# Patient Record
Sex: FEMALE | Race: WHITE | Employment: OTHER | URBAN - METROPOLITAN AREA
[De-identification: names, ages, dates, MRNs, and addresses within clinical notes are randomized per-mention and may not be internally consistent; named-entity substitution may affect disease eponyms.]

---

## 2017-01-09 RX ORDER — BUTALBITAL, ASPIRIN, AND CAFFEINE 325; 50; 40 MG/1; MG/1; MG/1
CAPSULE ORAL
Qty: 30 CAPSULE | Refills: 1 | OUTPATIENT
Start: 2017-01-09 | End: 2017-02-21 | Stop reason: SDUPTHER

## 2017-02-03 RX ORDER — BUTALBITAL, ASPIRIN, AND CAFFEINE 325; 50; 40 MG/1; MG/1; MG/1
CAPSULE ORAL
Qty: 30 CAPSULE | Refills: 0 | OUTPATIENT
Start: 2017-02-03

## 2017-02-21 ENCOUNTER — TELEPHONE (OUTPATIENT)
Dept: INTERNAL MEDICINE | Age: 62
End: 2017-02-21

## 2017-02-21 ENCOUNTER — OFFICE VISIT (OUTPATIENT)
Dept: ORTHOPEDIC SURGERY | Age: 62
End: 2017-02-21

## 2017-02-21 VITALS
HEIGHT: 65 IN | WEIGHT: 158 LBS | HEART RATE: 83 BPM | BODY MASS INDEX: 26.33 KG/M2 | DIASTOLIC BLOOD PRESSURE: 72 MMHG | SYSTOLIC BLOOD PRESSURE: 143 MMHG

## 2017-02-21 DIAGNOSIS — G89.29 CHRONIC LEFT SHOULDER PAIN: Primary | ICD-10-CM

## 2017-02-21 DIAGNOSIS — M75.81 TENDINITIS OF RIGHT ROTATOR CUFF: ICD-10-CM

## 2017-02-21 DIAGNOSIS — M19.011 ARTHRITIS OF RIGHT ACROMIOCLAVICULAR JOINT: ICD-10-CM

## 2017-02-21 DIAGNOSIS — M75.82 TENDINITIS OF LEFT ROTATOR CUFF: ICD-10-CM

## 2017-02-21 DIAGNOSIS — G89.29 CHRONIC RIGHT SHOULDER PAIN: ICD-10-CM

## 2017-02-21 DIAGNOSIS — M19.012 ARTHRITIS OF LEFT ACROMIOCLAVICULAR JOINT: ICD-10-CM

## 2017-02-21 DIAGNOSIS — M25.512 CHRONIC LEFT SHOULDER PAIN: Primary | ICD-10-CM

## 2017-02-21 DIAGNOSIS — M75.22 BICEPS TENDINITIS ON LEFT: ICD-10-CM

## 2017-02-21 DIAGNOSIS — M75.21 BICEPS TENDINITIS ON RIGHT: ICD-10-CM

## 2017-02-21 DIAGNOSIS — M47.812 NECK ARTHRITIS: ICD-10-CM

## 2017-02-21 DIAGNOSIS — M25.511 CHRONIC RIGHT SHOULDER PAIN: ICD-10-CM

## 2017-02-21 PROCEDURE — 20610 DRAIN/INJ JOINT/BURSA W/O US: CPT | Performed by: ORTHOPAEDIC SURGERY

## 2017-02-21 RX ORDER — HYDROCHLOROTHIAZIDE 50 MG/1
50 TABLET ORAL DAILY
COMMUNITY
Start: 2017-01-27 | End: 2020-05-27 | Stop reason: ALTCHOICE

## 2017-02-21 RX ORDER — FLUCONAZOLE 150 MG/1
TABLET ORAL
COMMUNITY
Start: 2017-02-16 | End: 2017-03-14

## 2017-02-21 RX ORDER — DULOXETIN HYDROCHLORIDE 30 MG/1
CAPSULE, DELAYED RELEASE ORAL
Qty: 270 CAPSULE | Refills: 1 | Status: SHIPPED | OUTPATIENT
Start: 2017-02-21 | End: 2017-06-13 | Stop reason: SDUPTHER

## 2017-02-21 RX ORDER — BUTALBITAL, ASPIRIN, AND CAFFEINE 325; 50; 40 MG/1; MG/1; MG/1
CAPSULE ORAL
Qty: 30 CAPSULE | Refills: 0 | OUTPATIENT
Start: 2017-02-21 | End: 2017-03-14 | Stop reason: SDUPTHER

## 2017-02-23 ENCOUNTER — TELEPHONE (OUTPATIENT)
Dept: INTERNAL MEDICINE | Age: 62
End: 2017-02-23

## 2017-03-01 ENCOUNTER — TELEPHONE (OUTPATIENT)
Dept: INTERNAL MEDICINE | Age: 62
End: 2017-03-01

## 2017-03-01 RX ORDER — PROMETHAZINE HYDROCHLORIDE AND CODEINE PHOSPHATE 6.25; 1 MG/5ML; MG/5ML
SYRUP ORAL
Qty: 120 ML | Refills: 0 | OUTPATIENT
Start: 2017-03-01 | End: 2017-03-14

## 2017-03-01 RX ORDER — DOXYCYCLINE HYCLATE 100 MG
100 TABLET ORAL 2 TIMES DAILY
Qty: 20 TABLET | Refills: 0 | OUTPATIENT
Start: 2017-03-01 | End: 2017-03-11

## 2017-03-07 RX ORDER — PROCHLORPERAZINE MALEATE 10 MG
TABLET ORAL
Qty: 30 TABLET | Refills: 1 | Status: SHIPPED | OUTPATIENT
Start: 2017-03-07 | End: 2017-03-14

## 2017-03-14 ENCOUNTER — OFFICE VISIT (OUTPATIENT)
Dept: INTERNAL MEDICINE | Age: 62
End: 2017-03-14

## 2017-03-14 VITALS
WEIGHT: 162 LBS | DIASTOLIC BLOOD PRESSURE: 80 MMHG | BODY MASS INDEX: 26.99 KG/M2 | SYSTOLIC BLOOD PRESSURE: 140 MMHG | HEART RATE: 72 BPM | HEIGHT: 65 IN

## 2017-03-14 DIAGNOSIS — I10 ESSENTIAL HYPERTENSION: ICD-10-CM

## 2017-03-14 DIAGNOSIS — J41.1 MUCOPURULENT CHRONIC BRONCHITIS (HCC): ICD-10-CM

## 2017-03-14 DIAGNOSIS — M54.50 ACUTE RIGHT-SIDED LOW BACK PAIN WITHOUT SCIATICA: ICD-10-CM

## 2017-03-14 DIAGNOSIS — F43.29 STRESS AND ADJUSTMENT REACTION: ICD-10-CM

## 2017-03-14 PROCEDURE — 99214 OFFICE O/P EST MOD 30 MIN: CPT | Performed by: INTERNAL MEDICINE

## 2017-03-14 RX ORDER — OMEPRAZOLE 40 MG/1
CAPSULE, DELAYED RELEASE ORAL
Qty: 90 CAPSULE | Refills: 3 | Status: SHIPPED | OUTPATIENT
Start: 2017-03-14 | End: 2017-09-12 | Stop reason: SDUPTHER

## 2017-03-14 RX ORDER — DIAZEPAM 5 MG/1
TABLET ORAL
Qty: 30 TABLET | Refills: 2 | Status: SHIPPED | OUTPATIENT
Start: 2017-03-14 | End: 2017-06-13 | Stop reason: SDUPTHER

## 2017-03-14 RX ORDER — BUTALBITAL, ASPIRIN, AND CAFFEINE 325; 50; 40 MG/1; MG/1; MG/1
CAPSULE ORAL
Qty: 30 CAPSULE | Refills: 2 | Status: SHIPPED | OUTPATIENT
Start: 2017-03-14 | End: 2017-05-08 | Stop reason: SDUPTHER

## 2017-03-14 RX ORDER — CHOLESTYRAMINE 4 G/9G
1 POWDER, FOR SUSPENSION ORAL DAILY
Qty: 30 EACH | Refills: 5 | Status: SHIPPED | OUTPATIENT
Start: 2017-03-14 | End: 2017-06-13 | Stop reason: SDUPTHER

## 2017-03-14 RX ORDER — LEVOTHYROXINE SODIUM 0.1 MG/1
TABLET ORAL
Qty: 90 TABLET | Refills: 3 | Status: SHIPPED | OUTPATIENT
Start: 2017-03-14 | End: 2017-06-13 | Stop reason: SDUPTHER

## 2017-03-14 ASSESSMENT — PATIENT HEALTH QUESTIONNAIRE - PHQ9
SUM OF ALL RESPONSES TO PHQ QUESTIONS 1-9: 0
1. LITTLE INTEREST OR PLEASURE IN DOING THINGS: 0
SUM OF ALL RESPONSES TO PHQ9 QUESTIONS 1 & 2: 0
2. FEELING DOWN, DEPRESSED OR HOPELESS: 0

## 2017-03-14 ASSESSMENT — ENCOUNTER SYMPTOMS
SORE THROAT: 0
BACK PAIN: 1
COUGH: 1

## 2017-04-18 ENCOUNTER — OFFICE VISIT (OUTPATIENT)
Dept: PULMONOLOGY | Age: 62
End: 2017-04-18

## 2017-04-18 VITALS
BODY MASS INDEX: 26.99 KG/M2 | HEIGHT: 65 IN | DIASTOLIC BLOOD PRESSURE: 82 MMHG | WEIGHT: 162 LBS | RESPIRATION RATE: 18 BRPM | SYSTOLIC BLOOD PRESSURE: 118 MMHG | OXYGEN SATURATION: 96 % | HEART RATE: 96 BPM

## 2017-04-18 DIAGNOSIS — J45.901 EXACERBATION OF ASTHMA: ICD-10-CM

## 2017-04-18 PROCEDURE — 99213 OFFICE O/P EST LOW 20 MIN: CPT | Performed by: INTERNAL MEDICINE

## 2017-04-18 RX ORDER — PROMETHAZINE HYDROCHLORIDE AND CODEINE PHOSPHATE 6.25; 1 MG/5ML; MG/5ML
SYRUP ORAL
Qty: 240 ML | Refills: 0 | Status: SHIPPED | OUTPATIENT
Start: 2017-04-18 | End: 2017-06-13

## 2017-04-18 RX ORDER — PREDNISONE 10 MG/1
TABLET ORAL
Qty: 28 TABLET | Refills: 0 | Status: SHIPPED | OUTPATIENT
Start: 2017-04-18 | End: 2017-06-13

## 2017-05-08 RX ORDER — DICYCLOMINE HCL 20 MG
TABLET ORAL
Qty: 120 TABLET | Refills: 4 | OUTPATIENT
Start: 2017-05-08 | End: 2017-09-12 | Stop reason: SDUPTHER

## 2017-05-08 RX ORDER — PROCHLORPERAZINE MALEATE 10 MG
TABLET ORAL
Qty: 30 TABLET | Refills: 0 | OUTPATIENT
Start: 2017-05-08 | End: 2017-06-13 | Stop reason: SDUPTHER

## 2017-05-08 RX ORDER — BUTALBITAL, ASPIRIN, AND CAFFEINE 325; 50; 40 MG/1; MG/1; MG/1
CAPSULE ORAL
Qty: 30 CAPSULE | Refills: 1 | OUTPATIENT
Start: 2017-05-08 | End: 2017-06-13 | Stop reason: SDUPTHER

## 2017-06-09 RX ORDER — ALPRAZOLAM 0.5 MG/1
TABLET ORAL
Qty: 30 TABLET | Refills: 0 | OUTPATIENT
Start: 2017-06-09 | End: 2017-12-05

## 2017-06-13 ENCOUNTER — OFFICE VISIT (OUTPATIENT)
Dept: INTERNAL MEDICINE | Age: 62
End: 2017-06-13

## 2017-06-13 VITALS
DIASTOLIC BLOOD PRESSURE: 80 MMHG | HEIGHT: 65 IN | BODY MASS INDEX: 28.32 KG/M2 | WEIGHT: 170 LBS | HEART RATE: 64 BPM | SYSTOLIC BLOOD PRESSURE: 128 MMHG

## 2017-06-13 DIAGNOSIS — F41.9 ANXIETY: ICD-10-CM

## 2017-06-13 DIAGNOSIS — G89.29 CHRONIC LEFT SHOULDER PAIN: ICD-10-CM

## 2017-06-13 DIAGNOSIS — M25.512 CHRONIC LEFT SHOULDER PAIN: ICD-10-CM

## 2017-06-13 DIAGNOSIS — L98.9 SKIN LESION OF FOOT: ICD-10-CM

## 2017-06-13 DIAGNOSIS — M54.50 ACUTE RIGHT-SIDED LOW BACK PAIN WITHOUT SCIATICA: ICD-10-CM

## 2017-06-13 PROCEDURE — 99214 OFFICE O/P EST MOD 30 MIN: CPT | Performed by: INTERNAL MEDICINE

## 2017-06-13 RX ORDER — LEVOTHYROXINE SODIUM 0.1 MG/1
TABLET ORAL
Qty: 90 TABLET | Refills: 3 | Status: SHIPPED | OUTPATIENT
Start: 2017-06-13 | End: 2017-12-05

## 2017-06-13 RX ORDER — DULOXETIN HYDROCHLORIDE 30 MG/1
CAPSULE, DELAYED RELEASE ORAL
Qty: 270 CAPSULE | Refills: 3 | Status: SHIPPED | OUTPATIENT
Start: 2017-06-13 | End: 2018-07-08 | Stop reason: SDUPTHER

## 2017-06-13 RX ORDER — DIAZEPAM 5 MG/1
TABLET ORAL
Qty: 30 TABLET | Refills: 2 | Status: SHIPPED | OUTPATIENT
Start: 2017-06-13 | End: 2017-09-12 | Stop reason: SDUPTHER

## 2017-06-13 RX ORDER — PROCHLORPERAZINE MALEATE 10 MG
TABLET ORAL
Qty: 30 TABLET | Refills: 1 | Status: SHIPPED | OUTPATIENT
Start: 2017-06-13 | End: 2017-08-15 | Stop reason: SDUPTHER

## 2017-06-13 RX ORDER — CHOLESTYRAMINE 4 G/9G
1 POWDER, FOR SUSPENSION ORAL DAILY
Qty: 90 EACH | Refills: 3 | Status: SHIPPED | OUTPATIENT
Start: 2017-06-13 | End: 2018-05-15

## 2017-06-13 RX ORDER — BUTALBITAL, ASPIRIN, AND CAFFEINE 325; 50; 40 MG/1; MG/1; MG/1
CAPSULE ORAL
Qty: 30 CAPSULE | Refills: 2 | Status: SHIPPED | OUTPATIENT
Start: 2017-06-13 | End: 2017-08-15 | Stop reason: SDUPTHER

## 2017-06-13 ASSESSMENT — PATIENT HEALTH QUESTIONNAIRE - PHQ9
1. LITTLE INTEREST OR PLEASURE IN DOING THINGS: 0
SUM OF ALL RESPONSES TO PHQ9 QUESTIONS 1 & 2: 0
SUM OF ALL RESPONSES TO PHQ QUESTIONS 1-9: 0
2. FEELING DOWN, DEPRESSED OR HOPELESS: 0

## 2017-06-13 ASSESSMENT — ENCOUNTER SYMPTOMS
RESPIRATORY NEGATIVE: 1
BACK PAIN: 1
ROS SKIN COMMENTS: FOOT LESION

## 2017-07-20 ENCOUNTER — HOSPITAL ENCOUNTER (OUTPATIENT)
Dept: WOMENS IMAGING | Age: 62
Discharge: HOME | End: 2017-07-20
Attending: INTERNAL MEDICINE | Admitting: INTERNAL MEDICINE

## 2017-07-20 ENCOUNTER — TELEPHONE (OUTPATIENT)
Dept: INTERNAL MEDICINE | Age: 62
End: 2017-07-20

## 2017-07-20 DIAGNOSIS — M81.0 OSTEOPOROSIS: Primary | ICD-10-CM

## 2017-07-20 DIAGNOSIS — Z12.31 ENCOUNTER FOR SCREENING MAMMOGRAM FOR HIGH-RISK PATIENT: ICD-10-CM

## 2017-07-25 ENCOUNTER — HOSPITAL ENCOUNTER (OUTPATIENT)
Dept: WOMENS IMAGING | Age: 62
Discharge: HOME | End: 2017-07-25
Attending: OBSTETRICS & GYNECOLOGY | Admitting: OBSTETRICS & GYNECOLOGY

## 2017-07-25 DIAGNOSIS — M81.0 AGE RELATED OSTEOPOROSIS: ICD-10-CM

## 2017-07-25 DIAGNOSIS — M81.0 AGE-RELATED OSTEOPOROSIS WITHOUT CURRENT PATHOLOGICAL FRACTURE: ICD-10-CM

## 2017-07-27 ENCOUNTER — OFFICE VISIT (OUTPATIENT)
Dept: ORTHOPEDIC SURGERY | Age: 62
End: 2017-07-27

## 2017-07-27 VITALS
DIASTOLIC BLOOD PRESSURE: 66 MMHG | HEIGHT: 65 IN | HEART RATE: 72 BPM | BODY MASS INDEX: 28.32 KG/M2 | WEIGHT: 169.97 LBS | SYSTOLIC BLOOD PRESSURE: 113 MMHG

## 2017-07-27 DIAGNOSIS — M47.816 SPONDYLOSIS OF LUMBAR REGION WITHOUT MYELOPATHY OR RADICULOPATHY: ICD-10-CM

## 2017-07-27 DIAGNOSIS — M54.50 PAIN OF LUMBAR SPINE: ICD-10-CM

## 2017-07-27 DIAGNOSIS — M51.26 HNP (HERNIATED NUCLEUS PULPOSUS), LUMBAR: ICD-10-CM

## 2017-07-27 DIAGNOSIS — M51.36 DDD (DEGENERATIVE DISC DISEASE), LUMBAR: Primary | ICD-10-CM

## 2017-07-27 PROCEDURE — 72100 X-RAY EXAM L-S SPINE 2/3 VWS: CPT | Performed by: PHYSICAL MEDICINE & REHABILITATION

## 2017-07-27 PROCEDURE — 99243 OFF/OP CNSLTJ NEW/EST LOW 30: CPT | Performed by: PHYSICAL MEDICINE & REHABILITATION

## 2017-07-27 RX ORDER — METHYLPREDNISOLONE 4 MG/1
TABLET ORAL
Qty: 1 KIT | Refills: 0 | Status: SHIPPED | OUTPATIENT
Start: 2017-07-27 | End: 2017-08-02

## 2017-07-27 RX ORDER — ACETAMINOPHEN AND CODEINE PHOSPHATE 300; 30 MG/1; MG/1
1 TABLET ORAL 2 TIMES DAILY PRN
Qty: 14 TABLET | Refills: 0 | Status: CANCELLED | OUTPATIENT
Start: 2017-07-27

## 2017-08-15 RX ORDER — BUTALBITAL, ASPIRIN, AND CAFFEINE 325; 50; 40 MG/1; MG/1; MG/1
CAPSULE ORAL
Qty: 30 CAPSULE | Refills: 2 | OUTPATIENT
Start: 2017-08-15 | End: 2017-09-12 | Stop reason: SDUPTHER

## 2017-08-15 RX ORDER — PROCHLORPERAZINE MALEATE 10 MG
TABLET ORAL
Qty: 30 TABLET | Refills: 0 | Status: SHIPPED | OUTPATIENT
Start: 2017-08-15 | End: 2017-09-12 | Stop reason: SDUPTHER

## 2017-08-22 ENCOUNTER — OFFICE VISIT (OUTPATIENT)
Dept: PULMONOLOGY | Age: 62
End: 2017-08-22

## 2017-08-22 VITALS
SYSTOLIC BLOOD PRESSURE: 122 MMHG | OXYGEN SATURATION: 96 % | HEART RATE: 60 BPM | BODY MASS INDEX: 28.61 KG/M2 | WEIGHT: 178 LBS | HEIGHT: 66 IN | DIASTOLIC BLOOD PRESSURE: 70 MMHG | RESPIRATION RATE: 18 BRPM

## 2017-08-22 DIAGNOSIS — J20.9 ACUTE BRONCHITIS, UNSPECIFIED ORGANISM: ICD-10-CM

## 2017-08-22 DIAGNOSIS — J45.30 MILD PERSISTENT ASTHMA WITHOUT COMPLICATION: Primary | ICD-10-CM

## 2017-08-22 PROCEDURE — 99213 OFFICE O/P EST LOW 20 MIN: CPT | Performed by: INTERNAL MEDICINE

## 2017-08-22 RX ORDER — ALBUTEROL SULFATE 90 UG/1
2 AEROSOL, METERED RESPIRATORY (INHALATION) EVERY 4 HOURS PRN
Qty: 1 INHALER | Refills: 5 | Status: SHIPPED | OUTPATIENT
Start: 2017-08-22 | End: 2019-02-06 | Stop reason: SDUPTHER

## 2017-08-23 ENCOUNTER — HOSPITAL ENCOUNTER (OUTPATIENT)
Dept: OTHER | Age: 62
Discharge: HOME | End: 2017-08-31
Attending: PHYSICAL MEDICINE & REHABILITATION | Admitting: PHYSICAL MEDICINE & REHABILITATION

## 2017-08-29 ENCOUNTER — HOSPITAL ENCOUNTER (OUTPATIENT)
Dept: PHYSICAL THERAPY | Age: 62
Discharge: HOME | End: 2017-08-30
Admitting: PHYSICAL MEDICINE & REHABILITATION

## 2017-09-07 ENCOUNTER — OFFICE VISIT (OUTPATIENT)
Dept: ORTHOPEDIC SURGERY | Age: 62
End: 2017-09-07

## 2017-09-07 ENCOUNTER — HOSPITAL ENCOUNTER (OUTPATIENT)
Dept: PHYSICAL THERAPY | Age: 62
Discharge: HOME | End: 2017-09-08
Admitting: PHYSICAL MEDICINE & REHABILITATION

## 2017-09-07 VITALS
HEIGHT: 64 IN | SYSTOLIC BLOOD PRESSURE: 149 MMHG | WEIGHT: 169 LBS | HEART RATE: 97 BPM | BODY MASS INDEX: 28.85 KG/M2 | DIASTOLIC BLOOD PRESSURE: 67 MMHG

## 2017-09-07 DIAGNOSIS — M47.816 LUMBAR FACET ARTHROPATHY: Primary | ICD-10-CM

## 2017-09-07 PROCEDURE — 99213 OFFICE O/P EST LOW 20 MIN: CPT | Performed by: NURSE PRACTITIONER

## 2017-09-07 RX ORDER — MELOXICAM 15 MG/1
15 TABLET ORAL DAILY
Qty: 30 TABLET | Refills: 1 | Status: SHIPPED | OUTPATIENT
Start: 2017-09-07 | End: 2017-11-30 | Stop reason: SDUPTHER

## 2017-09-12 ENCOUNTER — OFFICE VISIT (OUTPATIENT)
Dept: INTERNAL MEDICINE | Age: 62
End: 2017-09-12

## 2017-09-12 ENCOUNTER — TELEPHONE (OUTPATIENT)
Dept: INTERNAL MEDICINE | Age: 62
End: 2017-09-12

## 2017-09-12 VITALS
DIASTOLIC BLOOD PRESSURE: 80 MMHG | SYSTOLIC BLOOD PRESSURE: 130 MMHG | HEART RATE: 72 BPM | WEIGHT: 179 LBS | HEIGHT: 65 IN | BODY MASS INDEX: 29.82 KG/M2

## 2017-09-12 DIAGNOSIS — M81.0 OSTEOPOROSIS, UNSPECIFIED OSTEOPOROSIS TYPE, UNSPECIFIED PATHOLOGICAL FRACTURE PRESENCE: ICD-10-CM

## 2017-09-12 DIAGNOSIS — E03.9 HYPOTHYROIDISM, UNSPECIFIED TYPE: Chronic | ICD-10-CM

## 2017-09-12 DIAGNOSIS — Z00.00 PREVENTATIVE HEALTH CARE: Primary | ICD-10-CM

## 2017-09-12 DIAGNOSIS — R11.0 NAUSEA: ICD-10-CM

## 2017-09-12 DIAGNOSIS — I10 ESSENTIAL HYPERTENSION: ICD-10-CM

## 2017-09-12 DIAGNOSIS — F43.29 STRESS AND ADJUSTMENT REACTION: ICD-10-CM

## 2017-09-12 DIAGNOSIS — F41.9 ANXIETY: ICD-10-CM

## 2017-09-12 PROBLEM — J45.901 EXACERBATION OF ASTHMA: Status: RESOLVED | Noted: 2017-04-18 | Resolved: 2017-09-12

## 2017-09-12 PROBLEM — J20.9 ACUTE BRONCHITIS: Status: RESOLVED | Noted: 2017-08-22 | Resolved: 2017-09-12

## 2017-09-12 PROBLEM — M54.50 ACUTE RIGHT-SIDED LOW BACK PAIN WITHOUT SCIATICA: Status: RESOLVED | Noted: 2017-03-14 | Resolved: 2017-09-12

## 2017-09-12 PROBLEM — L98.9 SKIN LESION OF FOOT: Status: RESOLVED | Noted: 2017-06-13 | Resolved: 2017-09-12

## 2017-09-12 PROCEDURE — 99213 OFFICE O/P EST LOW 20 MIN: CPT | Performed by: INTERNAL MEDICINE

## 2017-09-12 RX ORDER — OMEPRAZOLE 40 MG/1
CAPSULE, DELAYED RELEASE ORAL
Qty: 90 CAPSULE | Refills: 3 | Status: SHIPPED | OUTPATIENT
Start: 2017-09-12 | End: 2017-09-12 | Stop reason: SDUPTHER

## 2017-09-12 RX ORDER — PROCHLORPERAZINE MALEATE 10 MG
TABLET ORAL
Qty: 30 TABLET | Refills: 1 | Status: SHIPPED | OUTPATIENT
Start: 2017-09-12 | End: 2017-10-23 | Stop reason: SDUPTHER

## 2017-09-12 RX ORDER — DICYCLOMINE HCL 20 MG
TABLET ORAL
Qty: 360 TABLET | Refills: 3 | Status: SHIPPED | OUTPATIENT
Start: 2017-09-12 | End: 2017-09-12 | Stop reason: SDUPTHER

## 2017-09-12 RX ORDER — BUTALBITAL, ASPIRIN, AND CAFFEINE 325; 50; 40 MG/1; MG/1; MG/1
CAPSULE ORAL
Qty: 30 CAPSULE | Refills: 2 | Status: SHIPPED | OUTPATIENT
Start: 2017-09-12 | End: 2017-11-08 | Stop reason: SDUPTHER

## 2017-09-12 RX ORDER — DIAZEPAM 5 MG/1
TABLET ORAL
Qty: 30 TABLET | Refills: 2 | Status: SHIPPED | OUTPATIENT
Start: 2017-09-12 | End: 2017-12-05 | Stop reason: SDUPTHER

## 2017-09-12 RX ORDER — DICYCLOMINE HCL 20 MG
TABLET ORAL
Qty: 360 TABLET | Refills: 3 | Status: SHIPPED | OUTPATIENT
Start: 2017-09-12 | End: 2018-08-07 | Stop reason: SDUPTHER

## 2017-09-12 RX ORDER — OMEPRAZOLE 40 MG/1
CAPSULE, DELAYED RELEASE ORAL
Qty: 90 CAPSULE | Refills: 3 | Status: SHIPPED | OUTPATIENT
Start: 2017-09-12 | End: 2018-10-01 | Stop reason: SDUPTHER

## 2017-09-12 ASSESSMENT — ENCOUNTER SYMPTOMS
ABDOMINAL PAIN: 0
VOMITING: 0
NAUSEA: 1
TROUBLE SWALLOWING: 0

## 2017-10-05 ENCOUNTER — TELEPHONE (OUTPATIENT)
Dept: ORTHOPEDIC SURGERY | Age: 62
End: 2017-10-05

## 2017-10-05 NOTE — TELEPHONE ENCOUNTER
Pt calling to let Erin know that the meloxicam and home therapy are helping at this time so she is going to hold off on getting an MRI or injections   NOREEN

## 2017-10-31 ENCOUNTER — TELEPHONE (OUTPATIENT)
Dept: PULMONOLOGY | Age: 62
End: 2017-10-31

## 2017-11-01 NOTE — TELEPHONE ENCOUNTER
Dr Caryle Lagos,  Please sign off on Breo. Thank you. Called patient and advised of rx and sample(s)  Left message on vm that everything is up at the .

## 2017-11-03 RX ORDER — FLUTICASONE FUROATE AND VILANTEROL 100; 25 UG/1; UG/1
POWDER RESPIRATORY (INHALATION)
Qty: 2 EACH | Refills: 0 | COMMUNITY
Start: 2017-11-03 | End: 2017-12-05

## 2017-11-08 RX ORDER — BUTALBITAL, ASPIRIN, AND CAFFEINE 325; 50; 40 MG/1; MG/1; MG/1
CAPSULE ORAL
Qty: 30 CAPSULE | Refills: 2 | OUTPATIENT
Start: 2017-11-08 | End: 2017-12-05 | Stop reason: SDUPTHER

## 2017-11-28 DIAGNOSIS — Z00.00 PREVENTATIVE HEALTH CARE: ICD-10-CM

## 2017-11-28 DIAGNOSIS — E03.9 HYPOTHYROIDISM, UNSPECIFIED TYPE: Chronic | ICD-10-CM

## 2017-11-28 LAB
A/G RATIO: 1.8 (ref 1.1–2.2)
ALBUMIN SERPL-MCNC: 4.2 G/DL (ref 3.4–5)
ALP BLD-CCNC: 70 U/L (ref 40–129)
ALT SERPL-CCNC: 20 U/L (ref 10–40)
ANION GAP SERPL CALCULATED.3IONS-SCNC: 12 MMOL/L (ref 3–16)
AST SERPL-CCNC: 20 U/L (ref 15–37)
BASOPHILS ABSOLUTE: 0.1 K/UL (ref 0–0.2)
BASOPHILS RELATIVE PERCENT: 1 %
BILIRUB SERPL-MCNC: <0.2 MG/DL (ref 0–1)
BILIRUBIN URINE: NEGATIVE
BLOOD, URINE: NEGATIVE
BUN BLDV-MCNC: 14 MG/DL (ref 7–20)
CALCIUM SERPL-MCNC: 9.9 MG/DL (ref 8.3–10.6)
CHLORIDE BLD-SCNC: 101 MMOL/L (ref 99–110)
CHOLESTEROL, TOTAL: 225 MG/DL (ref 0–199)
CLARITY: CLEAR
CO2: 26 MMOL/L (ref 21–32)
COLOR: YELLOW
CREAT SERPL-MCNC: 0.6 MG/DL (ref 0.6–1.2)
EOSINOPHILS ABSOLUTE: 0.1 K/UL (ref 0–0.6)
EOSINOPHILS RELATIVE PERCENT: 1.2 %
GFR AFRICAN AMERICAN: >60
GFR NON-AFRICAN AMERICAN: >60
GLOBULIN: 2.4 G/DL
GLUCOSE BLD-MCNC: 86 MG/DL (ref 70–99)
GLUCOSE URINE: NEGATIVE MG/DL
HCT VFR BLD CALC: 42 % (ref 36–48)
HDLC SERPL-MCNC: 70 MG/DL (ref 40–60)
HEMOGLOBIN: 14.2 G/DL (ref 12–16)
KETONES, URINE: NEGATIVE MG/DL
LDL CHOLESTEROL CALCULATED: 135 MG/DL
LEUKOCYTE ESTERASE, URINE: NEGATIVE
LYMPHOCYTES ABSOLUTE: 1.3 K/UL (ref 1–5.1)
LYMPHOCYTES RELATIVE PERCENT: 17.7 %
MCH RBC QN AUTO: 31.8 PG (ref 26–34)
MCHC RBC AUTO-ENTMCNC: 33.8 G/DL (ref 31–36)
MCV RBC AUTO: 94.1 FL (ref 80–100)
MICROSCOPIC EXAMINATION: NORMAL
MONOCYTES ABSOLUTE: 0.5 K/UL (ref 0–1.3)
MONOCYTES RELATIVE PERCENT: 6.9 %
NEUTROPHILS ABSOLUTE: 5.4 K/UL (ref 1.7–7.7)
NEUTROPHILS RELATIVE PERCENT: 73.2 %
NITRITE, URINE: NEGATIVE
PDW BLD-RTO: 12.6 % (ref 12.4–15.4)
PH UA: 6
PLATELET # BLD: 280 K/UL (ref 135–450)
PMV BLD AUTO: 8 FL (ref 5–10.5)
POTASSIUM SERPL-SCNC: 4.6 MMOL/L (ref 3.5–5.1)
PROTEIN UA: NEGATIVE MG/DL
RBC # BLD: 4.46 M/UL (ref 4–5.2)
SODIUM BLD-SCNC: 139 MMOL/L (ref 136–145)
SPECIFIC GRAVITY UA: 1.02
T4 FREE: 1.1 NG/DL (ref 0.9–1.8)
TOTAL PROTEIN: 6.6 G/DL (ref 6.4–8.2)
TRIGL SERPL-MCNC: 101 MG/DL (ref 0–150)
TSH SERPL DL<=0.05 MIU/L-ACNC: 3.05 UIU/ML (ref 0.27–4.2)
URINE TYPE: NORMAL
UROBILINOGEN, URINE: 0.2 E.U./DL
VLDLC SERPL CALC-MCNC: 20 MG/DL
WBC # BLD: 7.3 K/UL (ref 4–11)

## 2017-12-05 ENCOUNTER — OFFICE VISIT (OUTPATIENT)
Dept: INTERNAL MEDICINE | Age: 62
End: 2017-12-05

## 2017-12-05 VITALS
SYSTOLIC BLOOD PRESSURE: 138 MMHG | HEIGHT: 64 IN | DIASTOLIC BLOOD PRESSURE: 78 MMHG | WEIGHT: 183 LBS | HEART RATE: 80 BPM | BODY MASS INDEX: 31.24 KG/M2

## 2017-12-05 DIAGNOSIS — Z00.00 PREVENTATIVE HEALTH CARE: ICD-10-CM

## 2017-12-05 DIAGNOSIS — I10 ESSENTIAL HYPERTENSION: Primary | ICD-10-CM

## 2017-12-05 DIAGNOSIS — F41.9 ANXIETY: ICD-10-CM

## 2017-12-05 DIAGNOSIS — M81.0 OSTEOPOROSIS, UNSPECIFIED OSTEOPOROSIS TYPE, UNSPECIFIED PATHOLOGICAL FRACTURE PRESENCE: ICD-10-CM

## 2017-12-05 DIAGNOSIS — E03.9 HYPOTHYROIDISM, UNSPECIFIED TYPE: Chronic | ICD-10-CM

## 2017-12-05 DIAGNOSIS — K21.9 GASTROESOPHAGEAL REFLUX DISEASE, ESOPHAGITIS PRESENCE NOT SPECIFIED: ICD-10-CM

## 2017-12-05 PROBLEM — M25.512 CHRONIC LEFT SHOULDER PAIN: Status: RESOLVED | Noted: 2017-02-21 | Resolved: 2017-12-05

## 2017-12-05 PROBLEM — G89.29 CHRONIC LEFT SHOULDER PAIN: Status: RESOLVED | Noted: 2017-02-21 | Resolved: 2017-12-05

## 2017-12-05 PROCEDURE — 99396 PREV VISIT EST AGE 40-64: CPT | Performed by: INTERNAL MEDICINE

## 2017-12-05 PROCEDURE — 93000 ELECTROCARDIOGRAM COMPLETE: CPT | Performed by: INTERNAL MEDICINE

## 2017-12-05 RX ORDER — LEVOTHYROXINE SODIUM 0.12 MG/1
125 TABLET ORAL DAILY
Qty: 90 TABLET | Refills: 3 | Status: SHIPPED | OUTPATIENT
Start: 2017-12-05 | End: 2018-08-07 | Stop reason: SDUPTHER

## 2017-12-05 RX ORDER — DILTIAZEM HYDROCHLORIDE 60 MG/1
TABLET, FILM COATED ORAL
COMMUNITY
Start: 2017-11-06 | End: 2019-02-04 | Stop reason: SDUPTHER

## 2017-12-05 RX ORDER — BUTALBITAL, ASPIRIN, AND CAFFEINE 325; 50; 40 MG/1; MG/1; MG/1
CAPSULE ORAL
Qty: 30 CAPSULE | Refills: 2 | Status: SHIPPED | OUTPATIENT
Start: 2017-12-05 | End: 2018-02-02 | Stop reason: SDUPTHER

## 2017-12-05 RX ORDER — DIAZEPAM 5 MG/1
TABLET ORAL
Qty: 30 TABLET | Refills: 2 | Status: SHIPPED | OUTPATIENT
Start: 2017-12-05 | End: 2018-05-15 | Stop reason: SDUPTHER

## 2017-12-05 ASSESSMENT — ENCOUNTER SYMPTOMS
RESPIRATORY NEGATIVE: 1
GASTROINTESTINAL NEGATIVE: 1

## 2017-12-05 NOTE — PROGRESS NOTES
SUBJECTIVE:    Patient ID: Terrence Ochoa is an 60 y.o. female. HPI: Patient here today for the f/u of chronic problems -- see Problem List and associated comments. New issues or complaints include (also see Assessment for more details):  Here for routine checkup. Labs reviewed. See problem list.    Review of Systems   Constitutional: Positive for fatigue. HENT: Negative. Eyes: Negative for visual disturbance. Respiratory: Negative. Cardiovascular: Negative. Gastrointestinal: Negative. Genitourinary: Negative. Musculoskeletal: Positive for arthralgias. Skin: Negative for rash and wound. Allergic/Immunologic: Positive for environmental allergies. Neurological: Negative. Hematological: Does not bruise/bleed easily. Psychiatric/Behavioral: Negative for behavioral problems, confusion, decreased concentration, dysphoric mood, sleep disturbance and suicidal ideas. The patient is nervous/anxious. OBJECTIVE:    /78 (Site: Left Arm, Position: Sitting, Cuff Size: Medium Adult)   Pulse 80   Ht 5' 4\" (1.626 m)   Wt 183 lb (83 kg)   BMI 31.41 kg/m²      Physical Exam   Constitutional: She is oriented to person, place, and time. She appears well-developed and well-nourished. HENT:   Head: Normocephalic and atraumatic. Right Ear: External ear normal.   Left Ear: External ear normal.   Mouth/Throat: Oropharynx is clear and moist. No oropharyngeal exudate. Eyes: EOM are normal. Right eye exhibits no discharge. Left eye exhibits no discharge. No scleral icterus. Neck: Normal range of motion. Neck supple. No JVD present. Carotid bruit is not present. No tracheal deviation present. No thyromegaly present. Cardiovascular: Normal rate and regular rhythm. Exam reveals no gallop and no friction rub. Murmur heard. Systolic murmur is present with a grade of 1/6   Pulses:       Carotid pulses are 2+ on the right side, and 2+ on the left side.        Radial pulses are 2+ on the right side, and 2+ on the left side. Posterior tibial pulses are 2+ on the right side, and 2+ on the left side. Pulmonary/Chest: No stridor. No respiratory distress. Abdominal: Soft. Bowel sounds are normal. She exhibits no distension, no abdominal bruit and no mass. There is no hepatosplenomegaly. There is no tenderness. Musculoskeletal: She exhibits no edema. Lymphadenopathy:        Head (right side): No submandibular adenopathy present. Head (left side): No submandibular adenopathy present. She has no cervical adenopathy. Right: No supraclavicular adenopathy present. Left: No supraclavicular adenopathy present. Neurological: She is alert and oriented to person, place, and time. She displays no tremor. No cranial nerve deficit. She exhibits normal muscle tone. Gait normal.   Reflex Scores:       Bicep reflexes are 2+ on the right side and 2+ on the left side. Patellar reflexes are 2+ on the right side and 2+ on the left side. No focal signs   Skin: She is not diaphoretic. No pallor. Psychiatric: She has a normal mood and affect. Her speech is normal and behavior is normal. Judgment and thought content normal. Her mood appears not anxious. Cognition and memory are normal. She does not exhibit a depressed mood. ASSESSMENT:       Encounter Diagnoses   Name Primary?  Essential hypertension Yes    Hypothyroidism, unspecified type     Preventative health care     Gastroesophageal reflux disease, esophagitis presence not specified     Osteoporosis, unspecified osteoporosis type, unspecified pathological fracture presence     Anxiety        Preventative health care  Routine checkup. Labs reviewed. Much of her stress is been decreased at home. She has gained some weight although she is active. She generally complains of fatigue. Nonsmoker. Staying up-to-date on preventive healthcare.     Hypothyroidism  Will increase to 125 to see if this helps with some of her fatigue. Recheck labs in 3 months. Hypertension  BP okay. EKG stable    GERD (gastroesophageal reflux disease)  Controlled    Osteoporosis  DEXA scan shows osteopenia of her hips and some mild osteoporosis in her lumbar spine. She was on Fosamax in the past and she is reluctant for any treatment currently. Agree to repeat DEXA scan in 2 years for comparison. Anxiety  Controlled. Her family has moved out of her house which is helped some the day-to-day stressors. Continue Cymbalta and Valium when necessary. Monitor report done. She is functional and doing well on current medications. PLAN:  See ASSESSMENT for evaluation & PLAN    Orders Placed This Encounter   Procedures    TSH without Reflex     Standing Status:   Future     Standing Expiration Date:   12/5/2018    T4, Free     Standing Status:   Future     Standing Expiration Date:   12/5/2018    EKG 12 Lead     Order Specific Question:   Reason for Exam?     Answer: Other       PSH, PMH, SH and FH reviewed and noted. Recent and past labs, tests and consults also reviewed. Recent or new meds also reviewed.

## 2017-12-05 NOTE — ASSESSMENT & PLAN NOTE
Routine checkup. Labs reviewed. Much of her stress is been decreased at home. She has gained some weight although she is active. She generally complains of fatigue. Nonsmoker. Staying up-to-date on preventive healthcare.

## 2017-12-11 ENCOUNTER — TELEPHONE (OUTPATIENT)
Dept: INTERNAL MEDICINE | Age: 62
End: 2017-12-11

## 2017-12-11 RX ORDER — AZITHROMYCIN 250 MG/1
TABLET, FILM COATED ORAL
Qty: 1 PACKET | Refills: 0 | OUTPATIENT
Start: 2017-12-11 | End: 2017-12-21

## 2017-12-11 RX ORDER — PROMETHAZINE HYDROCHLORIDE AND CODEINE PHOSPHATE 6.25; 1 MG/5ML; MG/5ML
5 SYRUP ORAL EVERY 4 HOURS PRN
Qty: 120 ML | Refills: 0 | OUTPATIENT
Start: 2017-12-11 | End: 2017-12-22 | Stop reason: SDUPTHER

## 2017-12-11 RX ORDER — METHYLPREDNISOLONE 4 MG/1
TABLET ORAL
Qty: 1 KIT | Refills: 0 | OUTPATIENT
Start: 2017-12-11 | End: 2017-12-17

## 2017-12-22 RX ORDER — PROMETHAZINE HYDROCHLORIDE AND CODEINE PHOSPHATE 6.25; 1 MG/5ML; MG/5ML
SYRUP ORAL
Qty: 120 ML | Refills: 0 | Status: SHIPPED | OUTPATIENT
Start: 2017-12-22 | End: 2018-02-02

## 2018-01-16 RX ORDER — PROCHLORPERAZINE MALEATE 10 MG
TABLET ORAL
Qty: 30 TABLET | Refills: 2 | Status: SHIPPED | OUTPATIENT
Start: 2018-01-16 | End: 2018-03-23 | Stop reason: SDUPTHER

## 2018-01-23 ENCOUNTER — TELEPHONE (OUTPATIENT)
Dept: INTERNAL MEDICINE | Age: 63
End: 2018-01-23

## 2018-02-02 ENCOUNTER — OFFICE VISIT (OUTPATIENT)
Dept: INTERNAL MEDICINE | Age: 63
End: 2018-02-02

## 2018-02-02 VITALS
WEIGHT: 183 LBS | SYSTOLIC BLOOD PRESSURE: 118 MMHG | OXYGEN SATURATION: 95 % | BODY MASS INDEX: 30.49 KG/M2 | HEART RATE: 79 BPM | HEIGHT: 65 IN | DIASTOLIC BLOOD PRESSURE: 70 MMHG

## 2018-02-02 DIAGNOSIS — G43.909 MIGRAINE WITHOUT STATUS MIGRAINOSUS, NOT INTRACTABLE, UNSPECIFIED MIGRAINE TYPE: ICD-10-CM

## 2018-02-02 DIAGNOSIS — E03.9 HYPOTHYROIDISM, UNSPECIFIED TYPE: Chronic | ICD-10-CM

## 2018-02-02 DIAGNOSIS — K52.839 MICROSCOPIC COLITIS, UNSPECIFIED MICROSCOPIC COLITIS TYPE: ICD-10-CM

## 2018-02-02 DIAGNOSIS — K21.9 GASTROESOPHAGEAL REFLUX DISEASE, ESOPHAGITIS PRESENCE NOT SPECIFIED: ICD-10-CM

## 2018-02-02 DIAGNOSIS — R19.5 LOOSE STOOLS: ICD-10-CM

## 2018-02-02 PROCEDURE — 99214 OFFICE O/P EST MOD 30 MIN: CPT | Performed by: INTERNAL MEDICINE

## 2018-02-02 RX ORDER — CIPROFLOXACIN 500 MG/1
500 TABLET, FILM COATED ORAL 2 TIMES DAILY
Qty: 20 TABLET | Refills: 0 | Status: SHIPPED | OUTPATIENT
Start: 2018-02-02 | End: 2018-03-12

## 2018-02-02 RX ORDER — BUTALBITAL, ASPIRIN, AND CAFFEINE 325; 50; 40 MG/1; MG/1; MG/1
CAPSULE ORAL
Qty: 30 CAPSULE | Refills: 2 | Status: SHIPPED | OUTPATIENT
Start: 2018-02-02 | End: 2018-04-03 | Stop reason: SDUPTHER

## 2018-02-02 RX ORDER — PREDNISONE 10 MG/1
TABLET ORAL
Qty: 18 TABLET | Refills: 0 | Status: SHIPPED | OUTPATIENT
Start: 2018-02-02 | End: 2018-02-12

## 2018-02-02 ASSESSMENT — ENCOUNTER SYMPTOMS
ABDOMINAL DISTENTION: 0
SORE THROAT: 0
ABDOMINAL PAIN: 0
SHORTNESS OF BREATH: 0
COUGH: 1
WHEEZING: 0
ANAL BLEEDING: 0
DIARRHEA: 1
VOMITING: 0
BLOOD IN STOOL: 0
NAUSEA: 0

## 2018-02-22 ENCOUNTER — TELEPHONE (OUTPATIENT)
Dept: INTERNAL MEDICINE | Age: 63
End: 2018-02-22

## 2018-02-22 DIAGNOSIS — R05.9 COUGH: Primary | ICD-10-CM

## 2018-02-23 ENCOUNTER — HOSPITAL ENCOUNTER (OUTPATIENT)
Dept: OTHER | Age: 63
Discharge: HOME | End: 2018-02-23
Attending: INTERNAL MEDICINE | Admitting: INTERNAL MEDICINE

## 2018-02-23 DIAGNOSIS — R05.9 COUGH: ICD-10-CM

## 2018-02-27 ENCOUNTER — OFFICE VISIT (OUTPATIENT)
Dept: PULMONOLOGY | Age: 63
End: 2018-02-27

## 2018-02-27 VITALS
HEIGHT: 65 IN | WEIGHT: 180 LBS | HEART RATE: 83 BPM | BODY MASS INDEX: 29.99 KG/M2 | RESPIRATION RATE: 16 BRPM | SYSTOLIC BLOOD PRESSURE: 128 MMHG | OXYGEN SATURATION: 95 % | DIASTOLIC BLOOD PRESSURE: 62 MMHG

## 2018-02-27 DIAGNOSIS — J45.30 MILD PERSISTENT ASTHMA WITHOUT COMPLICATION: Primary | ICD-10-CM

## 2018-02-27 DIAGNOSIS — K21.9 GASTROESOPHAGEAL REFLUX DISEASE, ESOPHAGITIS PRESENCE NOT SPECIFIED: ICD-10-CM

## 2018-02-27 DIAGNOSIS — R05.9 COUGH: ICD-10-CM

## 2018-02-27 PROCEDURE — 99214 OFFICE O/P EST MOD 30 MIN: CPT | Performed by: INTERNAL MEDICINE

## 2018-02-27 RX ORDER — INHALER, ASSIST DEVICES
SPACER (EA) MISCELLANEOUS
Qty: 1 EACH | Refills: 3 | Status: ON HOLD | OUTPATIENT
Start: 2018-02-27 | End: 2020-07-03 | Stop reason: HOSPADM

## 2018-02-27 NOTE — PROGRESS NOTES
no discharge. Left eye exhibits no discharge. No scleral icterus. Neck: No tracheal deviation present. No thyromegaly present. Cardiovascular: Normal rate, regular rhythm, S1 normal, S2 normal and intact distal pulses. Murmur heard. Crescendo systolic murmur is present with a grade of 2/6   Pulmonary/Chest: Effort normal and breath sounds normal. No respiratory distress. She has no wheezes. She has no rales. She exhibits no tenderness. Musculoskeletal: She exhibits no edema. Lymphadenopathy:     She has no cervical adenopathy. Neurological: She is alert and oriented to person, place, and time. Skin: Skin is warm and dry. Psychiatric: She has a normal mood and affect. Her behavior is normal. Judgment and thought content normal.   Vitals reviewed. Chest x-ray on 2/23/18 is normal    Assessment:      Asthma appears to be under fairly good control with ICS/LABA. Note that she is not maximizing the benefit of her inhaler, not using a spacer device. She may have one at home. Development of cough is of uncertain origin. It seems unlikely that this is related to asthma, as she has been using ICS/LABA at an appropriate dose. There is erythema and the oropharynx but she does not appear to have postnasal drainage. There may be local irritation from inhaled steroid. GERD is at least somewhat controlled, although she takes additional medicine for symptoms despite 40 mg of omeprazole daily. She is not on an ACEI. Plan:      Continue Symbicort 80/4.5, 2 puffs every 12 hours. She will use this with a spacer. Instructions were given for proper use. She will rinse her mouth after each use. She will be undergoing endoscopy evaluation in the next few weeks, both for colitis and concerns regarding GERD.   During this face-to-face visit of 28 minutes, greater than 50% of the time was spent counseling patient guarding cough, asthma, appropriate treatment and use of medications, extrapulmonary sources

## 2018-03-02 DIAGNOSIS — E03.9 HYPOTHYROIDISM, UNSPECIFIED TYPE: Chronic | ICD-10-CM

## 2018-03-02 LAB
T4 FREE: 1.2 NG/DL (ref 0.9–1.8)
TSH SERPL DL<=0.05 MIU/L-ACNC: 2.28 UIU/ML (ref 0.27–4.2)

## 2018-03-12 ENCOUNTER — OFFICE VISIT (OUTPATIENT)
Dept: INTERNAL MEDICINE | Age: 63
End: 2018-03-12

## 2018-03-12 VITALS
HEIGHT: 64 IN | WEIGHT: 181 LBS | SYSTOLIC BLOOD PRESSURE: 158 MMHG | DIASTOLIC BLOOD PRESSURE: 70 MMHG | TEMPERATURE: 98.6 F | BODY MASS INDEX: 30.9 KG/M2

## 2018-03-12 DIAGNOSIS — R19.5 LOOSE STOOLS: ICD-10-CM

## 2018-03-12 DIAGNOSIS — K52.839 MICROSCOPIC COLITIS, UNSPECIFIED MICROSCOPIC COLITIS TYPE: ICD-10-CM

## 2018-03-12 DIAGNOSIS — K21.9 GASTROESOPHAGEAL REFLUX DISEASE, ESOPHAGITIS PRESENCE NOT SPECIFIED: ICD-10-CM

## 2018-03-12 DIAGNOSIS — J45.30 MILD PERSISTENT ASTHMA WITHOUT COMPLICATION: ICD-10-CM

## 2018-03-12 DIAGNOSIS — H10.33 ACUTE CONJUNCTIVITIS OF BOTH EYES, UNSPECIFIED ACUTE CONJUNCTIVITIS TYPE: ICD-10-CM

## 2018-03-12 DIAGNOSIS — R05.9 COUGH: Primary | ICD-10-CM

## 2018-03-12 DIAGNOSIS — R11.2 NAUSEA AND VOMITING, INTRACTABILITY OF VOMITING NOT SPECIFIED, UNSPECIFIED VOMITING TYPE: ICD-10-CM

## 2018-03-12 PROBLEM — H10.30 ACUTE CONJUNCTIVITIS: Status: ACTIVE | Noted: 2018-03-12

## 2018-03-12 PROBLEM — Z00.00 PREVENTATIVE HEALTH CARE: Status: RESOLVED | Noted: 2017-12-05 | Resolved: 2018-03-12

## 2018-03-12 PROCEDURE — 99214 OFFICE O/P EST MOD 30 MIN: CPT | Performed by: INTERNAL MEDICINE

## 2018-03-12 RX ORDER — PROMETHAZINE HYDROCHLORIDE AND CODEINE PHOSPHATE 6.25; 1 MG/5ML; MG/5ML
5 SYRUP ORAL 3 TIMES DAILY PRN
Qty: 120 ML | Refills: 0 | Status: SHIPPED | OUTPATIENT
Start: 2018-03-12 | End: 2018-03-22

## 2018-03-12 ASSESSMENT — ENCOUNTER SYMPTOMS
ABDOMINAL PAIN: 0
SHORTNESS OF BREATH: 0
TROUBLE SWALLOWING: 0
NAUSEA: 1
DIARRHEA: 1
EYE DISCHARGE: 1
VOMITING: 1
BLOOD IN STOOL: 0

## 2018-03-12 NOTE — PROGRESS NOTES
infectious versus blepharitis. Gave medicated eyedrops to use and suggest starting use baby shampoo with the shower. PLAN:  See ASSESSMENT for evaluation & PLAN     No orders of the defined types were placed in this encounter. PSH, PMH, SH and FH reviewed and noted. Recent and past labs, tests and consults also reviewed. Recent or new meds also reviewed.

## 2018-03-22 ENCOUNTER — PAT TELEPHONE (OUTPATIENT)
Dept: PREADMISSION TESTING | Age: 63
End: 2018-03-22

## 2018-03-22 VITALS — BODY MASS INDEX: 29.99 KG/M2 | HEIGHT: 65 IN | WEIGHT: 180 LBS

## 2018-03-23 RX ORDER — PROCHLORPERAZINE MALEATE 10 MG
TABLET ORAL
Qty: 30 TABLET | Refills: 1 | Status: SHIPPED | OUTPATIENT
Start: 2018-03-23 | End: 2018-05-15 | Stop reason: SDUPTHER

## 2018-03-29 ENCOUNTER — HOSPITAL ENCOUNTER (OUTPATIENT)
Dept: ENDOSCOPY | Age: 63
Discharge: HOME | End: 2018-03-29
Attending: INTERNAL MEDICINE | Admitting: INTERNAL MEDICINE

## 2018-03-29 VITALS
DIASTOLIC BLOOD PRESSURE: 49 MMHG | HEIGHT: 65 IN | HEART RATE: 77 BPM | OXYGEN SATURATION: 99 % | BODY MASS INDEX: 29.32 KG/M2 | WEIGHT: 176 LBS | RESPIRATION RATE: 18 BRPM | TEMPERATURE: 98.9 F | SYSTOLIC BLOOD PRESSURE: 139 MMHG

## 2018-03-29 RX ORDER — MORPHINE SULFATE 4 MG/ML
1 INJECTION, SOLUTION INTRAMUSCULAR; INTRAVENOUS EVERY 5 MIN PRN
Status: DISCONTINUED | OUTPATIENT
Start: 2018-03-29 | End: 2018-03-30 | Stop reason: HOSPADM

## 2018-03-29 RX ORDER — PROMETHAZINE HYDROCHLORIDE 25 MG/ML
6.25 INJECTION, SOLUTION INTRAMUSCULAR; INTRAVENOUS
Status: ACTIVE | OUTPATIENT
Start: 2018-03-29 | End: 2018-03-29

## 2018-03-29 RX ORDER — SODIUM CHLORIDE 9 MG/ML
INJECTION, SOLUTION INTRAVENOUS CONTINUOUS
Status: DISCONTINUED | OUTPATIENT
Start: 2018-03-29 | End: 2018-03-30 | Stop reason: HOSPADM

## 2018-03-29 RX ORDER — SODIUM CHLORIDE 0.9 % (FLUSH) 0.9 %
10 SYRINGE (ML) INJECTION EVERY 12 HOURS SCHEDULED
Status: DISCONTINUED | OUTPATIENT
Start: 2018-03-29 | End: 2018-03-30 | Stop reason: HOSPADM

## 2018-03-29 RX ORDER — LABETALOL HYDROCHLORIDE 5 MG/ML
5 INJECTION, SOLUTION INTRAVENOUS EVERY 10 MIN PRN
Status: DISCONTINUED | OUTPATIENT
Start: 2018-03-29 | End: 2018-03-30 | Stop reason: HOSPADM

## 2018-03-29 RX ORDER — OXYCODONE HYDROCHLORIDE 5 MG/1
5 TABLET ORAL PRN
Status: ACTIVE | OUTPATIENT
Start: 2018-03-29 | End: 2018-03-29

## 2018-03-29 RX ORDER — OXYCODONE HYDROCHLORIDE 5 MG/1
10 TABLET ORAL PRN
Status: ACTIVE | OUTPATIENT
Start: 2018-03-29 | End: 2018-03-29

## 2018-03-29 RX ORDER — LIDOCAINE HYDROCHLORIDE 10 MG/ML
1 INJECTION, SOLUTION EPIDURAL; INFILTRATION; INTRACAUDAL; PERINEURAL
Status: ACTIVE | OUTPATIENT
Start: 2018-03-29 | End: 2018-03-29

## 2018-03-29 RX ORDER — METOCLOPRAMIDE HYDROCHLORIDE 5 MG/ML
10 INJECTION INTRAMUSCULAR; INTRAVENOUS
Status: ACTIVE | OUTPATIENT
Start: 2018-03-29 | End: 2018-03-29

## 2018-03-29 RX ORDER — HYDRALAZINE HYDROCHLORIDE 20 MG/ML
5 INJECTION INTRAMUSCULAR; INTRAVENOUS EVERY 10 MIN PRN
Status: DISCONTINUED | OUTPATIENT
Start: 2018-03-29 | End: 2018-03-30 | Stop reason: HOSPADM

## 2018-03-29 RX ORDER — MEPERIDINE HYDROCHLORIDE 25 MG/ML
12.5 INJECTION INTRAMUSCULAR; INTRAVENOUS; SUBCUTANEOUS EVERY 5 MIN PRN
Status: DISCONTINUED | OUTPATIENT
Start: 2018-03-29 | End: 2018-03-30 | Stop reason: HOSPADM

## 2018-03-29 RX ORDER — SODIUM CHLORIDE 0.9 % (FLUSH) 0.9 %
10 SYRINGE (ML) INJECTION PRN
Status: DISCONTINUED | OUTPATIENT
Start: 2018-03-29 | End: 2018-03-30 | Stop reason: HOSPADM

## 2018-03-29 RX ORDER — DIPHENHYDRAMINE HYDROCHLORIDE 50 MG/ML
12.5 INJECTION INTRAMUSCULAR; INTRAVENOUS
Status: ACTIVE | OUTPATIENT
Start: 2018-03-29 | End: 2018-03-29

## 2018-03-29 RX ADMIN — SODIUM CHLORIDE: 9 INJECTION, SOLUTION INTRAVENOUS at 07:22

## 2018-03-29 ASSESSMENT — PAIN SCALES - GENERAL
PAINLEVEL_OUTOF10: 0

## 2018-03-29 ASSESSMENT — PAIN - FUNCTIONAL ASSESSMENT: PAIN_FUNCTIONAL_ASSESSMENT: 0-10

## 2018-03-29 NOTE — OP NOTE
Endoscopy Note    Patient: Jasmine Hazel  : 1955  Acct#:     Procedure: Esophagogastroduodenoscopy                        Colonoscopy with biopsy    Date:  3/29/2018     Surgeon:   Racheal Hurley MD    Referring Physician:  Joan Doll MD    Indications: This is a 58y.o. year old female who presents today with GERD/Diarrhea/Hx of Polyps     Postoperative Diagnosis:  1. Normal EGD 2. Normal Colonoscopy-Random biopsies obtained for microscopic colitis     Anesthesia: The patient was administered IV propofol per anesthesiology team.  Please see their operative records for full details. Consent:  The patient or their legal guardian has signed a consent, and is aware of the potential risks, benefits, alternatives, and potential complications of this procedure. These include, but are not limited to hemorrhage, bleeding, post procedural pain, perforation, phlebitis, aspiration, hypotension, hypoxia, cardiovascular events such as arryhthmia, and possibly death. Additionally, the possibility of missed colonic polyps and interval colon cancer was discussed in the consent. Description of Procedure: The patient was then taken to the endoscopy suite, placed in the left lateral decubitus position and the above IV sedation was administrered. The Olympus video endoscope was placed through the patient's oropharynx without difficulty to the extent of the 2nd portion of the duodenum. Both forward and retroflexed views of the stomach were obtained. Findings:    Esophagus: The esophagus appeared normal without evidence of Sahu's esophagus or reflux esophagitis. Stomach: The stomach appeared normal on forward and retroflexed views. Duodenum: The first and 2nd portions of the duodenum appeared normal with normal villous pattern    The scope was then withdrawn back into the stomach, it was decompressed, and the scope was completely withdrawn.     A digital rectal examination was performed and revealed negative without mass, lesions or tenderness. The Olympus video colonoscope was placed in the patient's rectum under digital direction and advanced to the cecum. The cecum was identified by characteristic anatomy and ballottment. The prep was excellent. The ileocecal valve was identified. The terminal ileum was normal appearing examined 1-10 cm into the TI. The scope was then withdrawn back through the cecum, ascending, transverse, descending, sigmoid colon, and rectum. Careful circumferential examination of the mucosa in these areas demonstrated:    1. The entire colon was normal appearing with no polyps, inflammation, masses, or other significant abnormalities. Random biopsies obtained for microscopic colitis. The scope was then withdrawn into the rectum and retroflexed. The retroflexed view of the anal verge and rectum demonstrates normal rectum. The scope was straightened, the colon was decompressed and the scope was withdrawn from the patient. The patient tolerated the procedure well and was taken to the PACU in good condition. Impression:    1) See post procedure diagnoses    Recommendations:   1) Await pathology results. 2) If biopsies confirm persistent microscopic colitis will discuss medical treatment options. If biopsies do show active microscopic colitis will also discuss switching PPI to H2 blocker as PPIs can be associated with microscopic colitis. 3) Recommend repeat Colonoscopy in 5 years for screening purposes given hx of polyps. 4) The patient had biopsies taken today. The patient should call for results in 7 days if they have not heard from our office. Our number is [phone].     Ewelina Arora MD  [address] and [address] [facility]

## 2018-03-29 NOTE — ANESTHESIA PRE-OP
complication Y62.22    Loose stools R19.5    Acute conjunctivitis H10.30       Past Medical History:        Diagnosis Date    Adrenal adenoma     left 8 mm - stable - CT 8/13    Anxiety     Asthma     Environmental allergies     GERD (gastroesophageal reflux disease)     HTN (hypertension)     Irritable bowel syndrome     Microscopic colitis     Migraine     Nephrolithiasis 1/21/15    R side -s/p lithotripsy    Osteoporosis     DEXA 8/8/13 - scotty lumbar spine    Restless leg syndrome     Vitreous detachment     Wrist fracture, bilateral        Past Surgical History:        Procedure Laterality Date    COLONOSCOPY  7/13    KNEE SURGERY Right 1975    synovectomy    OVARY REMOVAL Left     partial    OVARY REMOVAL Left     Partial    SYNOVECTOMY Right     knee    WRIST FRACTURE SURGERY Left 2009       Social History:    Social History   Substance Use Topics    Smoking status: Former Smoker     Packs/day: 1.00     Years: 20.00    Smokeless tobacco: Never Used    Alcohol use 0.0 oz/week      Comment: social                                Counseling given: Not Answered      Vital Signs (Current):   Vitals:    03/29/18 0717   BP: (!) 164/67   Pulse: 87   Resp: 18   Temp: 99.1 °F (37.3 °C)   TempSrc: Temporal   SpO2: 97%   Weight: 176 lb (79.8 kg)   Height: 5' 5\" (1.651 m)                                              BP Readings from Last 3 Encounters:   03/29/18 (!) 164/67   03/12/18 (!) 158/70   02/27/18 128/62       NPO Status: Time of last liquid consumption: 0230                                                 Date of last liquid consumption: 03/29/18                        Date of last solid food consumption: 03/27/18    BMI:   Wt Readings from Last 3 Encounters:   03/29/18 176 lb (79.8 kg)   03/22/18 180 lb (81.6 kg)   03/12/18 181 lb (82.1 kg)     Body mass index is 29.29 kg/m².     Anesthesia Evaluation  Patient summary reviewed and Nursing notes reviewed no history of anesthetic complications:   Airway: Mallampati: II  TM distance: >3 FB   Neck ROM: full  Mouth opening: > = 3 FB Dental:          Pulmonary:   (+) asthma:                            Cardiovascular:    (+) hypertension:,                   Neuro/Psych:   (+) headaches:,             GI/Hepatic/Renal:   (+) GERD:,           Endo/Other:    (+) hypothyroidism::., .                 Abdominal:           Vascular:                                        Anesthesia Plan      general       (65-year-old female presents for colonoscopy. Plan general anesthesia with ASA standard monitors. Questions answered. Patient agreeable with anesthetic plan.  )  Induction: intravenous. Anesthetic plan and risks discussed with patient. Plan discussed with CRNA.     Attending anesthesiologist reviewed and agrees with Pre Eval content        Hannah Olmedo MD   3/29/2018

## 2018-04-03 RX ORDER — BUTALBITAL, ASPIRIN, AND CAFFEINE 325; 50; 40 MG/1; MG/1; MG/1
CAPSULE ORAL
Qty: 30 CAPSULE | Refills: 0 | OUTPATIENT
Start: 2018-04-03 | End: 2018-05-15 | Stop reason: SDUPTHER

## 2018-04-10 RX ORDER — PROMETHAZINE HYDROCHLORIDE AND CODEINE PHOSPHATE 6.25; 1 MG/5ML; MG/5ML
SYRUP ORAL
Refills: 0 | OUTPATIENT
Start: 2018-04-10 | End: 2018-05-10

## 2018-05-15 ENCOUNTER — OFFICE VISIT (OUTPATIENT)
Dept: INTERNAL MEDICINE | Age: 63
End: 2018-05-15

## 2018-05-15 VITALS
SYSTOLIC BLOOD PRESSURE: 120 MMHG | DIASTOLIC BLOOD PRESSURE: 68 MMHG | BODY MASS INDEX: 30.16 KG/M2 | WEIGHT: 181 LBS | HEART RATE: 66 BPM | OXYGEN SATURATION: 97 % | HEIGHT: 65 IN

## 2018-05-15 DIAGNOSIS — K52.839 MICROSCOPIC COLITIS, UNSPECIFIED MICROSCOPIC COLITIS TYPE: ICD-10-CM

## 2018-05-15 DIAGNOSIS — R19.5 LOOSE STOOLS: ICD-10-CM

## 2018-05-15 DIAGNOSIS — G43.909 MIGRAINE WITHOUT STATUS MIGRAINOSUS, NOT INTRACTABLE, UNSPECIFIED MIGRAINE TYPE: ICD-10-CM

## 2018-05-15 DIAGNOSIS — I10 ESSENTIAL HYPERTENSION: ICD-10-CM

## 2018-05-15 DIAGNOSIS — R11.0 CHRONIC NAUSEA: ICD-10-CM

## 2018-05-15 DIAGNOSIS — F41.9 ANXIETY: ICD-10-CM

## 2018-05-15 PROBLEM — H10.30 ACUTE CONJUNCTIVITIS: Status: RESOLVED | Noted: 2018-03-12 | Resolved: 2018-05-15

## 2018-05-15 PROCEDURE — 99213 OFFICE O/P EST LOW 20 MIN: CPT | Performed by: INTERNAL MEDICINE

## 2018-05-15 RX ORDER — BUDESONIDE 3 MG/1
3 CAPSULE, COATED PELLETS ORAL EVERY MORNING
COMMUNITY
Start: 2018-04-18 | End: 2021-07-23 | Stop reason: ALTCHOICE

## 2018-05-15 RX ORDER — PROCHLORPERAZINE MALEATE 10 MG
TABLET ORAL
Qty: 30 TABLET | Refills: 2 | Status: SHIPPED | OUTPATIENT
Start: 2018-05-15 | End: 2018-07-22 | Stop reason: SDUPTHER

## 2018-05-15 RX ORDER — BUTALBITAL, ASPIRIN, AND CAFFEINE 325; 50; 40 MG/1; MG/1; MG/1
CAPSULE ORAL
Qty: 30 CAPSULE | Refills: 2 | Status: SHIPPED | OUTPATIENT
Start: 2018-05-15 | End: 2018-08-07 | Stop reason: SDUPTHER

## 2018-05-15 RX ORDER — DIAZEPAM 5 MG/1
TABLET ORAL
Qty: 30 TABLET | Refills: 2 | Status: SHIPPED | OUTPATIENT
Start: 2018-05-15 | End: 2018-08-07 | Stop reason: SDUPTHER

## 2018-05-15 ASSESSMENT — ENCOUNTER SYMPTOMS
CONSTIPATION: 1
ANAL BLEEDING: 1
DIARRHEA: 0
ABDOMINAL PAIN: 0
NAUSEA: 1
RESPIRATORY NEGATIVE: 1
VOMITING: 0

## 2018-07-09 RX ORDER — MELOXICAM 15 MG/1
TABLET ORAL
Qty: 30 TABLET | Refills: 2 | Status: SHIPPED | OUTPATIENT
Start: 2018-07-09 | End: 2019-08-08 | Stop reason: SDUPTHER

## 2018-07-09 RX ORDER — DULOXETIN HYDROCHLORIDE 30 MG/1
CAPSULE, DELAYED RELEASE ORAL
Qty: 270 CAPSULE | Refills: 3 | Status: SHIPPED | OUTPATIENT
Start: 2018-07-09 | End: 2018-10-15 | Stop reason: SDUPTHER

## 2018-07-23 RX ORDER — PROCHLORPERAZINE MALEATE 10 MG
TABLET ORAL
Qty: 30 TABLET | Refills: 1 | Status: SHIPPED | OUTPATIENT
Start: 2018-07-23 | End: 2018-09-13 | Stop reason: SDUPTHER

## 2018-07-24 ENCOUNTER — HOSPITAL ENCOUNTER (OUTPATIENT)
Dept: WOMENS IMAGING | Age: 63
Discharge: HOME | End: 2018-07-24
Attending: INTERNAL MEDICINE | Admitting: INTERNAL MEDICINE

## 2018-07-24 DIAGNOSIS — Z12.31 VISIT FOR SCREENING MAMMOGRAM: ICD-10-CM

## 2018-08-02 ENCOUNTER — TELEPHONE (OUTPATIENT)
Dept: INTERNAL MEDICINE | Age: 63
End: 2018-08-02

## 2018-08-02 DIAGNOSIS — R39.9 UTI SYMPTOMS: Primary | ICD-10-CM

## 2018-08-02 RX ORDER — CIPROFLOXACIN 500 MG/1
500 TABLET, FILM COATED ORAL 2 TIMES DAILY
Qty: 14 TABLET | Refills: 0 | Status: SHIPPED | OUTPATIENT
Start: 2018-08-02 | End: 2018-08-09

## 2018-08-02 NOTE — TELEPHONE ENCOUNTER
Patient called in stating when she is urinating there is blood in the urine. Patient stated it's not a lot, but it is noticeable   Patient stated the blood issue started 8/1/18  Patient stated it is also burning when urinating   Patient stated the burning issue has always been there  Patient stated she's in a little bit of discomfort when not using the bathroom  Patient stated when using the bathroom she's very uncomfortable   I recommended the ER or urgent care. Patient refused  Please call.

## 2018-08-07 ENCOUNTER — OFFICE VISIT (OUTPATIENT)
Dept: INTERNAL MEDICINE | Age: 63
End: 2018-08-07

## 2018-08-07 VITALS
SYSTOLIC BLOOD PRESSURE: 120 MMHG | OXYGEN SATURATION: 95 % | HEIGHT: 65 IN | DIASTOLIC BLOOD PRESSURE: 78 MMHG | WEIGHT: 186 LBS | BODY MASS INDEX: 30.99 KG/M2 | HEART RATE: 72 BPM

## 2018-08-07 DIAGNOSIS — J45.30 MILD PERSISTENT ASTHMA WITHOUT COMPLICATION: ICD-10-CM

## 2018-08-07 DIAGNOSIS — K52.839 MICROSCOPIC COLITIS, UNSPECIFIED MICROSCOPIC COLITIS TYPE: ICD-10-CM

## 2018-08-07 DIAGNOSIS — K21.9 GASTROESOPHAGEAL REFLUX DISEASE, ESOPHAGITIS PRESENCE NOT SPECIFIED: ICD-10-CM

## 2018-08-07 DIAGNOSIS — N30.00 ACUTE CYSTITIS WITHOUT HEMATURIA: ICD-10-CM

## 2018-08-07 DIAGNOSIS — G43.909 MIGRAINE WITHOUT STATUS MIGRAINOSUS, NOT INTRACTABLE, UNSPECIFIED MIGRAINE TYPE: ICD-10-CM

## 2018-08-07 DIAGNOSIS — R19.5 LOOSE STOOLS: ICD-10-CM

## 2018-08-07 DIAGNOSIS — I10 ESSENTIAL HYPERTENSION: ICD-10-CM

## 2018-08-07 DIAGNOSIS — F41.9 ANXIETY: ICD-10-CM

## 2018-08-07 PROCEDURE — 99214 OFFICE O/P EST MOD 30 MIN: CPT | Performed by: INTERNAL MEDICINE

## 2018-08-07 RX ORDER — DICYCLOMINE HCL 20 MG
TABLET ORAL
Qty: 360 TABLET | Refills: 3 | Status: SHIPPED | OUTPATIENT
Start: 2018-08-07 | End: 2019-02-12 | Stop reason: SDUPTHER

## 2018-08-07 RX ORDER — LEVOTHYROXINE SODIUM 0.12 MG/1
125 TABLET ORAL DAILY
Qty: 90 TABLET | Refills: 3 | Status: SHIPPED | OUTPATIENT
Start: 2018-08-07 | End: 2019-02-12 | Stop reason: SDUPTHER

## 2018-08-07 RX ORDER — DIAZEPAM 5 MG/1
TABLET ORAL
Qty: 30 TABLET | Refills: 2 | Status: SHIPPED | OUTPATIENT
Start: 2018-08-07 | End: 2018-10-30 | Stop reason: SDUPTHER

## 2018-08-07 RX ORDER — BUTALBITAL, ASPIRIN, AND CAFFEINE 325; 50; 40 MG/1; MG/1; MG/1
CAPSULE ORAL
Qty: 30 CAPSULE | Refills: 2 | Status: SHIPPED | OUTPATIENT
Start: 2018-08-07 | End: 2018-10-03 | Stop reason: SDUPTHER

## 2018-08-07 ASSESSMENT — PATIENT HEALTH QUESTIONNAIRE - PHQ9
1. LITTLE INTEREST OR PLEASURE IN DOING THINGS: 0
SUM OF ALL RESPONSES TO PHQ9 QUESTIONS 1 & 2: 0
2. FEELING DOWN, DEPRESSED OR HOPELESS: 0
SUM OF ALL RESPONSES TO PHQ QUESTIONS 1-9: 0

## 2018-08-07 ASSESSMENT — ENCOUNTER SYMPTOMS
RESPIRATORY NEGATIVE: 1
WHEEZING: 0
NAUSEA: 1
TROUBLE SWALLOWING: 0
DIARRHEA: 0

## 2018-08-07 NOTE — ASSESSMENT & PLAN NOTE
On omeprazole. Forgot to take for one week and had severe rebound or guard. Per GI doctor there was discussion of stopping the PPI due to possible loose stools and replacing with H2 blocker the patient does much better with PPI.

## 2018-08-14 DIAGNOSIS — R39.9 UTI SYMPTOMS: ICD-10-CM

## 2018-08-14 LAB
BILIRUBIN URINE: NEGATIVE
BLOOD, URINE: NEGATIVE
CLARITY: CLEAR
COLOR: YELLOW
EPITHELIAL CELLS, UA: NORMAL /HPF
GLUCOSE URINE: NEGATIVE MG/DL
KETONES, URINE: NEGATIVE MG/DL
LEUKOCYTE ESTERASE, URINE: ABNORMAL
MICROSCOPIC EXAMINATION: YES
NITRITE, URINE: NEGATIVE
PH UA: 6
PROTEIN UA: NEGATIVE MG/DL
RBC UA: NORMAL /HPF (ref 0–2)
SPECIFIC GRAVITY UA: <=1.005
URINE REFLEX TO CULTURE: YES
URINE TYPE: ABNORMAL
UROBILINOGEN, URINE: 0.2 E.U./DL
WBC UA: NORMAL /HPF (ref 0–5)

## 2018-08-16 LAB — URINE CULTURE, ROUTINE: NORMAL

## 2018-09-11 ENCOUNTER — OFFICE VISIT (OUTPATIENT)
Dept: PULMONOLOGY | Age: 63
End: 2018-09-11

## 2018-09-11 VITALS
OXYGEN SATURATION: 92 % | HEIGHT: 65 IN | RESPIRATION RATE: 17 BRPM | WEIGHT: 187.2 LBS | BODY MASS INDEX: 31.19 KG/M2 | SYSTOLIC BLOOD PRESSURE: 118 MMHG | DIASTOLIC BLOOD PRESSURE: 62 MMHG | HEART RATE: 67 BPM

## 2018-09-11 DIAGNOSIS — J45.30 MILD PERSISTENT ASTHMA WITHOUT COMPLICATION: Primary | ICD-10-CM

## 2018-09-11 DIAGNOSIS — R05.9 COUGH: ICD-10-CM

## 2018-09-11 PROCEDURE — 90682 RIV4 VACC RECOMBINANT DNA IM: CPT | Performed by: INTERNAL MEDICINE

## 2018-09-11 PROCEDURE — 99214 OFFICE O/P EST MOD 30 MIN: CPT | Performed by: INTERNAL MEDICINE

## 2018-09-11 PROCEDURE — 90471 IMMUNIZATION ADMIN: CPT | Performed by: INTERNAL MEDICINE

## 2018-09-11 NOTE — PATIENT INSTRUCTIONS
Patient Education        Influenza (Flu) Vaccine (Inactivated or Recombinant): What You Need to Know  Why get vaccinated? Influenza (\"flu\") is a contagious disease that spreads around the [place] every winter, usually between October and May. Flu is caused by influenza viruses and is spread mainly by coughing, sneezing, and close contact. Anyone can get flu. Flu strikes suddenly and can last several days. Symptoms vary by age, but can include:  · Fever/chills. · Sore throat. · Muscle aches. · Fatigue. · Cough. · Headache. · Runny or stuffy nose. Flu can also lead to pneumonia and blood infections, and cause diarrhea and seizures in children. If you have a medical condition, such as heart or lung disease, flu can make it worse. Flu is more dangerous for some people. Infants and young children, people 72years of age and older, pregnant women, and people with certain health conditions or a weakened immune system are at greatest risk. Each year thousands of people in the [facility] die from flu, and many more are hospitalized. Flu vaccine can:  · Keep you from getting flu. · Make flu less severe if you do get it. · Keep you from spreading flu to your family and other people. Inactivated and recombinant flu vaccines  A dose of flu vaccine is recommended every flu season. Children 6 months through 6years of age may need two doses during the same flu season. Everyone else needs only one dose each flu season. Some inactivated flu vaccines contain a very small amount of a mercury-based preservative called thimerosal. Studies have not shown thimerosal in vaccines to be harmful, but flu vaccines that do not contain thimerosal are available. There is no live flu virus in flu shots. They cannot cause the flu. There are many flu viruses, and they are always changing.  Each year a new flu vaccine is made to protect against three or four viruses that are likely to cause disease in the upcoming flu season. But even when the vaccine doesn't exactly match these viruses, it may still provide some protection. Flu vaccine cannot prevent:  · Flu that is caused by a virus not covered by the vaccine. · Illnesses that look like flu but are not. Some people should not get this vaccine  Tell the person who is giving you the vaccine:  · If you have any severe (life-threatening) allergies. If you ever had a life-threatening allergic reaction after a dose of flu vaccine, or have a severe allergy to any part of this vaccine, you may be advised not to get vaccinated. Most, but not all, types of flu vaccine contain a small amount of egg protein. · If you ever had Guillain-Barré syndrome (also called GBS) Some people with a history of GBS should not get this vaccine. This should be discussed with your doctor. · If you are not feeling well. It is usually okay to get flu vaccine when you have a mild illness, but you might be asked to come back when you feel better. Risks of a vaccine reaction  With any medicine, including vaccines, there is a chance of reactions. These are usually mild and go away on their own, but serious reactions are also possible. Most people who get a flu shot do not have any problems with it. Minor problems following a flu shot include:  · Soreness, redness, or swelling where the shot was given  · Hoarseness  · Sore, red or itchy eyes  · Cough  · Fever  · Aches  · Headache  · Itching  · Fatigue  If these problems occur, they usually begin soon after the shot and last 1 or 2 days. More serious problems following a flu shot can include the following:  · There may be a small increased risk of Guillain-Barré Syndrome (GBS) after inactivated flu vaccine. This risk has been estimated at 1 or 2 additional cases per million people vaccinated. This is much lower than the risk of severe complications from flu, which can be prevented by flu vaccine.   · [facility] children who get the flu shot along with pneumococcal vaccine (PCV13) and/or DTaP vaccine at the same time might be slightly more likely to have a seizure caused by fever. Ask your doctor for more information. Tell your doctor if a child who is getting flu vaccine has ever had a seizure  Problems that could happen after any injected vaccine:  · People sometimes faint after a medical procedure, including vaccination. Sitting or lying down for about 15 minutes can help prevent fainting, and injuries caused by a fall. Tell your doctor if you feel dizzy, or have vision changes or ringing in the ears. · Some people get severe pain in the shoulder and have difficulty moving the arm where a shot was given. This happens very rarely. · Any medication can cause a severe allergic reaction. Such reactions from a vaccine are very rare, estimated at about 1 in a million doses, and would happen within a few minutes to a few hours after the vaccination. As with any medicine, there is a very remote chance of a vaccine causing a serious injury or death. The safety of vaccines is always being monitored. For more information, visit: www.cdc.gov/vaccinesafety/. What if there is a serious reaction? What should I look for? · Look for anything that concerns you, such as signs of a severe allergic reaction, very high fever, or unusual behavior. Signs of a severe allergic reaction can include hives, swelling of the face and throat, difficulty breathing, a fast heartbeat, dizziness, and weakness - usually within a few minutes to a few hours after the vaccination. What should I do? · If you think it is a severe allergic reaction or other emergency that can't wait, call 9-1-1 and get the person to the nearest hospital. Otherwise, call your doctor. · Reactions should be reported to the \"Vaccine Adverse Event Reporting System\" (VAERS). Your doctor should file this report, or you can do it yourself through the VAERS website at www.vaers. [facility].gov, or by calling 6-749-818-090-858-2805. [facility] does not give medical advice. The National Vaccine Injury Compensation Program  The National Vaccine Injury Compensation Program (VICP) is a federal program that was created to compensate people who may have been injured by certain vaccines. Persons who believe they may have been injured by a vaccine can learn about the program and about filing a claim by calling 3-150.936.5940 or visiting the [address] website at www.[facility].gov/vaccinecompensation. There is a time limit to file a claim for compensation. How can I learn more? · Ask your healthcare provider. He or she can give you the vaccine package insert or suggest other sources of information. · Call your local or state health department. · Contact the Centers for Disease Control and Prevention (CDC):  ¨ Call 2-723.482.7394 (1-800-CDC-INFO) or  ¨ Visit CDC's website at www.cdc.gov/flu  Vaccine Information Statement  Inactivated Influenza Vaccine  8/7/2015)  [address] Joe [id number]-99  Department of Health and Human Services  Centers for Disease Control and Prevention  Many Vaccine Information Statements are available in [place] and other languages. See www.immunize.org/vis. Muchas hojas de información sobre vacunas están disponibles en español y en otros idiomas. Visite www.immunize.org/vis. Care instructions adapted under license by [facility] ([facility]). If you have questions about a medical condition or this instruction, always ask your healthcare professional. [place] [address] any warranty or liability for your use of this information. Patient Education        Influenza (Flu) Vaccine (Inactivated or Recombinant): What You Need to Know  Why get vaccinated? Influenza (\"flu\") is a contagious disease that spreads around the [place] every winter, usually between October and May. Flu is caused by influenza viruses and is spread mainly by coughing, sneezing, and close contact. Anyone can get flu.  Flu strikes suddenly and can last several days. Symptoms vary by age, but can include:  · Fever/chills. · Sore throat. · Muscle aches. · Fatigue. · Cough. · Headache. · Runny or stuffy nose. Flu can also lead to pneumonia and blood infections, and cause diarrhea and seizures in children. If you have a medical condition, such as heart or lung disease, flu can make it worse. Flu is more dangerous for some people. Infants and young children, people 72years of age and older, pregnant women, and people with certain health conditions or a weakened immune system are at greatest risk. Each year thousands of people in the [facility] die from flu, and many more are hospitalized. Flu vaccine can:  · Keep you from getting flu. · Make flu less severe if you do get it. · Keep you from spreading flu to your family and other people. Inactivated and recombinant flu vaccines  A dose of flu vaccine is recommended every flu season. Children 6 months through 6years of age may need two doses during the same flu season. Everyone else needs only one dose each flu season. Some inactivated flu vaccines contain a very small amount of a mercury-based preservative called thimerosal. Studies have not shown thimerosal in vaccines to be harmful, but flu vaccines that do not contain thimerosal are available. There is no live flu virus in flu shots. They cannot cause the flu. There are many flu viruses, and they are always changing. Each year a new flu vaccine is made to protect against three or four viruses that are likely to cause disease in the upcoming flu season. But even when the vaccine doesn't exactly match these viruses, it may still provide some protection. Flu vaccine cannot prevent:  · Flu that is caused by a virus not covered by the vaccine. · Illnesses that look like flu but are not. Some people should not get this vaccine  Tell the person who is giving you the vaccine:  · If you have any severe (life-threatening) allergies.  If you ever had

## 2018-09-12 NOTE — PROGRESS NOTES
Subjective:      Patient ID: Alex Alexandra is a 58 y.o. female. HPI  this Andrea Begun returns with complaints of continued cough. This is nonproductive, occurs at random times, not associated with environment or activity. It does not appear to vary with weather. She is a little uncertain about the time span this has been continuing. On the one hand, she speaks of it is if it has been going on for several months, then refers to it as more acute. It is noted that she has had nonproductive cough intermittently over the past few years. She states she has been taking her Symbicort inhaler twice daily. She is not requiring short acting bronchodilator. She denies any shortness of breath. She describes her lifestyle as fairly sedentary. She denies symptoms of heartburn. Review of Systems    Objective:   Physical Exam   Constitutional: She is oriented to person, place, and time. She appears well-developed and well-nourished. HENT:   Head: Normocephalic and atraumatic. Mouth/Throat: Oropharynx is clear and moist. No oropharyngeal exudate. Eyes: Right eye exhibits no discharge. Left eye exhibits no discharge. No scleral icterus. Neck: No tracheal deviation present. No thyromegaly present. Cardiovascular: Normal rate, regular rhythm, normal heart sounds and intact distal pulses. No murmur heard. Pulmonary/Chest: Effort normal and breath sounds normal. No respiratory distress. She has no wheezes. She has no rales. She exhibits no tenderness. Musculoskeletal: She exhibits no edema. Lymphadenopathy:     She has no cervical adenopathy. Neurological: She is alert and oriented to person, place, and time. Skin: Skin is warm and dry. Psychiatric: She has a normal mood and affect. Her behavior is normal. Judgment and thought content normal.   Vitals reviewed. Assessment:      Mild to moderate asthma, maintained on ICS/LA BA. No complaints of dyspnea or tightness.   She has persistent cough, without extrapulmonary source identified. It is probably an issue related to chronic airways disease. Note that she has been treated in the past with montelukast, without significant benefit. Plan:      Add Umeclidinium 1 puff daily.         Remberto Tejada MD

## 2018-09-13 RX ORDER — PROCHLORPERAZINE MALEATE 10 MG
TABLET ORAL
Qty: 30 TABLET | Refills: 0 | Status: SHIPPED | OUTPATIENT
Start: 2018-09-13 | End: 2018-10-30 | Stop reason: SDUPTHER

## 2018-10-01 RX ORDER — OMEPRAZOLE 40 MG/1
CAPSULE, DELAYED RELEASE ORAL
Qty: 90 CAPSULE | Refills: 3 | Status: SHIPPED | OUTPATIENT
Start: 2018-10-01 | End: 2019-02-12 | Stop reason: SDUPTHER

## 2018-10-03 DIAGNOSIS — G43.909 MIGRAINE WITHOUT STATUS MIGRAINOSUS, NOT INTRACTABLE, UNSPECIFIED MIGRAINE TYPE: ICD-10-CM

## 2018-10-03 RX ORDER — BUTALBITAL, ASPIRIN, AND CAFFEINE 325; 50; 40 MG/1; MG/1; MG/1
CAPSULE ORAL
Qty: 30 CAPSULE | Refills: 1 | OUTPATIENT
Start: 2018-10-03 | End: 2018-10-30 | Stop reason: SDUPTHER

## 2018-10-04 DIAGNOSIS — G43.909 MIGRAINE WITHOUT STATUS MIGRAINOSUS, NOT INTRACTABLE, UNSPECIFIED MIGRAINE TYPE: ICD-10-CM

## 2018-10-04 RX ORDER — BUTALBITAL, ASPIRIN, AND CAFFEINE 325; 50; 40 MG/1; MG/1; MG/1
CAPSULE ORAL
Qty: 30 CAPSULE | Refills: 1 | OUTPATIENT
Start: 2018-10-04 | End: 2018-11-03

## 2018-10-15 ENCOUNTER — TELEPHONE (OUTPATIENT)
Dept: INTERNAL MEDICINE CLINIC | Age: 63
End: 2018-10-15

## 2018-10-15 RX ORDER — DULOXETIN HYDROCHLORIDE 30 MG/1
CAPSULE, DELAYED RELEASE ORAL
Qty: 30 CAPSULE | Refills: 0 | Status: SHIPPED | OUTPATIENT
Start: 2018-10-15 | End: 2019-01-18 | Stop reason: SDUPTHER

## 2018-10-30 ENCOUNTER — OFFICE VISIT (OUTPATIENT)
Dept: INTERNAL MEDICINE CLINIC | Age: 63
End: 2018-10-30
Payer: COMMERCIAL

## 2018-10-30 VITALS
SYSTOLIC BLOOD PRESSURE: 132 MMHG | HEIGHT: 65 IN | BODY MASS INDEX: 32.99 KG/M2 | WEIGHT: 198 LBS | DIASTOLIC BLOOD PRESSURE: 70 MMHG

## 2018-10-30 DIAGNOSIS — K52.839 MICROSCOPIC COLITIS, UNSPECIFIED MICROSCOPIC COLITIS TYPE: ICD-10-CM

## 2018-10-30 DIAGNOSIS — G43.909 MIGRAINE WITHOUT STATUS MIGRAINOSUS, NOT INTRACTABLE, UNSPECIFIED MIGRAINE TYPE: ICD-10-CM

## 2018-10-30 DIAGNOSIS — E03.9 HYPOTHYROIDISM, UNSPECIFIED TYPE: Primary | Chronic | ICD-10-CM

## 2018-10-30 DIAGNOSIS — M26.623 BILATERAL TEMPOROMANDIBULAR JOINT PAIN: ICD-10-CM

## 2018-10-30 DIAGNOSIS — Z00.00 PREVENTATIVE HEALTH CARE: ICD-10-CM

## 2018-10-30 DIAGNOSIS — I10 ESSENTIAL HYPERTENSION: ICD-10-CM

## 2018-10-30 DIAGNOSIS — J45.30 MILD PERSISTENT ASTHMA WITHOUT COMPLICATION: ICD-10-CM

## 2018-10-30 DIAGNOSIS — F41.9 ANXIETY: ICD-10-CM

## 2018-10-30 PROBLEM — N30.00 ACUTE CYSTITIS WITHOUT HEMATURIA: Status: RESOLVED | Noted: 2018-08-07 | Resolved: 2018-10-30

## 2018-10-30 PROCEDURE — 99213 OFFICE O/P EST LOW 20 MIN: CPT | Performed by: INTERNAL MEDICINE

## 2018-10-30 RX ORDER — PROCHLORPERAZINE MALEATE 10 MG
TABLET ORAL
Qty: 30 TABLET | Refills: 2 | Status: SHIPPED | OUTPATIENT
Start: 2018-10-30 | End: 2019-03-22

## 2018-10-30 RX ORDER — DIAZEPAM 5 MG/1
TABLET ORAL
Qty: 30 TABLET | Refills: 2 | Status: SHIPPED | OUTPATIENT
Start: 2018-10-30 | End: 2019-01-18 | Stop reason: SDUPTHER

## 2018-10-30 RX ORDER — BUTALBITAL, ASPIRIN, AND CAFFEINE 325; 50; 40 MG/1; MG/1; MG/1
CAPSULE ORAL
Qty: 30 CAPSULE | Refills: 2 | Status: SHIPPED | OUTPATIENT
Start: 2018-10-30 | End: 2019-03-22 | Stop reason: SDUPTHER

## 2018-10-30 ASSESSMENT — ENCOUNTER SYMPTOMS
WHEEZING: 0
RESPIRATORY NEGATIVE: 1
DIARRHEA: 0
GASTROINTESTINAL NEGATIVE: 1
ABDOMINAL DISTENTION: 0

## 2018-11-09 ENCOUNTER — OFFICE VISIT (OUTPATIENT)
Dept: PULMONOLOGY | Age: 63
End: 2018-11-09
Payer: COMMERCIAL

## 2018-11-09 VITALS
DIASTOLIC BLOOD PRESSURE: 68 MMHG | HEART RATE: 70 BPM | HEIGHT: 65 IN | RESPIRATION RATE: 17 BRPM | WEIGHT: 188.8 LBS | OXYGEN SATURATION: 96 % | SYSTOLIC BLOOD PRESSURE: 122 MMHG | BODY MASS INDEX: 31.46 KG/M2

## 2018-11-09 DIAGNOSIS — J45.30 MILD PERSISTENT ASTHMA WITHOUT COMPLICATION: Primary | ICD-10-CM

## 2018-11-09 PROCEDURE — 99213 OFFICE O/P EST LOW 20 MIN: CPT | Performed by: INTERNAL MEDICINE

## 2018-11-09 NOTE — PROGRESS NOTES
Subjective:      Patient ID: Timothy Timmons is a 58 y.o. female. HPI  Mrs. Eliz Henderson returns after using muscarinic antagonist inhaler for treatment of cough, which had not improved with treatment of asthma with ICS/LABA. She reports her cough has greatly improved. She sleeps at night without interruption, she can take a deep breath without coughing. She is feeling much better. Unfortunately, the Umeclidinium inhaler has a very bad taste for her, and she gags even before she takes an inhalation. She even uses this on an empty stomach so that she wouldn't have anything to vomit. She has not had vomiting but is worried about it. No complaints of dyspnea or chest tightness, pressure, wheezing. Review of Systems    Objective:   Physical Exam   Constitutional: She is oriented to person, place, and time. She appears well-developed and well-nourished. HENT:   Head: Normocephalic and atraumatic. Mouth/Throat: Oropharynx is clear and moist. No oropharyngeal exudate. Eyes: Right eye exhibits no discharge. Left eye exhibits no discharge. No scleral icterus. Neck: No tracheal deviation present. No thyromegaly present. Cardiovascular: Normal rate, regular rhythm, normal heart sounds and intact distal pulses. No murmur heard. Pulmonary/Chest: Effort normal and breath sounds normal. No respiratory distress. She has no wheezes. She has no rales. She exhibits no tenderness. Musculoskeletal: She exhibits no edema. Lymphadenopathy:     She has no cervical adenopathy. Neurological: She is alert and oriented to person, place, and time. Skin: Skin is warm and dry. Psychiatric: She has a normal mood and affect. Her behavior is normal. Thought content normal.   Vitals reviewed. Assessment:      Asthma with significant cough, improved control with addition of LAMA along with ICS/LABA. Plan:      Continue current medication, Symbicort 2 puffs twice a day and increase inhaler 1 puff daily.

## 2018-12-11 ENCOUNTER — OFFICE VISIT (OUTPATIENT)
Dept: INTERNAL MEDICINE CLINIC | Age: 63
End: 2018-12-11
Payer: COMMERCIAL

## 2018-12-11 VITALS
DIASTOLIC BLOOD PRESSURE: 80 MMHG | HEIGHT: 65 IN | BODY MASS INDEX: 31.65 KG/M2 | SYSTOLIC BLOOD PRESSURE: 138 MMHG | WEIGHT: 190 LBS

## 2018-12-11 DIAGNOSIS — G89.29 CHRONIC RIGHT-SIDED LOW BACK PAIN WITH RIGHT-SIDED SCIATICA: ICD-10-CM

## 2018-12-11 DIAGNOSIS — E66.09 CLASS 1 OBESITY DUE TO EXCESS CALORIES WITH SERIOUS COMORBIDITY AND BODY MASS INDEX (BMI) OF 31.0 TO 31.9 IN ADULT: ICD-10-CM

## 2018-12-11 DIAGNOSIS — M54.41 CHRONIC RIGHT-SIDED LOW BACK PAIN WITH RIGHT-SIDED SCIATICA: ICD-10-CM

## 2018-12-11 DIAGNOSIS — I10 ESSENTIAL HYPERTENSION: ICD-10-CM

## 2018-12-11 DIAGNOSIS — E03.9 HYPOTHYROIDISM, UNSPECIFIED TYPE: Chronic | ICD-10-CM

## 2018-12-11 PROBLEM — E66.9 OBESITY: Status: ACTIVE | Noted: 2018-12-11

## 2018-12-11 PROCEDURE — 99214 OFFICE O/P EST MOD 30 MIN: CPT | Performed by: INTERNAL MEDICINE

## 2018-12-11 RX ORDER — METHYLPREDNISOLONE 4 MG/1
TABLET ORAL
Qty: 1 KIT | Refills: 0 | Status: SHIPPED | OUTPATIENT
Start: 2018-12-11 | End: 2019-03-22 | Stop reason: ALTCHOICE

## 2018-12-11 RX ORDER — MELOXICAM 15 MG/1
15 TABLET ORAL DAILY
Qty: 30 TABLET | Refills: 3 | Status: SHIPPED | OUTPATIENT
Start: 2018-12-11 | End: 2019-03-22

## 2018-12-11 ASSESSMENT — ENCOUNTER SYMPTOMS
BACK PAIN: 1
ABDOMINAL PAIN: 0
CHEST TIGHTNESS: 0
COLOR CHANGE: 0
WHEEZING: 0

## 2018-12-11 ASSESSMENT — PATIENT HEALTH QUESTIONNAIRE - PHQ9
SUM OF ALL RESPONSES TO PHQ QUESTIONS 1-9: 0
1. LITTLE INTEREST OR PLEASURE IN DOING THINGS: 0
2. FEELING DOWN, DEPRESSED OR HOPELESS: 0
SUM OF ALL RESPONSES TO PHQ9 QUESTIONS 1 & 2: 0
SUM OF ALL RESPONSES TO PHQ QUESTIONS 1-9: 0

## 2018-12-11 NOTE — PATIENT INSTRUCTIONS
for each leg. Hip flexor stretch    1. Kneel on the floor with one knee bent and one leg behind you. Place your forward knee over your foot. Keep your other knee touching the floor. 2. Slowly push your hips forward until you feel a stretch in the upper thigh of your rear leg. 3. Hold the stretch for at least 15 to 30 seconds. Repeat with your other leg. 4. Do 2 to 4 times on each side. Wall sit    1. Stand with your back 10 to 12 inches away from a wall. 2. Lean into the wall until your back is flat against it. 3. Slowly slide down until your knees are slightly bent, pressing your lower back into the wall. 4. Hold for about 6 seconds, then slide back up the wall. 5. Repeat 8 to 12 times. Follow-up care is a key part of your treatment and safety. Be sure to make and go to all appointments, and call your doctor if you are having problems. It's also a good idea to know your test results and keep a list of the medicines you take. Where can you learn more? Go to [email or web address] and sign in to your [employer] account. Enter W728 in the M2TECH box to learn more about \"Low Back Pain: Exercises. \"     If you do not have an account, please click on the \"Sign Up Now\" link. Current as of: November 29, 2017  Content Version: 11.8  © 7245-9606 Healthwise, Incorporated. Care instructions adapted under license by [facility] ([facility]). If you have questions about a medical condition or this instruction, always ask your healthcare professional. [address] any warranty or liability for your use of this information.

## 2019-01-15 ENCOUNTER — OFFICE VISIT (OUTPATIENT)
Dept: PULMONOLOGY | Age: 64
End: 2019-01-15
Payer: MEDICARE

## 2019-01-15 VITALS
OXYGEN SATURATION: 96 % | WEIGHT: 194.4 LBS | RESPIRATION RATE: 18 BRPM | BODY MASS INDEX: 32.39 KG/M2 | DIASTOLIC BLOOD PRESSURE: 74 MMHG | HEART RATE: 63 BPM | HEIGHT: 65 IN | SYSTOLIC BLOOD PRESSURE: 128 MMHG

## 2019-01-15 DIAGNOSIS — J45.30 MILD PERSISTENT ASTHMA WITHOUT COMPLICATION: Primary | ICD-10-CM

## 2019-01-15 PROCEDURE — 99213 OFFICE O/P EST LOW 20 MIN: CPT | Performed by: INTERNAL MEDICINE

## 2019-01-18 ENCOUNTER — TELEPHONE (OUTPATIENT)
Dept: INTERNAL MEDICINE CLINIC | Age: 64
End: 2019-01-18

## 2019-01-18 DIAGNOSIS — G43.909 MIGRAINE WITHOUT STATUS MIGRAINOSUS, NOT INTRACTABLE, UNSPECIFIED MIGRAINE TYPE: ICD-10-CM

## 2019-01-18 DIAGNOSIS — F41.9 ANXIETY: ICD-10-CM

## 2019-01-18 RX ORDER — DIAZEPAM 5 MG/1
TABLET ORAL
Qty: 30 TABLET | Refills: 0 | OUTPATIENT
Start: 2019-01-18 | End: 2019-02-12 | Stop reason: SDUPTHER

## 2019-01-18 RX ORDER — BUTALBITAL, ASPIRIN, AND CAFFEINE 325; 50; 40 MG/1; MG/1; MG/1
1 CAPSULE ORAL EVERY 4 HOURS PRN
Qty: 30 CAPSULE | Refills: 0 | OUTPATIENT
Start: 2019-01-18 | End: 2019-02-12 | Stop reason: SDUPTHER

## 2019-01-23 ENCOUNTER — TELEPHONE (OUTPATIENT)
Dept: INTERNAL MEDICINE CLINIC | Age: 64
End: 2019-01-23

## 2019-01-23 RX ORDER — AMOXICILLIN 500 MG/1
500 CAPSULE ORAL 3 TIMES DAILY
Qty: 30 CAPSULE | Refills: 0 | Status: SHIPPED | OUTPATIENT
Start: 2019-01-23 | End: 2019-02-02

## 2019-02-04 DIAGNOSIS — J45.909 ASTHMA, UNSPECIFIED ASTHMA SEVERITY, UNSPECIFIED WHETHER COMPLICATED, UNSPECIFIED WHETHER PERSISTENT: Primary | ICD-10-CM

## 2019-02-04 RX ORDER — DILTIAZEM HYDROCHLORIDE 60 MG/1
2 TABLET, FILM COATED ORAL 2 TIMES DAILY
Qty: 1 INHALER | Refills: 11 | Status: SHIPPED | OUTPATIENT
Start: 2019-02-04 | End: 2020-02-07

## 2019-02-06 DIAGNOSIS — Z00.00 PREVENTATIVE HEALTH CARE: ICD-10-CM

## 2019-02-06 LAB
A/G RATIO: 1.6 (ref 1.1–2.2)
ALBUMIN SERPL-MCNC: 4.4 G/DL (ref 3.4–5)
ALP BLD-CCNC: 76 U/L (ref 40–129)
ALT SERPL-CCNC: 20 U/L (ref 10–40)
ANION GAP SERPL CALCULATED.3IONS-SCNC: 16 MMOL/L (ref 3–16)
AST SERPL-CCNC: 20 U/L (ref 15–37)
BASOPHILS ABSOLUTE: 0.1 K/UL (ref 0–0.2)
BASOPHILS RELATIVE PERCENT: 1.1 %
BILIRUB SERPL-MCNC: <0.2 MG/DL (ref 0–1)
BILIRUBIN URINE: NEGATIVE
BLOOD, URINE: ABNORMAL
BUN BLDV-MCNC: 15 MG/DL (ref 7–20)
CALCIUM SERPL-MCNC: 10.6 MG/DL (ref 8.3–10.6)
CHLORIDE BLD-SCNC: 101 MMOL/L (ref 99–110)
CHOLESTEROL, TOTAL: 218 MG/DL (ref 0–199)
CLARITY: ABNORMAL
CO2: 23 MMOL/L (ref 21–32)
COLOR: YELLOW
COMMENT UA: ABNORMAL
CREAT SERPL-MCNC: 0.9 MG/DL (ref 0.6–1.2)
EOSINOPHILS ABSOLUTE: 0.1 K/UL (ref 0–0.6)
EOSINOPHILS RELATIVE PERCENT: 0.8 %
EPITHELIAL CELLS, UA: 3 /HPF (ref 0–5)
GFR AFRICAN AMERICAN: >60
GFR NON-AFRICAN AMERICAN: >60
GLOBULIN: 2.8 G/DL
GLUCOSE BLD-MCNC: 135 MG/DL (ref 70–99)
GLUCOSE URINE: NEGATIVE MG/DL
HCT VFR BLD CALC: 46.1 % (ref 36–48)
HDLC SERPL-MCNC: 73 MG/DL (ref 40–60)
HEMOGLOBIN: 15.7 G/DL (ref 12–16)
HEPATITIS C ANTIBODY INTERPRETATION: NORMAL
HYALINE CASTS: 9 /LPF (ref 0–8)
KETONES, URINE: NEGATIVE MG/DL
LDL CHOLESTEROL CALCULATED: 123 MG/DL
LEUKOCYTE ESTERASE, URINE: ABNORMAL
LYMPHOCYTES ABSOLUTE: 2.2 K/UL (ref 1–5.1)
LYMPHOCYTES RELATIVE PERCENT: 22.9 %
MCH RBC QN AUTO: 31.5 PG (ref 26–34)
MCHC RBC AUTO-ENTMCNC: 34.1 G/DL (ref 31–36)
MCV RBC AUTO: 92.3 FL (ref 80–100)
MICROSCOPIC EXAMINATION: YES
MONOCYTES ABSOLUTE: 0.8 K/UL (ref 0–1.3)
MONOCYTES RELATIVE PERCENT: 8.3 %
NEUTROPHILS ABSOLUTE: 6.3 K/UL (ref 1.7–7.7)
NEUTROPHILS RELATIVE PERCENT: 66.9 %
NITRITE, URINE: NEGATIVE
PDW BLD-RTO: 12.6 % (ref 12.4–15.4)
PH UA: 7.5
PLATELET # BLD: 444 K/UL (ref 135–450)
PMV BLD AUTO: 8 FL (ref 5–10.5)
POTASSIUM SERPL-SCNC: 4.5 MMOL/L (ref 3.5–5.1)
PROTEIN UA: 100 MG/DL
RBC # BLD: 5 M/UL (ref 4–5.2)
RBC UA: 11 /HPF (ref 0–4)
RENAL EPITHELIAL, UA: ABNORMAL /HPF
SODIUM BLD-SCNC: 140 MMOL/L (ref 136–145)
SPECIFIC GRAVITY UA: 1.02
T4 FREE: 1.2 NG/DL (ref 0.9–1.8)
TOTAL PROTEIN: 7.2 G/DL (ref 6.4–8.2)
TRIGL SERPL-MCNC: 111 MG/DL (ref 0–150)
TSH SERPL DL<=0.05 MIU/L-ACNC: 1.54 UIU/ML (ref 0.27–4.2)
URINE REFLEX TO CULTURE: YES
URINE TYPE: ABNORMAL
UROBILINOGEN, URINE: 0.2 E.U./DL
VLDLC SERPL CALC-MCNC: 22 MG/DL
WBC # BLD: 9.4 K/UL (ref 4–11)
WBC UA: 232 /HPF (ref 0–5)

## 2019-02-07 LAB
ESTIMATED AVERAGE GLUCOSE: 119.8 MG/DL
HBA1C MFR BLD: 5.8 %

## 2019-02-07 RX ORDER — CIPROFLOXACIN 500 MG/1
500 TABLET, FILM COATED ORAL 2 TIMES DAILY
Qty: 14 TABLET | Refills: 0 | Status: SHIPPED | OUTPATIENT
Start: 2019-02-07 | End: 2019-02-14

## 2019-02-08 ENCOUNTER — TELEPHONE (OUTPATIENT)
Dept: INTERNAL MEDICINE CLINIC | Age: 64
End: 2019-02-08

## 2019-02-11 ENCOUNTER — TELEPHONE (OUTPATIENT)
Dept: FAMILY MEDICINE CLINIC | Age: 64
End: 2019-02-11

## 2019-02-12 ENCOUNTER — OFFICE VISIT (OUTPATIENT)
Dept: INTERNAL MEDICINE CLINIC | Age: 64
End: 2019-02-12
Payer: MEDICARE

## 2019-02-12 VITALS
HEART RATE: 81 BPM | WEIGHT: 190 LBS | BODY MASS INDEX: 31.65 KG/M2 | HEIGHT: 65 IN | DIASTOLIC BLOOD PRESSURE: 80 MMHG | OXYGEN SATURATION: 96 % | SYSTOLIC BLOOD PRESSURE: 138 MMHG

## 2019-02-12 DIAGNOSIS — Z00.00 PREVENTATIVE HEALTH CARE: Primary | ICD-10-CM

## 2019-02-12 DIAGNOSIS — F41.9 ANXIETY: ICD-10-CM

## 2019-02-12 DIAGNOSIS — J45.30 MILD PERSISTENT ASTHMA WITHOUT COMPLICATION: ICD-10-CM

## 2019-02-12 DIAGNOSIS — R01.1 HEART MURMUR: ICD-10-CM

## 2019-02-12 DIAGNOSIS — G43.909 MIGRAINE WITHOUT STATUS MIGRAINOSUS, NOT INTRACTABLE, UNSPECIFIED MIGRAINE TYPE: ICD-10-CM

## 2019-02-12 DIAGNOSIS — R11.0 CHRONIC NAUSEA: ICD-10-CM

## 2019-02-12 DIAGNOSIS — Z23 NEED FOR PNEUMOCOCCAL VACCINATION: ICD-10-CM

## 2019-02-12 DIAGNOSIS — Z00.00 ROUTINE GENERAL MEDICAL EXAMINATION AT A HEALTH CARE FACILITY: ICD-10-CM

## 2019-02-12 DIAGNOSIS — K21.9 GASTROESOPHAGEAL REFLUX DISEASE, ESOPHAGITIS PRESENCE NOT SPECIFIED: ICD-10-CM

## 2019-02-12 DIAGNOSIS — K52.839 MICROSCOPIC COLITIS, UNSPECIFIED MICROSCOPIC COLITIS TYPE: ICD-10-CM

## 2019-02-12 DIAGNOSIS — I10 ESSENTIAL HYPERTENSION: ICD-10-CM

## 2019-02-12 DIAGNOSIS — E03.9 HYPOTHYROIDISM, UNSPECIFIED TYPE: Chronic | ICD-10-CM

## 2019-02-12 PROCEDURE — 1036F TOBACCO NON-USER: CPT | Performed by: INTERNAL MEDICINE

## 2019-02-12 PROCEDURE — 4040F PNEUMOC VAC/ADMIN/RCVD: CPT | Performed by: INTERNAL MEDICINE

## 2019-02-12 PROCEDURE — G8482 FLU IMMUNIZE ORDER/ADMIN: HCPCS | Performed by: INTERNAL MEDICINE

## 2019-02-12 PROCEDURE — 1090F PRES/ABSN URINE INCON ASSESS: CPT | Performed by: INTERNAL MEDICINE

## 2019-02-12 PROCEDURE — G0009 ADMIN PNEUMOCOCCAL VACCINE: HCPCS | Performed by: INTERNAL MEDICINE

## 2019-02-12 PROCEDURE — G8399 PT W/DXA RESULTS DOCUMENT: HCPCS | Performed by: INTERNAL MEDICINE

## 2019-02-12 PROCEDURE — 93000 ELECTROCARDIOGRAM COMPLETE: CPT | Performed by: INTERNAL MEDICINE

## 2019-02-12 PROCEDURE — 1101F PT FALLS ASSESS-DOCD LE1/YR: CPT | Performed by: INTERNAL MEDICINE

## 2019-02-12 PROCEDURE — G8417 CALC BMI ABV UP PARAM F/U: HCPCS | Performed by: INTERNAL MEDICINE

## 2019-02-12 PROCEDURE — 3017F COLORECTAL CA SCREEN DOC REV: CPT | Performed by: INTERNAL MEDICINE

## 2019-02-12 PROCEDURE — G8427 DOCREV CUR MEDS BY ELIG CLIN: HCPCS | Performed by: INTERNAL MEDICINE

## 2019-02-12 PROCEDURE — G0439 PPPS, SUBSEQ VISIT: HCPCS | Performed by: INTERNAL MEDICINE

## 2019-02-12 PROCEDURE — 1123F ACP DISCUSS/DSCN MKR DOCD: CPT | Performed by: INTERNAL MEDICINE

## 2019-02-12 PROCEDURE — 99214 OFFICE O/P EST MOD 30 MIN: CPT | Performed by: INTERNAL MEDICINE

## 2019-02-12 PROCEDURE — 90732 PPSV23 VACC 2 YRS+ SUBQ/IM: CPT | Performed by: INTERNAL MEDICINE

## 2019-02-12 RX ORDER — DULOXETIN HYDROCHLORIDE 30 MG/1
CAPSULE, DELAYED RELEASE ORAL
Qty: 30 CAPSULE | Refills: 5 | Status: SHIPPED | OUTPATIENT
Start: 2019-02-12 | End: 2019-08-08

## 2019-02-12 RX ORDER — BUTALBITAL, ASPIRIN, AND CAFFEINE 325; 50; 40 MG/1; MG/1; MG/1
1 CAPSULE ORAL EVERY 4 HOURS PRN
Qty: 30 CAPSULE | Refills: 2 | Status: SHIPPED | OUTPATIENT
Start: 2019-02-12 | End: 2019-04-19 | Stop reason: SDUPTHER

## 2019-02-12 RX ORDER — OMEPRAZOLE 40 MG/1
CAPSULE, DELAYED RELEASE ORAL
Qty: 30 CAPSULE | Refills: 5 | Status: SHIPPED | OUTPATIENT
Start: 2019-02-12 | End: 2019-08-29 | Stop reason: SDUPTHER

## 2019-02-12 RX ORDER — ALPRAZOLAM 0.5 MG/1
TABLET ORAL
Qty: 30 TABLET | Refills: 2 | Status: SHIPPED | OUTPATIENT
Start: 2019-02-12 | End: 2019-03-12

## 2019-02-12 RX ORDER — CIPROFLOXACIN 500 MG/1
500 TABLET, FILM COATED ORAL 2 TIMES DAILY
Qty: 20 TABLET | Refills: 0 | Status: SHIPPED | OUTPATIENT
Start: 2019-02-12 | End: 2019-03-22 | Stop reason: ALTCHOICE

## 2019-02-12 RX ORDER — LEVOTHYROXINE SODIUM 0.12 MG/1
125 TABLET ORAL DAILY
Qty: 30 TABLET | Refills: 5 | Status: SHIPPED | OUTPATIENT
Start: 2019-02-12 | End: 2019-08-29 | Stop reason: SDUPTHER

## 2019-02-12 RX ORDER — DICYCLOMINE HCL 20 MG
TABLET ORAL
Qty: 120 TABLET | Refills: 5 | Status: SHIPPED | OUTPATIENT
Start: 2019-02-12 | End: 2019-11-05 | Stop reason: SDUPTHER

## 2019-02-12 RX ORDER — PROCHLORPERAZINE MALEATE 10 MG
TABLET ORAL
Qty: 30 TABLET | Refills: 2 | Status: SHIPPED | OUTPATIENT
Start: 2019-02-12 | End: 2020-01-06

## 2019-02-12 RX ORDER — DIAZEPAM 5 MG/1
TABLET ORAL
Qty: 30 TABLET | Refills: 0 | Status: SHIPPED | OUTPATIENT
Start: 2019-02-12 | End: 2019-03-12 | Stop reason: SDUPTHER

## 2019-02-12 ASSESSMENT — LIFESTYLE VARIABLES
HOW OFTEN DURING THE LAST YEAR HAVE YOU NEEDED AN ALCOHOLIC DRINK FIRST THING IN THE MORNING TO GET YOURSELF GOING AFTER A NIGHT OF HEAVY DRINKING: 0
HOW OFTEN DURING THE LAST YEAR HAVE YOU HAD A FEELING OF GUILT OR REMORSE AFTER DRINKING: 0
HAS A RELATIVE, FRIEND, DOCTOR, OR ANOTHER HEALTH PROFESSIONAL EXPRESSED CONCERN ABOUT YOUR DRINKING OR SUGGESTED YOU CUT DOWN: 0
HOW OFTEN DURING THE LAST YEAR HAVE YOU BEEN UNABLE TO REMEMBER WHAT HAPPENED THE NIGHT BEFORE BECAUSE YOU HAD BEEN DRINKING: 0
HOW OFTEN DO YOU HAVE A DRINK CONTAINING ALCOHOL: 2
HOW OFTEN DO YOU HAVE SIX OR MORE DRINKS ON ONE OCCASION: 0
HAVE YOU OR SOMEONE ELSE BEEN INJURED AS A RESULT OF YOUR DRINKING: 0
HOW MANY STANDARD DRINKS CONTAINING ALCOHOL DO YOU HAVE ON A TYPICAL DAY: 0
AUDIT-C TOTAL SCORE: 2
HOW OFTEN DURING THE LAST YEAR HAVE YOU FAILED TO DO WHAT WAS NORMALLY EXPECTED FROM YOU BECAUSE OF DRINKING: 0
AUDIT TOTAL SCORE: 2
HOW OFTEN DURING THE LAST YEAR HAVE YOU FOUND THAT YOU WERE NOT ABLE TO STOP DRINKING ONCE YOU HAD STARTED: 0

## 2019-02-12 ASSESSMENT — PATIENT HEALTH QUESTIONNAIRE - PHQ9
1. LITTLE INTEREST OR PLEASURE IN DOING THINGS: 0
SUM OF ALL RESPONSES TO PHQ QUESTIONS 1-9: 0
SUM OF ALL RESPONSES TO PHQ QUESTIONS 1-9: 0
2. FEELING DOWN, DEPRESSED OR HOPELESS: 0
SUM OF ALL RESPONSES TO PHQ9 QUESTIONS 1 & 2: 0

## 2019-02-12 ASSESSMENT — ENCOUNTER SYMPTOMS
RESPIRATORY NEGATIVE: 1
WHEEZING: 0
NAUSEA: 1

## 2019-03-05 ENCOUNTER — HOSPITAL ENCOUNTER (OUTPATIENT)
Dept: NUCLEAR MEDICINE | Age: 64
Discharge: HOME | End: 2019-03-05
Payer: MEDICARE

## 2019-03-05 ENCOUNTER — HOSPITAL ENCOUNTER (OUTPATIENT)
Dept: ULTRASOUND IMAGING | Age: 64
Discharge: HOME | End: 2019-03-05
Payer: MEDICARE

## 2019-03-05 DIAGNOSIS — R11.0 CHRONIC NAUSEA: ICD-10-CM

## 2019-03-05 PROCEDURE — 78227 HEPATOBIL SYST IMAGE W/DRUG: CPT

## 2019-03-05 PROCEDURE — A9537 TC99M MEBROFENIN: HCPCS | Performed by: INTERNAL MEDICINE

## 2019-03-05 PROCEDURE — 3430000000 HC RX DIAGNOSTIC RADIOPHARMACEUTICAL: Performed by: INTERNAL MEDICINE

## 2019-03-05 PROCEDURE — 76705 ECHO EXAM OF ABDOMEN: CPT

## 2019-03-05 RX ADMIN — Medication 6 MILLICURIE: at 08:51

## 2019-03-12 ENCOUNTER — OFFICE VISIT (OUTPATIENT)
Dept: INTERNAL MEDICINE CLINIC | Age: 64
End: 2019-03-12
Payer: MEDICARE

## 2019-03-12 VITALS
TEMPERATURE: 99.4 F | DIASTOLIC BLOOD PRESSURE: 84 MMHG | SYSTOLIC BLOOD PRESSURE: 128 MMHG | HEART RATE: 76 BPM | HEIGHT: 65 IN | BODY MASS INDEX: 31.99 KG/M2 | OXYGEN SATURATION: 97 % | WEIGHT: 192 LBS

## 2019-03-12 DIAGNOSIS — K76.0 FATTY LIVER: ICD-10-CM

## 2019-03-12 DIAGNOSIS — G43.909 MIGRAINE WITHOUT STATUS MIGRAINOSUS, NOT INTRACTABLE, UNSPECIFIED MIGRAINE TYPE: ICD-10-CM

## 2019-03-12 DIAGNOSIS — R11.0 CHRONIC NAUSEA: ICD-10-CM

## 2019-03-12 DIAGNOSIS — F41.9 ANXIETY: ICD-10-CM

## 2019-03-12 DIAGNOSIS — I10 ESSENTIAL HYPERTENSION: ICD-10-CM

## 2019-03-12 DIAGNOSIS — K82.4 GALLBLADDER POLYP: ICD-10-CM

## 2019-03-12 DIAGNOSIS — J20.8 ACUTE BRONCHITIS DUE TO OTHER SPECIFIED ORGANISMS: ICD-10-CM

## 2019-03-12 PROCEDURE — G8482 FLU IMMUNIZE ORDER/ADMIN: HCPCS | Performed by: INTERNAL MEDICINE

## 2019-03-12 PROCEDURE — 99214 OFFICE O/P EST MOD 30 MIN: CPT | Performed by: INTERNAL MEDICINE

## 2019-03-12 PROCEDURE — G8417 CALC BMI ABV UP PARAM F/U: HCPCS | Performed by: INTERNAL MEDICINE

## 2019-03-12 PROCEDURE — 1090F PRES/ABSN URINE INCON ASSESS: CPT | Performed by: INTERNAL MEDICINE

## 2019-03-12 PROCEDURE — 4040F PNEUMOC VAC/ADMIN/RCVD: CPT | Performed by: INTERNAL MEDICINE

## 2019-03-12 PROCEDURE — 3017F COLORECTAL CA SCREEN DOC REV: CPT | Performed by: INTERNAL MEDICINE

## 2019-03-12 PROCEDURE — 1101F PT FALLS ASSESS-DOCD LE1/YR: CPT | Performed by: INTERNAL MEDICINE

## 2019-03-12 PROCEDURE — 1123F ACP DISCUSS/DSCN MKR DOCD: CPT | Performed by: INTERNAL MEDICINE

## 2019-03-12 PROCEDURE — G8399 PT W/DXA RESULTS DOCUMENT: HCPCS | Performed by: INTERNAL MEDICINE

## 2019-03-12 PROCEDURE — 1036F TOBACCO NON-USER: CPT | Performed by: INTERNAL MEDICINE

## 2019-03-12 PROCEDURE — G8427 DOCREV CUR MEDS BY ELIG CLIN: HCPCS | Performed by: INTERNAL MEDICINE

## 2019-03-12 RX ORDER — AZITHROMYCIN 500 MG/1
TABLET, FILM COATED ORAL
Qty: 3 TABLET | Refills: 0 | Status: SHIPPED | OUTPATIENT
Start: 2019-03-12 | End: 2019-03-22 | Stop reason: ALTCHOICE

## 2019-03-12 RX ORDER — DIAZEPAM 5 MG/1
TABLET ORAL
Qty: 30 TABLET | Refills: 2 | Status: SHIPPED | OUTPATIENT
Start: 2019-03-12 | End: 2019-05-14 | Stop reason: SDUPTHER

## 2019-03-12 ASSESSMENT — ENCOUNTER SYMPTOMS
ABDOMINAL PAIN: 0
TROUBLE SWALLOWING: 0
WHEEZING: 0
NAUSEA: 1
SORE THROAT: 0
COUGH: 1

## 2019-03-12 ASSESSMENT — PATIENT HEALTH QUESTIONNAIRE - PHQ9
SUM OF ALL RESPONSES TO PHQ9 QUESTIONS 1 & 2: 0
1. LITTLE INTEREST OR PLEASURE IN DOING THINGS: 0
2. FEELING DOWN, DEPRESSED OR HOPELESS: 0
SUM OF ALL RESPONSES TO PHQ QUESTIONS 1-9: 0
SUM OF ALL RESPONSES TO PHQ QUESTIONS 1-9: 0

## 2019-03-14 PROBLEM — Z00.00 PREVENTATIVE HEALTH CARE: Status: RESOLVED | Noted: 2017-12-05 | Resolved: 2019-03-14

## 2019-03-21 ENCOUNTER — INITIAL CONSULT (OUTPATIENT)
Dept: SURGERY | Age: 64
End: 2019-03-21
Payer: MEDICARE

## 2019-03-21 ENCOUNTER — TELEPHONE (OUTPATIENT)
Dept: BREAST CENTER | Age: 64
End: 2019-03-21

## 2019-03-21 VITALS
DIASTOLIC BLOOD PRESSURE: 84 MMHG | BODY MASS INDEX: 31.99 KG/M2 | HEIGHT: 65 IN | WEIGHT: 192 LBS | SYSTOLIC BLOOD PRESSURE: 136 MMHG

## 2019-03-21 DIAGNOSIS — K80.50 BILIARY COLIC: Primary | ICD-10-CM

## 2019-03-21 PROCEDURE — 3017F COLORECTAL CA SCREEN DOC REV: CPT | Performed by: SURGERY

## 2019-03-21 PROCEDURE — 1123F ACP DISCUSS/DSCN MKR DOCD: CPT | Performed by: SURGERY

## 2019-03-21 PROCEDURE — 1101F PT FALLS ASSESS-DOCD LE1/YR: CPT | Performed by: SURGERY

## 2019-03-21 PROCEDURE — G8417 CALC BMI ABV UP PARAM F/U: HCPCS | Performed by: SURGERY

## 2019-03-21 PROCEDURE — G8427 DOCREV CUR MEDS BY ELIG CLIN: HCPCS | Performed by: SURGERY

## 2019-03-21 PROCEDURE — 1090F PRES/ABSN URINE INCON ASSESS: CPT | Performed by: SURGERY

## 2019-03-21 PROCEDURE — 4040F PNEUMOC VAC/ADMIN/RCVD: CPT | Performed by: SURGERY

## 2019-03-21 PROCEDURE — G8399 PT W/DXA RESULTS DOCUMENT: HCPCS | Performed by: SURGERY

## 2019-03-21 PROCEDURE — 1036F TOBACCO NON-USER: CPT | Performed by: SURGERY

## 2019-03-21 PROCEDURE — G8482 FLU IMMUNIZE ORDER/ADMIN: HCPCS | Performed by: SURGERY

## 2019-03-21 PROCEDURE — 99203 OFFICE O/P NEW LOW 30 MIN: CPT | Performed by: SURGERY

## 2019-03-22 ASSESSMENT — ENCOUNTER SYMPTOMS
VOMITING: 1
DIARRHEA: 1
NAUSEA: 1

## 2019-03-26 ENCOUNTER — ANESTHESIA EVENT (OUTPATIENT)
Dept: OPERATING ROOM | Age: 64
End: 2019-03-26
Payer: MEDICARE

## 2019-03-27 ENCOUNTER — APPOINTMENT (OUTPATIENT)
Dept: GENERAL RADIOLOGY | Age: 64
End: 2019-03-27
Attending: SURGERY
Payer: MEDICARE

## 2019-03-27 ENCOUNTER — ANESTHESIA (OUTPATIENT)
Dept: OPERATING ROOM | Age: 64
End: 2019-03-27
Payer: MEDICARE

## 2019-03-27 ENCOUNTER — TELEPHONE (OUTPATIENT)
Dept: SURGERY | Age: 64
End: 2019-03-27

## 2019-03-27 ENCOUNTER — HOSPITAL ENCOUNTER (OUTPATIENT)
Age: 64
Setting detail: OUTPATIENT SURGERY
Discharge: HOME | End: 2019-03-27
Attending: SURGERY | Admitting: SURGERY
Payer: MEDICARE

## 2019-03-27 VITALS
RESPIRATION RATE: 17 BRPM | OXYGEN SATURATION: 99 % | SYSTOLIC BLOOD PRESSURE: 110 MMHG | DIASTOLIC BLOOD PRESSURE: 59 MMHG | TEMPERATURE: 97.7 F

## 2019-03-27 VITALS
SYSTOLIC BLOOD PRESSURE: 130 MMHG | DIASTOLIC BLOOD PRESSURE: 73 MMHG | HEIGHT: 65 IN | OXYGEN SATURATION: 94 % | HEART RATE: 106 BPM | RESPIRATION RATE: 18 BRPM | TEMPERATURE: 98.9 F | WEIGHT: 192 LBS | BODY MASS INDEX: 31.99 KG/M2

## 2019-03-27 DIAGNOSIS — K82.4 GALLBLADDER POLYP: ICD-10-CM

## 2019-03-27 DIAGNOSIS — K80.50 BILIARY COLIC: Primary | ICD-10-CM

## 2019-03-27 PROCEDURE — 88304 TISSUE EXAM BY PATHOLOGIST: CPT

## 2019-03-27 PROCEDURE — 2500000003 HC RX 250 WO HCPCS: Performed by: NURSE ANESTHETIST, CERTIFIED REGISTERED

## 2019-03-27 PROCEDURE — 6360000004 HC RX CONTRAST MEDICATION: Performed by: SURGERY

## 2019-03-27 PROCEDURE — 6360000002 HC RX W HCPCS: Performed by: SURGERY

## 2019-03-27 PROCEDURE — 3700000000 HC ANESTHESIA ATTENDED CARE: Performed by: SURGERY

## 2019-03-27 PROCEDURE — 3600000004 HC SURGERY LEVEL 4 BASE: Performed by: SURGERY

## 2019-03-27 PROCEDURE — 3600000014 HC SURGERY LEVEL 4 ADDTL 15MIN: Performed by: SURGERY

## 2019-03-27 PROCEDURE — 2720000010 HC SURG SUPPLY STERILE: Performed by: SURGERY

## 2019-03-27 PROCEDURE — 2500000003 HC RX 250 WO HCPCS: Performed by: SURGERY

## 2019-03-27 PROCEDURE — 2709999900 HC NON-CHARGEABLE SUPPLY: Performed by: SURGERY

## 2019-03-27 PROCEDURE — 7100000011 HC PHASE II RECOVERY - ADDTL 15 MIN: Performed by: SURGERY

## 2019-03-27 PROCEDURE — 7100000000 HC PACU RECOVERY - FIRST 15 MIN: Performed by: SURGERY

## 2019-03-27 PROCEDURE — 3700000001 HC ADD 15 MINUTES (ANESTHESIA): Performed by: SURGERY

## 2019-03-27 PROCEDURE — 7100000010 HC PHASE II RECOVERY - FIRST 15 MIN: Performed by: SURGERY

## 2019-03-27 PROCEDURE — 74300 X-RAY BILE DUCTS/PANCREAS: CPT

## 2019-03-27 PROCEDURE — 6370000000 HC RX 637 (ALT 250 FOR IP): Performed by: ANESTHESIOLOGY

## 2019-03-27 PROCEDURE — 7100000001 HC PACU RECOVERY - ADDTL 15 MIN: Performed by: SURGERY

## 2019-03-27 PROCEDURE — 47563 LAPARO CHOLECYSTECTOMY/GRAPH: CPT | Performed by: SURGERY

## 2019-03-27 PROCEDURE — 2580000003 HC RX 258: Performed by: ANESTHESIOLOGY

## 2019-03-27 PROCEDURE — 6360000002 HC RX W HCPCS: Performed by: NURSE ANESTHETIST, CERTIFIED REGISTERED

## 2019-03-27 RX ORDER — CEFOXITIN SODIUM 2 G/50ML
2 INJECTION, SOLUTION INTRAVENOUS ONCE
Status: COMPLETED | OUTPATIENT
Start: 2019-03-27 | End: 2019-03-27

## 2019-03-27 RX ORDER — PROPOFOL 10 MG/ML
INJECTION, EMULSION INTRAVENOUS PRN
Status: DISCONTINUED | OUTPATIENT
Start: 2019-03-27 | End: 2019-03-27 | Stop reason: SDUPTHER

## 2019-03-27 RX ORDER — SODIUM CHLORIDE 0.9 % (FLUSH) 0.9 %
10 SYRINGE (ML) INJECTION PRN
Status: DISCONTINUED | OUTPATIENT
Start: 2019-03-27 | End: 2019-03-27 | Stop reason: HOSPADM

## 2019-03-27 RX ORDER — HYDROCODONE BITARTRATE AND ACETAMINOPHEN 5; 325 MG/1; MG/1
1 TABLET ORAL EVERY 6 HOURS PRN
Qty: 20 TABLET | Refills: 0 | Status: SHIPPED | OUTPATIENT
Start: 2019-03-27 | End: 2019-04-01

## 2019-03-27 RX ORDER — BUPIVACAINE HYDROCHLORIDE 5 MG/ML
INJECTION, SOLUTION EPIDURAL; INTRACAUDAL
Status: COMPLETED | OUTPATIENT
Start: 2019-03-27 | End: 2019-03-27

## 2019-03-27 RX ORDER — SODIUM CHLORIDE 0.9 % (FLUSH) 0.9 %
10 SYRINGE (ML) INJECTION EVERY 12 HOURS SCHEDULED
Status: DISCONTINUED | OUTPATIENT
Start: 2019-03-27 | End: 2019-03-27 | Stop reason: HOSPADM

## 2019-03-27 RX ORDER — LABETALOL HYDROCHLORIDE 5 MG/ML
INJECTION, SOLUTION INTRAVENOUS PRN
Status: DISCONTINUED | OUTPATIENT
Start: 2019-03-27 | End: 2019-03-27 | Stop reason: SDUPTHER

## 2019-03-27 RX ORDER — PROMETHAZINE HYDROCHLORIDE 25 MG/ML
6.25 INJECTION, SOLUTION INTRAMUSCULAR; INTRAVENOUS
Status: DISCONTINUED | OUTPATIENT
Start: 2019-03-27 | End: 2019-03-27 | Stop reason: HOSPADM

## 2019-03-27 RX ORDER — LIDOCAINE HYDROCHLORIDE 20 MG/ML
INJECTION, SOLUTION EPIDURAL; INFILTRATION; INTRACAUDAL; PERINEURAL PRN
Status: DISCONTINUED | OUTPATIENT
Start: 2019-03-27 | End: 2019-03-27 | Stop reason: SDUPTHER

## 2019-03-27 RX ORDER — MIDAZOLAM HYDROCHLORIDE 1 MG/ML
INJECTION INTRAMUSCULAR; INTRAVENOUS PRN
Status: DISCONTINUED | OUTPATIENT
Start: 2019-03-27 | End: 2019-03-27 | Stop reason: SDUPTHER

## 2019-03-27 RX ORDER — FENTANYL CITRATE 50 UG/ML
INJECTION, SOLUTION INTRAMUSCULAR; INTRAVENOUS PRN
Status: DISCONTINUED | OUTPATIENT
Start: 2019-03-27 | End: 2019-03-27 | Stop reason: SDUPTHER

## 2019-03-27 RX ORDER — HYDROCODONE BITARTRATE AND ACETAMINOPHEN 5; 325 MG/1; MG/1
1 TABLET ORAL
Status: COMPLETED | OUTPATIENT
Start: 2019-03-27 | End: 2019-03-27

## 2019-03-27 RX ORDER — LABETALOL HYDROCHLORIDE 5 MG/ML
5 INJECTION, SOLUTION INTRAVENOUS EVERY 10 MIN PRN
Status: DISCONTINUED | OUTPATIENT
Start: 2019-03-27 | End: 2019-03-27 | Stop reason: HOSPADM

## 2019-03-27 RX ORDER — FENTANYL CITRATE 50 UG/ML
25 INJECTION, SOLUTION INTRAMUSCULAR; INTRAVENOUS EVERY 5 MIN PRN
Status: DISCONTINUED | OUTPATIENT
Start: 2019-03-27 | End: 2019-03-27 | Stop reason: HOSPADM

## 2019-03-27 RX ORDER — HEPARIN SODIUM 5000 [USP'U]/ML
5000 INJECTION, SOLUTION INTRAVENOUS; SUBCUTANEOUS ONCE
Status: COMPLETED | OUTPATIENT
Start: 2019-03-27 | End: 2019-03-27

## 2019-03-27 RX ORDER — ONDANSETRON 2 MG/ML
INJECTION INTRAMUSCULAR; INTRAVENOUS PRN
Status: DISCONTINUED | OUTPATIENT
Start: 2019-03-27 | End: 2019-03-27 | Stop reason: SDUPTHER

## 2019-03-27 RX ORDER — DEXAMETHASONE SODIUM PHOSPHATE 4 MG/ML
INJECTION, SOLUTION INTRA-ARTICULAR; INTRALESIONAL; INTRAMUSCULAR; INTRAVENOUS; SOFT TISSUE PRN
Status: DISCONTINUED | OUTPATIENT
Start: 2019-03-27 | End: 2019-03-27 | Stop reason: SDUPTHER

## 2019-03-27 RX ORDER — PHENYLEPHRINE HCL IN 0.9% NACL 1 MG/10 ML
SYRINGE (ML) INTRAVENOUS PRN
Status: DISCONTINUED | OUTPATIENT
Start: 2019-03-27 | End: 2019-03-27 | Stop reason: SDUPTHER

## 2019-03-27 RX ORDER — ROCURONIUM BROMIDE 10 MG/ML
INJECTION, SOLUTION INTRAVENOUS PRN
Status: DISCONTINUED | OUTPATIENT
Start: 2019-03-27 | End: 2019-03-27 | Stop reason: SDUPTHER

## 2019-03-27 RX ORDER — SODIUM CHLORIDE 9 MG/ML
INJECTION, SOLUTION INTRAVENOUS CONTINUOUS
Status: DISCONTINUED | OUTPATIENT
Start: 2019-03-27 | End: 2019-03-27 | Stop reason: HOSPADM

## 2019-03-27 RX ADMIN — Medication 100 MCG: at 07:47

## 2019-03-27 RX ADMIN — ONDANSETRON 4 MG: 2 INJECTION INTRAMUSCULAR; INTRAVENOUS at 08:03

## 2019-03-27 RX ADMIN — FENTANYL CITRATE 100 MCG: 50 INJECTION INTRAMUSCULAR; INTRAVENOUS at 07:33

## 2019-03-27 RX ADMIN — ROCURONIUM BROMIDE 15 MG: 10 INJECTION INTRAVENOUS at 08:15

## 2019-03-27 RX ADMIN — DEXAMETHASONE SODIUM PHOSPHATE 4 MG: 4 INJECTION, SOLUTION INTRAMUSCULAR; INTRAVENOUS at 07:45

## 2019-03-27 RX ADMIN — HYDROCODONE BITARTRATE AND ACETAMINOPHEN 1 TABLET: 5; 325 TABLET ORAL at 10:29

## 2019-03-27 RX ADMIN — SUGAMMADEX 200 MG: 100 INJECTION, SOLUTION INTRAVENOUS at 08:23

## 2019-03-27 RX ADMIN — LABETALOL HYDROCHLORIDE 5 MG: 5 INJECTION, SOLUTION INTRAVENOUS at 08:05

## 2019-03-27 RX ADMIN — HEPARIN SODIUM 5000 UNITS: 5000 INJECTION INTRAVENOUS; SUBCUTANEOUS at 06:47

## 2019-03-27 RX ADMIN — MIDAZOLAM 2 MG: 1 INJECTION INTRAMUSCULAR; INTRAVENOUS at 07:29

## 2019-03-27 RX ADMIN — SODIUM CHLORIDE: 9 INJECTION, SOLUTION INTRAVENOUS at 06:39

## 2019-03-27 RX ADMIN — PROPOFOL 175 MG: 10 INJECTION, EMULSION INTRAVENOUS at 07:36

## 2019-03-27 RX ADMIN — ROCURONIUM BROMIDE 50 MG: 10 INJECTION INTRAVENOUS at 07:36

## 2019-03-27 RX ADMIN — HYDROMORPHONE HYDROCHLORIDE 0.5 MG: 1 INJECTION, SOLUTION INTRAMUSCULAR; INTRAVENOUS; SUBCUTANEOUS at 07:59

## 2019-03-27 RX ADMIN — CEFOXITIN SODIUM 2 G: 2 INJECTION, SOLUTION INTRAVENOUS at 07:30

## 2019-03-27 RX ADMIN — LIDOCAINE HYDROCHLORIDE 100 MG: 20 INJECTION, SOLUTION EPIDURAL; INFILTRATION; INTRACAUDAL; PERINEURAL at 07:35

## 2019-03-27 RX ADMIN — HYDROMORPHONE HYDROCHLORIDE 0.5 MG: 1 INJECTION, SOLUTION INTRAMUSCULAR; INTRAVENOUS; SUBCUTANEOUS at 07:45

## 2019-03-27 ASSESSMENT — PULMONARY FUNCTION TESTS
PIF_VALUE: 25
PIF_VALUE: 13
PIF_VALUE: 3
PIF_VALUE: 18
PIF_VALUE: 24
PIF_VALUE: 20
PIF_VALUE: 25
PIF_VALUE: 8
PIF_VALUE: 23
PIF_VALUE: 24
PIF_VALUE: 24
PIF_VALUE: 17
PIF_VALUE: 20
PIF_VALUE: 24
PIF_VALUE: 25
PIF_VALUE: 18
PIF_VALUE: 5
PIF_VALUE: 20
PIF_VALUE: 24
PIF_VALUE: 17
PIF_VALUE: 0
PIF_VALUE: 19
PIF_VALUE: 17
PIF_VALUE: 25
PIF_VALUE: 17
PIF_VALUE: 0
PIF_VALUE: 5
PIF_VALUE: 24
PIF_VALUE: 17
PIF_VALUE: 2
PIF_VALUE: 24
PIF_VALUE: 25
PIF_VALUE: 18
PIF_VALUE: 18
PIF_VALUE: 25
PIF_VALUE: 17
PIF_VALUE: 21
PIF_VALUE: 19
PIF_VALUE: 25
PIF_VALUE: 24
PIF_VALUE: 16
PIF_VALUE: 24
PIF_VALUE: 2
PIF_VALUE: 18
PIF_VALUE: 0
PIF_VALUE: 24
PIF_VALUE: 24
PIF_VALUE: 16
PIF_VALUE: 18
PIF_VALUE: 24
PIF_VALUE: 0
PIF_VALUE: 21
PIF_VALUE: 17
PIF_VALUE: 1
PIF_VALUE: 18
PIF_VALUE: 18
PIF_VALUE: 27
PIF_VALUE: 17
PIF_VALUE: 3
PIF_VALUE: 17
PIF_VALUE: 23
PIF_VALUE: 21
PIF_VALUE: 1
PIF_VALUE: 22

## 2019-03-27 ASSESSMENT — PAIN DESCRIPTION - LOCATION
LOCATION: BACK
LOCATION: BACK;ABDOMEN

## 2019-03-27 ASSESSMENT — PAIN DESCRIPTION - FREQUENCY
FREQUENCY: CONTINUOUS

## 2019-03-27 ASSESSMENT — PAIN DESCRIPTION - PAIN TYPE
TYPE: CHRONIC PAIN
TYPE: CHRONIC PAIN
TYPE: CHRONIC PAIN;SURGICAL PAIN
TYPE: CHRONIC PAIN

## 2019-03-27 ASSESSMENT — PAIN - FUNCTIONAL ASSESSMENT
PAIN_FUNCTIONAL_ASSESSMENT: PREVENTS OR INTERFERES SOME ACTIVE ACTIVITIES AND ADLS
PAIN_FUNCTIONAL_ASSESSMENT: PREVENTS OR INTERFERES SOME ACTIVE ACTIVITIES AND ADLS
PAIN_FUNCTIONAL_ASSESSMENT: 0-10
PAIN_FUNCTIONAL_ASSESSMENT: PREVENTS OR INTERFERES SOME ACTIVE ACTIVITIES AND ADLS
PAIN_FUNCTIONAL_ASSESSMENT: PREVENTS OR INTERFERES SOME ACTIVE ACTIVITIES AND ADLS

## 2019-03-27 ASSESSMENT — PAIN DESCRIPTION - DESCRIPTORS
DESCRIPTORS: ACHING

## 2019-03-27 ASSESSMENT — PAIN SCALES - GENERAL
PAINLEVEL_OUTOF10: 0
PAINLEVEL_OUTOF10: 0
PAINLEVEL_OUTOF10: 5
PAINLEVEL_OUTOF10: 4
PAINLEVEL_OUTOF10: 5
PAINLEVEL_OUTOF10: 5
PAINLEVEL_OUTOF10: 4

## 2019-03-27 ASSESSMENT — PAIN DESCRIPTION - PROGRESSION
CLINICAL_PROGRESSION: GRADUALLY IMPROVING
CLINICAL_PROGRESSION: NOT CHANGED
CLINICAL_PROGRESSION: GRADUALLY IMPROVING
CLINICAL_PROGRESSION: NOT CHANGED

## 2019-03-27 ASSESSMENT — PAIN DESCRIPTION - ORIENTATION
ORIENTATION: LOWER

## 2019-04-11 ENCOUNTER — OFFICE VISIT (OUTPATIENT)
Dept: SURGERY | Age: 64
End: 2019-04-11

## 2019-04-11 DIAGNOSIS — K80.50 BILIARY COLIC: Primary | ICD-10-CM

## 2019-04-11 PROCEDURE — 99024 POSTOP FOLLOW-UP VISIT: CPT | Performed by: SURGERY

## 2019-04-11 NOTE — PROGRESS NOTES
Subjective:      Patient ID: Devonte Reyes is a 71 y.o. female. HPI  Patient presents s/p Laparoscopic Cholecystectomy with Cholangiogram. Patient is two weeks post op. Pain level is minor. Incision appearance: well healed x 4. Post op complications: none. Pathology report reviewed with patient and showed cholesterolosis. Follow up prn. Review of Systems    Objective:   Physical Exam    Assessment:       Diagnosis Orders   1.  Biliary colic             Plan:      Follow up with me as needed          Elvi Mendes MD

## 2019-04-17 DIAGNOSIS — G43.909 MIGRAINE WITHOUT STATUS MIGRAINOSUS, NOT INTRACTABLE, UNSPECIFIED MIGRAINE TYPE: ICD-10-CM

## 2019-04-17 DIAGNOSIS — F41.9 ANXIETY: ICD-10-CM

## 2019-04-17 RX ORDER — BUTALBITAL, ASPIRIN, AND CAFFEINE 325; 50; 40 MG/1; MG/1; MG/1
CAPSULE ORAL
Qty: 30 CAPSULE | Refills: 1 | OUTPATIENT
Start: 2019-04-17

## 2019-04-19 DIAGNOSIS — G43.909 MIGRAINE WITHOUT STATUS MIGRAINOSUS, NOT INTRACTABLE, UNSPECIFIED MIGRAINE TYPE: Primary | ICD-10-CM

## 2019-04-19 DIAGNOSIS — F41.9 ANXIETY: ICD-10-CM

## 2019-04-22 DIAGNOSIS — F41.9 ANXIETY: ICD-10-CM

## 2019-04-22 DIAGNOSIS — G43.909 MIGRAINE WITHOUT STATUS MIGRAINOSUS, NOT INTRACTABLE, UNSPECIFIED MIGRAINE TYPE: ICD-10-CM

## 2019-04-22 RX ORDER — DULOXETIN HYDROCHLORIDE 30 MG/1
CAPSULE, DELAYED RELEASE ORAL
Qty: 90 CAPSULE | Refills: 2 | Status: SHIPPED | OUTPATIENT
Start: 2019-04-22 | End: 2019-08-08

## 2019-04-22 RX ORDER — BUTALBITAL, ASPIRIN, AND CAFFEINE 325; 50; 40 MG/1; MG/1; MG/1
CAPSULE ORAL
Qty: 30 CAPSULE | Refills: 1 | OUTPATIENT
Start: 2019-04-22 | End: 2019-05-14 | Stop reason: SDUPTHER

## 2019-04-23 RX ORDER — BUTALBITAL, ASPIRIN, AND CAFFEINE 325; 50; 40 MG/1; MG/1; MG/1
CAPSULE ORAL
Qty: 30 CAPSULE | Refills: 1 | OUTPATIENT
Start: 2019-04-23 | End: 2019-05-23

## 2019-04-24 ENCOUNTER — TELEPHONE (OUTPATIENT)
Dept: INTERNAL MEDICINE CLINIC | Age: 64
End: 2019-04-24

## 2019-05-07 ENCOUNTER — OFFICE VISIT (OUTPATIENT)
Dept: PULMONOLOGY | Age: 64
End: 2019-05-07
Payer: MEDICARE

## 2019-05-07 VITALS
HEIGHT: 65 IN | DIASTOLIC BLOOD PRESSURE: 66 MMHG | OXYGEN SATURATION: 95 % | HEART RATE: 63 BPM | BODY MASS INDEX: 31.12 KG/M2 | SYSTOLIC BLOOD PRESSURE: 124 MMHG | WEIGHT: 186.8 LBS

## 2019-05-07 DIAGNOSIS — J45.30 MILD PERSISTENT ASTHMA WITHOUT COMPLICATION: Primary | ICD-10-CM

## 2019-05-07 DIAGNOSIS — R05.9 COUGH: ICD-10-CM

## 2019-05-07 PROCEDURE — G8417 CALC BMI ABV UP PARAM F/U: HCPCS | Performed by: INTERNAL MEDICINE

## 2019-05-07 PROCEDURE — 99213 OFFICE O/P EST LOW 20 MIN: CPT | Performed by: INTERNAL MEDICINE

## 2019-05-07 PROCEDURE — G8427 DOCREV CUR MEDS BY ELIG CLIN: HCPCS | Performed by: INTERNAL MEDICINE

## 2019-05-07 PROCEDURE — 4040F PNEUMOC VAC/ADMIN/RCVD: CPT | Performed by: INTERNAL MEDICINE

## 2019-05-07 PROCEDURE — 1036F TOBACCO NON-USER: CPT | Performed by: INTERNAL MEDICINE

## 2019-05-07 PROCEDURE — G8399 PT W/DXA RESULTS DOCUMENT: HCPCS | Performed by: INTERNAL MEDICINE

## 2019-05-07 PROCEDURE — 1123F ACP DISCUSS/DSCN MKR DOCD: CPT | Performed by: INTERNAL MEDICINE

## 2019-05-07 PROCEDURE — 1090F PRES/ABSN URINE INCON ASSESS: CPT | Performed by: INTERNAL MEDICINE

## 2019-05-07 PROCEDURE — 3017F COLORECTAL CA SCREEN DOC REV: CPT | Performed by: INTERNAL MEDICINE

## 2019-05-07 NOTE — PROGRESS NOTES
Subjective:      Patient ID: Darnell Tsai is a 71 y.o. female. HPI  Mrs. Catalina Boykin returns for regular follow-up for persistent asthma. Since her last visit, she had laparoscopic cholecystectomy. Abdominal symptoms have improved, although not completely resolved. She continues to have mild cough, with rare sputum. This may awaken her from sleep at times but does not keep her awake. She has not isolated any trigger exposures. No shortness of breath, tightness, wheezing. She has been on Spiriva for 3 months, and is not clear whether this has made any impact on cough. On further review of medication, she admits that she is using Symbicort only once daily, in the morning. She has no requirement for when necessary albuterol    Review of Systems    Objective:   Physical Exam   Constitutional: She is oriented to person, place, and time. She appears well-developed and well-nourished. HENT:   Head: Normocephalic and atraumatic. Mouth/Throat: Oropharynx is clear and moist. No oropharyngeal exudate. Eyes: Right eye exhibits no discharge. Left eye exhibits no discharge. No scleral icterus. Neck: No tracheal deviation present. No thyromegaly present. Cardiovascular: Normal rate, regular rhythm, normal heart sounds and intact distal pulses. No murmur heard. Pulmonary/Chest: Effort normal and breath sounds normal. No stridor. No respiratory distress. She has no wheezes. She has no rales. She exhibits no tenderness. Musculoskeletal: She exhibits no edema. Lymphadenopathy:     She has no cervical adenopathy. Neurological: She is alert and oriented to person, place, and time. Skin: Skin is warm and dry. Psychiatric: She has a normal mood and affect. Her behavior is normal. Thought content normal.   Vitals reviewed. Assessment:      Mild asthma, with persistent cough. She is a former smoker, may have some element of chronic bronchitis.   She has not responded to addition of muscular neck antagonist inhaler. She is not taking the recommended dose of inhaled steroid and long-acting beta agonist.      Plan:      She is recommended to use Symbicort inhaler twice daily, even if it is not quite 12 hours apart.   She may stop using Spiriva, and assess how the inhalers are affecting her cough        Favian Grijalva MD

## 2019-05-14 ENCOUNTER — OFFICE VISIT (OUTPATIENT)
Dept: INTERNAL MEDICINE CLINIC | Age: 64
End: 2019-05-14
Payer: MEDICARE

## 2019-05-14 VITALS
SYSTOLIC BLOOD PRESSURE: 138 MMHG | WEIGHT: 185 LBS | HEART RATE: 68 BPM | OXYGEN SATURATION: 95 % | DIASTOLIC BLOOD PRESSURE: 80 MMHG | BODY MASS INDEX: 30.82 KG/M2 | HEIGHT: 65 IN

## 2019-05-14 DIAGNOSIS — G43.909 MIGRAINE WITHOUT STATUS MIGRAINOSUS, NOT INTRACTABLE, UNSPECIFIED MIGRAINE TYPE: ICD-10-CM

## 2019-05-14 DIAGNOSIS — R11.0 CHRONIC NAUSEA: ICD-10-CM

## 2019-05-14 DIAGNOSIS — I10 ESSENTIAL HYPERTENSION: ICD-10-CM

## 2019-05-14 DIAGNOSIS — R12 HEARTBURN: ICD-10-CM

## 2019-05-14 DIAGNOSIS — F41.9 ANXIETY: ICD-10-CM

## 2019-05-14 DIAGNOSIS — J45.30 MILD PERSISTENT ASTHMA WITHOUT COMPLICATION: ICD-10-CM

## 2019-05-14 PROBLEM — K82.4 GALLBLADDER POLYP: Status: RESOLVED | Noted: 2019-03-12 | Resolved: 2019-05-14

## 2019-05-14 PROBLEM — J20.8 ACUTE BRONCHITIS DUE TO OTHER SPECIFIED ORGANISMS: Status: RESOLVED | Noted: 2017-08-22 | Resolved: 2019-05-14

## 2019-05-14 PROBLEM — R19.5 LOOSE STOOLS: Status: RESOLVED | Noted: 2018-02-02 | Resolved: 2019-05-14

## 2019-05-14 PROBLEM — M54.50 LOW BACK PAIN: Status: RESOLVED | Noted: 2017-03-14 | Resolved: 2019-05-14

## 2019-05-14 PROBLEM — E66.9 OBESITY: Status: RESOLVED | Noted: 2018-12-11 | Resolved: 2019-05-14

## 2019-05-14 PROCEDURE — 4040F PNEUMOC VAC/ADMIN/RCVD: CPT | Performed by: INTERNAL MEDICINE

## 2019-05-14 PROCEDURE — 1123F ACP DISCUSS/DSCN MKR DOCD: CPT | Performed by: INTERNAL MEDICINE

## 2019-05-14 PROCEDURE — G8427 DOCREV CUR MEDS BY ELIG CLIN: HCPCS | Performed by: INTERNAL MEDICINE

## 2019-05-14 PROCEDURE — 3017F COLORECTAL CA SCREEN DOC REV: CPT | Performed by: INTERNAL MEDICINE

## 2019-05-14 PROCEDURE — G8417 CALC BMI ABV UP PARAM F/U: HCPCS | Performed by: INTERNAL MEDICINE

## 2019-05-14 PROCEDURE — 99214 OFFICE O/P EST MOD 30 MIN: CPT | Performed by: INTERNAL MEDICINE

## 2019-05-14 PROCEDURE — G8399 PT W/DXA RESULTS DOCUMENT: HCPCS | Performed by: INTERNAL MEDICINE

## 2019-05-14 PROCEDURE — 1090F PRES/ABSN URINE INCON ASSESS: CPT | Performed by: INTERNAL MEDICINE

## 2019-05-14 PROCEDURE — 1036F TOBACCO NON-USER: CPT | Performed by: INTERNAL MEDICINE

## 2019-05-14 RX ORDER — BUTALBITAL, ASPIRIN, AND CAFFEINE 325; 50; 40 MG/1; MG/1; MG/1
CAPSULE ORAL
Qty: 30 CAPSULE | Refills: 2 | Status: SHIPPED | OUTPATIENT
Start: 2019-05-14 | End: 2019-07-23 | Stop reason: SDUPTHER

## 2019-05-14 RX ORDER — DIAZEPAM 5 MG/1
TABLET ORAL
Qty: 30 TABLET | Refills: 2 | Status: SHIPPED | OUTPATIENT
Start: 2019-05-14 | End: 2019-08-08 | Stop reason: SDUPTHER

## 2019-05-14 RX ORDER — FAMOTIDINE 40 MG/1
40 TABLET, FILM COATED ORAL DAILY
Qty: 30 TABLET | Refills: 5 | Status: SHIPPED | OUTPATIENT
Start: 2019-05-14 | End: 2019-06-03

## 2019-05-14 ASSESSMENT — ENCOUNTER SYMPTOMS
CHEST TIGHTNESS: 0
COUGH: 1
VOMITING: 0
TROUBLE SWALLOWING: 0
NAUSEA: 1
DIARRHEA: 0
CONSTIPATION: 0

## 2019-05-14 ASSESSMENT — PATIENT HEALTH QUESTIONNAIRE - PHQ9
SUM OF ALL RESPONSES TO PHQ QUESTIONS 1-9: 0
2. FEELING DOWN, DEPRESSED OR HOPELESS: 0
1. LITTLE INTEREST OR PLEASURE IN DOING THINGS: 0
SUM OF ALL RESPONSES TO PHQ9 QUESTIONS 1 & 2: 0
SUM OF ALL RESPONSES TO PHQ QUESTIONS 1-9: 0

## 2019-05-14 NOTE — ASSESSMENT & PLAN NOTE
Worsening heartburn over the last couple months since cholecystectomy. Using more and acid. No dysphagia. Continue omeprazole 40 mg but change to evening. Add Pepcid 40 mg in the morning. May have to refer back to GI if this does not help. Consider 5-HIAA evaluation.

## 2019-05-14 NOTE — PROGRESS NOTES
SUBJECTIVE:  Patient ID: Ede Pimentel is an 71 y.o. female. HPI: Patient here today for the f/u of chronic problems-- see Problem List and associated comments. New issues or complaints include (alsosee Assessment for more details):  Status post cholecystectomy and her nausea is significantly resolved along with the vomiting. She does not have any loose stools. She is having quite a bit of heartburn since surgery despite being on omeprazole. She is using an acid supplement. No dysphagia. No abdominal pain. Her wounds are healing well. She is seeing pulmonology for her asthma and cough symptoms. She has no chest pain or chest tightness. Headaches are stable. Anxiety is stable and continues to be well controlled with Valium. She remains functional.    Review of Systems   Constitutional:        Intentional weight loss   HENT: Negative for trouble swallowing. Respiratory: Positive for cough. Negative for chest tightness. Cardiovascular: Negative for chest pain. Gastrointestinal: Positive for nausea. Negative for constipation, diarrhea and vomiting. Heartburn   Genitourinary: Negative. Neurological: Positive for headaches. Psychiatric/Behavioral: Negative for dysphoric mood. The patient is nervous/anxious. OBJECTIVE:    /80 (Site: Right Upper Arm, Position: Sitting, Cuff Size: Medium Adult)   Pulse 68   Ht 5' 5.25\" (1.657 m)   Wt 185 lb (83.9 kg)   SpO2 95%   BMI 30.55 kg/m²      Physical Exam   Constitutional: She is oriented to person, place, and time. She appears well-developed and well-nourished. Non-toxic appearance. She does not have a sickly appearance. No distress. Eyes: EOM are normal. No scleral icterus. Cardiovascular: Normal rate and regular rhythm. Pulmonary/Chest: Effort normal. No respiratory distress. Abdominal: Soft. Bowel sounds are normal.   Surgical wounds healing well   Musculoskeletal: She exhibits no edema.    Neurological: She is alert and oriented to person, place, and time. No cranial nerve deficit. Skin: She is not diaphoretic. No pallor. Psychiatric: She has a normal mood and affect. Her behavior is normal. Judgment and thought content normal.       ASSESSMENT:       Encounter Diagnoses   Name Primary?  Migraine without status migrainosus, not intractable, unspecified migraine type     Anxiety     Essential hypertension     Chronic nausea     Mild persistent asthma without complication     Heartburn        Hypertension  BP ok    Chronic nausea  Improved significantly since cholecystectomy. Rare use of antinausea medication. No more vomiting. Mild persistent asthma without complication  Per pulmonology    Heartburn  Worsening heartburn over the last couple months since cholecystectomy. Using more and acid. No dysphagia. Continue omeprazole 40 mg but change to evening. Add Pepcid 40 mg in the morning. May have to refer back to GI if this does not help. Consider 5-HIAA evaluation. Migraine  Monitor report done. Refill Fiorinal and Valium. Doing well with Rx. Anxiety  Monitor report done. Refill Valium. PLAN:See ASSESSMENT for evaluation & PLAN     No orders of the defined types were placed in this encounter. PSH, PMH, SH and  reviewed and noted. Recent and past labs, tests and consultsalso reviewed. Recent or new meds also reviewed.

## 2019-06-03 ENCOUNTER — TELEPHONE (OUTPATIENT)
Dept: INTERNAL MEDICINE CLINIC | Age: 64
End: 2019-06-03

## 2019-06-03 NOTE — TELEPHONE ENCOUNTER
Try to add ranitidine-she can try the over-the-counter ranitidine 75 mg 1 daily, and see if this helps control her GERD without giving her diarrhea. I can always give her prescription strength ranitidine if necessary as long as it does not cause diarrhea.

## 2019-06-03 NOTE — TELEPHONE ENCOUNTER
The diarrhea could be from many different causes. Stop the Pepcid and see if it clears and if not then follow-up with me. If the Pepcid was helping then we could try a different medication once we know it was the cause of the diarrhea.

## 2019-06-03 NOTE — TELEPHONE ENCOUNTER
Pt called in starting taking the medication    famotidine (PEPCID) 40 MG tablet    Symptoms is diarrhea for 4 days now wants to know what to do???      Please advise

## 2019-06-25 ENCOUNTER — TELEPHONE (OUTPATIENT)
Dept: INTERNAL MEDICINE CLINIC | Age: 64
End: 2019-06-25

## 2019-06-25 NOTE — TELEPHONE ENCOUNTER
Pt stated the following:  FYI  Pt took inhaler then drank soda  Pt vomited up fluid  Then pt vomited up blood. Pt wanted  to be aware.   Please call

## 2019-07-23 DIAGNOSIS — G43.909 MIGRAINE WITHOUT STATUS MIGRAINOSUS, NOT INTRACTABLE, UNSPECIFIED MIGRAINE TYPE: ICD-10-CM

## 2019-07-23 DIAGNOSIS — F41.9 ANXIETY: ICD-10-CM

## 2019-07-23 RX ORDER — BUTALBITAL, ASPIRIN, AND CAFFEINE 325; 50; 40 MG/1; MG/1; MG/1
CAPSULE ORAL
Qty: 30 CAPSULE | Refills: 1 | OUTPATIENT
Start: 2019-07-23 | End: 2019-08-08

## 2019-07-24 DIAGNOSIS — G43.909 MIGRAINE WITHOUT STATUS MIGRAINOSUS, NOT INTRACTABLE, UNSPECIFIED MIGRAINE TYPE: ICD-10-CM

## 2019-07-24 DIAGNOSIS — F41.9 ANXIETY: ICD-10-CM

## 2019-07-24 RX ORDER — BUTALBITAL, ASPIRIN, AND CAFFEINE 325; 50; 40 MG/1; MG/1; MG/1
CAPSULE ORAL
Qty: 30 CAPSULE | Refills: 1 | OUTPATIENT
Start: 2019-07-24

## 2019-07-26 RX ORDER — DULOXETIN HYDROCHLORIDE 30 MG/1
CAPSULE, DELAYED RELEASE ORAL
Qty: 90 CAPSULE | Refills: 2 | Status: SHIPPED | OUTPATIENT
Start: 2019-07-26 | End: 2019-10-19 | Stop reason: SDUPTHER

## 2019-07-26 RX ORDER — PROCHLORPERAZINE MALEATE 10 MG
TABLET ORAL
Qty: 30 TABLET | Refills: 1 | Status: SHIPPED | OUTPATIENT
Start: 2019-07-26 | End: 2019-09-30 | Stop reason: SDUPTHER

## 2019-08-01 ENCOUNTER — HOSPITAL ENCOUNTER (OUTPATIENT)
Dept: WOMENS IMAGING | Age: 64
Discharge: HOME | End: 2019-08-01
Payer: MEDICARE

## 2019-08-01 DIAGNOSIS — Z12.31 VISIT FOR SCREENING MAMMOGRAM: ICD-10-CM

## 2019-08-01 PROCEDURE — 77067 SCR MAMMO BI INCL CAD: CPT

## 2019-08-08 ENCOUNTER — OFFICE VISIT (OUTPATIENT)
Dept: INTERNAL MEDICINE CLINIC | Age: 64
End: 2019-08-08
Payer: MEDICARE

## 2019-08-08 ENCOUNTER — OFFICE VISIT (OUTPATIENT)
Dept: PULMONOLOGY | Age: 64
End: 2019-08-08
Payer: MEDICARE

## 2019-08-08 VITALS
RESPIRATION RATE: 16 BRPM | SYSTOLIC BLOOD PRESSURE: 110 MMHG | BODY MASS INDEX: 30.32 KG/M2 | DIASTOLIC BLOOD PRESSURE: 62 MMHG | OXYGEN SATURATION: 92 % | HEIGHT: 65 IN | HEART RATE: 66 BPM | WEIGHT: 182 LBS

## 2019-08-08 VITALS
HEIGHT: 60 IN | OXYGEN SATURATION: 97 % | WEIGHT: 180 LBS | SYSTOLIC BLOOD PRESSURE: 138 MMHG | HEART RATE: 69 BPM | BODY MASS INDEX: 35.34 KG/M2 | DIASTOLIC BLOOD PRESSURE: 78 MMHG

## 2019-08-08 DIAGNOSIS — J45.30 MILD PERSISTENT ASTHMA WITHOUT COMPLICATION: ICD-10-CM

## 2019-08-08 DIAGNOSIS — I10 ESSENTIAL HYPERTENSION: ICD-10-CM

## 2019-08-08 DIAGNOSIS — G43.909 MIGRAINE WITHOUT STATUS MIGRAINOSUS, NOT INTRACTABLE, UNSPECIFIED MIGRAINE TYPE: ICD-10-CM

## 2019-08-08 DIAGNOSIS — J45.30 MILD PERSISTENT ASTHMA WITHOUT COMPLICATION: Primary | ICD-10-CM

## 2019-08-08 DIAGNOSIS — K21.9 GASTROESOPHAGEAL REFLUX DISEASE, ESOPHAGITIS PRESENCE NOT SPECIFIED: ICD-10-CM

## 2019-08-08 DIAGNOSIS — K52.839 MICROSCOPIC COLITIS, UNSPECIFIED MICROSCOPIC COLITIS TYPE: ICD-10-CM

## 2019-08-08 DIAGNOSIS — F41.9 ANXIETY: ICD-10-CM

## 2019-08-08 DIAGNOSIS — R11.0 CHRONIC NAUSEA: ICD-10-CM

## 2019-08-08 PROBLEM — R12 HEARTBURN: Status: RESOLVED | Noted: 2019-05-14 | Resolved: 2019-08-08

## 2019-08-08 PROBLEM — M26.623 BILATERAL TEMPOROMANDIBULAR JOINT PAIN: Status: RESOLVED | Noted: 2018-10-30 | Resolved: 2019-08-08

## 2019-08-08 PROCEDURE — 4040F PNEUMOC VAC/ADMIN/RCVD: CPT | Performed by: INTERNAL MEDICINE

## 2019-08-08 PROCEDURE — 1123F ACP DISCUSS/DSCN MKR DOCD: CPT | Performed by: INTERNAL MEDICINE

## 2019-08-08 PROCEDURE — G8399 PT W/DXA RESULTS DOCUMENT: HCPCS | Performed by: INTERNAL MEDICINE

## 2019-08-08 PROCEDURE — 1090F PRES/ABSN URINE INCON ASSESS: CPT | Performed by: INTERNAL MEDICINE

## 2019-08-08 PROCEDURE — 3017F COLORECTAL CA SCREEN DOC REV: CPT | Performed by: INTERNAL MEDICINE

## 2019-08-08 PROCEDURE — G8417 CALC BMI ABV UP PARAM F/U: HCPCS | Performed by: INTERNAL MEDICINE

## 2019-08-08 PROCEDURE — G8427 DOCREV CUR MEDS BY ELIG CLIN: HCPCS | Performed by: INTERNAL MEDICINE

## 2019-08-08 PROCEDURE — 99214 OFFICE O/P EST MOD 30 MIN: CPT | Performed by: INTERNAL MEDICINE

## 2019-08-08 PROCEDURE — 99213 OFFICE O/P EST LOW 20 MIN: CPT | Performed by: INTERNAL MEDICINE

## 2019-08-08 PROCEDURE — 1036F TOBACCO NON-USER: CPT | Performed by: INTERNAL MEDICINE

## 2019-08-08 RX ORDER — DIAZEPAM 5 MG/1
TABLET ORAL
Qty: 30 TABLET | Refills: 2 | Status: SHIPPED | OUTPATIENT
Start: 2019-08-08 | End: 2019-11-05 | Stop reason: SDUPTHER

## 2019-08-08 RX ORDER — BUTALBITAL, ASPIRIN, AND CAFFEINE 325; 50; 40 MG/1; MG/1; MG/1
CAPSULE ORAL
Qty: 30 CAPSULE | Refills: 2 | Status: SHIPPED | OUTPATIENT
Start: 2019-08-08 | End: 2019-11-05 | Stop reason: SDUPTHER

## 2019-08-08 RX ORDER — MELOXICAM 15 MG/1
TABLET ORAL
Qty: 30 TABLET | Refills: 2 | Status: SHIPPED | OUTPATIENT
Start: 2019-08-08 | End: 2019-11-05 | Stop reason: SDUPTHER

## 2019-08-08 RX ORDER — MONTELUKAST SODIUM 10 MG/1
10 TABLET ORAL DAILY
Qty: 30 TABLET | Refills: 3 | Status: SHIPPED | OUTPATIENT
Start: 2019-08-08 | End: 2020-01-24

## 2019-08-08 RX ORDER — RANITIDINE HCL 75 MG
75 TABLET ORAL NIGHTLY
COMMUNITY
End: 2020-01-24

## 2019-08-08 ASSESSMENT — PATIENT HEALTH QUESTIONNAIRE - PHQ9
1. LITTLE INTEREST OR PLEASURE IN DOING THINGS: 0
SUM OF ALL RESPONSES TO PHQ9 QUESTIONS 1 & 2: 0
SUM OF ALL RESPONSES TO PHQ QUESTIONS 1-9: 0
2. FEELING DOWN, DEPRESSED OR HOPELESS: 0
SUM OF ALL RESPONSES TO PHQ QUESTIONS 1-9: 0

## 2019-08-08 ASSESSMENT — ENCOUNTER SYMPTOMS
SHORTNESS OF BREATH: 0
TROUBLE SWALLOWING: 0
COUGH: 1
DIARRHEA: 0

## 2019-08-08 NOTE — PROGRESS NOTES
Subjective:      Patient ID: Maria Del Carmen Lea is a 71 y.o. female. HPI  Mrs Deanna Felix returns for regular follow up for asthma. She has had no breathing limitations, chest tightness, wheezing. She has occasional episodes of cough that can be quite startling. She is awakened from sleep once or twice per week with cough. She has been using Symbicort inhaler twice daily, not spiriva. She does not think this has been any more beneficial than her previous routine of symbicort and spiriva, both once daily. Review of Systems    Objective:   Physical Exam   Constitutional: She is oriented to person, place, and time. She appears well-developed and well-nourished. HENT:   Head: Normocephalic and atraumatic. Mouth/Throat: Oropharynx is clear and moist. No oropharyngeal exudate. Mallampati score 3   Eyes: Right eye exhibits no discharge. Left eye exhibits no discharge. No scleral icterus. Neck: No tracheal deviation present. No thyromegaly present. Cardiovascular: Normal rate, regular rhythm and intact distal pulses. Murmur heard. Crescendo systolic murmur is present with a grade of 2/6. Pulmonary/Chest: Effort normal and breath sounds normal. No stridor. No respiratory distress. She has no wheezes. She has no rales. She exhibits no tenderness. Musculoskeletal: She exhibits no edema. Lymphadenopathy:     She has no cervical adenopathy. Neurological: She is alert and oriented to person, place, and time. Skin: Skin is warm and dry. Psychiatric: She has a normal mood and affect. Her behavior is normal. Judgment and thought content normal.   Vitals reviewed. Assessment:      Mild persistent asthma, without significant issues of bronchospasm. Her primary symptom is cough. This is improved but not resolved with standard dose of inhaled glucocorticoid and beta agonist.  It was not clear this improved necessarily with muscarinic antagonist inhaler.   No other evident sources of cough

## 2019-08-08 NOTE — PROGRESS NOTES
SUBJECTIVE:  Patient ID: Bianka Conti is an 71 y.o. female. HPI: Patient here today for the f/u of chronic problems-- see Problem List and associated comments. New issues or complaints include (alsosee Assessment for more details): Patient here for her routine refill. Overall she is doing pretty well. She still gets very rare episodes of nausea. Her diarrhea is significantly improved as well. Heartburn has been well controlled with an H2 blocker in the morning and her PPI in the evening. She is still going to pulmonary medicine to try to get better control of her cough symptoms. She has recently been put on Singulair trial.    She is trying to lose weight and she has been successfully losing at a slow rate. Her headaches are stable in pattern. She needs a refill medication. Also her anxiety is stable and controlled with the Valium. Review of Systems   Constitutional: Negative for unexpected weight change. HENT: Negative for trouble swallowing. Respiratory: Positive for cough. Negative for shortness of breath. Cardiovascular: Negative for chest pain and palpitations. Gastrointestinal: Negative for diarrhea. GERD controlled   Psychiatric/Behavioral: Negative for dysphoric mood. OBJECTIVE:    /78   Pulse 69   Ht 5' 0.25\" (1.53 m)   Wt 180 lb (81.6 kg)   SpO2 97%   BMI 34.86 kg/m²      Physical Exam   Constitutional: She is oriented to person, place, and time. She appears well-developed and well-nourished. No distress. Eyes: No scleral icterus. Cardiovascular: Normal rate and regular rhythm. Murmur heard. Pulmonary/Chest: Effort normal and breath sounds normal. No respiratory distress. Neurological: She is alert and oriented to person, place, and time. She has normal strength. She displays no tremor. No cranial nerve deficit. Coordination and gait normal.   Skin: She is not diaphoretic. No pallor. Psychiatric: She has a normal mood and affect.  Her speech is normal and behavior is normal. Judgment and thought content normal. Cognition and memory are normal.       ASSESSMENT:       Encounter Diagnoses   Name Primary?  Anxiety     Migraine without status migrainosus, not intractable, unspecified migraine type     Essential hypertension     Microscopic colitis, unspecified microscopic colitis type     Gastroesophageal reflux disease, esophagitis presence not specified     Mild persistent asthma without complication     Chronic nausea        Hypertension  BP okay-continue metoprolol and hydrochlorothiazide    Microscopic colitis  No diarrhea. She is still taking the budesonide. She is at 1 a day and she may try cutting down to every other day. Past attempt to stop the medication was not successful. GERD (gastroesophageal reflux disease)  Well-controlled with ranitidine in the morning and omeprazole in the evening. Migraine  Stable and controlled- refill Fiorinal.  Monitor report done. Mild persistent asthma without complication  Per pulmonary medicine. Now on Singulair    Chronic nausea  Very rare episodes now. Doing much better since cholecystectomy. She keeps Compazine on hand. Anxiety  Monitor report done. Refill Valium. Doing well on Rx. PLAN:See ASSESSMENT for evaluation & PLAN     No orders of the defined types were placed in this encounter. PSH, PMH, SH and  reviewed and noted. Recent and past labs, tests and consultsalso reviewed. Recent or new meds also reviewed.

## 2019-08-08 NOTE — ASSESSMENT & PLAN NOTE
No diarrhea. She is still taking the budesonide. She is at 1 a day and she may try cutting down to every other day. Past attempt to stop the medication was not successful.

## 2019-08-21 ENCOUNTER — TELEPHONE (OUTPATIENT)
Dept: INTERNAL MEDICINE CLINIC | Age: 64
End: 2019-08-21

## 2019-08-21 RX ORDER — CIPROFLOXACIN 500 MG/1
500 TABLET, FILM COATED ORAL 2 TIMES DAILY
Qty: 14 TABLET | Refills: 0 | Status: SHIPPED | OUTPATIENT
Start: 2019-08-21 | End: 2019-10-11 | Stop reason: SDUPTHER

## 2019-09-12 ENCOUNTER — TELEPHONE (OUTPATIENT)
Dept: PULMONOLOGY | Age: 64
End: 2019-09-12

## 2019-09-12 NOTE — TELEPHONE ENCOUNTER
Patient called to let Dr Tomi Saul know that the Singulair has not helped her cough. She has been taking Rx for 30 days. She said that she still has a deep dry cough. Is there anything else you would recommend? Please call pt at [phone] if you have any questions.    Thank you

## 2019-09-20 ENCOUNTER — TELEPHONE (OUTPATIENT)
Dept: INTERNAL MEDICINE CLINIC | Age: 64
End: 2019-09-20

## 2019-10-02 RX ORDER — PROCHLORPERAZINE MALEATE 10 MG
TABLET ORAL
Qty: 30 TABLET | Refills: 0 | Status: SHIPPED | OUTPATIENT
Start: 2019-10-02 | End: 2019-11-05 | Stop reason: SDUPTHER

## 2019-10-09 ENCOUNTER — NURSE ONLY (OUTPATIENT)
Dept: INTERNAL MEDICINE CLINIC | Age: 64
End: 2019-10-09
Payer: MEDICARE

## 2019-10-09 DIAGNOSIS — Z23 NEED FOR INFLUENZA VACCINATION: Primary | ICD-10-CM

## 2019-10-09 PROCEDURE — G0008 ADMIN INFLUENZA VIRUS VAC: HCPCS | Performed by: INTERNAL MEDICINE

## 2019-10-09 PROCEDURE — 90653 IIV ADJUVANT VACCINE IM: CPT | Performed by: INTERNAL MEDICINE

## 2019-10-11 ENCOUNTER — TELEPHONE (OUTPATIENT)
Dept: INTERNAL MEDICINE CLINIC | Age: 64
End: 2019-10-11

## 2019-10-11 RX ORDER — CIPROFLOXACIN 500 MG/1
500 TABLET, FILM COATED ORAL 2 TIMES DAILY
Qty: 14 TABLET | Refills: 0 | Status: SHIPPED | OUTPATIENT
Start: 2019-10-11 | End: 2019-10-18

## 2019-10-21 RX ORDER — DULOXETIN HYDROCHLORIDE 30 MG/1
CAPSULE, DELAYED RELEASE ORAL
Qty: 90 CAPSULE | Refills: 1 | Status: SHIPPED | OUTPATIENT
Start: 2019-10-21 | End: 2020-02-13 | Stop reason: SDUPTHER

## 2019-11-05 ENCOUNTER — OFFICE VISIT (OUTPATIENT)
Dept: INTERNAL MEDICINE CLINIC | Age: 64
End: 2019-11-05
Payer: MEDICARE

## 2019-11-05 VITALS
WEIGHT: 178 LBS | SYSTOLIC BLOOD PRESSURE: 130 MMHG | HEART RATE: 71 BPM | BODY MASS INDEX: 29.66 KG/M2 | HEIGHT: 65 IN | DIASTOLIC BLOOD PRESSURE: 78 MMHG | OXYGEN SATURATION: 96 %

## 2019-11-05 DIAGNOSIS — G43.909 MIGRAINE WITHOUT STATUS MIGRAINOSUS, NOT INTRACTABLE, UNSPECIFIED MIGRAINE TYPE: ICD-10-CM

## 2019-11-05 DIAGNOSIS — K21.9 GASTROESOPHAGEAL REFLUX DISEASE, ESOPHAGITIS PRESENCE NOT SPECIFIED: ICD-10-CM

## 2019-11-05 DIAGNOSIS — Z00.00 PREVENTATIVE HEALTH CARE: ICD-10-CM

## 2019-11-05 DIAGNOSIS — K52.839 MICROSCOPIC COLITIS, UNSPECIFIED MICROSCOPIC COLITIS TYPE: ICD-10-CM

## 2019-11-05 DIAGNOSIS — F41.9 ANXIETY: ICD-10-CM

## 2019-11-05 DIAGNOSIS — I10 ESSENTIAL HYPERTENSION: Primary | ICD-10-CM

## 2019-11-05 DIAGNOSIS — R11.0 CHRONIC NAUSEA: ICD-10-CM

## 2019-11-05 DIAGNOSIS — E03.9 HYPOTHYROIDISM, UNSPECIFIED TYPE: ICD-10-CM

## 2019-11-05 PROCEDURE — 4040F PNEUMOC VAC/ADMIN/RCVD: CPT | Performed by: INTERNAL MEDICINE

## 2019-11-05 PROCEDURE — 1123F ACP DISCUSS/DSCN MKR DOCD: CPT | Performed by: INTERNAL MEDICINE

## 2019-11-05 PROCEDURE — G8399 PT W/DXA RESULTS DOCUMENT: HCPCS | Performed by: INTERNAL MEDICINE

## 2019-11-05 PROCEDURE — G8482 FLU IMMUNIZE ORDER/ADMIN: HCPCS | Performed by: INTERNAL MEDICINE

## 2019-11-05 PROCEDURE — 3017F COLORECTAL CA SCREEN DOC REV: CPT | Performed by: INTERNAL MEDICINE

## 2019-11-05 PROCEDURE — 99214 OFFICE O/P EST MOD 30 MIN: CPT | Performed by: INTERNAL MEDICINE

## 2019-11-05 PROCEDURE — 1036F TOBACCO NON-USER: CPT | Performed by: INTERNAL MEDICINE

## 2019-11-05 PROCEDURE — G8417 CALC BMI ABV UP PARAM F/U: HCPCS | Performed by: INTERNAL MEDICINE

## 2019-11-05 PROCEDURE — 1090F PRES/ABSN URINE INCON ASSESS: CPT | Performed by: INTERNAL MEDICINE

## 2019-11-05 PROCEDURE — G8427 DOCREV CUR MEDS BY ELIG CLIN: HCPCS | Performed by: INTERNAL MEDICINE

## 2019-11-05 RX ORDER — MELOXICAM 15 MG/1
TABLET ORAL
Qty: 30 TABLET | Refills: 3 | Status: SHIPPED | OUTPATIENT
Start: 2019-11-05 | End: 2020-07-07

## 2019-11-05 RX ORDER — DIAZEPAM 5 MG/1
TABLET ORAL
Qty: 30 TABLET | Refills: 2 | Status: SHIPPED | OUTPATIENT
Start: 2019-11-05 | End: 2020-02-13 | Stop reason: SDUPTHER

## 2019-11-05 RX ORDER — FAMOTIDINE 40 MG/1
40 TABLET, FILM COATED ORAL DAILY
Qty: 30 TABLET | Refills: 5 | Status: SHIPPED | OUTPATIENT
Start: 2019-11-05 | End: 2020-02-26 | Stop reason: HOSPADM

## 2019-11-05 RX ORDER — DICYCLOMINE HCL 20 MG
TABLET ORAL
Qty: 120 TABLET | Refills: 5 | Status: SHIPPED | OUTPATIENT
Start: 2019-11-05 | End: 2020-06-01

## 2019-11-05 RX ORDER — PROCHLORPERAZINE MALEATE 10 MG
TABLET ORAL
Qty: 30 TABLET | Refills: 2 | Status: SHIPPED | OUTPATIENT
Start: 2019-11-05 | End: 2019-12-31 | Stop reason: SDUPTHER

## 2019-11-05 RX ORDER — BUTALBITAL, ASPIRIN, AND CAFFEINE 325; 50; 40 MG/1; MG/1; MG/1
CAPSULE ORAL
Qty: 30 CAPSULE | Refills: 2 | Status: SHIPPED | OUTPATIENT
Start: 2019-11-05 | End: 2020-02-13 | Stop reason: SDUPTHER

## 2019-11-05 ASSESSMENT — ENCOUNTER SYMPTOMS
NAUSEA: 0
SHORTNESS OF BREATH: 0

## 2019-11-21 ENCOUNTER — OFFICE VISIT (OUTPATIENT)
Dept: PULMONOLOGY | Age: 64
End: 2019-11-21
Payer: MEDICARE

## 2019-11-21 VITALS
DIASTOLIC BLOOD PRESSURE: 72 MMHG | RESPIRATION RATE: 16 BRPM | HEIGHT: 65 IN | WEIGHT: 177 LBS | HEART RATE: 53 BPM | BODY MASS INDEX: 29.49 KG/M2 | SYSTOLIC BLOOD PRESSURE: 118 MMHG | OXYGEN SATURATION: 97 %

## 2019-11-21 DIAGNOSIS — R05.9 COUGH: Primary | ICD-10-CM

## 2019-11-21 DIAGNOSIS — J45.30 MILD PERSISTENT ASTHMA WITHOUT COMPLICATION: ICD-10-CM

## 2019-11-21 PROCEDURE — G8482 FLU IMMUNIZE ORDER/ADMIN: HCPCS | Performed by: INTERNAL MEDICINE

## 2019-11-21 PROCEDURE — G8399 PT W/DXA RESULTS DOCUMENT: HCPCS | Performed by: INTERNAL MEDICINE

## 2019-11-21 PROCEDURE — G8427 DOCREV CUR MEDS BY ELIG CLIN: HCPCS | Performed by: INTERNAL MEDICINE

## 2019-11-21 PROCEDURE — 1036F TOBACCO NON-USER: CPT | Performed by: INTERNAL MEDICINE

## 2019-11-21 PROCEDURE — 1123F ACP DISCUSS/DSCN MKR DOCD: CPT | Performed by: INTERNAL MEDICINE

## 2019-11-21 PROCEDURE — 4040F PNEUMOC VAC/ADMIN/RCVD: CPT | Performed by: INTERNAL MEDICINE

## 2019-11-21 PROCEDURE — G8417 CALC BMI ABV UP PARAM F/U: HCPCS | Performed by: INTERNAL MEDICINE

## 2019-11-21 PROCEDURE — 1090F PRES/ABSN URINE INCON ASSESS: CPT | Performed by: INTERNAL MEDICINE

## 2019-11-21 PROCEDURE — 3017F COLORECTAL CA SCREEN DOC REV: CPT | Performed by: INTERNAL MEDICINE

## 2019-11-21 PROCEDURE — 99214 OFFICE O/P EST MOD 30 MIN: CPT | Performed by: INTERNAL MEDICINE

## 2019-12-16 RX ORDER — DULOXETIN HYDROCHLORIDE 30 MG/1
CAPSULE, DELAYED RELEASE ORAL
Qty: 90 CAPSULE | Refills: 2 | Status: SHIPPED | OUTPATIENT
Start: 2019-12-16 | End: 2019-12-31 | Stop reason: SDUPTHER

## 2019-12-17 ENCOUNTER — TELEPHONE (OUTPATIENT)
Dept: PULMONOLOGY | Age: 64
End: 2019-12-17

## 2019-12-17 ENCOUNTER — HOSPITAL ENCOUNTER (OUTPATIENT)
Dept: CT IMAGING | Age: 64
Discharge: HOME | End: 2019-12-17
Payer: MEDICARE

## 2019-12-17 DIAGNOSIS — R05.9 COUGH: ICD-10-CM

## 2019-12-17 DIAGNOSIS — J90 PLEURAL EFFUSION, BILATERAL: ICD-10-CM

## 2019-12-17 DIAGNOSIS — R01.1 HEART MURMUR: Primary | ICD-10-CM

## 2019-12-17 PROCEDURE — 71250 CT THORAX DX C-: CPT

## 2019-12-18 ENCOUNTER — HOSPITAL ENCOUNTER (OUTPATIENT)
Dept: NON INVASIVE DIAGNOSTICS | Age: 64
Discharge: HOME | End: 2019-12-18
Payer: MEDICARE

## 2019-12-18 ENCOUNTER — TELEPHONE (OUTPATIENT)
Dept: PULMONOLOGY | Age: 64
End: 2019-12-18

## 2019-12-18 DIAGNOSIS — R01.1 HEART MURMUR: ICD-10-CM

## 2019-12-18 DIAGNOSIS — J90 PLEURAL EFFUSION, BILATERAL: ICD-10-CM

## 2019-12-18 LAB
LV EF: 68 %
LVEF MODALITY: NORMAL
PRO-BNP: 2358 PG/ML (ref 0–124)

## 2019-12-18 PROCEDURE — 93306 TTE W/DOPPLER COMPLETE: CPT

## 2019-12-18 NOTE — TELEPHONE ENCOUNTER
Reviewed echocardiogram with Mrs. Noah Carranza, advising her that she has a structural problem and valvular problem that is causing heart failure.   She is referred to Dr. Melvin March for consultation

## 2019-12-19 DIAGNOSIS — E04.1 THYROID NODULE: Primary | ICD-10-CM

## 2019-12-21 ENCOUNTER — HOSPITAL ENCOUNTER (OUTPATIENT)
Dept: ULTRASOUND IMAGING | Age: 64
Discharge: HOME | End: 2019-12-21
Payer: MEDICARE

## 2019-12-21 DIAGNOSIS — E04.1 THYROID NODULE: ICD-10-CM

## 2019-12-21 PROCEDURE — 76536 US EXAM OF HEAD AND NECK: CPT

## 2019-12-23 ENCOUNTER — TELEPHONE (OUTPATIENT)
Dept: INTERNAL MEDICINE CLINIC | Age: 64
End: 2019-12-23

## 2019-12-23 DIAGNOSIS — R93.89 ABNORMAL THYROID ULTRASOUND: Primary | ICD-10-CM

## 2019-12-31 ENCOUNTER — OFFICE VISIT (OUTPATIENT)
Dept: ENT CLINIC | Age: 64
End: 2019-12-31
Payer: MEDICARE

## 2019-12-31 VITALS
TEMPERATURE: 97.7 F | DIASTOLIC BLOOD PRESSURE: 64 MMHG | WEIGHT: 177.4 LBS | HEIGHT: 65 IN | HEART RATE: 73 BPM | SYSTOLIC BLOOD PRESSURE: 116 MMHG | BODY MASS INDEX: 29.56 KG/M2

## 2019-12-31 DIAGNOSIS — E04.2 MULTIPLE THYROID NODULES: Primary | ICD-10-CM

## 2019-12-31 PROCEDURE — 1036F TOBACCO NON-USER: CPT | Performed by: OTOLARYNGOLOGY

## 2019-12-31 PROCEDURE — 3017F COLORECTAL CA SCREEN DOC REV: CPT | Performed by: OTOLARYNGOLOGY

## 2019-12-31 PROCEDURE — 4040F PNEUMOC VAC/ADMIN/RCVD: CPT | Performed by: OTOLARYNGOLOGY

## 2019-12-31 PROCEDURE — G8427 DOCREV CUR MEDS BY ELIG CLIN: HCPCS | Performed by: OTOLARYNGOLOGY

## 2019-12-31 PROCEDURE — G8417 CALC BMI ABV UP PARAM F/U: HCPCS | Performed by: OTOLARYNGOLOGY

## 2019-12-31 PROCEDURE — 99204 OFFICE O/P NEW MOD 45 MIN: CPT | Performed by: OTOLARYNGOLOGY

## 2019-12-31 PROCEDURE — 10005 FNA BX W/US GDN 1ST LES: CPT | Performed by: OTOLARYNGOLOGY

## 2019-12-31 PROCEDURE — 1123F ACP DISCUSS/DSCN MKR DOCD: CPT | Performed by: OTOLARYNGOLOGY

## 2019-12-31 PROCEDURE — 10006 FNA BX W/US GDN EA ADDL: CPT | Performed by: OTOLARYNGOLOGY

## 2019-12-31 PROCEDURE — 1090F PRES/ABSN URINE INCON ASSESS: CPT | Performed by: OTOLARYNGOLOGY

## 2019-12-31 PROCEDURE — G8399 PT W/DXA RESULTS DOCUMENT: HCPCS | Performed by: OTOLARYNGOLOGY

## 2019-12-31 PROCEDURE — G8482 FLU IMMUNIZE ORDER/ADMIN: HCPCS | Performed by: OTOLARYNGOLOGY

## 2020-01-06 ENCOUNTER — TELEPHONE (OUTPATIENT)
Dept: INTERNAL MEDICINE CLINIC | Age: 65
End: 2020-01-06

## 2020-01-06 RX ORDER — AZITHROMYCIN 250 MG/1
250 TABLET, FILM COATED ORAL SEE ADMIN INSTRUCTIONS
Qty: 6 TABLET | Refills: 0 | OUTPATIENT
Start: 2020-01-06 | End: 2020-01-11

## 2020-01-06 RX ORDER — PROMETHAZINE HYDROCHLORIDE AND CODEINE PHOSPHATE 6.25; 1 MG/5ML; MG/5ML
5 SYRUP ORAL EVERY 6 HOURS PRN
Qty: 120 ML | Refills: 0 | OUTPATIENT
Start: 2020-01-06 | End: 2020-01-09

## 2020-01-13 ENCOUNTER — OFFICE VISIT (OUTPATIENT)
Dept: CARDIOLOGY CLINIC | Age: 65
End: 2020-01-13
Payer: MEDICARE

## 2020-01-13 ENCOUNTER — TELEPHONE (OUTPATIENT)
Dept: CARDIOLOGY CLINIC | Age: 65
End: 2020-01-13

## 2020-01-13 VITALS
HEART RATE: 60 BPM | BODY MASS INDEX: 29.42 KG/M2 | SYSTOLIC BLOOD PRESSURE: 118 MMHG | WEIGHT: 176.8 LBS | DIASTOLIC BLOOD PRESSURE: 74 MMHG

## 2020-01-13 PROCEDURE — 99204 OFFICE O/P NEW MOD 45 MIN: CPT | Performed by: INTERNAL MEDICINE

## 2020-01-13 PROCEDURE — G8399 PT W/DXA RESULTS DOCUMENT: HCPCS | Performed by: INTERNAL MEDICINE

## 2020-01-13 PROCEDURE — G8482 FLU IMMUNIZE ORDER/ADMIN: HCPCS | Performed by: INTERNAL MEDICINE

## 2020-01-13 PROCEDURE — 3017F COLORECTAL CA SCREEN DOC REV: CPT | Performed by: INTERNAL MEDICINE

## 2020-01-13 PROCEDURE — 1123F ACP DISCUSS/DSCN MKR DOCD: CPT | Performed by: INTERNAL MEDICINE

## 2020-01-13 PROCEDURE — G8417 CALC BMI ABV UP PARAM F/U: HCPCS | Performed by: INTERNAL MEDICINE

## 2020-01-13 PROCEDURE — 1090F PRES/ABSN URINE INCON ASSESS: CPT | Performed by: INTERNAL MEDICINE

## 2020-01-13 PROCEDURE — G8427 DOCREV CUR MEDS BY ELIG CLIN: HCPCS | Performed by: INTERNAL MEDICINE

## 2020-01-13 PROCEDURE — 4040F PNEUMOC VAC/ADMIN/RCVD: CPT | Performed by: INTERNAL MEDICINE

## 2020-01-13 PROCEDURE — 1036F TOBACCO NON-USER: CPT | Performed by: INTERNAL MEDICINE

## 2020-01-13 NOTE — TELEPHONE ENCOUNTER
Spoke with patient, scheduled cardiac mri at [facility] on 2/7/20 at 10:30. Reviewed instructions. Patient verbalized understanding.

## 2020-01-13 NOTE — PROGRESS NOTES
71 y.o. referred by Dr. Gilford Ly for valvular disease. Has asthma. Here with sister. Pt denies cp or sob. Has some nausea; has it every day for 6 mo. Has it randomly, every day. She gets \"indigestion\" though denies cp. Notices it after eating. Can be random. No exercise. Has steps at home. Gets sob with steps. Not active. No walking. No syncope. Past Medical History:   Diagnosis Date    Adrenal adenoma     left 8 mm - stable - CT     Anxiety     Arthritis     Asthma     Environmental allergies     GERD (gastroesophageal reflux disease)     HTN (hypertension)     Irritable bowel syndrome     Lung disease     Microscopic colitis     Migraine     Nephrolithiasis 1/21/15    R side -s/p lithotripsy    Nosebleed     Osteoporosis     DEXA 13 - scotty lumbar spine    Rash     Restless leg syndrome     TMJ dysfunction     Vitreous detachment     Wrist fracture, bilateral      Past Surgical History:   Procedure Laterality Date    CARDIOVASCULAR STRESS TEST      normal    CHOLECYSTECTOMY, LAPAROSCOPIC N/A 3/27/2019    LAPAROSCOPIC CHOLECYSTECTOMY WITH CHOLANGIOGRAM WITH C-ARM performed by Onur Cam MD at [address]      COLONOSCOPY  2018    Afshan (random bxs)-microscopic colitis    KNEE SURGERY Right 1975    synovectomy    OVARY REMOVAL Left     partial    OVARY REMOVAL Left     Partial    SYNOVECTOMY Right     knee    UPPER GASTROINTESTINAL ENDOSCOPY  2018    Normal    WRIST FRACTURE SURGERY Left      Social History     Tobacco Use    Smoking status: Former Smoker     Packs/day: 1.00     Years: 38.00     Pack years: 38.00     Start date: 1972     Last attempt to quit: 2010     Years since quittin.7    Smokeless tobacco: Never Used   Substance Use Topics    Alcohol use:  Yes     Alcohol/week: 0.0 standard drinks     Comment: 7-8 drinks per week on average    Drug use: No     Allergies   Allergen Reactions    Tolectin CREATININE 0.9 02/06/2019    GLUCOSE 135 (H) 02/06/2019    CALCIUM 10.6 02/06/2019    PROT 7.2 02/06/2019    LABALBU 4.4 02/06/2019    BILITOT <0.2 02/06/2019    ALKPHOS 76 02/06/2019    AST 20 02/06/2019    ALT 20 02/06/2019    LABGLOM >60 02/06/2019    GFRAA >60 02/06/2019    AGRATIO 1.6 02/06/2019    GLOB 2.8 02/06/2019     No results found for: INR, PROTIME    Lab Results   Component Value Date    CHOL 218 (H) 02/06/2019    CHOL 225 (H) 11/28/2017    CHOL 239 (H) 06/11/2016     Lab Results   Component Value Date    TRIG 111 02/06/2019    TRIG 101 11/28/2017    TRIG 90 06/11/2016     Lab Results   Component Value Date    HDL 73 (H) 02/06/2019    HDL 70 (H) 11/28/2017    HDL 84 (H) 06/11/2016     Lab Results   Component Value Date    LDLCALC 123 (H) 02/06/2019    LDLCALC 135 (H) 11/28/2017    LDLCALC 137 (H) 06/11/2016     Lab Results   Component Value Date    LABVLDL 22 02/06/2019    LABVLDL 20 11/28/2017    LABVLDL 18 06/11/2016     No results found for: CHOLHDLRATIO    12/2019 Echo: EF 65-70%. LVOT grd of 103 mm Hg. Grade 2 DD. Mild TR.        Current Outpatient Medications:     prochlorperazine (COMPAZINE) 10 MG tablet, TAKE ONE TABLET BY MOUTH EVERY 6 HOURS AS NEEDED, Disp: 30 tablet, Rfl: 5    dicyclomine (BENTYL) 20 MG tablet, TAKE ONE TABLET BY MOUTH FOUR TIMES A DAY AS NEEDED, Disp: 120 tablet, Rfl: 5    metoprolol tartrate (LOPRESSOR) 25 MG tablet, TAKE 1 TABLET TWICE A DAY, Disp: 180 tablet, Rfl: 3    meloxicam (MOBIC) 15 MG tablet, TAKE ONE TABLET BY MOUTH DAILY, Disp: 30 tablet, Rfl: 3    butalbital-aspirin-caffeine (FIORINAL) -40 MG capsule, TAKE ONE CAPSULE BY MOUTH EVERY 4 HOURS AS NEEDED FOR HEADACHES, Disp: 30 capsule, Rfl: 2    famotidine (PEPCID) 40 MG tablet, Take 1 tablet by mouth daily, Disp: 30 tablet, Rfl: 5    DULoxetine (CYMBALTA) 30 MG extended release capsule, TAKE THREE CAPSULES BY MOUTH DAILY, Disp: 90 capsule, Rfl: 1    omeprazole (PRILOSEC) 40 MG delayed release

## 2020-01-14 ENCOUNTER — HOSPITAL ENCOUNTER (OUTPATIENT)
Dept: ULTRASOUND IMAGING | Age: 65
Discharge: HOME | End: 2020-01-14
Payer: MEDICARE

## 2020-01-14 PROCEDURE — 88173 CYTOPATH EVAL FNA REPORT: CPT

## 2020-01-14 PROCEDURE — 60100 BIOPSY OF THYROID: CPT

## 2020-01-14 PROCEDURE — 76942 ECHO GUIDE FOR BIOPSY: CPT

## 2020-01-14 PROCEDURE — 88305 TISSUE EXAM BY PATHOLOGIST: CPT

## 2020-01-14 NOTE — BRIEF OP NOTE
Brief Postoperative Note    Hung Patel  YOB: 1955  [id number]    Pre-operative Diagnosis: bilateral thyroid nodules    Post-operative Diagnosis: Same    Procedure: FNA of left and right thyroid nodules    Anesthesia: Local    Surgeons: Rosalva Ascencio MD    Estimated Blood Loss: Less than 5 mL    Complications: None    Specimens: Was Obtained: FNA of right and left thyroid nodules.     Findings: Successful FNA of bilateral thyroid nodules    Electronically signed by Rosalva Ascencio MD on 1/14/2020 at 8:23 AM

## 2020-01-15 RX ORDER — ALPRAZOLAM 0.5 MG/1
TABLET ORAL
Qty: 30 TABLET | Refills: 0 | OUTPATIENT
Start: 2020-01-15 | End: 2020-02-14

## 2020-01-15 NOTE — TELEPHONE ENCOUNTER
ALPRAZolam (XANAX) 0.5 MG tablet- pt stated this was denied and she would like for stiven to call her pls advise

## 2020-01-20 ENCOUNTER — TELEPHONE (OUTPATIENT)
Dept: ENT CLINIC | Age: 65
End: 2020-01-20

## 2020-01-20 NOTE — TELEPHONE ENCOUNTER
Patient aware of Dr Miriam Mckay note in detail. States she is finding it to be more of an effort to talk is that normal. Per Dr Miriam Mckay, yes it can be and it will eventually go away. Patient made aware if not better next week call office and make appointment. Verbalized understanding.

## 2020-01-24 ENCOUNTER — OFFICE VISIT (OUTPATIENT)
Dept: PULMONOLOGY | Age: 65
End: 2020-01-24
Payer: MEDICARE

## 2020-01-24 VITALS
HEIGHT: 64 IN | OXYGEN SATURATION: 94 % | DIASTOLIC BLOOD PRESSURE: 66 MMHG | BODY MASS INDEX: 29.98 KG/M2 | SYSTOLIC BLOOD PRESSURE: 108 MMHG | HEART RATE: 69 BPM | WEIGHT: 175.6 LBS

## 2020-01-24 PROBLEM — I42.2 HYPERTROPHIC CARDIOMYOPATHY (HCC): Status: ACTIVE | Noted: 2020-01-24

## 2020-01-24 PROCEDURE — 3017F COLORECTAL CA SCREEN DOC REV: CPT | Performed by: INTERNAL MEDICINE

## 2020-01-24 PROCEDURE — 99214 OFFICE O/P EST MOD 30 MIN: CPT | Performed by: INTERNAL MEDICINE

## 2020-01-24 PROCEDURE — G8399 PT W/DXA RESULTS DOCUMENT: HCPCS | Performed by: INTERNAL MEDICINE

## 2020-01-24 PROCEDURE — 4040F PNEUMOC VAC/ADMIN/RCVD: CPT | Performed by: INTERNAL MEDICINE

## 2020-01-24 PROCEDURE — G8427 DOCREV CUR MEDS BY ELIG CLIN: HCPCS | Performed by: INTERNAL MEDICINE

## 2020-01-24 PROCEDURE — 1090F PRES/ABSN URINE INCON ASSESS: CPT | Performed by: INTERNAL MEDICINE

## 2020-01-24 PROCEDURE — 1036F TOBACCO NON-USER: CPT | Performed by: INTERNAL MEDICINE

## 2020-01-24 PROCEDURE — G8482 FLU IMMUNIZE ORDER/ADMIN: HCPCS | Performed by: INTERNAL MEDICINE

## 2020-01-24 PROCEDURE — 1123F ACP DISCUSS/DSCN MKR DOCD: CPT | Performed by: INTERNAL MEDICINE

## 2020-01-24 PROCEDURE — G8417 CALC BMI ABV UP PARAM F/U: HCPCS | Performed by: INTERNAL MEDICINE

## 2020-01-24 NOTE — PROGRESS NOTES
Subjective:      Patient ID: Griselda Trinidad is a 71 y.o. female. HPI Mrs. Ann Marie Mcarthur returns for follow-up of asthma and cough. Since her last visit, she had further evaluation of thyromegaly, as seen on CT scan. She had ultrasound and nodules were seen. Needle aspiration of these indicated benign thyroid tissue. She was seen by Dr. Sally Kaur for hypertrophic cardiomyopathy. She is having further evaluation with cardiac MRI. There were ST-T changes on EKG that may require nuclear stress test.   She had a upper respiratory infection 2 to 3 weeks ago, with increased cough. This has largely resolved. She incidentally received azithromycin and cough suppressant. She is back to having her usual daily nonproductive cough. No complaints of shortness of breath with normal daily activities. Review of Systems    Objective:   Physical Exam  Vitals signs reviewed. Constitutional:       Appearance: She is well-developed. HENT:      Head: Normocephalic and atraumatic. Mouth/Throat:      Mouth: Mucous membranes are moist.      Pharynx: Oropharynx is clear. No oropharyngeal exudate or posterior oropharyngeal erythema. Eyes:      General: No scleral icterus. Right eye: No discharge. Left eye: No discharge. Neck:      Thyroid: No thyromegaly. Trachea: No tracheal deviation. Cardiovascular:      Rate and Rhythm: Normal rate and regular rhythm. Pulses: Normal pulses. Heart sounds: Murmur present. Crescendo  systolic murmur present with a grade of 2/6. Pulmonary:      Effort: Pulmonary effort is normal.      Breath sounds: Examination of the right-lower field reveals decreased breath sounds. Examination of the left-lower field reveals decreased breath sounds. Decreased breath sounds present. No wheezing, rhonchi or rales. Musculoskeletal:      Right lower leg: No edema. Left lower leg: No edema. Lymphadenopathy:      Cervical: No cervical adenopathy.    Skin: General: Skin is warm and dry. Neurological:      Mental Status: She is alert and oriented to person, place, and time. Psychiatric:         Mood and Affect: Mood normal.         Behavior: Behavior normal.         Thought Content: Thought content normal.         Judgment: Judgment normal.     CT chest from 12/17/2019 is reviewed: Very mild emphysematous changes. No abnormalities of airways or lung parenchyma otherwise. Small bilateral pleural effusions and small pericardial effusion present. Cardiomegaly present. Assessment:      Mild persistent asthma, overall well controlled with ICS/LABA. Cough, unclear etiology. Treatment of airways disease has had no effect on this, including adding muscarinic antagonist.  She had some modest improvement with treatment of postnasal drainage. No GERD. Concern for heart failure as a reason for cough. Hypertrophic cardiomyopathy, evaluation is underway. Finding of thyroid nodules on ultrasound, benign      Plan:      Continue ICS/LABA inhaler  No specific treatment for cough is evident, treatment of cardiac disease may be useful.   Cardiac MRI, possible pharmacologic GXT        Mukesh Briones MD

## 2020-02-06 DIAGNOSIS — E03.9 HYPOTHYROIDISM, UNSPECIFIED TYPE: ICD-10-CM

## 2020-02-06 DIAGNOSIS — Z00.00 PREVENTATIVE HEALTH CARE: ICD-10-CM

## 2020-02-06 DIAGNOSIS — I10 ESSENTIAL HYPERTENSION: ICD-10-CM

## 2020-02-06 LAB
A/G RATIO: 1.6 (ref 1.1–2.2)
ALBUMIN SERPL-MCNC: 4.4 G/DL (ref 3.4–5)
ALP BLD-CCNC: 72 U/L (ref 40–129)
ALT SERPL-CCNC: 15 U/L (ref 10–40)
ANION GAP SERPL CALCULATED.3IONS-SCNC: 14 MMOL/L (ref 3–16)
AST SERPL-CCNC: 18 U/L (ref 15–37)
BASOPHILS ABSOLUTE: 0.1 K/UL (ref 0–0.2)
BASOPHILS RELATIVE PERCENT: 1.1 %
BILIRUB SERPL-MCNC: 0.4 MG/DL (ref 0–1)
BILIRUBIN URINE: NEGATIVE
BLOOD, URINE: NEGATIVE
BUN BLDV-MCNC: 10 MG/DL (ref 7–20)
CALCIUM SERPL-MCNC: 10.5 MG/DL (ref 8.3–10.6)
CASTS 2: ABNORMAL /LPF
CASTS: ABNORMAL /LPF
CHLORIDE BLD-SCNC: 93 MMOL/L (ref 99–110)
CHOLESTEROL, TOTAL: 227 MG/DL (ref 0–199)
CLARITY: ABNORMAL
CO2: 27 MMOL/L (ref 21–32)
COLOR: YELLOW
CREAT SERPL-MCNC: 0.7 MG/DL (ref 0.6–1.2)
EOSINOPHILS ABSOLUTE: 0 K/UL (ref 0–0.6)
EOSINOPHILS RELATIVE PERCENT: 0.6 %
EPITHELIAL CELLS, UA: 10 /HPF (ref 0–5)
GFR AFRICAN AMERICAN: >60
GFR NON-AFRICAN AMERICAN: >60
GLOBULIN: 2.8 G/DL
GLUCOSE BLD-MCNC: 104 MG/DL (ref 70–99)
GLUCOSE URINE: NEGATIVE MG/DL
HCT VFR BLD CALC: 43.7 % (ref 36–48)
HDLC SERPL-MCNC: 77 MG/DL (ref 40–60)
HEMOGLOBIN: 15.4 G/DL (ref 12–16)
KETONES, URINE: NEGATIVE MG/DL
LDL CHOLESTEROL CALCULATED: 133 MG/DL
LEUKOCYTE ESTERASE, URINE: ABNORMAL
LYMPHOCYTES ABSOLUTE: 1.3 K/UL (ref 1–5.1)
LYMPHOCYTES RELATIVE PERCENT: 18.9 %
MCH RBC QN AUTO: 31.9 PG (ref 26–34)
MCHC RBC AUTO-ENTMCNC: 35.2 G/DL (ref 31–36)
MCV RBC AUTO: 90.8 FL (ref 80–100)
MICROSCOPIC EXAMINATION: YES
MONOCYTES ABSOLUTE: 0.5 K/UL (ref 0–1.3)
MONOCYTES RELATIVE PERCENT: 7.5 %
NEUTROPHILS ABSOLUTE: 4.8 K/UL (ref 1.7–7.7)
NEUTROPHILS RELATIVE PERCENT: 71.9 %
NITRITE, URINE: NEGATIVE
PDW BLD-RTO: 12.4 % (ref 12.4–15.4)
PH UA: 7 (ref 5–8)
PLATELET # BLD: 305 K/UL (ref 135–450)
PMV BLD AUTO: 7.6 FL (ref 5–10.5)
POTASSIUM SERPL-SCNC: 4 MMOL/L (ref 3.5–5.1)
PROTEIN UA: NEGATIVE MG/DL
RBC # BLD: 4.81 M/UL (ref 4–5.2)
RBC UA: 6 /HPF (ref 0–4)
SODIUM BLD-SCNC: 134 MMOL/L (ref 136–145)
SPECIFIC GRAVITY UA: 1.01 (ref 1–1.03)
T4 FREE: 1.1 NG/DL (ref 0.9–1.8)
TOTAL PROTEIN: 7.2 G/DL (ref 6.4–8.2)
TRIGL SERPL-MCNC: 86 MG/DL (ref 0–150)
TSH SERPL DL<=0.05 MIU/L-ACNC: 1.75 UIU/ML (ref 0.27–4.2)
URINE REFLEX TO CULTURE: YES
URINE TYPE: ABNORMAL
UROBILINOGEN, URINE: 0.2 E.U./DL
VLDLC SERPL CALC-MCNC: 17 MG/DL
WBC # BLD: 6.7 K/UL (ref 4–11)
WBC UA: 16 /HPF (ref 0–5)

## 2020-02-07 LAB
ESTIMATED AVERAGE GLUCOSE: 105.4 MG/DL
HBA1C MFR BLD: 5.3 %

## 2020-02-07 RX ORDER — DILTIAZEM HYDROCHLORIDE 60 MG/1
TABLET, FILM COATED ORAL
Qty: 10.2 G | Refills: 10 | Status: SHIPPED | OUTPATIENT
Start: 2020-02-07 | End: 2021-04-06

## 2020-02-07 RX ORDER — OMEPRAZOLE 40 MG/1
CAPSULE, DELAYED RELEASE ORAL
Qty: 30 CAPSULE | Refills: 3 | Status: SHIPPED | OUTPATIENT
Start: 2020-02-07 | End: 2020-05-07 | Stop reason: SDUPTHER

## 2020-02-08 LAB
ORGANISM: ABNORMAL
URINE CULTURE, ROUTINE: ABNORMAL

## 2020-02-11 ENCOUNTER — TELEPHONE (OUTPATIENT)
Dept: CARDIOLOGY CLINIC | Age: 65
End: 2020-02-11

## 2020-02-11 NOTE — TELEPHONE ENCOUNTER
Echo showed hypertropic cardiomyopathy and LVOT obstruction and severe mitral regurgitations. She needs to see Dr. Khanh Saha to discuss plan of care. I'd probably have her come in soon to discuss with Dr. Khanh Saha.

## 2020-02-12 ENCOUNTER — OFFICE VISIT (OUTPATIENT)
Dept: CARDIOLOGY CLINIC | Age: 65
End: 2020-02-12
Payer: MEDICARE

## 2020-02-12 VITALS
WEIGHT: 173.6 LBS | HEIGHT: 65 IN | HEART RATE: 84 BPM | BODY MASS INDEX: 28.92 KG/M2 | SYSTOLIC BLOOD PRESSURE: 136 MMHG | DIASTOLIC BLOOD PRESSURE: 70 MMHG

## 2020-02-12 PROCEDURE — 4040F PNEUMOC VAC/ADMIN/RCVD: CPT | Performed by: INTERNAL MEDICINE

## 2020-02-12 PROCEDURE — G8417 CALC BMI ABV UP PARAM F/U: HCPCS | Performed by: INTERNAL MEDICINE

## 2020-02-12 PROCEDURE — G8482 FLU IMMUNIZE ORDER/ADMIN: HCPCS | Performed by: INTERNAL MEDICINE

## 2020-02-12 PROCEDURE — 1123F ACP DISCUSS/DSCN MKR DOCD: CPT | Performed by: INTERNAL MEDICINE

## 2020-02-12 PROCEDURE — G8427 DOCREV CUR MEDS BY ELIG CLIN: HCPCS | Performed by: INTERNAL MEDICINE

## 2020-02-12 PROCEDURE — 1090F PRES/ABSN URINE INCON ASSESS: CPT | Performed by: INTERNAL MEDICINE

## 2020-02-12 PROCEDURE — 99214 OFFICE O/P EST MOD 30 MIN: CPT | Performed by: INTERNAL MEDICINE

## 2020-02-12 PROCEDURE — 3017F COLORECTAL CA SCREEN DOC REV: CPT | Performed by: INTERNAL MEDICINE

## 2020-02-12 PROCEDURE — G8399 PT W/DXA RESULTS DOCUMENT: HCPCS | Performed by: INTERNAL MEDICINE

## 2020-02-12 PROCEDURE — 1036F TOBACCO NON-USER: CPT | Performed by: INTERNAL MEDICINE

## 2020-02-13 ENCOUNTER — NURSE ONLY (OUTPATIENT)
Dept: CARDIOLOGY CLINIC | Age: 65
End: 2020-02-13

## 2020-02-13 ENCOUNTER — PREP FOR PROCEDURE (OUTPATIENT)
Dept: CARDIOLOGY CLINIC | Age: 65
End: 2020-02-13

## 2020-02-13 ENCOUNTER — OFFICE VISIT (OUTPATIENT)
Dept: INTERNAL MEDICINE CLINIC | Age: 65
End: 2020-02-13
Payer: MEDICARE

## 2020-02-13 VITALS
OXYGEN SATURATION: 95 % | SYSTOLIC BLOOD PRESSURE: 118 MMHG | HEIGHT: 64 IN | WEIGHT: 172 LBS | DIASTOLIC BLOOD PRESSURE: 78 MMHG | BODY MASS INDEX: 29.37 KG/M2 | HEART RATE: 70 BPM

## 2020-02-13 PROBLEM — R82.71 BACTERIURIA: Status: ACTIVE | Noted: 2020-02-13

## 2020-02-13 PROBLEM — E04.2 MULTIPLE THYROID NODULES: Status: ACTIVE | Noted: 2020-02-13

## 2020-02-13 PROBLEM — I34.0 MITRAL VALVE REGURGITATION: Status: ACTIVE | Noted: 2019-02-12

## 2020-02-13 PROCEDURE — G0439 PPPS, SUBSEQ VISIT: HCPCS | Performed by: INTERNAL MEDICINE

## 2020-02-13 PROCEDURE — G8417 CALC BMI ABV UP PARAM F/U: HCPCS | Performed by: INTERNAL MEDICINE

## 2020-02-13 PROCEDURE — 1123F ACP DISCUSS/DSCN MKR DOCD: CPT | Performed by: INTERNAL MEDICINE

## 2020-02-13 PROCEDURE — G8399 PT W/DXA RESULTS DOCUMENT: HCPCS | Performed by: INTERNAL MEDICINE

## 2020-02-13 PROCEDURE — 3017F COLORECTAL CA SCREEN DOC REV: CPT | Performed by: INTERNAL MEDICINE

## 2020-02-13 PROCEDURE — G8482 FLU IMMUNIZE ORDER/ADMIN: HCPCS | Performed by: INTERNAL MEDICINE

## 2020-02-13 PROCEDURE — G8427 DOCREV CUR MEDS BY ELIG CLIN: HCPCS | Performed by: INTERNAL MEDICINE

## 2020-02-13 PROCEDURE — 99214 OFFICE O/P EST MOD 30 MIN: CPT | Performed by: INTERNAL MEDICINE

## 2020-02-13 PROCEDURE — 4040F PNEUMOC VAC/ADMIN/RCVD: CPT | Performed by: INTERNAL MEDICINE

## 2020-02-13 PROCEDURE — 1090F PRES/ABSN URINE INCON ASSESS: CPT | Performed by: INTERNAL MEDICINE

## 2020-02-13 PROCEDURE — 1036F TOBACCO NON-USER: CPT | Performed by: INTERNAL MEDICINE

## 2020-02-13 RX ORDER — SODIUM CHLORIDE 0.9 % (FLUSH) 0.9 %
10 SYRINGE (ML) INJECTION EVERY 12 HOURS SCHEDULED
Status: CANCELLED | OUTPATIENT
Start: 2020-02-13

## 2020-02-13 RX ORDER — DIAZEPAM 5 MG/1
5 TABLET ORAL 2 TIMES DAILY PRN
Qty: 60 TABLET | Refills: 3 | Status: SHIPPED | OUTPATIENT
Start: 2020-02-13 | End: 2020-05-21 | Stop reason: SDUPTHER

## 2020-02-13 RX ORDER — ASPIRIN 325 MG
325 TABLET ORAL ONCE
Status: CANCELLED | OUTPATIENT
Start: 2020-02-26

## 2020-02-13 RX ORDER — DULOXETIN HYDROCHLORIDE 30 MG/1
CAPSULE, DELAYED RELEASE ORAL
Qty: 90 CAPSULE | Refills: 2 | Status: SHIPPED | OUTPATIENT
Start: 2020-02-13 | End: 2020-05-21 | Stop reason: SDUPTHER

## 2020-02-13 RX ORDER — DIAZEPAM 5 MG/1
5 TABLET ORAL 2 TIMES DAILY PRN
Qty: 60 TABLET | Refills: 3 | Status: SHIPPED | OUTPATIENT
Start: 2020-02-13 | End: 2020-02-13 | Stop reason: DRUGHIGH

## 2020-02-13 RX ORDER — BUTALBITAL, ASPIRIN, AND CAFFEINE 325; 50; 40 MG/1; MG/1; MG/1
CAPSULE ORAL
Qty: 30 CAPSULE | Refills: 2 | Status: SHIPPED | OUTPATIENT
Start: 2020-02-13 | End: 2020-05-21 | Stop reason: SDUPTHER

## 2020-02-13 RX ORDER — SODIUM CHLORIDE 0.9 % (FLUSH) 0.9 %
10 SYRINGE (ML) INJECTION PRN
Status: CANCELLED | OUTPATIENT
Start: 2020-02-13

## 2020-02-13 RX ORDER — SODIUM CHLORIDE 9 MG/ML
INJECTION, SOLUTION INTRAVENOUS CONTINUOUS
Status: CANCELLED | OUTPATIENT
Start: 2020-02-13

## 2020-02-13 ASSESSMENT — LIFESTYLE VARIABLES
HAVE YOU OR SOMEONE ELSE BEEN INJURED AS A RESULT OF YOUR DRINKING: 0
HOW OFTEN DURING THE LAST YEAR HAVE YOU FOUND THAT YOU WERE NOT ABLE TO STOP DRINKING ONCE YOU HAD STARTED: 0
HOW OFTEN DURING THE LAST YEAR HAVE YOU FAILED TO DO WHAT WAS NORMALLY EXPECTED FROM YOU BECAUSE OF DRINKING: 0
HOW MANY STANDARD DRINKS CONTAINING ALCOHOL DO YOU HAVE ON A TYPICAL DAY: 0
AUDIT-C TOTAL SCORE: 2
AUDIT TOTAL SCORE: 2
HOW OFTEN DURING THE LAST YEAR HAVE YOU HAD A FEELING OF GUILT OR REMORSE AFTER DRINKING: 0
HOW OFTEN DURING THE LAST YEAR HAVE YOU NEEDED AN ALCOHOLIC DRINK FIRST THING IN THE MORNING TO GET YOURSELF GOING AFTER A NIGHT OF HEAVY DRINKING: 0
HAS A RELATIVE, FRIEND, DOCTOR, OR ANOTHER HEALTH PROFESSIONAL EXPRESSED CONCERN ABOUT YOUR DRINKING OR SUGGESTED YOU CUT DOWN: 0
HOW OFTEN DO YOU HAVE A DRINK CONTAINING ALCOHOL: 2
HOW OFTEN DO YOU HAVE SIX OR MORE DRINKS ON ONE OCCASION: 0
HOW OFTEN DURING THE LAST YEAR HAVE YOU BEEN UNABLE TO REMEMBER WHAT HAPPENED THE NIGHT BEFORE BECAUSE YOU HAD BEEN DRINKING: 0

## 2020-02-13 ASSESSMENT — ENCOUNTER SYMPTOMS
SHORTNESS OF BREATH: 1
GASTROINTESTINAL NEGATIVE: 1
EYE DISCHARGE: 0
TROUBLE SWALLOWING: 0

## 2020-02-13 ASSESSMENT — PATIENT HEALTH QUESTIONNAIRE - PHQ9
SUM OF ALL RESPONSES TO PHQ9 QUESTIONS 1 & 2: 0
1. LITTLE INTEREST OR PLEASURE IN DOING THINGS: 0
SUM OF ALL RESPONSES TO PHQ QUESTIONS 1-9: 0
2. FEELING DOWN, DEPRESSED OR HOPELESS: 0
SUM OF ALL RESPONSES TO PHQ QUESTIONS 1-9: 0

## 2020-02-13 NOTE — PROGRESS NOTES
Left Heart Catheterization    A left heart catheterization is a procedure that provides your cardiologist with detailed information regarding how your heart functions. A small catheter (long, fine tube) is inserted into an artery (a vessel that carries blood and oxygen) that leads to your heart. While watching with x-ray equipment, small amounts of dye are injected which enables visualization of the heart arteries and chambers. The pictures that your cardiologist receives from the cardiac catheterization enable him or her to decide on the best treatment for you. Date of the procedure:  2/26/20     Time of arrival: 0830    Cardiologist performing the procedure: ___Dr. Oconnell______________    Instructions for your left heart catheterization:    1. Bring a list of your medications to the hospital.    2.  Please notify us before the procedure if you are allergic to anything; especially x-ray contrast dye, iodine, nickel, or any type of jewelry. This is very important! 3. Do not eat or drink anything at all after midnight (or 8 hours) prior to the procedure. 4.  Take all morning medications EXCEPT any diuretics (water pills) the day of the procedure with a small sip of water. 5.  If you are on Coumadin, Warfarin, or Ledon Shock, please notify us so that we can make adjustments to your medication. 6.  If you are taking Xarelto, Eliquis, or Pradaxa, please stop staking these medications two days prior to the procedure (including the day of the procedure). 7.  If you are diabetic, check your blood sugar in the morning. If your blood sugar is 120 or less, do not take insulin. If your blood sugar is more than 120, take half the dose of your normal insulin. Do not take Metformin the night before your procedure or morning of the procedure. 8.  You MUST have someone to drive you home--no driving for 24 hours after your procedure.   If an intervention is performed, you might stay overnight in the hospital.    9.  Discharge instructions will be given to you at the time of your procedure. 10.  For any questions or if you cannot keep this appointment for any reason, please call [phone]. Spoke with pt regarding procedure. Pre-op instructions, time and location of arrival discussed. Pt verbalized understanding and all questions answered at this time.

## 2020-02-25 ENCOUNTER — PRE-PROCEDURE TELEPHONE (OUTPATIENT)
Dept: CARDIAC CATH/INVASIVE PROCEDURES | Age: 65
End: 2020-02-25

## 2020-02-26 ENCOUNTER — HOSPITAL ENCOUNTER (OUTPATIENT)
Dept: CARDIAC CATH/INVASIVE PROCEDURES | Age: 65
Discharge: HOME | End: 2020-02-28
Payer: MEDICARE

## 2020-02-26 VITALS — HEIGHT: 65 IN | BODY MASS INDEX: 28.16 KG/M2 | WEIGHT: 169 LBS | TEMPERATURE: 98 F

## 2020-02-26 LAB
A/G RATIO: 1.4 (ref 1.1–2.2)
ALBUMIN SERPL-MCNC: 4.3 G/DL (ref 3.4–5)
ALP BLD-CCNC: 66 U/L (ref 40–129)
ALT SERPL-CCNC: 16 U/L (ref 10–40)
ANION GAP SERPL CALCULATED.3IONS-SCNC: 13 MMOL/L (ref 3–16)
AST SERPL-CCNC: 19 U/L (ref 15–37)
BILIRUB SERPL-MCNC: <0.2 MG/DL (ref 0–1)
BUN BLDV-MCNC: 10 MG/DL (ref 7–20)
CALCIUM SERPL-MCNC: 10.4 MG/DL (ref 8.3–10.6)
CHLORIDE BLD-SCNC: 101 MMOL/L (ref 99–110)
CO2: 23 MMOL/L (ref 21–32)
CREAT SERPL-MCNC: 0.7 MG/DL (ref 0.6–1.2)
EKG ATRIAL RATE: 68 BPM
EKG DIAGNOSIS: NORMAL
EKG P AXIS: 66 DEGREES
EKG P-R INTERVAL: 166 MS
EKG Q-T INTERVAL: 424 MS
EKG QRS DURATION: 90 MS
EKG QTC CALCULATION (BAZETT): 450 MS
EKG R AXIS: 31 DEGREES
EKG T AXIS: 84 DEGREES
EKG VENTRICULAR RATE: 68 BPM
GFR AFRICAN AMERICAN: >60
GFR NON-AFRICAN AMERICAN: >60
GLOBULIN: 3 G/DL
GLUCOSE BLD-MCNC: 112 MG/DL (ref 70–99)
HCT VFR BLD CALC: 43.3 % (ref 36–48)
HEMOGLOBIN: 14.9 G/DL (ref 12–16)
INR BLD: 0.98 (ref 0.86–1.14)
MCH RBC QN AUTO: 32.1 PG (ref 26–34)
MCHC RBC AUTO-ENTMCNC: 34.5 G/DL (ref 31–36)
MCV RBC AUTO: 93.1 FL (ref 80–100)
PDW BLD-RTO: 13.2 % (ref 12.4–15.4)
PLATELET # BLD: 313 K/UL (ref 135–450)
PMV BLD AUTO: 7.1 FL (ref 5–10.5)
POTASSIUM SERPL-SCNC: 4.3 MMOL/L (ref 3.5–5.1)
PROTHROMBIN TIME: 11.4 SEC (ref 10–13.2)
RBC # BLD: 4.65 M/UL (ref 4–5.2)
SODIUM BLD-SCNC: 137 MMOL/L (ref 136–145)
TOTAL PROTEIN: 7.3 G/DL (ref 6.4–8.2)
WBC # BLD: 9.6 K/UL (ref 4–11)

## 2020-02-26 PROCEDURE — 85610 PROTHROMBIN TIME: CPT

## 2020-02-26 PROCEDURE — 2500000003 HC RX 250 WO HCPCS

## 2020-02-26 PROCEDURE — 99217 PR OBSERVATION CARE DISCHARGE MANAGEMENT: CPT | Performed by: NURSE PRACTITIONER

## 2020-02-26 PROCEDURE — 2709999900 HC NON-CHARGEABLE SUPPLY

## 2020-02-26 PROCEDURE — 93458 L HRT ARTERY/VENTRICLE ANGIO: CPT

## 2020-02-26 PROCEDURE — 93458 L HRT ARTERY/VENTRICLE ANGIO: CPT | Performed by: INTERNAL MEDICINE

## 2020-02-26 PROCEDURE — 99152 MOD SED SAME PHYS/QHP 5/>YRS: CPT | Performed by: INTERNAL MEDICINE

## 2020-02-26 PROCEDURE — 6360000002 HC RX W HCPCS

## 2020-02-26 PROCEDURE — 6360000004 HC RX CONTRAST MEDICATION: Performed by: INTERNAL MEDICINE

## 2020-02-26 PROCEDURE — 99152 MOD SED SAME PHYS/QHP 5/>YRS: CPT

## 2020-02-26 PROCEDURE — 99153 MOD SED SAME PHYS/QHP EA: CPT

## 2020-02-26 PROCEDURE — 93005 ELECTROCARDIOGRAM TRACING: CPT | Performed by: INTERNAL MEDICINE

## 2020-02-26 PROCEDURE — C1894 INTRO/SHEATH, NON-LASER: HCPCS

## 2020-02-26 PROCEDURE — 85027 COMPLETE CBC AUTOMATED: CPT

## 2020-02-26 PROCEDURE — C1769 GUIDE WIRE: HCPCS

## 2020-02-26 PROCEDURE — 80053 COMPREHEN METABOLIC PANEL: CPT

## 2020-02-26 RX ORDER — SODIUM CHLORIDE 9 MG/ML
INJECTION, SOLUTION INTRAVENOUS CONTINUOUS
Status: DISCONTINUED | OUTPATIENT
Start: 2020-02-26 | End: 2020-02-29 | Stop reason: HOSPADM

## 2020-02-26 RX ORDER — ACETAMINOPHEN 325 MG/1
650 TABLET ORAL EVERY 4 HOURS PRN
Status: DISCONTINUED | OUTPATIENT
Start: 2020-02-26 | End: 2020-02-29 | Stop reason: HOSPADM

## 2020-02-26 RX ORDER — ASPIRIN 325 MG
325 TABLET ORAL ONCE
Status: DISCONTINUED | OUTPATIENT
Start: 2020-02-26 | End: 2020-02-29 | Stop reason: HOSPADM

## 2020-02-26 RX ORDER — SODIUM CHLORIDE 0.9 % (FLUSH) 0.9 %
10 SYRINGE (ML) INJECTION EVERY 12 HOURS SCHEDULED
Status: DISCONTINUED | OUTPATIENT
Start: 2020-02-26 | End: 2020-02-26 | Stop reason: SDUPTHER

## 2020-02-26 RX ORDER — SODIUM CHLORIDE 0.9 % (FLUSH) 0.9 %
10 SYRINGE (ML) INJECTION PRN
Status: DISCONTINUED | OUTPATIENT
Start: 2020-02-26 | End: 2020-02-26 | Stop reason: SDUPTHER

## 2020-02-26 RX ORDER — SODIUM CHLORIDE 0.9 % (FLUSH) 0.9 %
10 SYRINGE (ML) INJECTION PRN
Status: DISCONTINUED | OUTPATIENT
Start: 2020-02-26 | End: 2020-02-29 | Stop reason: HOSPADM

## 2020-02-26 RX ORDER — SODIUM CHLORIDE 0.9 % (FLUSH) 0.9 %
10 SYRINGE (ML) INJECTION EVERY 12 HOURS SCHEDULED
Status: DISCONTINUED | OUTPATIENT
Start: 2020-02-26 | End: 2020-02-29 | Stop reason: HOSPADM

## 2020-02-26 RX ADMIN — IOHEXOL 100 ML: 350 INJECTION, SOLUTION INTRAVENOUS at 11:56

## 2020-02-26 NOTE — DISCHARGE SUMMARY
[facility] Discharge Note      Patient ID: Trupti Woodard 76 y.o. 1955    Admission Date: 2/26/2020     Discharge Date: 2/26/2020     Reason for Admission:  Principal Diagnosis: Hypertropic cardiomegaly   Secondary Diagnosis: Severe mitral regurgitation     Procedures:  Coronary angiogram     Brief Summary of Patient's Course:  76 y.o. with ANGULO, HCM and severe MR who presented for coronary angiogram. There was no obstructive coronary artery disease. There was a gradient across the LVOT of 102 mmHg. Recent echo demonstrated severe mitral regurgitation. She tolerated the procedure well. A TR band was used to obtain hemostasis of the right radial arteriotomy. She was monitored in the holding bay and discharged home. Dr. Lili Blakely was consulted for correction of LVOT and severe mitral regurgitation. Discharge Condition:  Good     Discharge Instructions: Activity Limitations:  No heavy lifting. Diet:  low fat and low sodium    Follow Up Instructions: To office in: Follow up with Clari Becker NP in 1 week and Follow up with Dr. Lili Blakely for surgery   Instructed Re: Discharge medications, activity and dietary restrictions and follow up appointments were discussed. Discharge Medications:   Ju Burciaga"   Home Medication Instructions GMK:[id number]    Printed on:02/26/20 1247   Medication Information                      budesonide (ENTOCORT EC) 3 MG extended release capsule  Take 3 mg by mouth every morning              butalbital-aspirin-caffeine (FIORINAL) -40 MG capsule  TAKE ONE CAPSULE BY MOUTH EVERY 4 HOURS AS NEEDED FOR HEADACHES             diazepam (VALIUM) 5 MG tablet  Take 1 tablet by mouth 2 times daily as needed for Anxiety.              dicyclomine (BENTYL) 20 MG tablet  TAKE ONE TABLET BY MOUTH FOUR TIMES A DAY AS NEEDED             DULoxetine (CYMBALTA) 30 MG extended release capsule  TAKE THREE CAPSULES BY MOUTH DAILY             FIBER PO  Take 1

## 2020-02-26 NOTE — H&P
week on average    Drug use: No     Allergies   Allergen Reactions    Tolectin [Tolmetin] Swelling     Face & Lips     Family History   Problem Relation Age of Onset   [place] Cancer Mother         leukemia    Other Father         CRF     Review of Systems   General: No fevers, chills, fatigue, or night sweats. No abnormal changes in weight. HEENT: No blurry or decreased vision. No changes in hearing, nasal discharge or sore throat. Cardiovascular: See HPI. No cramping in legs or buttocks when walking. Respiratory: No cough, hemoptysis, or wheezing. No history of asthma. Gastrointestinal: No abdominal pain, hematochezia, melana, or history of GI ulcers. Genito-Urinary: No dysuria or hematuria. No urgency or polyuria. Musculoskeletal: No complaints of joint pain, joint swelling or muscular weakness/soreness. Neurological: No dizziness or headaches. No numbness/tingling, speech problems or weakness. No history of a stroke or TIA. Psychological: No anxiety or depression  Hematological and Lymphatic: No abnormal bleeding or bruising, blood clots, jaundice. Endocrine: No malaise/lethargy, palpitations, polydipsia/polyuria, temperature intolerance or unexpected weight changes. Skin: No rashes or non-healing ulcers. PE:  Temperature 98 °F (36.7 °C), temperature source Oral, height 5' 5\" (1.651 m), weight 169 lb (76.7 kg), not currently breastfeeding. General (appearance): Well devel. No distress. Eyes: Anicteric. EOMI. Neck: Supple. No JVD  Ears/Nose/Mouth/Thorat: No cyanosis  CV: RRR. 2/6 PLACIDO. + S2   Respiratory:  Clear B, normal respiratory effort  GI: abd s/nt/nd. No peritoneal signs  Skin: Warm, dry. No rashes  Neuro/Psych: Alert and oriented x 3. Appropriate behavior  Ext:  No c/c.  No pitting edema  Pulses:  2+ radial.    Lab Results   Component Value Date    WBC 9.6 02/26/2020    HGB 14.9 02/26/2020    HCT 43.3 02/26/2020    MCV 93.1 02/26/2020     02/26/2020     Lab Results DULoxetine (CYMBALTA) 30 MG extended release capsule, TAKE THREE CAPSULES BY MOUTH DAILY, Disp: 90 capsule, Rfl: 2    butalbital-aspirin-caffeine (FIORINAL) -40 MG capsule, TAKE ONE CAPSULE BY MOUTH EVERY 4 HOURS AS NEEDED FOR HEADACHES, Disp: 30 capsule, Rfl: 2    diazepam (VALIUM) 5 MG tablet, Take 1 tablet by mouth 2 times daily as needed for Anxiety. , Disp: 60 tablet, Rfl: 3    omeprazole (PRILOSEC) 40 MG delayed release capsule, TAKE ONE CAPSULE BY MOUTH DAILY, Disp: 30 capsule, Rfl: 3    SYMBICORT 80-4.5 MCG/ACT AERO, INHALE TWO PUFFS BY MOUTH TWICE A DAY, Disp: 10.2 g, Rfl: 10    prochlorperazine (COMPAZINE) 10 MG tablet, TAKE ONE TABLET BY MOUTH EVERY 6 HOURS AS NEEDED, Disp: 30 tablet, Rfl: 5    dicyclomine (BENTYL) 20 MG tablet, TAKE ONE TABLET BY MOUTH FOUR TIMES A DAY AS NEEDED, Disp: 120 tablet, Rfl: 5    metoprolol tartrate (LOPRESSOR) 25 MG tablet, TAKE 1 TABLET TWICE A DAY, Disp: 180 tablet, Rfl: 3    meloxicam (MOBIC) 15 MG tablet, TAKE ONE TABLET BY MOUTH DAILY, Disp: 30 tablet, Rfl: 3    famotidine (PEPCID) 40 MG tablet, Take 1 tablet by mouth daily, Disp: 30 tablet, Rfl: 5    levothyroxine (SYNTHROID) 125 MCG tablet, TAKE ONE TABLET BY MOUTH DAILY, Disp: 60 tablet, Rfl: 4    budesonide (ENTOCORT EC) 3 MG extended release capsule, Take 3 mg by mouth every morning , Disp: , Rfl:     hydrochlorothiazide (HYDRODIURIL) 50 MG tablet, , Disp: , Rfl:     FIBER PO, Take 1 capsule by mouth daily , Disp: , Rfl:     loratadine (CLARITIN) 10 MG tablet, Take 10 mg by mouth daily. , Disp: , Rfl:     PROAIR  (90 Base) MCG/ACT inhaler, INHALE TWO PUFFS BY MOUTH EVERY 4 HOURS AS NEEDED FOR WHEEZING, Disp: 1 Inhaler, Rfl: 11    Spacer/Aero-Holding Chambers (AEROCHAMBER PLUS-FLOW SIGNAL) MISC, Use with inhaler as instructed, Disp: 1 each, Rfl: 3    A/P:  76 y.o. here for HCM. HCM    Recs:  -Cath  -Refer to CT surgery given HCM, severe MR, and ANGULO. Gume Kaur MD, [facility]

## 2020-02-26 NOTE — PROCEDURES
Rosia Duane  76 y.o.  [id number]    Referring MD:  Dr. Walker Height    Indication:  SOB, HCM    After informed consent was obtained and history and exam reviewed, the patient was taken to the cardiac cath lab. The patient had both groins and the right wrist prepped and draped in sterile fashion. 1% Xylocaine was used to anesthetize the right wrist.  A needle was inserted into the radial artery and a 5/6 Fr sheath was inserted. Continuous pulse-oximetry and ECG monitoring was performed, and frequent blood pressure assessment was performed. An independent trained observer pushed medications at my direction. We monitored the patient's level of consciousness and vital signs/physiologic status throughout the procedure duration (see start and stop times below). A solution of 2.5 mg verapamil, 200 mcg nitroglycerin, and 3,000U of heparin was administered via the sheath. A JR 4 catheter was advanced through the sheath over a guide wire and advanced under fluoroscopic guidance into the ascending aorta. The wire and catheter prolapsed into the left ventricle. Wire removed. Apical LV pressure was obtained. Pullback across the LVOT was performed, and then pullback across the aortic valve performed. The right coronary artery was engaged. Digital angiograms were recorded. The catheter was then removed and a JL 3.5 catheter was then advanced over the wire into the ascending aorta. The wire was removed, and the left main coronary artery was engaged. Digital angiograms were recorded. The catheter was then removed, and a pigtail catheter was advanced over the wire to the ascending aorta. The pigtail catheter was then placed into the left ventricle. Pressures were recorded. Ventriculography was performed. Post-ventriculography pressures and a pullback were performed. The pigtail catheter was then removed. All catheter exchanges done over a wire.     Hemostasis was obtained by TR Band    Complications:

## 2020-03-02 ENCOUNTER — TELEPHONE (OUTPATIENT)
Dept: INTERNAL MEDICINE CLINIC | Age: 65
End: 2020-03-02

## 2020-03-04 ENCOUNTER — OFFICE VISIT (OUTPATIENT)
Dept: CARDIOTHORACIC SURGERY | Age: 65
End: 2020-03-04
Payer: MEDICARE

## 2020-03-04 VITALS
BODY MASS INDEX: 28.82 KG/M2 | HEART RATE: 67 BPM | DIASTOLIC BLOOD PRESSURE: 68 MMHG | TEMPERATURE: 98.8 F | HEIGHT: 65 IN | SYSTOLIC BLOOD PRESSURE: 136 MMHG | OXYGEN SATURATION: 97 % | WEIGHT: 173 LBS

## 2020-03-04 PROCEDURE — G8417 CALC BMI ABV UP PARAM F/U: HCPCS | Performed by: THORACIC SURGERY (CARDIOTHORACIC VASCULAR SURGERY)

## 2020-03-04 PROCEDURE — 1123F ACP DISCUSS/DSCN MKR DOCD: CPT | Performed by: THORACIC SURGERY (CARDIOTHORACIC VASCULAR SURGERY)

## 2020-03-04 PROCEDURE — G8482 FLU IMMUNIZE ORDER/ADMIN: HCPCS | Performed by: THORACIC SURGERY (CARDIOTHORACIC VASCULAR SURGERY)

## 2020-03-04 PROCEDURE — G8399 PT W/DXA RESULTS DOCUMENT: HCPCS | Performed by: THORACIC SURGERY (CARDIOTHORACIC VASCULAR SURGERY)

## 2020-03-04 PROCEDURE — 1036F TOBACCO NON-USER: CPT | Performed by: THORACIC SURGERY (CARDIOTHORACIC VASCULAR SURGERY)

## 2020-03-04 PROCEDURE — G8427 DOCREV CUR MEDS BY ELIG CLIN: HCPCS | Performed by: THORACIC SURGERY (CARDIOTHORACIC VASCULAR SURGERY)

## 2020-03-04 PROCEDURE — 4040F PNEUMOC VAC/ADMIN/RCVD: CPT | Performed by: THORACIC SURGERY (CARDIOTHORACIC VASCULAR SURGERY)

## 2020-03-04 PROCEDURE — 99204 OFFICE O/P NEW MOD 45 MIN: CPT | Performed by: THORACIC SURGERY (CARDIOTHORACIC VASCULAR SURGERY)

## 2020-03-04 PROCEDURE — 3017F COLORECTAL CA SCREEN DOC REV: CPT | Performed by: THORACIC SURGERY (CARDIOTHORACIC VASCULAR SURGERY)

## 2020-03-04 PROCEDURE — 1090F PRES/ABSN URINE INCON ASSESS: CPT | Performed by: THORACIC SURGERY (CARDIOTHORACIC VASCULAR SURGERY)

## 2020-03-04 RX ORDER — ASCORBIC ACID 500 MG
1000 TABLET ORAL DAILY
Status: ON HOLD | COMMUNITY
End: 2020-07-03 | Stop reason: HOSPADM

## 2020-03-04 RX ORDER — AMOXICILLIN 500 MG
1 CAPSULE ORAL 2 TIMES DAILY
COMMUNITY

## 2020-03-04 RX ORDER — FAMOTIDINE 40 MG/1
40 TABLET, FILM COATED ORAL DAILY
COMMUNITY
End: 2020-05-21 | Stop reason: SDUPTHER

## 2020-03-04 RX ORDER — OMEGA-3S/DHA/EPA/FISH OIL/D3 300MG-1000
400 CAPSULE ORAL DAILY
COMMUNITY
End: 2022-08-09

## 2020-03-04 SDOH — HEALTH STABILITY: MENTAL HEALTH: HOW OFTEN DO YOU HAVE A DRINK CONTAINING ALCOHOL?: 4 OR MORE TIMES A WEEK

## 2020-03-04 SDOH — HEALTH STABILITY: MENTAL HEALTH: HOW MANY STANDARD DRINKS CONTAINING ALCOHOL DO YOU HAVE ON A TYPICAL DAY?: 1 OR 2

## 2020-03-04 ASSESSMENT — ENCOUNTER SYMPTOMS
SHORTNESS OF BREATH: 1
BLOOD IN STOOL: 0
TROUBLE SWALLOWING: 0
EYE PAIN: 0
DIARRHEA: 1
ABDOMINAL PAIN: 1
CONSTIPATION: 0
EYE REDNESS: 0
APNEA: 1
BACK PAIN: 1
CHEST TIGHTNESS: 0

## 2020-03-04 NOTE — PROGRESS NOTES
palpitations (OCC. ). Negative for chest pain and leg swelling. Gastrointestinal: Positive for abdominal pain (W/IBS flare up) and diarrhea (W/IBS). Negative for blood in stool and constipation.        + Acid reflux   Endocrine: Positive for cold intolerance. Negative for heat intolerance. Genitourinary: Negative for difficulty urinating, dysuria and hematuria. Musculoskeletal: Positive for back pain. Negative for gait problem and neck pain. Skin: Negative for pallor, rash and wound.        + Bruising to right wrist-resolving( Cath site)- No redness, swelling, purulence. Allergic/Immunologic: Negative for environmental allergies and food allergies. Neurological: Negative for dizziness, syncope and light-headedness. Hematological: Bruises/bleeds easily. Psychiatric/Behavioral: Negative for sleep disturbance. The patient is nervous/anxious.       Past Medical History:   Diagnosis Date    Adrenal adenoma     left 8 mm - stable - CT 8/13    Anxiety     Arthritis     Asthma     Environmental allergies     GERD (gastroesophageal reflux disease)     HOCM (hypertrophic obstructive cardiomyopathy) (HCC)     HTN (hypertension)     Irritable bowel syndrome     Lung disease     Microscopic colitis     Migraine     Mitral regurgitation     Nephrolithiasis 1/21/15    R side -s/p lithotripsy    Nosebleed     Osteoporosis     DEXA 8/8/13 - scotty lumbar spine    Rash     Restless leg syndrome     TMJ dysfunction     Vitreous detachment     Wrist fracture, bilateral      Past Surgical History:   Procedure Laterality Date    CARDIAC CATHETERIZATION  02/28/2020    CARDIOVASCULAR STRESS TEST  2016    normal    CHOLECYSTECTOMY, LAPAROSCOPIC N/A 3/27/2019    LAPAROSCOPIC CHOLECYSTECTOMY WITH CHOLANGIOGRAM WITH C-ARM performed by Frances Jason MD at [facility]  7/13    COLONOSCOPY  03/29/2018    Afshan (random bxs)-microscopic colitis    KNEE SURGERY Right 1975    synovectomy connections:     Talks on phone: Not on file     Gets together: Not on file     Attends Jain service: Not on file     Active member of club or organization: Not on file     Attends meetings of clubs or organizations: Not on file     Relationship status: Not on file    Intimate partner violence:     Fear of current or ex partner: Not on file     Emotionally abused: Not on file     Physically abused: Not on file     Forced sexual activity: Not on file   Other Topics Concern    Not on file   Social History Narrative    Not on file     Family History   Problem Relation Age of Onset    Cancer Mother         leukemia    Hypertension Mother     Other Father         CRF    Heart Surgery Father         CABG    Blindness Father     Hypertension Father     Hearing Loss Father        Objective:   Physical Exam  Vitals:    03/04/20 1334 03/04/20 1339   BP: 134/70 136/68   Site: Left Upper Arm Right Upper Arm   Position: Sitting Sitting   Pulse: 67    Temp: 98.8 °F (37.1 °C)    TempSrc: Oral    SpO2: 97%    Weight: 173 lb (78.5 kg)    Height: 5' 5\" (1.651 m)      General: appears well  HEENT: no JVD, no bruit, no stridor  CV: normal rate, regular, systolic murmur  Resp: normal effort, clear, good air movement bilaterally  Abd: soft, non-tender, non-distended, no bruit  Ext: warm, no edema, normal pedal pulses  Neuro: alert, oriented, no focal deficits observed    Diagnostics Reviewed:  Left Heart cath 2/26/2020  Angiographic Findings:  Right dominant system  Left Main:  Normal     Left Anterior Descending:  Normal     Circumflex:  Normal     Right Coronary:  Normal     Left Ventriculogram:  Not performed.     Hemodynamics (mm Hg):  Apical Left Ventricular Pressure: 222/1, 11  LVOT Left ventricular pressure: 120/7, 22  Central Aortic Pressure:  115/65 (83)     Conclusions:  -No coronary artery disease.   -Gradient across the LVOT of 102 mm Hg.  -\"Sen and Dome\" tracing on     Cardiac MRI 2/7/20  IMPRESSIONS:     msec; if < 20 msec, suggestive of    iron-overloading of the myocardium).       3. The right ventricular size is normal. Global right ventricular function is normal. There are    no regional wall motion abnormalities of the right ventricular wall. There is no late    gadolinium enhancement of the right ventricle.       4. Left atrial size is moderately enlarged. There is extensive left atrial scarring.  Right    atrial size is normal. The Qp/Qs is 1.0 by phase contrast imaging and is consistent with no    evidence of shunt.       5. Velocity-encoded phase contrast Imaging in (2D/4D) was performed to assess valvular    function. The aortic valve leaflets are thickened.  Peak velocity at the aortic valve is 1.34    corresponding to a peak gradient of 7.18 mmHg. There is mild aortic regurgitation. The    calculated aortic regurgitant fraction is 5.6 %. The AMVL is thickened. Zulema Distel is severe mitral    regurgitation. The calculated mitral regurgitant fraction is 57.7 %. The calculated tricuspid    regurgitant fraction is 3.15 %. Peak velocity at pulmonary valve is 0.87 corresponding to a    peak gradient of 3.0276 mmHg. The calculated pulmonic regurgitant fraction is 0.0 %.       6. There is a trivial pericardial effusion.       7. The coronary arteries have normal origins and proximal courses.  The descending aorta is    dilated measuring 24.6 mm. Transthoracic Echocardiogram 12/18/2019  Conclusions      Summary   Left ventricular cavity size is normal.   There is severe asymmetric hypertrophy of the basal septum. Ejection fraction is estimated to be 65-70%. Overall left ventricular systolic function appears borderline hyperdynamic. There is a late peaking gradient recorded across the LV outflow tract   consistent with dynamic obstruction with a peak gradient of 103 mmHg. Diastolic filling parameters suggest grade II diastolic dysfunction. Mild tricuspid regurgitation.       CT Chest 12/17/2019  FINDINGS: Mediastinum: Indeterminate left thyroid lobe nodule measures 15 mm.  The   heart size is mildly enlarged with small anterior pericardial effusion. There are no pathologically enlarged intrathoracic or axillary lymph nodes. Small anterior mediastinal and subcarinal lymph nodes are too small to   further characterize.       Lungs/pleura: The central airways are patent.  The lungs demonstrate small   effusions bilaterally.  No consolidation noted.  Lungs are mildly   emphysematous.  No nodule or mass.       Upper Abdomen: 3 mm non-obstructing left renal upper pole calculus.       Soft Tissues/Bones: No suspicious lytic or blastic osseous lesion.           Impression   1. Small effusions bilaterally. 2. Emphysema. 3. Cardiomegaly with small pericardial effusion. 4. Nonobstructing left renal calculus 3 mm. Assessment and Plan:      Ms. Mayco Rebolledo is a 76year old woman with asymmetric septal hypertrophy and dynamic LVOT obstruction with SITA and severe mitral regurgitation. We recommend surgery with septal myectomy and mitral valve repair or possible replacement. We discussed the surgery, the risks and benefits, the tushar-operative course and recovery expectations. She would like to proceed with surgery at the earliest convenience. She will need a Transesophageal echocardiogram as part of her pre-operative testing in order to better assess the mitral valve to aide planning for repair. She will need to be seen in the pre-admission testing clinic. I saw and evaluated the patient. I agree with the findings/plan of care in the fellow's note.       Brijesh Roberto MD  3/4/2020  3:14 PM

## 2020-03-05 ENCOUNTER — PREP FOR PROCEDURE (OUTPATIENT)
Dept: CARDIOLOGY CLINIC | Age: 65
End: 2020-03-05

## 2020-03-05 ENCOUNTER — NURSE ONLY (OUTPATIENT)
Dept: CARDIOLOGY CLINIC | Age: 65
End: 2020-03-05

## 2020-03-05 ENCOUNTER — TELEPHONE (OUTPATIENT)
Dept: CARDIOLOGY CLINIC | Age: 65
End: 2020-03-05

## 2020-03-05 RX ORDER — SODIUM CHLORIDE 0.9 % (FLUSH) 0.9 %
10 SYRINGE (ML) INJECTION EVERY 12 HOURS SCHEDULED
Status: CANCELLED | OUTPATIENT
Start: 2020-03-05

## 2020-03-05 RX ORDER — SODIUM CHLORIDE 0.9 % (FLUSH) 0.9 %
10 SYRINGE (ML) INJECTION PRN
Status: CANCELLED | OUTPATIENT
Start: 2020-03-05

## 2020-03-05 RX ORDER — SODIUM CHLORIDE 9 MG/ML
INJECTION, SOLUTION INTRAVENOUS CONTINUOUS
Status: CANCELLED | OUTPATIENT
Start: 2020-03-05

## 2020-03-05 NOTE — TELEPHONE ENCOUNTER
Please call Liliana Ge to schedule CORRIE with Dr. Jazmin Cole as part of her preoperative testing order to better assess the mitral valve to aide planning for repair per Jovita Greene from Dr. Maximo Zaldivar office.

## 2020-03-05 NOTE — PROGRESS NOTES
Transesophageal Echocardiogram (CORRIE)     Date of Procedure: 3/11/20    Time of Arrival: 0800    Cardiologist performing procedure:  Dr. Chevy Aponte    · [address] at [facility] [employer]. through the main entrance. Check in at the Outpatient Diagnostic desk on the 1st floor. · Do not eat or drink anything after midnight the night before the procedure. · You may brush your teeth and rinse the morning of the procedure. · Take ALL of your routine medications the morning of the procedure. However, if you are taking diabetic medications, please HOLD on the day of the procedure (including insulin). If you take Lantus insulin, take HALF of your usual dose the night before. · Do NOT stop taking aspirin, Plavix, coumadin, Eliquis, Xarelto, or Pradaxa (any blood thinners)    · Do not apply any lotion, deodorant, or powder the morning of the procedure. · Please bring a list of your medications to the hospital with you. · You must have someone available to drive you home. It is recommended that you do not drive for 24 hours after the procedure. · If you are unable to make this appointment, please call [address] [place] [address] Cardiology at [phone]. Spoke with pt regarding procedure. Pre-op instructions, time and location of arrival discussed. Pt verbalized understanding and all questions answered at this time.

## 2020-03-09 ENCOUNTER — TELEPHONE (OUTPATIENT)
Dept: CARDIOLOGY CLINIC | Age: 65
End: 2020-03-09

## 2020-03-10 ENCOUNTER — PRE-PROCEDURE TELEPHONE (OUTPATIENT)
Dept: CARDIAC CATH/INVASIVE PROCEDURES | Age: 65
End: 2020-03-10

## 2020-03-11 ENCOUNTER — HOSPITAL ENCOUNTER (OUTPATIENT)
Dept: NON INVASIVE DIAGNOSTICS | Age: 65
Discharge: HOME | End: 2020-03-11
Payer: MEDICARE

## 2020-03-11 VITALS — HEIGHT: 65 IN | WEIGHT: 168 LBS | BODY MASS INDEX: 27.99 KG/M2 | TEMPERATURE: 97.5 F

## 2020-03-11 LAB
EKG ATRIAL RATE: 67 BPM
EKG DIAGNOSIS: NORMAL
EKG P AXIS: 75 DEGREES
EKG P-R INTERVAL: 172 MS
EKG Q-T INTERVAL: 422 MS
EKG QRS DURATION: 94 MS
EKG QTC CALCULATION (BAZETT): 445 MS
EKG R AXIS: -10 DEGREES
EKG T AXIS: 96 DEGREES
EKG VENTRICULAR RATE: 67 BPM
LV EF: 58 %
LVEF MODALITY: NORMAL

## 2020-03-11 PROCEDURE — 93005 ELECTROCARDIOGRAM TRACING: CPT | Performed by: INTERNAL MEDICINE

## 2020-03-11 PROCEDURE — 99152 MOD SED SAME PHYS/QHP 5/>YRS: CPT | Performed by: INTERNAL MEDICINE

## 2020-03-11 PROCEDURE — 93312 ECHO TRANSESOPHAGEAL: CPT | Performed by: INTERNAL MEDICINE

## 2020-03-11 PROCEDURE — 93010 ELECTROCARDIOGRAM REPORT: CPT | Performed by: INTERNAL MEDICINE

## 2020-03-11 PROCEDURE — 93320 DOPPLER ECHO COMPLETE: CPT | Performed by: INTERNAL MEDICINE

## 2020-03-11 PROCEDURE — 93325 DOPPLER ECHO COLOR FLOW MAPG: CPT | Performed by: INTERNAL MEDICINE

## 2020-03-11 RX ORDER — SODIUM CHLORIDE 0.9 % (FLUSH) 0.9 %
10 SYRINGE (ML) INJECTION EVERY 12 HOURS SCHEDULED
Status: DISCONTINUED | OUTPATIENT
Start: 2020-03-11 | End: 2020-03-12 | Stop reason: HOSPADM

## 2020-03-11 RX ORDER — SODIUM CHLORIDE 0.9 % (FLUSH) 0.9 %
10 SYRINGE (ML) INJECTION PRN
Status: DISCONTINUED | OUTPATIENT
Start: 2020-03-11 | End: 2020-03-12 | Stop reason: HOSPADM

## 2020-03-11 RX ORDER — SODIUM CHLORIDE 9 MG/ML
INJECTION, SOLUTION INTRAVENOUS CONTINUOUS
Status: DISCONTINUED | OUTPATIENT
Start: 2020-03-11 | End: 2020-03-12 | Stop reason: HOSPADM

## 2020-03-11 NOTE — PROCEDURES
medications at my direction. We monitored the patient's level of consciousness and vital signs/physiologic status throughout the procedure duration (see start and stop times below).   Sedation (meds): versed 8 mg, fentanyl 75 mcg  Sedation start time: 10:55  Sedation stop time: 11:25    Estimated blood loss: none  Specimens removed: none  Complications: none    Plan:  Proceed with procedure    Full report on CORRIE dictated separately    Ember Whalen MD, [facility]

## 2020-03-14 PROBLEM — Z00.00 PREVENTATIVE HEALTH CARE: Status: RESOLVED | Noted: 2017-12-05 | Resolved: 2020-03-14

## 2020-03-25 ENCOUNTER — TELEPHONE (OUTPATIENT)
Dept: CARDIOTHORACIC SURGERY | Age: 65
End: 2020-03-25

## 2020-03-25 NOTE — TELEPHONE ENCOUNTER
CORRIE reviewed by Dr. Chucky Lomeli. Per Dr. Chucky Lomeli, since patient is stable surgery will be scheduled at a later date due to the risk of Coronavirus. Patient is aware and agreeable with plan. Patient instructed to call with any new or worsening symptoms.

## 2020-04-15 ENCOUNTER — TELEPHONE (OUTPATIENT)
Dept: PULMONOLOGY | Age: 65
End: 2020-04-15

## 2020-04-15 NOTE — TELEPHONE ENCOUNTER
Patient chooses to postpone her office visit until after her heart surgery. She states that her asthma is stable. She will call when she knows when cardiac surgery is planned so that she can r/s then.

## 2020-05-07 RX ORDER — OMEPRAZOLE 40 MG/1
40 CAPSULE, DELAYED RELEASE ORAL EVERY EVENING
Qty: 90 CAPSULE | Refills: 1 | Status: ON HOLD
Start: 2020-05-07 | End: 2020-07-03 | Stop reason: HOSPADM

## 2020-05-07 RX ORDER — LEVOTHYROXINE SODIUM 0.12 MG/1
TABLET ORAL
Qty: 60 TABLET | Refills: 4 | Status: SHIPPED | OUTPATIENT
Start: 2020-05-07 | End: 2020-05-21 | Stop reason: SDUPTHER

## 2020-05-11 RX ORDER — ALPRAZOLAM 0.5 MG/1
TABLET ORAL
Qty: 30 TABLET | Refills: 0 | OUTPATIENT
Start: 2020-05-11 | End: 2020-06-24 | Stop reason: SDUPTHER

## 2020-05-20 NOTE — PROGRESS NOTES
[facility] [employer] / [facility] ([facility]) [address], [address]    Acknowledgment of Informed Consent for Surgical or Medical Procedure and Sedation  I agree to allow doctor(s) Tyrell Castro and his/her associates or assistants, including residents and/or other qualified medical practitioner to perform the following medical treatment or procedure and to administer or direct the administration of sedation as necessary:  Procedure(s): MITRAL VALVE REPAIR / [address] MYECTOMY  My doctor has explained the following regarding the proposed procedure:   the explanation of the procedure   the benefits of the procedure   the potential problems that might occur during recuperation   the risks and side effects of the procedure which could include but are not limited to severe blood loss, infection, stroke or death   the benefits, risks and side effect of alternative procedures including the consequences of declining this procedure or any alternative procedures   the likelihood of achieving satisfactory results. I acknowledge no guarantee or assurance has been made to me regarding the results. I understand that during the course of this treatment/procedure, unforeseen conditions can occur which require an additional or different procedure. I agree to allow my physician or assistants to perform such extension of the original procedure as they may find necessary. I understand that sedation will often result in temporary impairment of memory and fine motor skills and that sedation can occasionally progress to a state of deep sedation or general anesthesia. I understand the risks of anesthesia for surgery include, but are not limited to, sore throat, hoarseness, injury to face, mouth, or teeth; nausea; headache; injury to blood vessels or nerves; death, brain damage, or paralysis.     I understand that if I have a Limitation of Treatment order in effect during my hospitalization, the order may or may not be in effect during this procedure. I give my doctor permission to give me blood or blood products. I understand that there are risks with receiving blood such as hepatitis, AIDS, fever, or allergic reaction. I acknowledge that the risks, benefits, and alternatives of this treatment have been explained to me and that no express or implied warranty has been given by the hospital, any blood bank, or any person or entity as to the blood or blood components transfused. At the discretion of my doctor, I agree to allow observers, equipment/product representatives and allow photographing, and/or televising of the procedure, provided my name or identity is maintained confidentially. I agree the hospital may dispose of or use for scientific or educational purposes any tissue, fluid, or body parts which may be removed.     ________________________________Date________Time______ am/pm  ([facility])  Patient or Signature of Closest Relative or Legal Guardian    ________________________________Date________Time______am/pm      Page 1 of  1  Witness

## 2020-05-21 ENCOUNTER — TELEPHONE (OUTPATIENT)
Dept: CARDIOTHORACIC SURGERY | Age: 65
End: 2020-05-21

## 2020-05-21 ENCOUNTER — OFFICE VISIT (OUTPATIENT)
Dept: INTERNAL MEDICINE CLINIC | Age: 65
End: 2020-05-21
Payer: MEDICARE

## 2020-05-21 VITALS
SYSTOLIC BLOOD PRESSURE: 128 MMHG | BODY MASS INDEX: 28.12 KG/M2 | HEART RATE: 64 BPM | DIASTOLIC BLOOD PRESSURE: 80 MMHG | WEIGHT: 169 LBS | OXYGEN SATURATION: 95 % | TEMPERATURE: 97.8 F

## 2020-05-21 PROCEDURE — G8427 DOCREV CUR MEDS BY ELIG CLIN: HCPCS | Performed by: INTERNAL MEDICINE

## 2020-05-21 PROCEDURE — 1090F PRES/ABSN URINE INCON ASSESS: CPT | Performed by: INTERNAL MEDICINE

## 2020-05-21 PROCEDURE — G8399 PT W/DXA RESULTS DOCUMENT: HCPCS | Performed by: INTERNAL MEDICINE

## 2020-05-21 PROCEDURE — 1123F ACP DISCUSS/DSCN MKR DOCD: CPT | Performed by: INTERNAL MEDICINE

## 2020-05-21 PROCEDURE — 4040F PNEUMOC VAC/ADMIN/RCVD: CPT | Performed by: INTERNAL MEDICINE

## 2020-05-21 PROCEDURE — 3017F COLORECTAL CA SCREEN DOC REV: CPT | Performed by: INTERNAL MEDICINE

## 2020-05-21 PROCEDURE — G8417 CALC BMI ABV UP PARAM F/U: HCPCS | Performed by: INTERNAL MEDICINE

## 2020-05-21 PROCEDURE — 99214 OFFICE O/P EST MOD 30 MIN: CPT | Performed by: INTERNAL MEDICINE

## 2020-05-21 PROCEDURE — 1036F TOBACCO NON-USER: CPT | Performed by: INTERNAL MEDICINE

## 2020-05-21 RX ORDER — DIAZEPAM 5 MG/1
5 TABLET ORAL 2 TIMES DAILY PRN
Qty: 60 TABLET | Refills: 3 | Status: ON HOLD | OUTPATIENT
Start: 2020-05-21 | End: 2020-07-03 | Stop reason: HOSPADM

## 2020-05-21 RX ORDER — BUTALBITAL, ASPIRIN, AND CAFFEINE 325; 50; 40 MG/1; MG/1; MG/1
CAPSULE ORAL
Qty: 60 CAPSULE | Refills: 2 | Status: SHIPPED | OUTPATIENT
Start: 2020-05-21 | End: 2020-08-05 | Stop reason: SDUPTHER

## 2020-05-21 RX ORDER — PROCHLORPERAZINE MALEATE 10 MG
TABLET ORAL
Qty: 30 TABLET | Refills: 5 | Status: SHIPPED | OUTPATIENT
Start: 2020-05-21 | End: 2020-11-25 | Stop reason: SDUPTHER

## 2020-05-21 RX ORDER — DULOXETIN HYDROCHLORIDE 30 MG/1
CAPSULE, DELAYED RELEASE ORAL
Qty: 90 CAPSULE | Refills: 3 | Status: SHIPPED | OUTPATIENT
Start: 2020-05-21 | End: 2020-11-25 | Stop reason: SDUPTHER

## 2020-05-21 RX ORDER — FAMOTIDINE 40 MG/1
40 TABLET, FILM COATED ORAL DAILY
Qty: 90 TABLET | Refills: 3 | Status: SHIPPED | OUTPATIENT
Start: 2020-05-21 | End: 2022-05-03 | Stop reason: SDUPTHER

## 2020-05-21 RX ORDER — LEVOTHYROXINE SODIUM 0.12 MG/1
TABLET ORAL
Qty: 60 TABLET | Refills: 4 | Status: SHIPPED | OUTPATIENT
Start: 2020-05-21 | End: 2021-07-06

## 2020-05-21 SDOH — ECONOMIC STABILITY: INCOME INSECURITY: HOW HARD IS IT FOR YOU TO PAY FOR THE VERY BASICS LIKE FOOD, HOUSING, MEDICAL CARE, AND HEATING?: NOT HARD AT ALL

## 2020-05-21 SDOH — ECONOMIC STABILITY: TRANSPORTATION INSECURITY
IN THE PAST 12 MONTHS, HAS LACK OF TRANSPORTATION KEPT YOU FROM MEETINGS, WORK, OR FROM GETTING THINGS NEEDED FOR DAILY LIVING?: NO

## 2020-05-21 SDOH — ECONOMIC STABILITY: FOOD INSECURITY: WITHIN THE PAST 12 MONTHS, YOU WORRIED THAT YOUR FOOD WOULD RUN OUT BEFORE YOU GOT MONEY TO BUY MORE.: NEVER TRUE

## 2020-05-21 SDOH — ECONOMIC STABILITY: FOOD INSECURITY: WITHIN THE PAST 12 MONTHS, THE FOOD YOU BOUGHT JUST DIDN'T LAST AND YOU DIDN'T HAVE MONEY TO GET MORE.: NEVER TRUE

## 2020-05-21 SDOH — ECONOMIC STABILITY: TRANSPORTATION INSECURITY
IN THE PAST 12 MONTHS, HAS THE LACK OF TRANSPORTATION KEPT YOU FROM MEDICAL APPOINTMENTS OR FROM GETTING MEDICATIONS?: NO

## 2020-05-21 ASSESSMENT — ENCOUNTER SYMPTOMS
EYE DISCHARGE: 0
DIARRHEA: 0
SHORTNESS OF BREATH: 0
TROUBLE SWALLOWING: 0

## 2020-05-21 NOTE — ASSESSMENT & PLAN NOTE
Chronic. Monitor report done. Medications refilled. No changes at this time, especially with upcoming surgery.

## 2020-05-21 NOTE — PROGRESS NOTES
SUBJECTIVE:  Patient ID: Aisha Birch is an 76 y.o. female. HPI: Patient here today for the f/u of chronic problems-- see Problem List and associated comments. New issues or complaints include (also see Assessment for more details): Patient here for refill on her medications. Her headaches, asthma, and other medical conditions are all relatively stable. She is having no fever or chills or GI symptoms. She does have a chronic cough but this is not new. She has been trying to shelter in place as much as possible, especially since she is having upcoming surgery for mitral valve repair and surgery for her hypertrophic cardiomyopathy. She is understandably extremely nervous about the upcoming surgery and she is taking every precaution as well to try to avoid infection in the meantime. Her surgery is on June 1. Review of Systems   Constitutional: Negative for fever. HENT: Negative for trouble swallowing. Eyes: Negative for discharge. Respiratory: Negative for shortness of breath. Cardiovascular: Negative for chest pain and palpitations. Gastrointestinal: Negative for diarrhea. Genitourinary: Negative for dysuria. Skin: Negative for rash. Neurological: Positive for headaches. Psychiatric/Behavioral: The patient is nervous/anxious. OBJECTIVE:    /80 (Site: Left Upper Arm, Position: Sitting, Cuff Size: Medium Adult)   Pulse 64   Temp 97.8 °F (36.6 °C) (Oral)   Wt 169 lb (76.7 kg)   SpO2 95%   BMI 28.12 kg/m²      Physical Exam  Constitutional:       General: She is not in acute distress. Appearance: Normal appearance. She is not ill-appearing or diaphoretic. HENT:      Mouth/Throat:      Pharynx: No oropharyngeal exudate or posterior oropharyngeal erythema. Eyes:      General: No scleral icterus. Neck:      Vascular: No carotid bruit. Cardiovascular:      Rate and Rhythm: Normal rate and regular rhythm. Heart sounds: Murmur present.    Pulmonary:

## 2020-05-26 ENCOUNTER — OFFICE VISIT (OUTPATIENT)
Dept: PRIMARY CARE CLINIC | Age: 65
End: 2020-05-26

## 2020-05-26 ENCOUNTER — ANESTHESIA EVENT (OUTPATIENT)
Dept: OPERATING ROOM | Age: 65
DRG: 220 | End: 2020-05-26
Payer: MEDICARE

## 2020-05-26 ENCOUNTER — HOSPITAL ENCOUNTER (OUTPATIENT)
Dept: GENERAL RADIOLOGY | Age: 65
Discharge: HOME | End: 2020-05-26
Payer: MEDICARE

## 2020-05-26 ENCOUNTER — HOSPITAL ENCOUNTER (OUTPATIENT)
Dept: PREADMISSION TESTING | Age: 65
Discharge: HOME | End: 2020-05-30
Payer: MEDICARE

## 2020-05-26 VITALS — HEIGHT: 65 IN | BODY MASS INDEX: 28.82 KG/M2 | WEIGHT: 173 LBS

## 2020-05-26 LAB
A/G RATIO: 1.6 (ref 1.1–2.2)
ABO/RH: NORMAL
ALBUMIN SERPL-MCNC: 4.1 G/DL (ref 3.4–5)
ALP BLD-CCNC: 60 U/L (ref 40–129)
ALT SERPL-CCNC: 15 U/L (ref 10–40)
ANION GAP SERPL CALCULATED.3IONS-SCNC: 13 MMOL/L (ref 3–16)
ANTIBODY SCREEN: NORMAL
APTT: 33.1 SEC (ref 24.2–36.2)
AST SERPL-CCNC: 18 U/L (ref 15–37)
BACTERIA: ABNORMAL /HPF
BILIRUB SERPL-MCNC: 0.3 MG/DL (ref 0–1)
BILIRUBIN URINE: NEGATIVE
BLOOD, URINE: NEGATIVE
BUN BLDV-MCNC: 11 MG/DL (ref 7–20)
CALCIUM SERPL-MCNC: 9.8 MG/DL (ref 8.3–10.6)
CHLORIDE BLD-SCNC: 85 MMOL/L (ref 99–110)
CLARITY: CLEAR
CO2: 25 MMOL/L (ref 21–32)
COLOR: YELLOW
CREAT SERPL-MCNC: 0.6 MG/DL (ref 0.6–1.2)
EPITHELIAL CELLS, UA: ABNORMAL /HPF (ref 0–5)
FIBRINOGEN: 236 MG/DL (ref 200–397)
GFR AFRICAN AMERICAN: >60
GFR NON-AFRICAN AMERICAN: >60
GLOBULIN: 2.5 G/DL
GLUCOSE BLD-MCNC: 90 MG/DL (ref 70–99)
GLUCOSE URINE: NEGATIVE MG/DL
HCT VFR BLD CALC: 37.2 % (ref 36–48)
HEMOGLOBIN: 13 G/DL (ref 12–16)
INR BLD: 0.97 (ref 0.86–1.14)
KETONES, URINE: NEGATIVE MG/DL
LEUKOCYTE ESTERASE, URINE: ABNORMAL
MAGNESIUM: 1.8 MG/DL (ref 1.8–2.4)
MCH RBC QN AUTO: 32.3 PG (ref 26–34)
MCHC RBC AUTO-ENTMCNC: 35 G/DL (ref 31–36)
MCV RBC AUTO: 92.3 FL (ref 80–100)
MICROSCOPIC EXAMINATION: YES
NITRITE, URINE: NEGATIVE
PDW BLD-RTO: 12.7 % (ref 12.4–15.4)
PH UA: 7 (ref 5–8)
PLATELET # BLD: 269 K/UL (ref 135–450)
PMV BLD AUTO: 7.1 FL (ref 5–10.5)
POTASSIUM SERPL-SCNC: 3.3 MMOL/L (ref 3.5–5.1)
PROTEIN UA: NEGATIVE MG/DL
PROTHROMBIN TIME: 11.2 SEC (ref 10–13.2)
RBC # BLD: 4.04 M/UL (ref 4–5.2)
RBC UA: ABNORMAL /HPF (ref 0–4)
SODIUM BLD-SCNC: 123 MMOL/L (ref 136–145)
SPECIFIC GRAVITY UA: <=1.005 (ref 1–1.03)
TOTAL PROTEIN: 6.6 G/DL (ref 6.4–8.2)
URINE TYPE: ABNORMAL
UROBILINOGEN, URINE: 0.2 E.U./DL
WBC # BLD: 6.4 K/UL (ref 4–11)
WBC UA: ABNORMAL /HPF (ref 0–5)

## 2020-05-26 PROCEDURE — 83735 ASSAY OF MAGNESIUM: CPT

## 2020-05-26 PROCEDURE — 93005 ELECTROCARDIOGRAM TRACING: CPT | Performed by: THORACIC SURGERY (CARDIOTHORACIC VASCULAR SURGERY)

## 2020-05-26 PROCEDURE — 87077 CULTURE AEROBIC IDENTIFY: CPT

## 2020-05-26 PROCEDURE — 87641 MR-STAPH DNA AMP PROBE: CPT

## 2020-05-26 PROCEDURE — 71046 X-RAY EXAM CHEST 2 VIEWS: CPT

## 2020-05-26 PROCEDURE — 85610 PROTHROMBIN TIME: CPT

## 2020-05-26 PROCEDURE — 81001 URINALYSIS AUTO W/SCOPE: CPT

## 2020-05-26 PROCEDURE — 86900 BLOOD TYPING SEROLOGIC ABO: CPT

## 2020-05-26 PROCEDURE — 87086 URINE CULTURE/COLONY COUNT: CPT

## 2020-05-26 PROCEDURE — 86850 RBC ANTIBODY SCREEN: CPT

## 2020-05-26 PROCEDURE — 85384 FIBRINOGEN ACTIVITY: CPT

## 2020-05-26 PROCEDURE — 83036 HEMOGLOBIN GLYCOSYLATED A1C: CPT

## 2020-05-26 PROCEDURE — 85730 THROMBOPLASTIN TIME PARTIAL: CPT

## 2020-05-26 PROCEDURE — 87186 SC STD MICRODIL/AGAR DIL: CPT

## 2020-05-26 PROCEDURE — 86901 BLOOD TYPING SEROLOGIC RH(D): CPT

## 2020-05-26 PROCEDURE — 80053 COMPREHEN METABOLIC PANEL: CPT

## 2020-05-26 PROCEDURE — 85027 COMPLETE CBC AUTOMATED: CPT

## 2020-05-26 ASSESSMENT — LIFESTYLE VARIABLES: SMOKING_STATUS: 0

## 2020-05-26 NOTE — PROGRESS NOTES
only)     5. MEDICATIONS    Take the following medications with a SMALL sip of water: metoprolol.  Use your usual dose of inhalers the morning of surgery. BRING your rescue inhaler with you to hospital.    Anesthesia does NOT want you to take insulin the morning of surgery. They will control your blood sugar while you are at the hospital. Please contact your ordering physician for instructions regarding your insulin the night before your procedure. If you have an insulin pump, please keep it set on basal rate. 6.  Do not swallow water when brushing teeth. No gum, candy, mints or ice chips. Refrain from smoking or at least decrease the amount. 7.  Dress in loose, comfortable clothing appropriate for redressing after your procedure. Do not wear jewelry (including body piercings), make-up (especially NO eye make-up), fingernail polish (NO toenail polish if foot/leg surgery), lotion, powders or metal hairclips. 8.  Dentures, glasses, or contacts will need to be removed before your procedure. Bring cases for your glasses, contacts, dentures, or hearing aids to protect them while you are in surgery. 9.  If you use a CPAP, please bring it with you on the day of your procedure. 10. We recommend that valuable personal  belongings, such as cash, cell phones, e-tablets or jewelry, be left at home during your stay. The hospital will not be responsible for valuables that are not secured in the hospital safe. However, if your insurance requires a co-pay, you may want to bring a method of payment, i.e. Check or credit card, if you wish to pay your co-pay the day of surgery. 11. If you are to stay overnight, you may bring a bag with personal items. Please have any large items you may need brought in by your family after your arrival to your hospital room. 12. If you have a Living Will or Durable Power of , please bring a copy on the day of your procedure.      13. With your permission, one family will be given written instructions about your diet, activity, dressing care, medications, and return visits. 4. Once at home, should issues with nausea, pain, or bleeding occur, or should you notice any signs of infection, you should call your surgeon. 5. Always clean your hands before and after caring for your wound. Do not let your family touch your surgery site without cleaning their hands. 6. Narcotic pain medications can cause significant constipation. You may want to add a stool softener to your postoperative medication schedule or speak to your surgeon on how best to manage this SIDE EFFECT. SPECIAL INSTRUCTIONS   Thank you for allowing us to care for you. We strive to exceed your expectations in the overall delivery of care and service provided to you and your family. If you need to contact us for any reason, please call us at [phone]. Instructions reviewed and copy given to patient during preadmission testing visit. Andree Bella. 5/26/2020 .3:32 PM      ADDITIONAL EDUCATIONAL INFORMATION REVIEWED / PROVIDED TO YOU AND YOUR FAMILY:  Yes Taking Control of Your Pain   Yes FAQs about Surgical Site Infections    Yes Cardiac Surgery Instructions for AM admission to the hospital  Yes Bactroban® Nasal Ointment Instructions for Cardiac Surgery  Yes Learning About Preventing Pressure Sores  Yes Cardiac Surgery Preoperative Hibiclens® Bathing Instructions  Yes Your Care after Heart Surgery Binder    No Rocky® Wipes Bathing Instructions (Obtained from:  https://www.Footbalistic/. pdf )  Yes Hibiclens® Bathing Instructions  No Antibacterial Soap    Yes Incentive Spirometer given to patient- PLEASE BRING THIS SPIROMETER BACK WITH YOU ON THE DAY OF YOUR SURGERY    No CMS Comprehensive Care for Joint Replacement Model Notification Letter  No Your Guide to Hip Replacement Surgery.  PLEASE BRING THIS BOOKLET BACK ON THE DAY OF YOUR SURGERY. No Your Guide to Knee Replacement Surgery. PLEASE BRING THIS BOOKLET BACK ON THE DAY OF YOUR SURGERY. No Your Guide to Shoulder Replacement Surgery. PLEASE BRING THIS BOOKLET BACK ON THE DAY OF YOUR SURGERY.   No  Reviewed/Given handout for TJ Video/Class    No Other

## 2020-05-26 NOTE — ANESTHESIA PRE PROCEDURE
Department of Anesthesiology  Preprocedure Note       Name:  Cheo Mary   Age:  76 y.o.  :  1955                                          MRN:  [id number]         Date:  2020      Surgeon: Flor Dias):  Christina Degroot MD    Procedure: Procedure(s):  MITRAL VALVE REPAIR / REPLACEMENT WITH SEPTAL MYECTOMY    Medications prior to admission:   Prior to Admission medications    Medication Sig Start Date End Date Taking? Authorizing Provider   levothyroxine (SYNTHROID) 125 MCG tablet TAKE ONE TABLET BY MOUTH DAILY 20   Cheikh Osman MD   prochlorperazine (COMPAZINE) 10 MG tablet Take one tablet by mouth every 6 hours as needed 20   Cheikh Osman MD   butalbital-aspirin-caffeine [facility]) -94 MG capsule TAKE ONE CAPSULE BY MOUTH EVERY 4 HOURS AS NEEDED FOR HEADACHES 20  Cheikh Osman MD   diazePAM (VALIUM) 5 MG tablet Take 1 tablet by mouth 2 times daily as needed for Anxiety.  20  Cheikh Osman MD   famotidine (PEPCID) 40 MG tablet Take 1 tablet by mouth daily 20   Cheikh Osman MD   DULoxetine (CYMBALTA) 30 MG extended release capsule TAKE THREE CAPSULES BY MOUTH DAILY 20   Cheikh Osman MD   ALPRAZolam Soila Marcano) 0.5 MG tablet TAKE ONE TABLET BY MOUTH DAILY AS NEEDED FOR ANXIETY 20  Cheikh Osman MD   omeprazole (PRILOSEC) 40 MG delayed release capsule Take 1 capsule by mouth every evening TAKE ONE CAPSULE BY MOUTH DAILY 20   Cheikh Osman MD   vitamin C (ASCORBIC ACID) 500 MG tablet Take 1,000 mg by mouth daily    Historical Provider, MD   Multiple Vitamins-Minerals (CENTRUM SILVER PO) Take 1 tablet by mouth daily    Historical Provider, MD   Calcium Carbonate-Vitamin D (CALTRATE 600+D PO) Take 1 tablet by mouth 2 times daily    Historical Provider, MD   vitamin D3 (CHOLECALCIFEROL) 10 MCG (400 UNIT) TABS tablet Take 400 Units by mouth daily    Historical Provider, MD   Omega-3 Fatty Acids (FISH OIL) 1200 MG CAPS Take 1

## 2020-05-27 ENCOUNTER — TELEPHONE (OUTPATIENT)
Dept: CARDIOTHORACIC SURGERY | Age: 65
End: 2020-05-27

## 2020-05-27 ENCOUNTER — TELEPHONE (OUTPATIENT)
Dept: INTERNAL MEDICINE CLINIC | Age: 65
End: 2020-05-27

## 2020-05-27 LAB
EKG ATRIAL RATE: 73 BPM
EKG DIAGNOSIS: NORMAL
EKG P AXIS: 71 DEGREES
EKG P-R INTERVAL: 184 MS
EKG Q-T INTERVAL: 414 MS
EKG QRS DURATION: 104 MS
EKG QTC CALCULATION (BAZETT): 456 MS
EKG R AXIS: 52 DEGREES
EKG T AXIS: 250 DEGREES
EKG VENTRICULAR RATE: 73 BPM

## 2020-05-27 PROCEDURE — 93010 ELECTROCARDIOGRAM REPORT: CPT | Performed by: INTERNAL MEDICINE

## 2020-05-27 RX ORDER — POTASSIUM CHLORIDE 20 MEQ/1
20 TABLET, EXTENDED RELEASE ORAL 2 TIMES DAILY
Qty: 180 TABLET | Refills: 1 | Status: SHIPPED
Start: 2020-05-27 | End: 2020-05-27 | Stop reason: CLARIF

## 2020-05-27 RX ORDER — POTASSIUM CHLORIDE 20 MEQ/1
20 TABLET, EXTENDED RELEASE ORAL 2 TIMES DAILY
Qty: 20 TABLET | Refills: 0 | Status: ON HOLD | OUTPATIENT
Start: 2020-05-27 | End: 2020-07-03 | Stop reason: HOSPADM

## 2020-05-27 NOTE — TELEPHONE ENCOUNTER
[facility] with Dr. Ariel Perez is calling regarding abnormal labs for the patient who is scheduled to have heart surgery and would like a call back to discuss.

## 2020-05-27 NOTE — PROGRESS NOTES
Reviewed EKG and BMP with Dr. Jessica Gallegos, she texted Dr. Graeme Sandifer, she feels pt's labs need to be addressed and sodium optimized before scheduling this case. Per Dr. Jessica Gallegos, Dr. Graeme Sandifer is in agreement.

## 2020-05-27 NOTE — TELEPHONE ENCOUNTER
Stop hydrochlorothiazide. Get potassium chloride 20 mEq    #20     1 twice daily  Repeat a BMP next Wednesday morning --diagnosis hyponatremia and hypokalemia    Add a little bit of salt to her diet.

## 2020-05-28 LAB
ESTIMATED AVERAGE GLUCOSE: 99.7 MG/DL
HBA1C MFR BLD: 5.1 %
MRSA SCREEN RT-PCR: NORMAL
ORGANISM: ABNORMAL
URINE CULTURE, ROUTINE: ABNORMAL

## 2020-05-29 ENCOUNTER — TELEPHONE (OUTPATIENT)
Dept: INTERNAL MEDICINE CLINIC | Age: 65
End: 2020-05-29

## 2020-05-29 LAB
SARS-COV-2: NOT DETECTED
SOURCE: NORMAL

## 2020-06-01 ENCOUNTER — ANESTHESIA (OUTPATIENT)
Dept: OPERATING ROOM | Age: 65
DRG: 220 | End: 2020-06-01
Payer: MEDICARE

## 2020-06-01 RX ORDER — DICYCLOMINE HCL 20 MG
TABLET ORAL
Qty: 120 TABLET | Refills: 4 | Status: SHIPPED | OUTPATIENT
Start: 2020-06-01 | End: 2021-02-25 | Stop reason: SDUPTHER

## 2020-06-03 DIAGNOSIS — E87.6 HYPOKALEMIA: ICD-10-CM

## 2020-06-03 DIAGNOSIS — E87.1 HYPONATREMIA: ICD-10-CM

## 2020-06-03 LAB
ANION GAP SERPL CALCULATED.3IONS-SCNC: 12 MMOL/L (ref 3–16)
BUN BLDV-MCNC: 17 MG/DL (ref 7–20)
CALCIUM SERPL-MCNC: 10.4 MG/DL (ref 8.3–10.6)
CHLORIDE BLD-SCNC: 97 MMOL/L (ref 99–110)
CO2: 24 MMOL/L (ref 21–32)
CREAT SERPL-MCNC: 0.7 MG/DL (ref 0.6–1.2)
GFR AFRICAN AMERICAN: >60
GFR NON-AFRICAN AMERICAN: >60
GLUCOSE BLD-MCNC: 92 MG/DL (ref 70–99)
POTASSIUM SERPL-SCNC: 4.9 MMOL/L (ref 3.5–5.1)
SODIUM BLD-SCNC: 133 MMOL/L (ref 136–145)

## 2020-06-04 ENCOUNTER — TELEPHONE (OUTPATIENT)
Dept: INTERNAL MEDICINE CLINIC | Age: 65
End: 2020-06-04

## 2020-06-04 ENCOUNTER — TELEPHONE (OUTPATIENT)
Dept: CARDIOTHORACIC SURGERY | Age: 65
End: 2020-06-04

## 2020-06-04 NOTE — TELEPHONE ENCOUNTER
Reviewed BMP from 6/3/20 with Dr. Jennifer Szymanski. He states that patient can now be scheduled for surgery. She will need a repeat BMP prior to surgery. Faheem Vines, our  and patient notified.

## 2020-06-05 ENCOUNTER — TELEPHONE (OUTPATIENT)
Dept: CARDIOTHORACIC SURGERY | Age: 65
End: 2020-06-05

## 2020-06-05 NOTE — TELEPHONE ENCOUNTER
Spoke w/pt at length; reviewed instructions to use hibiclens shower prep pm nite prior to surgery as directed. Verb understanding.

## 2020-06-09 ENCOUNTER — OFFICE VISIT (OUTPATIENT)
Dept: PRIMARY CARE CLINIC | Age: 65
End: 2020-06-09

## 2020-06-09 DIAGNOSIS — Z01.818 PRE-OP TESTING: ICD-10-CM

## 2020-06-09 DIAGNOSIS — E87.1 HYPONATREMIA: ICD-10-CM

## 2020-06-09 LAB
ANION GAP SERPL CALCULATED.3IONS-SCNC: 9 MMOL/L (ref 3–16)
BUN BLDV-MCNC: 10 MG/DL (ref 7–20)
CALCIUM SERPL-MCNC: 9.9 MG/DL (ref 8.3–10.6)
CHLORIDE BLD-SCNC: 96 MMOL/L (ref 99–110)
CO2: 24 MMOL/L (ref 21–32)
CREAT SERPL-MCNC: 0.7 MG/DL (ref 0.6–1.2)
GFR AFRICAN AMERICAN: >60
GFR NON-AFRICAN AMERICAN: >60
GLUCOSE BLD-MCNC: 106 MG/DL (ref 70–99)
POTASSIUM SERPL-SCNC: 4.7 MMOL/L (ref 3.5–5.1)
SODIUM BLD-SCNC: 129 MMOL/L (ref 136–145)

## 2020-06-09 NOTE — PROGRESS NOTES
Patient presented to [facility] drive up clinic for preop testing. Patient was swabbed and given information advising them to remain isolated until procedure date.

## 2020-06-10 ENCOUNTER — TELEPHONE (OUTPATIENT)
Dept: CARDIOTHORACIC SURGERY | Age: 65
End: 2020-06-10

## 2020-06-10 RX ORDER — CEFAZOLIN SODIUM 2 G/50ML
2 SOLUTION INTRAVENOUS ONCE
Status: CANCELLED | OUTPATIENT
Start: 2020-06-10 | End: 2020-06-10

## 2020-06-10 RX ORDER — CHLORHEXIDINE GLUCONATE 0.12 MG/ML
15 RINSE ORAL ONCE
Status: CANCELLED | OUTPATIENT
Start: 2020-06-10

## 2020-06-10 NOTE — PROGRESS NOTES
shahla at dr Lory Sweet office aware of sodium level 129, states will talk with dr Gagandeep Cuello and get back with me

## 2020-06-11 LAB
SARS-COV-2: NOT DETECTED
SOURCE: NORMAL

## 2020-06-11 NOTE — RESULT ENCOUNTER NOTE
Please contact patient with their testing results: Your test for COVID-19, also known as novel coronavirus, came back negative. No virus was detected from the sample collected. Until your symptoms are fully resolved, you may still be contagious. We recommend that you remain isolated for 7 days minimum or 72 hours after your symptoms have completely resolved, whichever is longer. Continually monitor symptoms. Contact a medical provider if symptoms are worsening. If you have any additional questions, contact your PCP.     For additional information, please visit the Centers for Disease Control and Prevention   [employer]

## 2020-06-16 ENCOUNTER — TELEPHONE (OUTPATIENT)
Dept: CARDIOTHORACIC SURGERY | Age: 65
End: 2020-06-16

## 2020-06-16 RX ORDER — AMOXICILLIN 500 MG/1
500 CAPSULE ORAL 3 TIMES DAILY
Qty: 15 CAPSULE | Refills: 0 | Status: SHIPPED | OUTPATIENT
Start: 2020-06-16 | End: 2020-06-21

## 2020-06-23 ENCOUNTER — OFFICE VISIT (OUTPATIENT)
Dept: PRIMARY CARE CLINIC | Age: 65
End: 2020-06-23

## 2020-06-23 DIAGNOSIS — I34.0 MITRAL VALVE INSUFFICIENCY, UNSPECIFIED ETIOLOGY: ICD-10-CM

## 2020-06-23 DIAGNOSIS — Z01.818 PRE-OP TESTING: ICD-10-CM

## 2020-06-23 LAB
A/G RATIO: 1.6 (ref 1.1–2.2)
ABO/RH: NORMAL
ALBUMIN SERPL-MCNC: 4.1 G/DL (ref 3.4–5)
ALP BLD-CCNC: 62 U/L (ref 40–129)
ALT SERPL-CCNC: 45 U/L (ref 10–40)
ANION GAP SERPL CALCULATED.3IONS-SCNC: 11 MMOL/L (ref 3–16)
ANTIBODY SCREEN: NORMAL
APTT: 35.8 SEC (ref 24.2–36.2)
AST SERPL-CCNC: 48 U/L (ref 15–37)
BASOPHILS ABSOLUTE: 0.1 K/UL (ref 0–0.2)
BASOPHILS RELATIVE PERCENT: 0.9 %
BILIRUB SERPL-MCNC: 0.3 MG/DL (ref 0–1)
BILIRUBIN URINE: NEGATIVE
BLOOD, URINE: NEGATIVE
BUN BLDV-MCNC: 10 MG/DL (ref 7–20)
CALCIUM SERPL-MCNC: 9.6 MG/DL (ref 8.3–10.6)
CHLORIDE BLD-SCNC: 97 MMOL/L (ref 99–110)
CHOLESTEROL, TOTAL: 191 MG/DL (ref 0–199)
CLARITY: CLEAR
CO2: 25 MMOL/L (ref 21–32)
COLOR: YELLOW
COMMENT UA: ABNORMAL
CREAT SERPL-MCNC: 0.7 MG/DL (ref 0.6–1.2)
EOSINOPHILS ABSOLUTE: 0.1 K/UL (ref 0–0.6)
EOSINOPHILS RELATIVE PERCENT: 0.7 %
ESTIMATED AVERAGE GLUCOSE: 93.9 MG/DL
FIBRINOGEN: 244 MG/DL (ref 200–397)
GFR AFRICAN AMERICAN: >60
GFR NON-AFRICAN AMERICAN: >60
GLOBULIN: 2.6 G/DL
GLUCOSE BLD-MCNC: 97 MG/DL (ref 70–99)
GLUCOSE URINE: NEGATIVE MG/DL
HBA1C MFR BLD: 4.9 %
HCT VFR BLD CALC: 39.8 % (ref 36–48)
HDLC SERPL-MCNC: 57 MG/DL (ref 40–60)
HEMOGLOBIN: 13.5 G/DL (ref 12–16)
INR BLD: 1.01 (ref 0.86–1.14)
KETONES, URINE: NEGATIVE MG/DL
LDL CHOLESTEROL CALCULATED: 119 MG/DL
LEUKOCYTE ESTERASE, URINE: ABNORMAL
LYMPHOCYTES ABSOLUTE: 1.2 K/UL (ref 1–5.1)
LYMPHOCYTES RELATIVE PERCENT: 16.1 %
MAGNESIUM: 2.1 MG/DL (ref 1.8–2.4)
MCH RBC QN AUTO: 31.7 PG (ref 26–34)
MCHC RBC AUTO-ENTMCNC: 33.8 G/DL (ref 31–36)
MCV RBC AUTO: 93.7 FL (ref 80–100)
MICROSCOPIC EXAMINATION: YES
MONOCYTES ABSOLUTE: 0.5 K/UL (ref 0–1.3)
MONOCYTES RELATIVE PERCENT: 6.9 %
NEUTROPHILS ABSOLUTE: 5.5 K/UL (ref 1.7–7.7)
NEUTROPHILS RELATIVE PERCENT: 75.4 %
NITRITE, URINE: NEGATIVE
PDW BLD-RTO: 12.4 % (ref 12.4–15.4)
PH UA: 6.5 (ref 5–8)
PLATELET # BLD: 257 K/UL (ref 135–450)
PMV BLD AUTO: 7.7 FL (ref 5–10.5)
POTASSIUM SERPL-SCNC: 4 MMOL/L (ref 3.5–5.1)
PROTEIN UA: NEGATIVE MG/DL
PROTHROMBIN TIME: 11.7 SEC (ref 10–13.2)
RBC # BLD: 4.25 M/UL (ref 4–5.2)
RBC UA: ABNORMAL /HPF (ref 0–4)
SODIUM BLD-SCNC: 133 MMOL/L (ref 136–145)
SPECIFIC GRAVITY UA: 1.01 (ref 1–1.03)
TOTAL PROTEIN: 6.7 G/DL (ref 6.4–8.2)
TRIGL SERPL-MCNC: 75 MG/DL (ref 0–150)
URINE TYPE: ABNORMAL
UROBILINOGEN, URINE: 0.2 E.U./DL
VLDLC SERPL CALC-MCNC: 15 MG/DL
WBC # BLD: 7.3 K/UL (ref 4–11)
WBC UA: ABNORMAL /HPF (ref 0–5)

## 2020-06-23 NOTE — PROGRESS NOTES
Spoke with Елена Ashley at Dr Donnie Jacobson office. H&P to be done DOS.  OK to use CXR and EKG from 5/26/20

## 2020-06-23 NOTE — PROGRESS NOTES
[facility] PRE-SURGICAL TESTING INSTRUCTIONS                              PRIOR TO PROCEDURE DATE:  1. Please follow any guidelines/instructions prior to your procedure as advised by your surgeon. 2. Arrange for someone to drive you home and be with you for the first 24 hours after discharge for your safety after your procedure for which you received sedation. Ensure it is someone we can share information with regarding your discharge. 3. You must contact your surgeon for instructions IF:   You are taking any blood thinners, aspirin, anti-inflammatory or vitamin E.   There is a change in your physical condition such as a cold, fever, rash, cuts, sores or any other infection, especially near your surgical site. 4. Do not drink alcohol the day before or day of your procedure. 5. A Pre-op History and Physical for surgery MUST be completed by your Physician or Urgent Care within 30 days of your procedure date. Please bring a copy with you on the day of your procedure and along with any other testing performed. THE DAY OF YOUR PROCEDURE:  1. Follow instructions for ARRIVAL TIME as DIRECTED BY YOUR SURGEON. I    2. Enter the MAIN entrance from [place] and follow the signs to the free [employer] [address] [employer] parking (offered free of charge 6am-5pm). 3. Enter the Main Entrance of the hospital (do not enter from the lower level of the parking garage). Upon entrance, check in with the  at the main desk on your left. If no one is available at the desk, proceed into the [facility] Waiting Room and go through the door directly into the [facility]. There is a Check-in desk ACROSS from Room 5 (marked with a sign hanging from the ceiling). The phone number for the surgery center is [phone]. 4. Please call [phone] option #2 option #2 if you have not been preregistered yet. On the day of your procedure bring your insurance card and photo ID.  You will be registered at your bedside once brought back to your room. 5. DO NOT EAT ANYTHING eight hours prior to surgery. May have 8 ounces of water 4 hours prior to surgery. 6. MEDICATIONS    Take the following medications with a SMALL sip of water: Metoprolol Use your usual dose of inhalers the morning of surgery. BRING your rescue inhaler with you to hospital.    Anesthesia does NOT want you to take insulin the morning of surgery. They will control your blood sugar while you are at the hospital. Please contact your ordering physician for instructions regarding your insulin the night before your procedure. If you have an insulin pump, please keep it set on basal rate. 7. Do not swallow water when brushing teeth. No gum, candy, mints or ice chips. Refrain from smoking or at least decrease the amount. 8. Dress in loose, comfortable clothing appropriate for redressing after your procedure. Do not wear jewelry (including body piercings), make-up (especially NO eye make-up), fingernail polish (NO toenail polish if foot/leg surgery), lotion, powders or metal hairclips. 9. Dentures, glasses, or contacts will need to be removed before your procedure. Bring cases for your glasses, contacts, dentures, or hearing aids to protect them while you are in surgery. 10. If you use a CPAP, please bring it with you on the day of your procedure. 11. We recommend that valuable personal  belongings such as cash, cell phones, e-tablets or jewelry, be left at home during your stay. The hospital will not be responsible for valuables that are not secured in the hospital safe. However, if your insurance requires a co-pay, you may want to bring a method of payment, i.e. Check or credit card, if you wish to pay your co-pay the day of surgery. 12. If you are to stay overnight, you may bring a bag with personal items.  Please have any large items you may need brought in by your family after your arrival to your hospital room.    13. If you have a Living Will or Durable Power of , please bring a copy on the day of your procedure. 15. With your permission, one family member may accompany you while you are being prepared for surgery. Once you are ready, additional family members may join you. HOW WE KEEP YOU SAFE and WORK TO PREVENT SURGICAL SITE INFECTIONS:  1. Health care workers should always check your ID bracelet to verify your name and birth date. You will be asked many times to state your name, date of birth, and allergies. 2. Health care workers should always clean their hands with soap or alcohol gel before providing care to you. It is okay to ask anyone if they cleaned their hands before they touch you. 3. You will be actively involved in verifying the type of procedure you are having and ensuring the correct surgical site. This will be confirmed multiple times prior to your procedure. Do NOT esvin your surgery site UNLESS instructed to by your surgeon. 4. Do not shave or wax for 72 hours prior to procedure near your operative site. Shaving with a razor can irritate your skin and make it easier to develop an infection. On the day of your procedure, any hair that needs to be removed near the surgical site will be clipped by a healthcare worker using a special clippers designed to avoid skin irritation. 5. When you are in the operating room, your surgical site will be cleansed with a special soap, and in most cases, you will be given an antibiotic before the surgery begins. What to expect AFTER YOUR PROCEDURE:  1. Immediately following your procedure, your will be taken to the PACU for the first phase of your recovery. Your nurse will help you recover from any potential side effects of anesthesia, such as extreme drowsiness, changes in your vital signs or breathing patterns. Nausea, headache, muscle aches, or sore throat may also occur after anesthesia.   Your nurse will help you manage these potential side effects. 2. For comfort and safety, arrange to have someone at home with you for the first 24 hours after discharge. 3. You and your family will be given written instructions about your diet, activity, dressing care, medications, and return visits. 4. Once at home, should issues with nausea, pain, or bleeding occur, or should you notice any signs of infection, you should call your surgeon. 5. Always clean your hands before and after caring for your wound. Do not let your family touch your surgery site without cleaning their hands. 6. Narcotic pain medications can cause significant constipation. You may want to add a stool softener to your postoperative medication schedule or speak to your surgeon on how best to manage this SIDE EFFECT. SPECIAL INSTRUCTIONS     Thank you for allowing us to care for you. We strive to exceed your expectations in the delivery of care and service provided to you and your family. If you need to contact us for any reason, please call us at [phone]    Instructions reviewed with patient during preadmission testing phone interview. Selina Pulido. 6/23/2020 .2:50 PM      ADDITIONAL EDUCATIONAL INFORMATION REVIEWED PER PHONE WITH YOU AND/OR YOUR FAMILY:    Yes Pain Goal-Taking Control of Your Pain  Yes FAQs about Surgical Site Infections  Yes Hibiclens® Bathing Instructions   Yes Incentive Spirometer Education

## 2020-06-24 ENCOUNTER — OFFICE VISIT (OUTPATIENT)
Dept: INTERNAL MEDICINE CLINIC | Age: 65
End: 2020-06-24
Payer: MEDICARE

## 2020-06-24 ENCOUNTER — TELEPHONE (OUTPATIENT)
Dept: INTERNAL MEDICINE CLINIC | Age: 65
End: 2020-06-24

## 2020-06-24 ENCOUNTER — TELEPHONE (OUTPATIENT)
Dept: CARDIOTHORACIC SURGERY | Age: 65
End: 2020-06-24

## 2020-06-24 VITALS
BODY MASS INDEX: 27.92 KG/M2 | WEIGHT: 167.6 LBS | SYSTOLIC BLOOD PRESSURE: 126 MMHG | OXYGEN SATURATION: 97 % | TEMPERATURE: 98.7 F | DIASTOLIC BLOOD PRESSURE: 78 MMHG | HEART RATE: 75 BPM | HEIGHT: 65 IN

## 2020-06-24 PROBLEM — J90 PLEURAL EFFUSION, BILATERAL: Status: RESOLVED | Noted: 2019-12-17 | Resolved: 2020-06-24

## 2020-06-24 PROBLEM — Z01.818 PREOP EXAMINATION: Status: ACTIVE | Noted: 2020-06-24

## 2020-06-24 PROCEDURE — 4040F PNEUMOC VAC/ADMIN/RCVD: CPT | Performed by: INTERNAL MEDICINE

## 2020-06-24 PROCEDURE — 3017F COLORECTAL CA SCREEN DOC REV: CPT | Performed by: INTERNAL MEDICINE

## 2020-06-24 PROCEDURE — 1036F TOBACCO NON-USER: CPT | Performed by: INTERNAL MEDICINE

## 2020-06-24 PROCEDURE — G8427 DOCREV CUR MEDS BY ELIG CLIN: HCPCS | Performed by: INTERNAL MEDICINE

## 2020-06-24 PROCEDURE — G8417 CALC BMI ABV UP PARAM F/U: HCPCS | Performed by: INTERNAL MEDICINE

## 2020-06-24 PROCEDURE — G8399 PT W/DXA RESULTS DOCUMENT: HCPCS | Performed by: INTERNAL MEDICINE

## 2020-06-24 PROCEDURE — 1090F PRES/ABSN URINE INCON ASSESS: CPT | Performed by: INTERNAL MEDICINE

## 2020-06-24 PROCEDURE — 1123F ACP DISCUSS/DSCN MKR DOCD: CPT | Performed by: INTERNAL MEDICINE

## 2020-06-24 PROCEDURE — 99215 OFFICE O/P EST HI 40 MIN: CPT | Performed by: INTERNAL MEDICINE

## 2020-06-24 RX ORDER — ALPRAZOLAM 0.5 MG/1
0.5 TABLET ORAL DAILY PRN
Qty: 30 TABLET | Refills: 2 | Status: SHIPPED | OUTPATIENT
Start: 2020-06-24 | End: 2020-11-25 | Stop reason: SDUPTHER

## 2020-06-24 RX ORDER — NITROFURANTOIN MACROCRYSTALS 100 MG/1
100 CAPSULE ORAL 3 TIMES DAILY
Qty: 12 CAPSULE | Refills: 0 | Status: ON HOLD
Start: 2020-06-24 | End: 2020-07-03 | Stop reason: HOSPADM

## 2020-06-24 ASSESSMENT — ENCOUNTER SYMPTOMS
GASTROINTESTINAL NEGATIVE: 1
SHORTNESS OF BREATH: 0
EYE DISCHARGE: 0
TROUBLE SWALLOWING: 0

## 2020-06-24 NOTE — TELEPHONE ENCOUNTER
Patient is scheduled for surgery with Dr. Nikki De Santiago 6/29/20. Per PAT, patient is in need of updated H&P. Also noted moderate leukocytes and wbc's in UA. Awaiting culture results. Reviewed with TON Jackson MSN. Plan is for patient to follow up with PCP Dr. Adonis Yen for updated H&P and review of urine. Patient is aware and agreeable with plan.

## 2020-06-24 NOTE — ASSESSMENT & PLAN NOTE
Patient scheduled for open heart surgery for mitral valve repair/replacement and myectomy. OK FOR SURGERY. No known anesthesia problems in patient or family. Take medications as directed.

## 2020-06-24 NOTE — PROGRESS NOTES
Subjective:      Patient ID: Soha Bermudez is a 76 y.o. female. HPI Patient here today for preoperative evaluation. Patient scheduled for open heart surgery for mitral valve repair/replacement and myectomy for septal hypertrophy. She has had COVID testing which is pending at this time. Recent labs reviewed-she was recently diagnosed as hyponatremic but this is been corrected. She was also found to have bacteriuria which will be treated. See Assessment for comments on acute and chronic medical problems and clearance evaluation. Review of Systems   Constitutional: Negative for fever. HENT: Negative for trouble swallowing. Eyes: Negative for discharge. Respiratory: Negative for shortness of breath. Cardiovascular: Negative for chest pain, palpitations and leg swelling. Gastrointestinal: Negative. Genitourinary: Negative for difficulty urinating and dysuria. Musculoskeletal: Positive for arthralgias. Skin: Negative for rash and wound. Allergic/Immunologic: Negative for immunocompromised state. Neurological: Negative for syncope and headaches. Hematological: Negative for adenopathy. Does not bruise/bleed easily. Psychiatric/Behavioral: The patient is nervous/anxious. Objective:   Physical Exam  Constitutional:       General: She is not in acute distress. Appearance: She is well-developed. She is not diaphoretic. HENT:      Right Ear: External ear normal.      Left Ear: External ear normal.      Mouth/Throat:      Pharynx: No oropharyngeal exudate or posterior oropharyngeal erythema. Eyes:      General: No scleral icterus. Comments: Mild left eye ptosis   Neck:      Musculoskeletal: Normal range of motion. Thyroid: Thyromegaly (Small bilateral nodules) present. Vascular: No carotid bruit or JVD. Trachea: No tracheal deviation. Cardiovascular:      Rate and Rhythm: Normal rate and regular rhythm.       Pulses:           Carotid pulses are 2+

## 2020-06-24 NOTE — TELEPHONE ENCOUNTER
Patient states Dr. Braulio Ruiz office advised her urine was abnormal and she is in need of an antibiotic sent to [facility] [address] [place] [address], [facility] [address] [place] [id number] [phone] F [phone]     Please advise

## 2020-06-25 LAB
SARS-COV-2: NOT DETECTED
SOURCE: NORMAL

## 2020-06-26 ENCOUNTER — TELEPHONE (OUTPATIENT)
Dept: CARDIOTHORACIC SURGERY | Age: 65
End: 2020-06-26

## 2020-06-26 LAB
ORGANISM: ABNORMAL
URINE CULTURE, ROUTINE: ABNORMAL

## 2020-06-29 ENCOUNTER — HOSPITAL ENCOUNTER (INPATIENT)
Age: 65
LOS: 4 days | Discharge: HOME HEALTH | DRG: 220 | End: 2020-07-03
Attending: THORACIC SURGERY (CARDIOTHORACIC VASCULAR SURGERY) | Admitting: INTERNAL MEDICINE
Payer: MEDICARE

## 2020-06-29 ENCOUNTER — APPOINTMENT (OUTPATIENT)
Dept: GENERAL RADIOLOGY | Age: 65
DRG: 220 | End: 2020-06-29
Attending: THORACIC SURGERY (CARDIOTHORACIC VASCULAR SURGERY)
Payer: MEDICARE

## 2020-06-29 VITALS
RESPIRATION RATE: 12 BRPM | DIASTOLIC BLOOD PRESSURE: 53 MMHG | SYSTOLIC BLOOD PRESSURE: 86 MMHG | TEMPERATURE: 99.5 F | OXYGEN SATURATION: 89 %

## 2020-06-29 PROBLEM — I50.30 HYPERTROPHIC OBSTRUCTIVE CARDIOMYOPATHY WITH DIASTOLIC HEART FAILURE (HCC): Status: ACTIVE | Noted: 2020-06-29

## 2020-06-29 PROBLEM — I42.1 HYPERTROPHIC OBSTRUCTIVE CARDIOMYOPATHY WITH DIASTOLIC HEART FAILURE (HCC): Status: ACTIVE | Noted: 2020-06-29

## 2020-06-29 LAB
ABO/RH: NORMAL
ACTIVATED CLOTTING TIME: 124 SEC (ref 99–130)
ACTIVATED CLOTTING TIME: 125 SEC (ref 99–130)
ACTIVATED CLOTTING TIME: 446 SEC (ref 99–130)
ACTIVATED CLOTTING TIME: 470 SEC (ref 99–130)
ACTIVATED CLOTTING TIME: 532 SEC (ref 99–130)
ACTIVATED CLOTTING TIME: 558 SEC (ref 99–130)
ACTIVATED CLOTTING TIME: 806 SEC (ref 99–130)
ANION GAP SERPL CALCULATED.3IONS-SCNC: 10 MMOL/L (ref 3–16)
ANION GAP SERPL CALCULATED.3IONS-SCNC: 10 MMOL/L (ref 3–16)
ANION GAP SERPL CALCULATED.3IONS-SCNC: 5 MMOL/L (ref 3–16)
ANTIBODY SCREEN: NORMAL
APTT: 41.9 SEC (ref 24.2–36.2)
BACTERIA: ABNORMAL /HPF
BACTERIA: ABNORMAL /HPF
BASE EXCESS ARTERIAL: -2 (ref -3–3)
BASE EXCESS ARTERIAL: -2 (ref -3–3)
BASE EXCESS ARTERIAL: -3 (ref -3–3)
BASE EXCESS ARTERIAL: -3 (ref -3–3)
BASE EXCESS ARTERIAL: -4 (ref -3–3)
BASE EXCESS ARTERIAL: -4 (ref -3–3)
BASE EXCESS ARTERIAL: -5 (ref -3–3)
BASE EXCESS ARTERIAL: 0 (ref -3–3)
BASE EXCESS ARTERIAL: 1 (ref -3–3)
BASE EXCESS ARTERIAL: 1 (ref -3–3)
BASE EXCESS ARTERIAL: 2 (ref -3–3)
BASE EXCESS ARTERIAL: 3 (ref -3–3)
BASE EXCESS VENOUS: -4 (ref -3–3)
BASOPHILS ABSOLUTE: 0 K/UL (ref 0–0.2)
BASOPHILS RELATIVE PERCENT: 0.2 %
BILIRUBIN URINE: NEGATIVE
BILIRUBIN URINE: NEGATIVE
BLOOD, URINE: NEGATIVE
BLOOD, URINE: NEGATIVE
BUN BLDV-MCNC: 10 MG/DL (ref 7–20)
BUN BLDV-MCNC: 8 MG/DL (ref 7–20)
BUN BLDV-MCNC: 9 MG/DL (ref 7–20)
CALCIUM IONIZED: 1.22 MMOL/L (ref 1.12–1.32)
CALCIUM IONIZED: 1.22 MMOL/L (ref 1.12–1.32)
CALCIUM IONIZED: 1.25 MMOL/L (ref 1.12–1.32)
CALCIUM IONIZED: 1.25 MMOL/L (ref 1.12–1.32)
CALCIUM IONIZED: 1.29 MMOL/L (ref 1.12–1.32)
CALCIUM IONIZED: 1.29 MMOL/L (ref 1.12–1.32)
CALCIUM IONIZED: 1.3 MMOL/L (ref 1.12–1.32)
CALCIUM IONIZED: 1.33 MMOL/L (ref 1.12–1.32)
CALCIUM IONIZED: 1.41 MMOL/L (ref 1.12–1.32)
CALCIUM SERPL-MCNC: 10.7 MG/DL (ref 8.3–10.6)
CALCIUM SERPL-MCNC: 7.8 MG/DL (ref 8.3–10.6)
CALCIUM SERPL-MCNC: 8.6 MG/DL (ref 8.3–10.6)
CHLORIDE BLD-SCNC: 106 MMOL/L (ref 99–110)
CHLORIDE BLD-SCNC: 111 MMOL/L (ref 99–110)
CHLORIDE BLD-SCNC: 112 MMOL/L (ref 99–110)
CHOLESTEROL, TOTAL: 195 MG/DL (ref 0–199)
CLARITY: ABNORMAL
CLARITY: ABNORMAL
CO2: 21 MMOL/L (ref 21–32)
CO2: 23 MMOL/L (ref 21–32)
CO2: 25 MMOL/L (ref 21–32)
COLOR: YELLOW
COLOR: YELLOW
CREAT SERPL-MCNC: 0.5 MG/DL (ref 0.6–1.2)
CREAT SERPL-MCNC: 0.6 MG/DL (ref 0.6–1.2)
CREAT SERPL-MCNC: 0.6 MG/DL (ref 0.6–1.2)
EOSINOPHILS ABSOLUTE: 0 K/UL (ref 0–0.6)
EOSINOPHILS RELATIVE PERCENT: 0.1 %
EPITHELIAL CELLS, UA: ABNORMAL /HPF (ref 0–5)
EPITHELIAL CELLS, UA: ABNORMAL /HPF (ref 0–5)
GFR AFRICAN AMERICAN: >60
GFR NON-AFRICAN AMERICAN: >60
GLUCOSE BLD-MCNC: 106 MG/DL (ref 70–99)
GLUCOSE BLD-MCNC: 107 MG/DL (ref 70–99)
GLUCOSE BLD-MCNC: 109 MG/DL (ref 70–99)
GLUCOSE BLD-MCNC: 110 MG/DL (ref 70–99)
GLUCOSE BLD-MCNC: 111 MG/DL (ref 70–99)
GLUCOSE BLD-MCNC: 111 MG/DL (ref 70–99)
GLUCOSE BLD-MCNC: 113 MG/DL (ref 70–99)
GLUCOSE BLD-MCNC: 114 MG/DL (ref 70–99)
GLUCOSE BLD-MCNC: 117 MG/DL (ref 70–99)
GLUCOSE BLD-MCNC: 119 MG/DL (ref 70–99)
GLUCOSE BLD-MCNC: 122 MG/DL (ref 70–99)
GLUCOSE BLD-MCNC: 136 MG/DL (ref 70–99)
GLUCOSE BLD-MCNC: 144 MG/DL (ref 70–99)
GLUCOSE BLD-MCNC: 146 MG/DL (ref 70–99)
GLUCOSE BLD-MCNC: 148 MG/DL (ref 70–99)
GLUCOSE BLD-MCNC: 148 MG/DL (ref 70–99)
GLUCOSE BLD-MCNC: 153 MG/DL (ref 70–99)
GLUCOSE BLD-MCNC: 155 MG/DL (ref 70–99)
GLUCOSE BLD-MCNC: 160 MG/DL (ref 70–99)
GLUCOSE BLD-MCNC: 180 MG/DL (ref 70–99)
GLUCOSE BLD-MCNC: 77 MG/DL (ref 70–99)
GLUCOSE BLD-MCNC: 98 MG/DL (ref 70–99)
GLUCOSE BLD-MCNC: 99 MG/DL (ref 70–99)
GLUCOSE URINE: NEGATIVE MG/DL
GLUCOSE URINE: NEGATIVE MG/DL
HCO3 ARTERIAL: 20 MMOL/L (ref 21–29)
HCO3 ARTERIAL: 21.5 MMOL/L (ref 21–29)
HCO3 ARTERIAL: 21.8 MMOL/L (ref 21–29)
HCO3 ARTERIAL: 22.4 MMOL/L (ref 21–29)
HCO3 ARTERIAL: 22.6 MMOL/L (ref 21–29)
HCO3 ARTERIAL: 23.5 MMOL/L (ref 21–29)
HCO3 ARTERIAL: 23.8 MMOL/L (ref 21–29)
HCO3 ARTERIAL: 24.6 MMOL/L (ref 21–29)
HCO3 ARTERIAL: 24.9 MMOL/L (ref 21–29)
HCO3 ARTERIAL: 25.4 MMOL/L (ref 21–29)
HCO3 ARTERIAL: 25.5 MMOL/L (ref 21–29)
HCO3 ARTERIAL: 26 MMOL/L (ref 21–29)
HCO3 ARTERIAL: 27.2 MMOL/L (ref 21–29)
HCO3 ARTERIAL: 28.9 MMOL/L (ref 21–29)
HCO3 VENOUS: 21.9 MMOL/L (ref 23–29)
HCT VFR BLD CALC: 31.2 % (ref 36–48)
HCT VFR BLD CALC: 32.5 % (ref 36–48)
HDLC SERPL-MCNC: 57 MG/DL (ref 40–60)
HEMOGLOBIN: 10.2 G/DL (ref 12–16)
HEMOGLOBIN: 10.5 G/DL (ref 12–16)
HEMOGLOBIN: 10.9 G/DL (ref 12–16)
HEMOGLOBIN: 11.4 G/DL (ref 12–16)
HEMOGLOBIN: 8.1 G/DL (ref 12–16)
HEMOGLOBIN: 8.2 G/DL (ref 12–16)
HEMOGLOBIN: 8.3 G/DL (ref 12–16)
HEMOGLOBIN: 8.3 G/DL (ref 12–16)
HEMOGLOBIN: 8.7 G/DL (ref 12–16)
HYALINE CASTS: ABNORMAL /LPF (ref 0–2)
INR BLD: 1.07 (ref 0.86–1.14)
INR BLD: 1.16 (ref 0.86–1.14)
KETONES, URINE: NEGATIVE MG/DL
KETONES, URINE: NEGATIVE MG/DL
LACTATE: 0.95 MMOL/L (ref 0.4–2)
LACTATE: 0.97 MMOL/L (ref 0.4–2)
LACTATE: 1.09 MMOL/L (ref 0.4–2)
LACTATE: 1.11 MMOL/L (ref 0.4–2)
LACTATE: 1.11 MMOL/L (ref 0.4–2)
LACTATE: 1.55 MMOL/L (ref 0.4–2)
LACTATE: 1.62 MMOL/L (ref 0.4–2)
LACTATE: 1.99 MMOL/L (ref 0.4–2)
LDL CHOLESTEROL CALCULATED: 120 MG/DL
LEUKOCYTE ESTERASE, URINE: ABNORMAL
LEUKOCYTE ESTERASE, URINE: ABNORMAL
LYMPHOCYTES ABSOLUTE: 0.7 K/UL (ref 1–5.1)
LYMPHOCYTES RELATIVE PERCENT: 4.4 %
MAGNESIUM: 2.4 MG/DL (ref 1.8–2.4)
MAGNESIUM: 3 MG/DL (ref 1.8–2.4)
MCH RBC QN AUTO: 32.1 PG (ref 26–34)
MCH RBC QN AUTO: 32.3 PG (ref 26–34)
MCHC RBC AUTO-ENTMCNC: 33.5 G/DL (ref 31–36)
MCHC RBC AUTO-ENTMCNC: 33.8 G/DL (ref 31–36)
MCV RBC AUTO: 95.6 FL (ref 80–100)
MCV RBC AUTO: 95.8 FL (ref 80–100)
MICROSCOPIC EXAMINATION: YES
MICROSCOPIC EXAMINATION: YES
MONOCYTES ABSOLUTE: 1.2 K/UL (ref 0–1.3)
MONOCYTES RELATIVE PERCENT: 7.6 %
NEUTROPHILS ABSOLUTE: 13.9 K/UL (ref 1.7–7.7)
NEUTROPHILS RELATIVE PERCENT: 87.7 %
NITRITE, URINE: NEGATIVE
NITRITE, URINE: NEGATIVE
O2 SAT, ARTERIAL: 100 % (ref 93–100)
O2 SAT, ARTERIAL: 90 % (ref 93–100)
O2 SAT, ARTERIAL: 92 % (ref 93–100)
O2 SAT, ARTERIAL: 94 % (ref 93–100)
O2 SAT, ARTERIAL: 96 % (ref 93–100)
O2 SAT, ARTERIAL: 97 % (ref 93–100)
O2 SAT, ARTERIAL: 98 % (ref 93–100)
O2 SAT, VEN: 67 %
PCO2 ARTERIAL: 34.9 MM HG (ref 35–45)
PCO2 ARTERIAL: 36.8 MM HG (ref 35–45)
PCO2 ARTERIAL: 37.3 MM HG (ref 35–45)
PCO2 ARTERIAL: 38.6 MM HG (ref 35–45)
PCO2 ARTERIAL: 39 MM HG (ref 35–45)
PCO2 ARTERIAL: 39.5 MM HG (ref 35–45)
PCO2 ARTERIAL: 40.7 MM HG (ref 35–45)
PCO2 ARTERIAL: 40.8 MM HG (ref 35–45)
PCO2 ARTERIAL: 41.3 MM HG (ref 35–45)
PCO2 ARTERIAL: 44.4 MM HG (ref 35–45)
PCO2 ARTERIAL: 46.7 MM HG (ref 35–45)
PCO2 ARTERIAL: 47.8 MM HG (ref 35–45)
PCO2 ARTERIAL: 51.2 MM HG (ref 35–45)
PCO2 ARTERIAL: 58 MM HG (ref 35–45)
PCO2, VEN: 41.4 MM HG (ref 40–50)
PDW BLD-RTO: 12.5 % (ref 12.4–15.4)
PDW BLD-RTO: 12.8 % (ref 12.4–15.4)
PERFORMED ON: ABNORMAL
PERFORMED ON: NORMAL
PERFORMED ON: NORMAL
PH ARTERIAL: 7.3 (ref 7.35–7.45)
PH ARTERIAL: 7.31 (ref 7.35–7.45)
PH ARTERIAL: 7.33 (ref 7.35–7.45)
PH ARTERIAL: 7.34 (ref 7.35–7.45)
PH ARTERIAL: 7.35 (ref 7.35–7.45)
PH ARTERIAL: 7.36 (ref 7.35–7.45)
PH ARTERIAL: 7.37 (ref 7.35–7.45)
PH ARTERIAL: 7.39 (ref 7.35–7.45)
PH ARTERIAL: 7.39 (ref 7.35–7.45)
PH ARTERIAL: 7.41 (ref 7.35–7.45)
PH ARTERIAL: 7.42 (ref 7.35–7.45)
PH ARTERIAL: 7.45 (ref 7.35–7.45)
PH UA: 6.5 (ref 5–8)
PH UA: 6.5 (ref 5–8)
PH VENOUS: 7.33 (ref 7.35–7.45)
PLATELET # BLD: 180 K/UL (ref 135–450)
PLATELET # BLD: 194 K/UL (ref 135–450)
PMV BLD AUTO: 7.5 FL (ref 5–10.5)
PMV BLD AUTO: 7.9 FL (ref 5–10.5)
PO2 ARTERIAL: 102.5 MM HG (ref 75–108)
PO2 ARTERIAL: 219.3 MM HG (ref 75–108)
PO2 ARTERIAL: 235.1 MM HG (ref 75–108)
PO2 ARTERIAL: 365.4 MM HG (ref 75–108)
PO2 ARTERIAL: 379.9 MM HG (ref 75–108)
PO2 ARTERIAL: 380.7 MM HG (ref 75–108)
PO2 ARTERIAL: 387.2 MM HG (ref 75–108)
PO2 ARTERIAL: 503.1 MM HG (ref 75–108)
PO2 ARTERIAL: 581.3 MM HG (ref 75–108)
PO2 ARTERIAL: 63.6 MM HG (ref 75–108)
PO2 ARTERIAL: 64.6 MM HG (ref 75–108)
PO2 ARTERIAL: 71.5 MM HG (ref 75–108)
PO2 ARTERIAL: 89.6 MM HG (ref 75–108)
PO2 ARTERIAL: 94.3 MM HG (ref 75–108)
PO2, VEN: 37 MM HG
POC CREATININE: 0.9 MG/DL (ref 0.6–1.2)
POC POTASSIUM: 3.6 MMOL/L (ref 3.5–5.1)
POC POTASSIUM: 3.9 MMOL/L (ref 3.5–5.1)
POC POTASSIUM: 4 MMOL/L (ref 3.5–5.1)
POC POTASSIUM: 4.2 MMOL/L (ref 3.5–5.1)
POC POTASSIUM: 4.3 MMOL/L (ref 3.5–5.1)
POC POTASSIUM: 4.3 MMOL/L (ref 3.5–5.1)
POC POTASSIUM: 4.7 MMOL/L (ref 3.5–5.1)
POC POTASSIUM: 4.7 MMOL/L (ref 3.5–5.1)
POC POTASSIUM: 5.4 MMOL/L (ref 3.5–5.1)
POC POTASSIUM: 5.4 MMOL/L (ref 3.5–5.1)
POC SAMPLE TYPE: ABNORMAL
POC SODIUM: 141 MMOL/L (ref 136–145)
POC SODIUM: 142 MMOL/L (ref 136–145)
POC SODIUM: 143 MMOL/L (ref 136–145)
POC SODIUM: 144 MMOL/L (ref 136–145)
POC SODIUM: 144 MMOL/L (ref 136–145)
POC SODIUM: 145 MMOL/L (ref 136–145)
POTASSIUM SERPL-SCNC: 4 MMOL/L (ref 3.5–5.1)
POTASSIUM SERPL-SCNC: 4.2 MMOL/L (ref 3.5–5.1)
POTASSIUM SERPL-SCNC: 4.4 MMOL/L (ref 3.5–5.1)
PROTEIN UA: ABNORMAL MG/DL
PROTEIN UA: NEGATIVE MG/DL
PROTHROMBIN TIME: 12.4 SEC (ref 10–13.2)
PROTHROMBIN TIME: 13.5 SEC (ref 10–13.2)
RBC # BLD: 3.26 M/UL (ref 4–5.2)
RBC # BLD: 3.39 M/UL (ref 4–5.2)
RBC UA: ABNORMAL /HPF (ref 0–4)
RBC UA: ABNORMAL /HPF (ref 0–4)
SODIUM BLD-SCNC: 140 MMOL/L (ref 136–145)
SODIUM BLD-SCNC: 141 MMOL/L (ref 136–145)
SODIUM BLD-SCNC: 142 MMOL/L (ref 136–145)
SPECIFIC GRAVITY UA: 1.02 (ref 1–1.03)
SPECIFIC GRAVITY UA: 1.02 (ref 1–1.03)
TCO2 ARTERIAL: 21 MMOL/L
TCO2 ARTERIAL: 23 MMOL/L
TCO2 ARTERIAL: 23 MMOL/L
TCO2 ARTERIAL: 24 MMOL/L
TCO2 ARTERIAL: 24 MMOL/L
TCO2 ARTERIAL: 25 MMOL/L
TCO2 ARTERIAL: 25 MMOL/L
TCO2 ARTERIAL: 26 MMOL/L
TCO2 ARTERIAL: 26 MMOL/L
TCO2 ARTERIAL: 27 MMOL/L
TCO2 ARTERIAL: 29 MMOL/L
TCO2 ARTERIAL: 31 MMOL/L
TCO2 CALC VENOUS: 23 MMOL/L
TRIGL SERPL-MCNC: 91 MG/DL (ref 0–150)
URINE REFLEX TO CULTURE: YES
URINE TYPE: ABNORMAL
URINE TYPE: ABNORMAL
UROBILINOGEN, URINE: 0.2 E.U./DL
UROBILINOGEN, URINE: 0.2 E.U./DL
VLDLC SERPL CALC-MCNC: 18 MG/DL
WBC # BLD: 15.8 K/UL (ref 4–11)
WBC # BLD: 16.6 K/UL (ref 4–11)
WBC UA: ABNORMAL /HPF (ref 0–5)
WBC UA: ABNORMAL /HPF (ref 0–5)

## 2020-06-29 PROCEDURE — 80048 BASIC METABOLIC PNL TOTAL CA: CPT

## 2020-06-29 PROCEDURE — 3700000000 HC ANESTHESIA ATTENDED CARE: Performed by: THORACIC SURGERY (CARDIOTHORACIC VASCULAR SURGERY)

## 2020-06-29 PROCEDURE — 2780000006 HC MISC HEART VALVE: Performed by: THORACIC SURGERY (CARDIOTHORACIC VASCULAR SURGERY)

## 2020-06-29 PROCEDURE — 6360000002 HC RX W HCPCS: Performed by: THORACIC SURGERY (CARDIOTHORACIC VASCULAR SURGERY)

## 2020-06-29 PROCEDURE — P9045 ALBUMIN (HUMAN), 5%, 250 ML: HCPCS | Performed by: THORACIC SURGERY (CARDIOTHORACIC VASCULAR SURGERY)

## 2020-06-29 PROCEDURE — 02TM0ZZ RESECTION OF VENTRICULAR SEPTUM, OPEN APPROACH: ICD-10-PCS | Performed by: THORACIC SURGERY (CARDIOTHORACIC VASCULAR SURGERY)

## 2020-06-29 PROCEDURE — 02HV33Z INSERTION OF INFUSION DEVICE INTO SUPERIOR VENA CAVA, PERCUTANEOUS APPROACH: ICD-10-PCS | Performed by: ANESTHESIOLOGY

## 2020-06-29 PROCEDURE — 2700000000 HC OXYGEN THERAPY PER DAY

## 2020-06-29 PROCEDURE — 85730 THROMBOPLASTIN TIME PARTIAL: CPT

## 2020-06-29 PROCEDURE — 83735 ASSAY OF MAGNESIUM: CPT

## 2020-06-29 PROCEDURE — 94640 AIRWAY INHALATION TREATMENT: CPT

## 2020-06-29 PROCEDURE — 84295 ASSAY OF SERUM SODIUM: CPT

## 2020-06-29 PROCEDURE — 83605 ASSAY OF LACTIC ACID: CPT

## 2020-06-29 PROCEDURE — 87186 SC STD MICRODIL/AGAR DIL: CPT

## 2020-06-29 PROCEDURE — 2580000003 HC RX 258: Performed by: ANESTHESIOLOGY

## 2020-06-29 PROCEDURE — 6370000000 HC RX 637 (ALT 250 FOR IP): Performed by: CLINICAL NURSE SPECIALIST

## 2020-06-29 PROCEDURE — 3600000018 HC SURGERY OHS ADDTL 15MIN: Performed by: THORACIC SURGERY (CARDIOTHORACIC VASCULAR SURGERY)

## 2020-06-29 PROCEDURE — 33416 REVISE VENTRICLE MUSCLE: CPT | Performed by: THORACIC SURGERY (CARDIOTHORACIC VASCULAR SURGERY)

## 2020-06-29 PROCEDURE — 6370000000 HC RX 637 (ALT 250 FOR IP): Performed by: THORACIC SURGERY (CARDIOTHORACIC VASCULAR SURGERY)

## 2020-06-29 PROCEDURE — 82803 BLOOD GASES ANY COMBINATION: CPT

## 2020-06-29 PROCEDURE — 94150 VITAL CAPACITY TEST: CPT

## 2020-06-29 PROCEDURE — 94761 N-INVAS EAR/PLS OXIMETRY MLT: CPT

## 2020-06-29 PROCEDURE — C1729 CATH, DRAINAGE: HCPCS | Performed by: THORACIC SURGERY (CARDIOTHORACIC VASCULAR SURGERY)

## 2020-06-29 PROCEDURE — 2500000003 HC RX 250 WO HCPCS: Performed by: THORACIC SURGERY (CARDIOTHORACIC VASCULAR SURGERY)

## 2020-06-29 PROCEDURE — 85610 PROTHROMBIN TIME: CPT

## 2020-06-29 PROCEDURE — 80061 LIPID PANEL: CPT

## 2020-06-29 PROCEDURE — 37799 UNLISTED PX VASCULAR SURGERY: CPT

## 2020-06-29 PROCEDURE — 85347 COAGULATION TIME ACTIVATED: CPT

## 2020-06-29 PROCEDURE — 85027 COMPLETE CBC AUTOMATED: CPT

## 2020-06-29 PROCEDURE — 87086 URINE CULTURE/COLONY COUNT: CPT

## 2020-06-29 PROCEDURE — 71045 X-RAY EXAM CHEST 1 VIEW: CPT

## 2020-06-29 PROCEDURE — 7100000011 HC PHASE II RECOVERY - ADDTL 15 MIN

## 2020-06-29 PROCEDURE — 82565 ASSAY OF CREATININE: CPT

## 2020-06-29 PROCEDURE — 85025 COMPLETE CBC W/AUTO DIFF WBC: CPT

## 2020-06-29 PROCEDURE — 7100000010 HC PHASE II RECOVERY - FIRST 15 MIN

## 2020-06-29 PROCEDURE — P9047 ALBUMIN (HUMAN), 25%, 50ML: HCPCS | Performed by: ANESTHESIOLOGY

## 2020-06-29 PROCEDURE — 94002 VENT MGMT INPAT INIT DAY: CPT

## 2020-06-29 PROCEDURE — 86900 BLOOD TYPING SEROLOGIC ABO: CPT

## 2020-06-29 PROCEDURE — 3600000008 HC SURGERY OHS BASE: Performed by: THORACIC SURGERY (CARDIOTHORACIC VASCULAR SURGERY)

## 2020-06-29 PROCEDURE — 2580000003 HC RX 258: Performed by: THORACIC SURGERY (CARDIOTHORACIC VASCULAR SURGERY)

## 2020-06-29 PROCEDURE — B24BZZ4 ULTRASONOGRAPHY OF HEART WITH AORTA, TRANSESOPHAGEAL: ICD-10-PCS | Performed by: THORACIC SURGERY (CARDIOTHORACIC VASCULAR SURGERY)

## 2020-06-29 PROCEDURE — 2500000003 HC RX 250 WO HCPCS: Performed by: ANESTHESIOLOGY

## 2020-06-29 PROCEDURE — 33430 REPLACEMENT OF MITRAL VALVE: CPT | Performed by: THORACIC SURGERY (CARDIOTHORACIC VASCULAR SURGERY)

## 2020-06-29 PROCEDURE — 02RG08Z REPLACEMENT OF MITRAL VALVE WITH ZOOPLASTIC TISSUE, OPEN APPROACH: ICD-10-PCS | Performed by: THORACIC SURGERY (CARDIOTHORACIC VASCULAR SURGERY)

## 2020-06-29 PROCEDURE — 82330 ASSAY OF CALCIUM: CPT

## 2020-06-29 PROCEDURE — C2626 INFUSION PUMP, NON-PROG,TEMP: HCPCS | Performed by: THORACIC SURGERY (CARDIOTHORACIC VASCULAR SURGERY)

## 2020-06-29 PROCEDURE — 84132 ASSAY OF SERUM POTASSIUM: CPT

## 2020-06-29 PROCEDURE — 5A1221Z PERFORMANCE OF CARDIAC OUTPUT, CONTINUOUS: ICD-10-PCS | Performed by: THORACIC SURGERY (CARDIOTHORACIC VASCULAR SURGERY)

## 2020-06-29 PROCEDURE — 82947 ASSAY GLUCOSE BLOOD QUANT: CPT

## 2020-06-29 PROCEDURE — 6360000002 HC RX W HCPCS: Performed by: ANESTHESIOLOGY

## 2020-06-29 PROCEDURE — 2720000010 HC SURG SUPPLY STERILE: Performed by: THORACIC SURGERY (CARDIOTHORACIC VASCULAR SURGERY)

## 2020-06-29 PROCEDURE — C1751 CATH, INF, PER/CENT/MIDLINE: HCPCS | Performed by: THORACIC SURGERY (CARDIOTHORACIC VASCULAR SURGERY)

## 2020-06-29 PROCEDURE — 88305 TISSUE EXAM BY PATHOLOGIST: CPT

## 2020-06-29 PROCEDURE — 1200000000 HC SEMI PRIVATE

## 2020-06-29 PROCEDURE — 81001 URINALYSIS AUTO W/SCOPE: CPT

## 2020-06-29 PROCEDURE — 2709999900 HC NON-CHARGEABLE SUPPLY: Performed by: THORACIC SURGERY (CARDIOTHORACIC VASCULAR SURGERY)

## 2020-06-29 PROCEDURE — 85018 HEMOGLOBIN: CPT

## 2020-06-29 PROCEDURE — 86901 BLOOD TYPING SEROLOGIC RH(D): CPT

## 2020-06-29 PROCEDURE — 86850 RBC ANTIBODY SCREEN: CPT

## 2020-06-29 PROCEDURE — 99222 1ST HOSP IP/OBS MODERATE 55: CPT | Performed by: INTERNAL MEDICINE

## 2020-06-29 PROCEDURE — 3700000001 HC ADD 15 MINUTES (ANESTHESIA): Performed by: THORACIC SURGERY (CARDIOTHORACIC VASCULAR SURGERY)

## 2020-06-29 DEVICE — VALVE MI H18MM DIA25MM ORIFICE DIA22.5MM SUT RNG DIA33MM: Type: IMPLANTABLE DEVICE | Site: HEART | Status: FUNCTIONAL

## 2020-06-29 RX ORDER — POTASSIUM CHLORIDE 29.8 MG/ML
20 INJECTION INTRAVENOUS PRN
Status: DISCONTINUED | OUTPATIENT
Start: 2020-06-29 | End: 2020-07-01

## 2020-06-29 RX ORDER — NICOTINE POLACRILEX 4 MG
15 LOZENGE BUCCAL PRN
Status: DISCONTINUED | OUTPATIENT
Start: 2020-06-29 | End: 2020-07-02

## 2020-06-29 RX ORDER — ALBUTEROL SULFATE 2.5 MG/3ML
2.5 SOLUTION RESPIRATORY (INHALATION) EVERY 6 HOURS PRN
Status: DISCONTINUED | OUTPATIENT
Start: 2020-06-29 | End: 2020-07-03 | Stop reason: HOSPADM

## 2020-06-29 RX ORDER — MAGNESIUM HYDROXIDE 1200 MG/15ML
LIQUID ORAL CONTINUOUS PRN
Status: COMPLETED | OUTPATIENT
Start: 2020-06-29 | End: 2020-06-29

## 2020-06-29 RX ORDER — SODIUM CHLORIDE, SODIUM LACTATE, POTASSIUM CHLORIDE, CALCIUM CHLORIDE 600; 310; 30; 20 MG/100ML; MG/100ML; MG/100ML; MG/100ML
INJECTION, SOLUTION INTRAVENOUS CONTINUOUS
Status: DISCONTINUED | OUTPATIENT
Start: 2020-06-29 | End: 2020-06-29

## 2020-06-29 RX ORDER — FENTANYL CITRATE 50 UG/ML
25 INJECTION, SOLUTION INTRAMUSCULAR; INTRAVENOUS EVERY 5 MIN PRN
Status: DISCONTINUED | OUTPATIENT
Start: 2020-06-29 | End: 2020-06-29 | Stop reason: HOSPADM

## 2020-06-29 RX ORDER — BUDESONIDE AND FORMOTEROL FUMARATE DIHYDRATE 80; 4.5 UG/1; UG/1
2 AEROSOL RESPIRATORY (INHALATION) DAILY
Status: DISCONTINUED | OUTPATIENT
Start: 2020-06-29 | End: 2020-06-29 | Stop reason: CLARIF

## 2020-06-29 RX ORDER — MAGNESIUM SULFATE IN WATER 40 MG/ML
2 INJECTION, SOLUTION INTRAVENOUS PRN
Status: DISCONTINUED | OUTPATIENT
Start: 2020-06-29 | End: 2020-07-03 | Stop reason: HOSPADM

## 2020-06-29 RX ORDER — LISINOPRIL 2.5 MG/1
2.5 TABLET ORAL
Status: DISCONTINUED | OUTPATIENT
Start: 2020-06-30 | End: 2020-07-03 | Stop reason: HOSPADM

## 2020-06-29 RX ORDER — MIDAZOLAM HYDROCHLORIDE 1 MG/ML
INJECTION INTRAMUSCULAR; INTRAVENOUS PRN
Status: DISCONTINUED | OUTPATIENT
Start: 2020-06-29 | End: 2020-06-29 | Stop reason: SDUPTHER

## 2020-06-29 RX ORDER — FUROSEMIDE 10 MG/ML
40 INJECTION INTRAMUSCULAR; INTRAVENOUS
Status: ACTIVE | OUTPATIENT
Start: 2020-06-29 | End: 2020-06-29

## 2020-06-29 RX ORDER — MIDAZOLAM HYDROCHLORIDE 1 MG/ML
1 INJECTION INTRAMUSCULAR; INTRAVENOUS
Status: ACTIVE | OUTPATIENT
Start: 2020-06-29 | End: 2020-06-30

## 2020-06-29 RX ORDER — ATORVASTATIN CALCIUM 40 MG/1
40 TABLET, FILM COATED ORAL NIGHTLY
Status: DISCONTINUED | OUTPATIENT
Start: 2020-06-30 | End: 2020-07-03 | Stop reason: HOSPADM

## 2020-06-29 RX ORDER — FUROSEMIDE 10 MG/ML
40 INJECTION INTRAMUSCULAR; INTRAVENOUS 2 TIMES DAILY
Status: DISCONTINUED | OUTPATIENT
Start: 2020-06-30 | End: 2020-07-02

## 2020-06-29 RX ORDER — 0.9 % SODIUM CHLORIDE 0.9 %
1000 INTRAVENOUS SOLUTION INTRAVENOUS CONTINUOUS PRN
Status: DISCONTINUED | OUTPATIENT
Start: 2020-06-29 | End: 2020-07-01

## 2020-06-29 RX ORDER — PROTAMINE SULFATE 10 MG/ML
INJECTION, SOLUTION INTRAVENOUS PRN
Status: DISCONTINUED | OUTPATIENT
Start: 2020-06-29 | End: 2020-06-29

## 2020-06-29 RX ORDER — CHLORHEXIDINE GLUCONATE 0.12 MG/ML
15 RINSE ORAL ONCE
Status: COMPLETED | OUTPATIENT
Start: 2020-06-29 | End: 2020-06-29

## 2020-06-29 RX ORDER — ROCURONIUM BROMIDE 10 MG/ML
INJECTION, SOLUTION INTRAVENOUS PRN
Status: DISCONTINUED | OUTPATIENT
Start: 2020-06-29 | End: 2020-06-29 | Stop reason: SDUPTHER

## 2020-06-29 RX ORDER — HYDRALAZINE HYDROCHLORIDE 20 MG/ML
5 INJECTION INTRAMUSCULAR; INTRAVENOUS EVERY 5 MIN PRN
Status: DISCONTINUED | OUTPATIENT
Start: 2020-06-29 | End: 2020-07-03 | Stop reason: HOSPADM

## 2020-06-29 RX ORDER — SODIUM CHLORIDE, SODIUM LACTATE, POTASSIUM CHLORIDE, CALCIUM CHLORIDE 600; 310; 30; 20 MG/100ML; MG/100ML; MG/100ML; MG/100ML
INJECTION, SOLUTION INTRAVENOUS CONTINUOUS PRN
Status: DISCONTINUED | OUTPATIENT
Start: 2020-06-29 | End: 2020-06-29 | Stop reason: SDUPTHER

## 2020-06-29 RX ORDER — LANOLIN ALCOHOL/MO/W.PET/CERES
400 CREAM (GRAM) TOPICAL 2 TIMES DAILY
Status: DISCONTINUED | OUTPATIENT
Start: 2020-06-30 | End: 2020-07-03 | Stop reason: HOSPADM

## 2020-06-29 RX ORDER — CEFAZOLIN SODIUM 2 G/50ML
2 SOLUTION INTRAVENOUS
Status: COMPLETED | OUTPATIENT
Start: 2020-06-29 | End: 2020-06-29

## 2020-06-29 RX ORDER — CHLORHEXIDINE GLUCONATE 0.12 MG/ML
15 RINSE ORAL 2 TIMES DAILY
Status: DISCONTINUED | OUTPATIENT
Start: 2020-06-29 | End: 2020-07-03 | Stop reason: HOSPADM

## 2020-06-29 RX ORDER — NITROGLYCERIN 20 MG/100ML
INJECTION INTRAVENOUS CONTINUOUS PRN
Status: DISCONTINUED | OUTPATIENT
Start: 2020-06-29 | End: 2020-06-29

## 2020-06-29 RX ORDER — DIPHENHYDRAMINE HCL 25 MG
25 TABLET ORAL NIGHTLY PRN
Status: DISCONTINUED | OUTPATIENT
Start: 2020-06-30 | End: 2020-07-03 | Stop reason: HOSPADM

## 2020-06-29 RX ORDER — LIDOCAINE HYDROCHLORIDE 20 MG/ML
INJECTION, SOLUTION INTRAVENOUS PRN
Status: DISCONTINUED | OUTPATIENT
Start: 2020-06-29 | End: 2020-06-29 | Stop reason: SDUPTHER

## 2020-06-29 RX ORDER — INSULIN LISPRO 100 [IU]/ML
0-12 INJECTION, SOLUTION INTRAVENOUS; SUBCUTANEOUS EVERY 4 HOURS
Status: DISCONTINUED | OUTPATIENT
Start: 2020-06-30 | End: 2020-07-01

## 2020-06-29 RX ORDER — OXYCODONE HYDROCHLORIDE AND ACETAMINOPHEN 5; 325 MG/1; MG/1
2 TABLET ORAL EVERY 4 HOURS PRN
Status: DISCONTINUED | OUTPATIENT
Start: 2020-06-29 | End: 2020-07-03 | Stop reason: HOSPADM

## 2020-06-29 RX ORDER — FENTANYL CITRATE 0.05 MG/ML
INJECTION, SOLUTION INTRAMUSCULAR; INTRAVENOUS PRN
Status: DISCONTINUED | OUTPATIENT
Start: 2020-06-29 | End: 2020-06-29

## 2020-06-29 RX ORDER — PROTAMINE SULFATE 10 MG/ML
50 INJECTION, SOLUTION INTRAVENOUS
Status: ACTIVE | OUTPATIENT
Start: 2020-06-29 | End: 2020-06-29

## 2020-06-29 RX ORDER — METOPROLOL TARTRATE 5 MG/5ML
2.5 INJECTION INTRAVENOUS EVERY 10 MIN PRN
Status: DISCONTINUED | OUTPATIENT
Start: 2020-06-29 | End: 2020-07-03 | Stop reason: HOSPADM

## 2020-06-29 RX ORDER — PHENYLEPHRINE HYDROCHLORIDE 10 MG/ML
INJECTION INTRAVENOUS PRN
Status: DISCONTINUED | OUTPATIENT
Start: 2020-06-29 | End: 2020-06-29

## 2020-06-29 RX ORDER — BUDESONIDE 0.25 MG/2ML
0.25 INHALANT ORAL 2 TIMES DAILY
Status: DISCONTINUED | OUTPATIENT
Start: 2020-06-29 | End: 2020-07-03 | Stop reason: HOSPADM

## 2020-06-29 RX ORDER — ACETAMINOPHEN 325 MG/1
650 TABLET ORAL EVERY 4 HOURS PRN
Status: DISCONTINUED | OUTPATIENT
Start: 2020-06-29 | End: 2020-07-03 | Stop reason: HOSPADM

## 2020-06-29 RX ORDER — DEXTROSE MONOHYDRATE 25 G/50ML
12.5 INJECTION, SOLUTION INTRAVENOUS PRN
Status: DISCONTINUED | OUTPATIENT
Start: 2020-06-29 | End: 2020-07-02

## 2020-06-29 RX ORDER — FENTANYL CITRATE 50 UG/ML
25 INJECTION, SOLUTION INTRAMUSCULAR; INTRAVENOUS
Status: DISPENSED | OUTPATIENT
Start: 2020-06-29 | End: 2020-07-01

## 2020-06-29 RX ORDER — ALBUMIN (HUMAN) 12.5 G/50ML
SOLUTION INTRAVENOUS PRN
Status: DISCONTINUED | OUTPATIENT
Start: 2020-06-29 | End: 2020-06-29

## 2020-06-29 RX ORDER — ALBUMIN, HUMAN INJ 5% 5 %
25 SOLUTION INTRAVENOUS PRN
Status: DISCONTINUED | OUTPATIENT
Start: 2020-06-29 | End: 2020-07-01

## 2020-06-29 RX ORDER — MEPERIDINE HYDROCHLORIDE 50 MG/ML
25 INJECTION INTRAMUSCULAR; INTRAVENOUS; SUBCUTANEOUS
Status: ACTIVE | OUTPATIENT
Start: 2020-06-29 | End: 2020-06-29

## 2020-06-29 RX ORDER — SODIUM CHLORIDE 0.9 % (FLUSH) 0.9 %
10 SYRINGE (ML) INJECTION EVERY 12 HOURS SCHEDULED
Status: DISCONTINUED | OUTPATIENT
Start: 2020-06-29 | End: 2020-07-03 | Stop reason: HOSPADM

## 2020-06-29 RX ORDER — ONDANSETRON 2 MG/ML
4 INJECTION INTRAMUSCULAR; INTRAVENOUS EVERY 8 HOURS PRN
Status: DISCONTINUED | OUTPATIENT
Start: 2020-06-29 | End: 2020-07-03 | Stop reason: HOSPADM

## 2020-06-29 RX ORDER — CHLORHEXIDINE GLUCONATE 4 G/100ML
SOLUTION TOPICAL ONCE
Status: COMPLETED | OUTPATIENT
Start: 2020-06-29 | End: 2020-06-29

## 2020-06-29 RX ORDER — ARFORMOTEROL TARTRATE 15 UG/2ML
15 SOLUTION RESPIRATORY (INHALATION) 2 TIMES DAILY
Status: DISCONTINUED | OUTPATIENT
Start: 2020-06-29 | End: 2020-07-03 | Stop reason: HOSPADM

## 2020-06-29 RX ORDER — DOPAMINE HYDROCHLORIDE 160 MG/100ML
2 INJECTION, SOLUTION INTRAVENOUS CONTINUOUS PRN
Status: DISCONTINUED | OUTPATIENT
Start: 2020-06-29 | End: 2020-07-02

## 2020-06-29 RX ORDER — SODIUM CHLORIDE, SODIUM LACTATE, POTASSIUM CHLORIDE, CALCIUM CHLORIDE 600; 310; 30; 20 MG/100ML; MG/100ML; MG/100ML; MG/100ML
INJECTION, SOLUTION INTRAVENOUS CONTINUOUS
Status: DISCONTINUED | OUTPATIENT
Start: 2020-06-29 | End: 2020-07-01

## 2020-06-29 RX ORDER — NITROGLYCERIN 20 MG/100ML
10 INJECTION INTRAVENOUS CONTINUOUS PRN
Status: DISCONTINUED | OUTPATIENT
Start: 2020-06-29 | End: 2020-07-01

## 2020-06-29 RX ORDER — OXYCODONE HYDROCHLORIDE AND ACETAMINOPHEN 5; 325 MG/1; MG/1
1 TABLET ORAL EVERY 4 HOURS PRN
Status: DISCONTINUED | OUTPATIENT
Start: 2020-06-29 | End: 2020-07-03 | Stop reason: HOSPADM

## 2020-06-29 RX ORDER — CLOPIDOGREL BISULFATE 75 MG/1
75 TABLET ORAL DAILY
Status: DISCONTINUED | OUTPATIENT
Start: 2020-06-30 | End: 2020-07-03 | Stop reason: HOSPADM

## 2020-06-29 RX ORDER — POLYETHYLENE GLYCOL 3350 17 G/17G
17 POWDER, FOR SOLUTION ORAL DAILY
Status: DISCONTINUED | OUTPATIENT
Start: 2020-06-29 | End: 2020-07-03 | Stop reason: HOSPADM

## 2020-06-29 RX ORDER — DEXTROSE MONOHYDRATE 50 MG/ML
100 INJECTION, SOLUTION INTRAVENOUS PRN
Status: DISCONTINUED | OUTPATIENT
Start: 2020-06-29 | End: 2020-07-02

## 2020-06-29 RX ORDER — SODIUM CHLORIDE 0.9 % (FLUSH) 0.9 %
10 SYRINGE (ML) INJECTION PRN
Status: DISCONTINUED | OUTPATIENT
Start: 2020-06-29 | End: 2020-07-03 | Stop reason: HOSPADM

## 2020-06-29 RX ORDER — DIAZEPAM 5 MG/1
5 TABLET ORAL 2 TIMES DAILY PRN
Status: DISCONTINUED | OUTPATIENT
Start: 2020-06-29 | End: 2020-07-03 | Stop reason: HOSPADM

## 2020-06-29 RX ORDER — METOCLOPRAMIDE HYDROCHLORIDE 5 MG/ML
10 INJECTION INTRAMUSCULAR; INTRAVENOUS EVERY 6 HOURS PRN
Status: DISCONTINUED | OUTPATIENT
Start: 2020-06-29 | End: 2020-07-03 | Stop reason: HOSPADM

## 2020-06-29 RX ORDER — ALPRAZOLAM 0.5 MG/1
0.5 TABLET ORAL DAILY PRN
Status: DISCONTINUED | OUTPATIENT
Start: 2020-06-29 | End: 2020-07-03 | Stop reason: HOSPADM

## 2020-06-29 RX ORDER — BUPIVACAINE HYDROCHLORIDE 5 MG/ML
INJECTION, SOLUTION EPIDURAL; INTRACAUDAL PRN
Status: DISCONTINUED | OUTPATIENT
Start: 2020-06-29 | End: 2020-06-29 | Stop reason: HOSPADM

## 2020-06-29 RX ORDER — ALBUTEROL SULFATE 2.5 MG/3ML
2.5 SOLUTION RESPIRATORY (INHALATION) 4 TIMES DAILY
Status: DISCONTINUED | OUTPATIENT
Start: 2020-06-29 | End: 2020-07-03 | Stop reason: HOSPADM

## 2020-06-29 RX ORDER — LEVOTHYROXINE SODIUM 0.12 MG/1
125 TABLET ORAL DAILY
Status: DISCONTINUED | OUTPATIENT
Start: 2020-06-29 | End: 2020-07-03 | Stop reason: HOSPADM

## 2020-06-29 RX ORDER — ONDANSETRON 2 MG/ML
4 INJECTION INTRAMUSCULAR; INTRAVENOUS
Status: DISCONTINUED | OUTPATIENT
Start: 2020-06-29 | End: 2020-06-29 | Stop reason: HOSPADM

## 2020-06-29 RX ORDER — AMINOCAPROIC ACID 250 MG/ML
INJECTION, SOLUTION INTRAVENOUS PRN
Status: DISCONTINUED | OUTPATIENT
Start: 2020-06-29 | End: 2020-06-29 | Stop reason: SDUPTHER

## 2020-06-29 RX ORDER — SODIUM CHLORIDE 9 MG/ML
INJECTION, SOLUTION INTRAVENOUS CONTINUOUS PRN
Status: DISCONTINUED | OUTPATIENT
Start: 2020-06-29 | End: 2020-06-29 | Stop reason: SDUPTHER

## 2020-06-29 RX ORDER — PROPOFOL 10 MG/ML
INJECTION, EMULSION INTRAVENOUS PRN
Status: DISCONTINUED | OUTPATIENT
Start: 2020-06-29 | End: 2020-06-29 | Stop reason: SDUPTHER

## 2020-06-29 RX ORDER — POTASSIUM CHLORIDE 750 MG/1
10 TABLET, EXTENDED RELEASE ORAL
Status: DISCONTINUED | OUTPATIENT
Start: 2020-06-30 | End: 2020-07-02

## 2020-06-29 RX ORDER — MILRINONE LACTATE 0.2 MG/ML
0.38 INJECTION, SOLUTION INTRAVENOUS CONTINUOUS PRN
Status: DISCONTINUED | OUTPATIENT
Start: 2020-06-29 | End: 2020-07-01

## 2020-06-29 RX ORDER — 0.9 % SODIUM CHLORIDE 0.9 %
500 INTRAVENOUS SOLUTION INTRAVENOUS
Status: DISCONTINUED | OUTPATIENT
Start: 2020-06-29 | End: 2020-06-29 | Stop reason: HOSPADM

## 2020-06-29 RX ADMIN — SODIUM CHLORIDE, POTASSIUM CHLORIDE, SODIUM LACTATE AND CALCIUM CHLORIDE: 600; 310; 30; 20 INJECTION, SOLUTION INTRAVENOUS at 06:28

## 2020-06-29 RX ADMIN — ROCURONIUM BROMIDE 30 MG: 10 INJECTION, SOLUTION INTRAVENOUS at 09:28

## 2020-06-29 RX ADMIN — ROCURONIUM BROMIDE 70 MG: 10 INJECTION, SOLUTION INTRAVENOUS at 07:05

## 2020-06-29 RX ADMIN — SODIUM CHLORIDE, SODIUM LACTATE, POTASSIUM CHLORIDE, AND CALCIUM CHLORIDE: 600; 310; 30; 20 INJECTION, SOLUTION INTRAVENOUS at 06:53

## 2020-06-29 RX ADMIN — PHENYLEPHRINE HYDROCHLORIDE 100 MCG: 10 INJECTION INTRAVENOUS at 07:10

## 2020-06-29 RX ADMIN — SODIUM BICARBONATE 50 MEQ: 84 INJECTION, SOLUTION INTRAVENOUS at 16:02

## 2020-06-29 RX ADMIN — VANCOMYCIN HYDROCHLORIDE 1 G: 10 INJECTION, POWDER, LYOPHILIZED, FOR SOLUTION INTRAVENOUS at 07:29

## 2020-06-29 RX ADMIN — VANCOMYCIN HYDROCHLORIDE 1000 MG: 10 INJECTION, POWDER, LYOPHILIZED, FOR SOLUTION INTRAVENOUS at 20:15

## 2020-06-29 RX ADMIN — LIDOCAINE HYDROCHLORIDE 100 MG: 20 INJECTION, SOLUTION INTRAVENOUS at 07:05

## 2020-06-29 RX ADMIN — ROCURONIUM BROMIDE 30 MG: 10 INJECTION, SOLUTION INTRAVENOUS at 08:41

## 2020-06-29 RX ADMIN — ROCURONIUM BROMIDE 30 MG: 10 INJECTION, SOLUTION INTRAVENOUS at 08:16

## 2020-06-29 RX ADMIN — PHENYLEPHRINE HYDROCHLORIDE 100 MCG: 10 INJECTION INTRAVENOUS at 07:13

## 2020-06-29 RX ADMIN — POTASSIUM CHLORIDE 20 MEQ: 29.8 INJECTION, SOLUTION INTRAVENOUS at 16:05

## 2020-06-29 RX ADMIN — FENTANYL CITRATE 250 MCG: 50 INJECTION, SOLUTION INTRAMUSCULAR; INTRAVENOUS at 07:00

## 2020-06-29 RX ADMIN — Medication 10 ML: at 20:22

## 2020-06-29 RX ADMIN — SODIUM CHLORIDE: 9 INJECTION, SOLUTION INTRAVENOUS at 06:53

## 2020-06-29 RX ADMIN — ALBUMIN (HUMAN) 25 G: 12.5 INJECTION, SOLUTION INTRAVENOUS at 12:41

## 2020-06-29 RX ADMIN — CEFAZOLIN 2 G: 10 INJECTION, POWDER, FOR SOLUTION INTRAVENOUS at 15:12

## 2020-06-29 RX ADMIN — PHENYLEPHRINE HYDROCHLORIDE 100 MCG: 10 INJECTION INTRAVENOUS at 07:42

## 2020-06-29 RX ADMIN — POTASSIUM CHLORIDE 20 MEQ: 29.8 INJECTION, SOLUTION INTRAVENOUS at 17:31

## 2020-06-29 RX ADMIN — AMINOCAPROIC ACID 1000 MG: 250 INJECTION, SOLUTION INTRAVENOUS at 07:06

## 2020-06-29 RX ADMIN — MUPIROCIN: 20 OINTMENT TOPICAL at 20:21

## 2020-06-29 RX ADMIN — MIDAZOLAM HYDROCHLORIDE 2 MG: 2 INJECTION, SOLUTION INTRAMUSCULAR; INTRAVENOUS at 10:15

## 2020-06-29 RX ADMIN — PROPOFOL 150 MG: 10 INJECTION, EMULSION INTRAVENOUS at 07:05

## 2020-06-29 RX ADMIN — ALBUTEROL SULFATE 2.5 MG: 2.5 SOLUTION RESPIRATORY (INHALATION) at 20:49

## 2020-06-29 RX ADMIN — ROCURONIUM BROMIDE 10 MG: 10 INJECTION, SOLUTION INTRAVENOUS at 11:36

## 2020-06-29 RX ADMIN — NITROGLYCERIN 10 MCG/MIN: 200 INJECTION, SOLUTION INTRAVENOUS at 11:48

## 2020-06-29 RX ADMIN — CEFAZOLIN SODIUM 2 G: 2 SOLUTION INTRAVENOUS at 07:48

## 2020-06-29 RX ADMIN — PHENYLEPHRINE HYDROCHLORIDE 100 MCG: 10 INJECTION INTRAVENOUS at 07:31

## 2020-06-29 RX ADMIN — CHLORHEXIDINE GLUCONATE 15 ML: 1.2 RINSE ORAL at 13:36

## 2020-06-29 RX ADMIN — ALBUMIN (HUMAN) 250 ML: 0.25 INJECTION, SOLUTION INTRAVENOUS at 11:00

## 2020-06-29 RX ADMIN — MUPIROCIN: 20 OINTMENT TOPICAL at 13:34

## 2020-06-29 RX ADMIN — ARFORMOTEROL TARTRATE 15 MCG: 15 SOLUTION RESPIRATORY (INHALATION) at 20:49

## 2020-06-29 RX ADMIN — PHENYLEPHRINE HYDROCHLORIDE 200 MCG: 10 INJECTION INTRAVENOUS at 07:37

## 2020-06-29 RX ADMIN — MUPIROCIN: 20 OINTMENT TOPICAL at 05:49

## 2020-06-29 RX ADMIN — AMINOCAPROIC ACID 4000 MG: 250 INJECTION, SOLUTION INTRAVENOUS at 07:15

## 2020-06-29 RX ADMIN — BUDESONIDE 250 MCG: 0.25 SUSPENSION RESPIRATORY (INHALATION) at 20:49

## 2020-06-29 RX ADMIN — ROCURONIUM BROMIDE 20 MG: 10 INJECTION, SOLUTION INTRAVENOUS at 10:16

## 2020-06-29 RX ADMIN — SODIUM CHLORIDE 1000 ML: 9 INJECTION, SOLUTION INTRAVENOUS at 14:05

## 2020-06-29 RX ADMIN — FENTANYL CITRATE 250 MCG: 50 INJECTION, SOLUTION INTRAMUSCULAR; INTRAVENOUS at 07:46

## 2020-06-29 RX ADMIN — FENTANYL CITRATE 25 MCG: 50 INJECTION, SOLUTION INTRAMUSCULAR; INTRAVENOUS at 13:48

## 2020-06-29 RX ADMIN — CHLORHEXIDINE GLUCONATE 15 ML: 1.2 RINSE ORAL at 05:49

## 2020-06-29 RX ADMIN — FENTANYL CITRATE 100 MCG: 50 INJECTION, SOLUTION INTRAMUSCULAR; INTRAVENOUS at 09:09

## 2020-06-29 RX ADMIN — PHENYLEPHRINE HYDROCHLORIDE 100 MCG: 10 INJECTION INTRAVENOUS at 08:11

## 2020-06-29 RX ADMIN — FENTANYL CITRATE 250 MCG: 50 INJECTION, SOLUTION INTRAMUSCULAR; INTRAVENOUS at 08:15

## 2020-06-29 RX ADMIN — CHLORHEXIDINE GLUCONATE: 213 SOLUTION TOPICAL at 05:49

## 2020-06-29 RX ADMIN — CHLORHEXIDINE GLUCONATE 15 ML: 1.2 RINSE ORAL at 20:22

## 2020-06-29 RX ADMIN — OXYCODONE HYDROCHLORIDE AND ACETAMINOPHEN 2 TABLET: 5; 325 TABLET ORAL at 18:13

## 2020-06-29 RX ADMIN — SODIUM CHLORIDE, POTASSIUM CHLORIDE, SODIUM LACTATE AND CALCIUM CHLORIDE: 600; 310; 30; 20 INJECTION, SOLUTION INTRAVENOUS at 12:43

## 2020-06-29 RX ADMIN — POTASSIUM CHLORIDE 20 MEQ: 29.8 INJECTION, SOLUTION INTRAVENOUS at 20:15

## 2020-06-29 RX ADMIN — FENTANYL CITRATE 250 MCG: 50 INJECTION, SOLUTION INTRAMUSCULAR; INTRAVENOUS at 07:15

## 2020-06-29 RX ADMIN — PHENYLEPHRINE HYDROCHLORIDE 100 MCG: 10 INJECTION INTRAVENOUS at 07:47

## 2020-06-29 RX ADMIN — DEXTROSE MONOHYDRATE 0.1 MCG/MIN: 5 INJECTION, SOLUTION INTRAVENOUS at 14:07

## 2020-06-29 RX ADMIN — MIDAZOLAM HYDROCHLORIDE 4 MG: 2 INJECTION, SOLUTION INTRAMUSCULAR; INTRAVENOUS at 07:00

## 2020-06-29 RX ADMIN — PROTAMINE SULFATE 350 MG: 10 INJECTION, SOLUTION INTRAVENOUS at 10:59

## 2020-06-29 RX ADMIN — ALPRAZOLAM 0.5 MG: 0.5 TABLET ORAL at 20:21

## 2020-06-29 RX ADMIN — SODIUM CHLORIDE 1.77 UNITS/HR: 900 INJECTION, SOLUTION INTRAVENOUS at 13:24

## 2020-06-29 ASSESSMENT — PULMONARY FUNCTION TESTS
PIF_VALUE: 17
PIF_VALUE: 0
PIF_VALUE: 9
PIF_VALUE: 9
PIF_VALUE: 19
PIF_VALUE: 15
PIF_VALUE: 0
PIF_VALUE: 12
PIF_VALUE: 0
PIF_VALUE: 17
PIF_VALUE: 28
PIF_VALUE: 17
PIF_VALUE: 0
PIF_VALUE: 13
PIF_VALUE: 11
PIF_VALUE: 0
PIF_VALUE: 14
PIF_VALUE: 0
PIF_VALUE: 0
PIF_VALUE: 18
PIF_VALUE: 17
PIF_VALUE: 14
PIF_VALUE: 14
PIF_VALUE: 17
PIF_VALUE: 0
PIF_VALUE: 0
PIF_VALUE: 15
PIF_VALUE: 14
PIF_VALUE: 14
PIF_VALUE: 29
PIF_VALUE: 17
PIF_VALUE: 17
PIF_VALUE: 0
PIF_VALUE: 18
PIF_VALUE: 0
PIF_VALUE: 0
PIF_VALUE: 17
PIF_VALUE: 16
PIF_VALUE: 17
PIF_VALUE: 1
PIF_VALUE: 16
PIF_VALUE: 17
PIF_VALUE: 0
PIF_VALUE: 17
PIF_VALUE: 0
PIF_VALUE: 20
PIF_VALUE: 0
PIF_VALUE: 28
PIF_VALUE: 0
PIF_VALUE: 0
PIF_VALUE: 18
PIF_VALUE: 14
PIF_VALUE: 0
PIF_VALUE: 17
PIF_VALUE: 15
PIF_VALUE: 13
PIF_VALUE: 0
PIF_VALUE: 0
PIF_VALUE: 14
PIF_VALUE: 0
PIF_VALUE: 16
PIF_VALUE: 11
PIF_VALUE: 16
PIF_VALUE: 0
PIF_VALUE: 17
PIF_VALUE: 11
PIF_VALUE: 17
PIF_VALUE: 14
PIF_VALUE: 0
PIF_VALUE: 22
PIF_VALUE: 0
PIF_VALUE: 17
PIF_VALUE: 16
PIF_VALUE: 17
PIF_VALUE: 15
PIF_VALUE: 15
PIF_VALUE: 1
PIF_VALUE: 0
PIF_VALUE: 18
PIF_VALUE: 0
PIF_VALUE: 12
PIF_VALUE: 12
PIF_VALUE: 14
PIF_VALUE: 18
PIF_VALUE: 0
PIF_VALUE: 14
PIF_VALUE: 0
PIF_VALUE: 14
PIF_VALUE: 14
PIF_VALUE: 17
PIF_VALUE: 0
PIF_VALUE: 20
PIF_VALUE: 16
PIF_VALUE: 15
PIF_VALUE: 0
PIF_VALUE: 0
PIF_VALUE: 14
PIF_VALUE: 0
PIF_VALUE: 12
PIF_VALUE: 14
PIF_VALUE: 0
PIF_VALUE: 20
PIF_VALUE: 17
PIF_VALUE: 9
PIF_VALUE: 17
PIF_VALUE: 0
PIF_VALUE: 0
PIF_VALUE: 25
PIF_VALUE: 15
PIF_VALUE: 0
PIF_VALUE: 12
PIF_VALUE: 13
PIF_VALUE: 0
PIF_VALUE: 0
PIF_VALUE: 15
PIF_VALUE: 13
PIF_VALUE: 0
PIF_VALUE: 13
PIF_VALUE: 0
PIF_VALUE: 17
PIF_VALUE: 0
PIF_VALUE: 0
PIF_VALUE: 17
PIF_VALUE: 0
PIF_VALUE: 16
PIF_VALUE: 13
PIF_VALUE: 13
PIF_VALUE: 0
PIF_VALUE: 0
PIF_VALUE: 32
PIF_VALUE: 14
PIF_VALUE: 15
PIF_VALUE: 0
PIF_VALUE: 0
PIF_VALUE: 11
PIF_VALUE: 0
PIF_VALUE: 1
PIF_VALUE: 0
PIF_VALUE: 14
PIF_VALUE: 0
PIF_VALUE: 17
PIF_VALUE: 0
PIF_VALUE: 17
PIF_VALUE: 17
PIF_VALUE: 0
PIF_VALUE: 0
PIF_VALUE: 15
PIF_VALUE: 17
PIF_VALUE: 16
PIF_VALUE: 0
PIF_VALUE: 12
PIF_VALUE: 14
PIF_VALUE: 17
PIF_VALUE: 14
PIF_VALUE: 14
PIF_VALUE: 16
PIF_VALUE: 13
PIF_VALUE: 0
PIF_VALUE: 11
PIF_VALUE: 27
PIF_VALUE: 18
PIF_VALUE: 0
PIF_VALUE: 21
PIF_VALUE: 0
PIF_VALUE: 17
PIF_VALUE: 17
PIF_VALUE: 0
PIF_VALUE: 14
PIF_VALUE: 17
PIF_VALUE: 16
PIF_VALUE: 14
PIF_VALUE: 0
PIF_VALUE: 17
PIF_VALUE: 0
PIF_VALUE: 0
PIF_VALUE: 12
PIF_VALUE: 0
PIF_VALUE: 17
PIF_VALUE: 0
PIF_VALUE: 17
PIF_VALUE: 14
PIF_VALUE: 0
PIF_VALUE: 11
PIF_VALUE: 11
PIF_VALUE: 18
PIF_VALUE: 0
PIF_VALUE: 13
PIF_VALUE: 16
PIF_VALUE: 14
PIF_VALUE: 0
PIF_VALUE: 17
PIF_VALUE: 0
PIF_VALUE: 17
PIF_VALUE: 30
PIF_VALUE: 0
PIF_VALUE: 17
PIF_VALUE: 16
PIF_VALUE: 0
PIF_VALUE: 0
PIF_VALUE: 8
PIF_VALUE: 11
PIF_VALUE: 0
PIF_VALUE: 16
PIF_VALUE: 18
PIF_VALUE: 0
PIF_VALUE: 15
PIF_VALUE: 16
PIF_VALUE: 0
PIF_VALUE: 12
PIF_VALUE: 17
PIF_VALUE: 16
PIF_VALUE: 13
PIF_VALUE: 0
PIF_VALUE: 16
PIF_VALUE: 0
PIF_VALUE: 8
PIF_VALUE: 17
PIF_VALUE: 0
PIF_VALUE: 17
PIF_VALUE: 0
PIF_VALUE: 17
PIF_VALUE: 0
PIF_VALUE: 0
PIF_VALUE: 20
PIF_VALUE: 17
PIF_VALUE: 3
PIF_VALUE: 0
PIF_VALUE: 0
PIF_VALUE: 6
PIF_VALUE: 18
PIF_VALUE: 17
PIF_VALUE: 0
PIF_VALUE: 0
PIF_VALUE: 17
PIF_VALUE: 0
PIF_VALUE: 18
PIF_VALUE: 0
PIF_VALUE: 18
PIF_VALUE: 0
PIF_VALUE: 14
PIF_VALUE: 16
PIF_VALUE: 0
PIF_VALUE: 11
PIF_VALUE: 10
PIF_VALUE: 1
PIF_VALUE: 11
PIF_VALUE: 0
PIF_VALUE: 0
PIF_VALUE: 16
PIF_VALUE: 13
PIF_VALUE: 14
PIF_VALUE: 0
PIF_VALUE: 18
PIF_VALUE: 0
PIF_VALUE: 13
PIF_VALUE: 0
PIF_VALUE: 13
PIF_VALUE: 16
PIF_VALUE: 28
PIF_VALUE: 0
PIF_VALUE: 0
PIF_VALUE: 15
PIF_VALUE: 0
PIF_VALUE: 18
PIF_VALUE: 17
PIF_VALUE: 0
PIF_VALUE: 0
PIF_VALUE: 32
PIF_VALUE: 17
PIF_VALUE: 13
PIF_VALUE: 17
PIF_VALUE: 1
PIF_VALUE: 0
PIF_VALUE: 11
PIF_VALUE: 13
PIF_VALUE: 15
PIF_VALUE: 15
PIF_VALUE: 0
PIF_VALUE: 17
PIF_VALUE: 0
PIF_VALUE: 18
PIF_VALUE: 0
PIF_VALUE: 17
PIF_VALUE: 0
PIF_VALUE: 15
PIF_VALUE: 15
PIF_VALUE: 0
PIF_VALUE: 0
PIF_VALUE: 14
PIF_VALUE: 0
PIF_VALUE: 12
PIF_VALUE: 0
PIF_VALUE: 0
PIF_VALUE: 13
PIF_VALUE: 16
PIF_VALUE: 11
PIF_VALUE: 0
PIF_VALUE: 0
PIF_VALUE: 11
PIF_VALUE: 0
PIF_VALUE: 1

## 2020-06-29 ASSESSMENT — PAIN SCALES - GENERAL
PAINLEVEL_OUTOF10: 0
PAINLEVEL_OUTOF10: 4
PAINLEVEL_OUTOF10: 4
PAINLEVEL_OUTOF10: 3

## 2020-06-29 ASSESSMENT — PAIN DESCRIPTION - DESCRIPTORS: DESCRIPTORS: ACHING

## 2020-06-29 ASSESSMENT — PAIN - FUNCTIONAL ASSESSMENT: PAIN_FUNCTIONAL_ASSESSMENT: 0-10

## 2020-06-29 NOTE — DISCHARGE INSTR - COC
Continuity of Care Form    Patient Name: Batool Ruano   :  1955  MRN:  [id number]    Admit date:  2020  Discharge date:  ***    Code Status Order: Full Code   Advance Directives:   Advance Care Flowsheet Documentation     Date/Time Healthcare Directive Type of Healthcare Directive Copy in [address] Agent's Name Healthcare Agent's Phone Number    20 9544  Yes, patient has an advance directive for healthcare treatment  --  No, copy requested from family --  -- --    20 7816  Yes, patient has an advance directive for healthcare treatment  Durable power of  for health care; Health care treatment directive; Living will  -- --   Ovid Caller --          Admitting Physician:  Moni Ballard MD  PCP: Rhiannon Romo MD    Discharging Nurse: [facility] Unit/Room#: OR/NONE  Discharging Unit Phone Number: ***    Emergency Contact:   Extended Emergency Contact Information  Primary Emergency Contact: Shiv Bryan  Address: [address] [place] [address],[address] [place] [address] Phone: [phone]  Relation: Spouse   needed? No  Secondary Emergency Contact: Leo Ruiz Phone: [phone]  Mobile Phone: [phone]  Relation: Brother/Sister   needed?  No    Past Surgical History:  Past Surgical History:   Procedure Laterality Date    CARDIAC CATHETERIZATION  2020    CARDIOVASCULAR STRESS TEST  2016    normal    CHOLECYSTECTOMY, LAPAROSCOPIC N/A 3/27/2019    w/cholangiogram w/C-arm, performed by Vern Adorno MD at [address] [place] [address]      COLONOSCOPY  2018    Afshan (random bxs)-microscopic colitis    KNEE SURGERY Right     synovectomy    OVARY REMOVAL Left     Partial    TRANSESOPHAGEAL ECHOCARDIOGRAM  2020    Dr. Dean Cantu ENDOSCOPY  2018    Normal    WRIST FRACTURE SURGERY Left        Immunization History:   Immunization History   Administered Date(s) Administered    Influenza 09/30/2014    Influenza Vaccine, unspecified formulation 09/13/2013, 09/30/2014, 09/21/2016, 10/05/2017    Influenza Virus Vaccine 09/30/2015, 10/05/2017    Influenza, Mayra Drier, Recombinant, IM PF (Flublok 18 yrs and older) 09/11/2018    Influenza, Triv, inactivated, subunit, adjuvanted, IM (Fluad 65 yrs and older) 10/09/2019    Pneumococcal Conjugate 13-valent ([id number]) 11/16/2016    Pneumococcal Polysaccharide ([id number]) 02/12/2019    Tdap (Boostrix, Adacel) 12/17/2015    Zoster Live (Zostavax) 07/23/2014    Zoster Recombinant (Shingrix) 02/12/2019, 07/12/2019       Active Problems:  Patient Active Problem List   Diagnosis Code    Hypertension I10    Microscopic colitis K52.839    GERD (gastroesophageal reflux disease) K21.9    Migraine G43.909    Osteoporosis M81.0    Anxiety F41.9    Neck arthritis C6/C7 M47.812    Nephrolithiasis N20.0    Hypothyroidism E03.9    Endometrial hyperplasia N85.00    Chronic nausea R11.0    Mild persistent asthma without complication O68.45    Mitral valve regurgitation I34.0    Fatty liver K76.0    Hypertrophic cardiomyopathy (HCC) I42.2    Multiple thyroid nodules E04.2    Bacteriuria R82.71    S/P coronary angiogram Z98.890    Left ventricular outflow tract obstruction Q24.8    Severe mitral regurgitation I34.0    Preop examination Z01.818       Isolation/Infection:   Isolation          No Isolation        Patient Infection Status     None to display          Nurse Assessment:  Last Vital Signs: BP (!) 112/46 Comment: 133/63 left arm  Pulse 74   Temp 98.6 °F (37 °C) (Oral)   Resp 16   Ht 5' 5\" (1.651 m)   Wt 162 lb 8 oz (73.7 kg)   SpO2 94%   BMI 27.04 kg/m²     Last documented pain score (0-10 scale): Pain Level: 4  Last Weight:   Wt Readings from Last 1 Encounters:   06/29/20 162 lb 8 oz (73.7 kg)     Mental Status:  {IP PT MENTAL STATUS:20030}    IV 6/29/20    Readmission Risk Assessment Score:  Readmission Risk              Risk of Unplanned Readmission:        0           Discharging to Facility/ Agency   · Name: [facility]  · Phone: [phone]  · Fax: [phone]      / signature: Electronically signed by DELL Bernard LSW on 7/2/20 at 10:49 AM EDT    PHYSICIAN SECTION    Prognosis: Good    Condition at Discharge: Stable    Rehab Potential (if transferring to Rehab): Good    Recommended Labs or Other Treatments After Discharge:   MUST  [address] [facility]    Pre-op weight:   162 lbs    Incision/Wound Care:    Midsternal      Chest Tube Site     Legs         Clean with antibacterial soap and water daily. Monitor for signs of infection    Upper Extremity Restrictions (sternal precautions):  No lifting, pushing, pulling over 5 pounds for 8 weeks. Remove chest tubes sutures 10 days post-op, after checking with surgeon's office. Please remove IJ suture if it is still in.   1.  Wash EACH incision daily with separate wash cloth with antibacterial soap and water; pat dry. Do NOT apply anything to incisions - NO LOTIONS, HYDROGEN PEROXIDE, POWDER. 2.  If discharged with dressing on wound, remove dressing and wash daily with antibacterial soap and water. Leave open to air unless draining. 3. ILYA hose on during the day and off at night. Keep legs elevated while sitting, especially if edematous. 4.  Incentive spiropmeter X 10, cough and deep breath every hour while awake. 5.  Pulse Ox checks with each visit for home care and with vital signs for ECF. CALL if pulse ox less than 90%. 6.  Keep activity log (ambulate as directed and bring log to follow up appointment.)  7. Daily weight and record  8. No tub bath. May shower  9. Up in chair for all meals  10. PT/OT - evaluate and treat  11. Follow up to see Dr Annie Mitchell on Wednesday, July 15, 2020 at 9:45 AM  12. Rolling walker    Call the surgeon at [phone] for any for any of the following reasons:  · Changes in incision (increased redness, tenderness, warmth or drainage)  · Call for pain not relieved with pain medication  · Call for temp >101, HR <50 or >110 beats/min, SBP < 90 or >160. · No bowel movement for 3 days  · Daily weights: call if 2 pound weight gain in 24 hours or 5 pound weight gain in 1 week. · Call for persistent diarrhea, nausea, or vomiting. · Pain, tenderness, warmth, or redness in calf  · Call for symptomatic fluttering in chest, irregular pulse, dyspnea  · DEPRESSION: Call Primary Care Physician if feelings of depression and depression persists  · DIABETICS: Call Primary Care Physician for blood sugars >130 consistently       Physician Certification: I certify the above information and transfer of Martin Miller  is necessary for the continuing treatment of the diagnosis listed and that she requires Home Care for less 30 days. Update Admission H&P: No change in H&P    PHYSICIAN SIGNATURE:  Electronically signed by TABITHA Davis/VANESA Catherine MD on 7/1/20 at 11:44 AM EDT

## 2020-06-29 NOTE — H&P
Lili Araujo    [id number]    [facility] Same Day Surgery Update H & P  Department of General Surgery   Surgical Service   Pre-operative History and Physical  Last H & P within the last 30 days. DIAGNOSIS:   Nonrheumatic mitral valve regurgitation [I34.0]  Obstructive hypertrophic cardiomyopathy (HCC) [I42.1]    PROCEDURE:  [place] TCAT MITRAL VALVE REPAIR INITIAL PROSTHESIS [39932] (MITRAL VALVE REPAIR / REPLACEMENT WITH SEPTAL MYECTOMY)      HISTORY OF PRESENT ILLNESS:   Patient with c/o cough was found to have asymmetric septal hypertrophy and severe mitral regurgitation on workup. Covid 19:  Patient denies fever, chills, cough or known exposure to Covid-19. Patient reports they have been quarantined at home since Covid-19 test. However review of her chart shows that she went to her pre-operative physical after Covid test was performed.       Past Medical History:        Diagnosis Date    Adrenal adenoma     left 8 mm - stable - CT 8/13    Anxiety     Arthritis     Asthma     Environmental allergies     GERD (gastroesophageal reflux disease)     HOCM (hypertrophic obstructive cardiomyopathy) (HCC)     HTN (hypertension)     Irritable bowel syndrome     Lung disease     Microscopic colitis     Migraine     Mitral regurgitation     Nephrolithiasis 1/21/15    R side -s/p lithotripsy    Nosebleed     Osteoporosis     DEXA 8/8/13 - scotty lumbar spine    Rash     Restless leg syndrome     TMJ dysfunction     Vitreous detachment     Wrist fracture, bilateral      Past Surgical History:        Procedure Laterality Date    CARDIAC CATHETERIZATION  02/28/2020    CARDIOVASCULAR STRESS TEST  2016    normal    CHOLECYSTECTOMY, LAPAROSCOPIC N/A 3/27/2019    w/cholangiogram w/C-arm, performed by Coty Eastman MD at [address]  7/13    COLONOSCOPY  03/29/2018    Afshan (random bxs)-microscopic colitis    KNEE SURGERY Right 1975    synovectomy    OVARY REMOVAL Left Partial    TRANSESOPHAGEAL ECHOCARDIOGRAM  03/11/2020    Dr. Cherylene Epp  03/2018    Normal    WRIST FRACTURE SURGERY Left 2009     Past Social History:  Social History     Socioeconomic History    Marital status:      Spouse name: None    Number of children: None    Years of education: None    Highest education level: None   Occupational History    None   Social Needs    Financial resource strain: Not hard at all   [place] insecurity     Worry: Never true     Inability: Never true    Transportation needs     Medical: No     Non-medical: No   Tobacco Use    Smoking status: Former Smoker     Packs/day: 1.00     Years: 38.00     Pack years: 38.00     Types: Cigarettes     Start date: 5/1/1972     Last attempt to quit: 5/1/2010     Years since quitting: 10.1    Smokeless tobacco: Never Used   Substance and Sexual Activity    Alcohol use: Not Currently     Alcohol/week: 0.0 standard drinks     Frequency: 4 or more times a week     Drinks per session: 1 or 2     Binge frequency: Never    Drug use: No    Sexual activity: None   Lifestyle    Physical activity     Days per week: None     Minutes per session: None    Stress: None   Relationships    Social connections     Talks on phone: None     Gets together: None     Attends Moravian service: None     Active member of club or organization: None     Attends meetings of clubs or organizations: None     Relationship status: None    Intimate partner violence     Fear of current or ex partner: None     Emotionally abused: None     Physically abused: None     Forced sexual activity: None   Other Topics Concern    None   Social History Narrative    None         Medications Prior to Admission:      Prior to Admission medications    Medication Sig Start Date End Date Taking?  Authorizing Provider   nitrofurantoin (MACRODANTIN) 100 MG capsule Take 1 capsule by mouth 3 times daily 6/24/20  Yes Kennedy Sandoval MD ALPRAZolam (XANAX) 0.5 MG tablet Take 1 tablet by mouth daily as needed for Anxiety. 6/24/20 6/24/21 Yes Evelyne Lima MD   dicyclomine (BENTYL) 20 MG tablet TAKE ONE TABLET BY MOUTH FOUR TIMES A DAY AS NEEDED 6/1/20  Yes Evelyne Lima MD   potassium chloride (KLOR-CON M) 20 MEQ extended release tablet Take 1 tablet by mouth 2 times daily for 10 days 5/27/20 6/29/20 Yes Evelyne Lima MD   levothyroxine (SYNTHROID) 125 MCG tablet TAKE ONE TABLET BY MOUTH DAILY 5/21/20  Yes Evelyne Lima MD   prochlorperazine (COMPAZINE) 10 MG tablet Take one tablet by mouth every 6 hours as needed 5/21/20  Yes Evelyne Lima MD   butalbital-aspirin-caffeine [facility]) -79 MG capsule TAKE ONE CAPSULE BY MOUTH EVERY 4 HOURS AS NEEDED FOR HEADACHES 5/21/20 8/21/20 Yes Evelyne Lima MD   diazePAM (VALIUM) 5 MG tablet Take 1 tablet by mouth 2 times daily as needed for Anxiety. 5/21/20 5/22/21 Yes Evelyne Lima MD   famotidine (PEPCID) 40 MG tablet Take 1 tablet by mouth daily 5/21/20  Yes Evelyne Lima MD   DULoxetine (CYMBALTA) 30 MG extended release capsule TAKE THREE CAPSULES BY MOUTH DAILY 5/21/20  Yes Evelyne Lima MD   omeprazole (PRILOSEC) 40 MG delayed release capsule Take 1 capsule by mouth every evening TAKE ONE CAPSULE BY MOUTH DAILY 5/7/20  Yes Evelyne Lima MD   SYMBICORT 80-4.5 MCG/ACT AERO INHALE TWO PUFFS BY MOUTH TWICE A DAY  Patient taking differently: Inhale 2 puffs into the lungs daily Rinse mouth out with water (without swallowing) after every dose. 2/7/20  Yes Abel Daigle MD   metoprolol tartrate (LOPRESSOR) 25 MG tablet TAKE 1 TABLET TWICE A DAY 11/5/19  Yes Evelyne iLma MD   budesonide (ENTOCORT EC) 3 MG extended release capsule Take 3 mg by mouth every morning  4/18/18  Yes Derrick Huddleston MD   loratadine (CLARITIN) 10 MG tablet Take 10 mg by mouth daily.    Yes Historical Provider, MD   PROAIR  (90 Base) MCG/ACT inhaler INHALE TWO PUFFS BY MOUTH EVERY 4 HOURS AS NEEDED FOR WHEEZING 6/6/20   Kimberly Yun MD   vitamin C (ASCORBIC ACID) 500 MG tablet Take 1,000 mg by mouth daily    Historical Provider, MD   Multiple Vitamins-Minerals (CENTRUM SILVER PO) Take 1 tablet by mouth daily    Historical Provider, MD   Calcium Carbonate-Vitamin D (CALTRATE 600+D PO) Take 1 tablet by mouth 2 times daily    Historical Provider, MD   vitamin D3 (CHOLECALCIFEROL) 10 MCG (400 UNIT) TABS tablet Take 400 Units by mouth daily    Historical Provider, MD   Omega-3 Fatty Acids (FISH OIL) 1200 MG CAPS Take 1 capsule by mouth 2 times daily    Historical Provider, MD   Flaxseed, Linseed, (FLAXSEED OIL PO) Take 1,300 mg by mouth daily    Historical Provider, MD   meloxicam (MOBIC) 15 MG tablet TAKE ONE TABLET BY MOUTH DAILY 11/5/19   Vlad Rhodes MD   Spacer/Aero-Holding Chambers (AEROCHAMBER PLUS-FLOW SIGNAL) MISC Use with inhaler as instructed 2/27/18   Kimberly Yun MD   FIBER PO Take 1 capsule by mouth daily     Historical Provider, MD         Allergies: Tolectin [tolmetin]    PHYSICAL EXAM:      BP (!) 112/46 Comment: 133/63 left arm  Pulse 74   Temp 98.6 °F (37 °C) (Oral)   Resp 16   Ht 5' 5\" (1.651 m)   Wt 162 lb 8 oz (73.7 kg)   SpO2 94%   BMI 27.04 kg/m²      Airway:  Airway patent with no audible stridor    Heart:  regular rate and rhythm, Murmur noted    Lungs:  No increased work of breathing, good air exchange, clear to auscultation bilaterally, no crackles or wheezing    Abdomen:  soft, non-distended, non-tender, no rebound tenderness or guarding, normal active bowel sounds and no masses palpated    ASSESSMENT AND PLAN     Patient is a 76 y.o. female with above specified procedure planned. 1.  Patient seen and focused exam done today- no new changes since last physical exam on 6/24/20    2. Access to ancillary services are available per request of the provider.     Cecile Underwood, TABITHA - CNP     6/29/2020

## 2020-06-29 NOTE — PLAN OF CARE
Problem: Discharge Planning:  Goal: Discharged to appropriate level of care  Description: Discharged to appropriate level of care  Outcome: Ongoing     Problem: Cardiac Output - Decreased:  Goal: Cardiac output within specified parameters  Description: Cardiac output within specified parameters  Outcome: Ongoing     Problem: Infection - Surgical Site:  Goal: Will show no infection signs and symptoms  Description: Will show no infection signs and symptoms  Outcome: Ongoing     Problem: Pain - Acute:  Goal: Pain level will decrease  Description: Pain level will decrease  Outcome: Ongoing     Problem: Venous Thromboembolism:  Goal: Absence of signs or symptoms of impaired coagulation  Description: Absence of signs or symptoms of impaired coagulation  Outcome: Ongoing     Problem: Falls - Risk of:  Goal: Will remain free from falls  Description: Will remain free from falls  Outcome: Ongoing  Goal: Absence of physical injury  Description: Absence of physical injury  Outcome: Ongoing     Problem: Skin Integrity:  Goal: Will show no infection signs and symptoms  Description: Will show no infection signs and symptoms  Outcome: Ongoing  Goal: Absence of new skin breakdown  Description: Absence of new skin breakdown  Outcome: Ongoing

## 2020-06-29 NOTE — CARE COORDINATION
Case Management Assessment           Initial Evaluation         Date / Time of Evaluation: 6/29/2020 2:09 PM  Assessment Completed by: Mihir Heck    Patient is a 75 y/o female admitted with DX of Nonrheumatic mitral valve regurgitation;  Obstructive hypertrophic cardiomyopathy. Patient is resting post surgery. HELDER provided AQ list for [facility]. Patient will need [address] [facility] at d/c.  SW will make appropriate referrals for [facility] and any other d/c needs. Patient Name: Mar Corral     YOB: 1955  Diagnosis: Nonrheumatic mitral valve regurgitation [I34.0]  Obstructive hypertrophic cardiomyopathy ([address] [I42.1]  Hypertrophic obstructive cardiomyopathy with diastolic heart failure ([facility] [I50.30, I42.1]     Date / Time: 6/29/2020  5:04 AM    Patient Admission Status: Inpatient    If patient is discharged prior to next notation, then this note serves as note for discharge by case management. Current PCP: Hansel Mcleod MD  Clinic Patient: No    Chart Reviewed: Yes  Patient/ Family Interviewed: Yes    Initial assessment completed at bedside with: Unable, patient asleep post OR    Hospitalization in the last 30 days: No    Emergency Contacts:  Extended Emergency Contact Information  Primary Emergency Contact: Shiv Bryan  Address: [address] [place] [address],[address] [place] [address] Phone: [phone]  Relation: Spouse   needed? No  Secondary Emergency Contact: Leo Ruiz Phone: [phone]  Mobile Phone: [phone]  Relation: Brother/Sister   needed?  No    Advance Directives:   Code Status: Full Code     Advance Directives (For Healthcare)  Healthcare Directive: Yes, patient has an advance directive for healthcare treatment  Type of Healthcare Directive: Durable power of  for health care, Health care treatment directive, Living will  Copy in Chart: No, copy requested from family  Healthcare Agent's Name:  Zita Brian / Beliefs  Do you have any ethnic, cultural, sacramental, or spiritual Rastafari needs you would like us to be aware of while you are in the hospital?: No      Financial  Payor: Gabriela Arvizu / Plan: MEDICARE PART A AND B / Product Type: *No Product type* /     Pre-cert required for SNF: No    Pharmacy    [facility] [address] [facility], [facility]  [address] [facility]  Phone: [phone] Fax: [phone]    [facility] [address], Nader Aleman [address] [place] [address] [phone] - [phone]  [address]  Phone: [phone] Fax: [address]  [phone] Gen[address] Avitia [phone]  [address] [facility] [address] [address]  Phone: [phone] Fax: [phone]      Potential assistance Purchasing Medications:    Does Patient want to participate in local refill/ meds to beds program?:      Meds To Beds General Rules:  1. Can ONLY be done Monday- Friday between 8:30am-5pm  2. Prescription(s) must be in pharmacy by 3pm to be filled same day  3. Copy of patient's insurance/ prescription drug card and patient face sheet must be sent along with the prescription(s)  4. Cost of Rx cannot be added to hospital bill. If financial assistance is needed, please contact unit  or ;  or  CANNOT provide pharmacy voucher for patients co-pays  5.  Patients can then  the prescription on their way out of the hospital at discharge, or pharmacy can deliver to the bedside if staff is available. (payment due at time of pick-up or delivery - cash, check, or card accepted)     Able to afford home medications/ co-pay costs: Yes    ADLS  Support Systems:  Spouse/Family    HOUSING  Home Environment: Lives with spouse  Steps: N/A    Plans to RETURN to current housing: Yes  Barriers to RETURNING to current housing: [address]  Currently ACTIVE with Home Health Care: Yes  Home Care Agency: TBD        DISCHARGE PLAN:  Disposition: Home with Home Health Care: TBD     Transportation PLAN for discharge: family     Factors facilitating achievement of predicted outcomes: Family support    Barriers to discharge: Decreased endurance and Wound Care    Additional Case Management Notes: Patient plans to return home with     The Plan for Transition of Care is related to the following treatment goals of Nonrheumatic mitral valve regurgitation [I34.0]  Obstructive hypertrophic cardiomyopathy ([address] [I42.1]  Hypertrophic obstructive cardiomyopathy with diastolic heart failure ([address] [I50.30, I42.1]    The Patient and/or patient representative Thelma Muro and her family were provided with a choice of provider and agrees with the discharge plan Yes    Freedom of choice list was provided with basic dialogue that supports the patient's individualized plan of care/goals and shares the quality data associated with the providers.  Yes    Care Transition patient: No    DELL Bernard, [facility]  Case Management Department  Ph: [phone]  Fax: [phone]

## 2020-06-29 NOTE — PROGRESS NOTES
Results for Giovanna Reddy" (MRN [id number]) as of 6/29/2020 13:18   Ref. Range 6/29/2020 13:08   pH, Arterial Latest Ref Range: 7.350 - 7.450  7.359   pCO2, Arterial Latest Ref Range: 35.0 - 45.0 mm Hg 38.6   pO2, Arterial Latest Ref Range: 75.0 - 108.0 mm Hg 235.1 (H)   HCO3, Arterial Latest Ref Range: 21.0 - 29.0 mmol/L 21.8   TCO2 (calc), Art Latest Ref Range: Not Established mmol/L 23   Base Excess, Arterial Latest Ref Range: -3 - 3  -4 (L)   O2 Sat, Arterial Latest Ref Range: 93 - 100 % 100     FiO2 decreased to 80%, PEEP decreased to 5 per Dr. Faby Huizar. PA cath re-calibrated. Now in NSR with HR 97 bpm.   at bedside to visit.

## 2020-06-29 NOTE — H&P
Topics    Alcohol use: Not Currently     Alcohol/week: 0.0 standard drinks     Frequency: 4 or more times a week     Drinks per session: 1 or 2     Binge frequency: Never    Drug use: No     Allergies   Allergen Reactions    Tolectin [Tolmetin] Swelling     Face & Lips     Family History   Problem Relation Age of Onset    Cancer Mother         leukemia    Hypertension Mother     Other Father         CRF    Heart Surgery Father         CABG    Blindness Father     Hypertension Father     Hearing Loss Father      ROS: Not obtainable    PE:  100/60 on exam  General (appearance): Well devel. No distress  Eyes: Anicteric. EOMI. Neck: supple  Ears/Nose/Mouth/Thorat: No cyanosis  CV: RRR. + PLACIDO   Respiratory:  Intubated. GI: abd soft  Skin: Warm, dry. No rashes  Neuro/Psych: Alert and oriented x 3.  Appropriate behavior  Ext:  No c/c. + edema  Pulses:  2+ carotid    Lab Results   Component Value Date    WBC 16.6 (H) 06/29/2020    HGB 10.9 (L) 06/29/2020    HCT 32.5 (L) 06/29/2020    MCV 95.8 06/29/2020     06/29/2020     Lab Results   Component Value Date     06/29/2020    K 4.2 06/29/2020     (H) 06/29/2020    CO2 23 06/29/2020    BUN 8 06/29/2020    CREATININE 0.6 06/29/2020    GLUCOSE 111 (H) 06/29/2020    CALCIUM 7.8 (L) 06/29/2020    PROT 6.7 06/23/2020    LABALBU 4.1 06/23/2020    BILITOT 0.3 06/23/2020    ALKPHOS 62 06/23/2020    AST 48 (H) 06/23/2020    ALT 45 (H) 06/23/2020    LABGLOM >60 06/29/2020    GFRAA >60 06/29/2020    AGRATIO 1.6 06/23/2020    GLOB 2.6 06/23/2020     Lab Results   Component Value Date    INR 1.16 (H) 06/29/2020    INR 1.01 06/23/2020    INR 0.97 05/26/2020    PROTIME 13.5 (H) 06/29/2020    PROTIME 11.7 06/23/2020    PROTIME 11.2 05/26/2020     Lab Results   Component Value Date    CHOL 195 06/29/2020    CHOL 191 06/23/2020    CHOL 227 (H) 02/06/2020     Lab Results   Component Value Date    TRIG 91 06/29/2020    TRIG 75 06/23/2020    TRIG 86 02/06/2020 Lab Results   Component Value Date    HDL 57 06/29/2020    HDL 57 06/23/2020    HDL 77 (H) 02/06/2020     Lab Results   Component Value Date    LDLCALC 120 (H) 06/29/2020    LDLCALC 119 (H) 06/23/2020    LDLCALC 133 (H) 02/06/2020     Lab Results   Component Value Date    LABVLDL 18 06/29/2020    LABVLDL 15 06/23/2020    LABVLDL 17 02/06/2020     No results found for: CHOLHDLRATIO    2/2020 LHC:  Angiographic Findings:  Right dominant system  Left Main:  Normal  Left Anterior Descending:  Normal  Circumflex:  Normal  Right Coronary:  Normal  Left Ventriculogram:  Not performed. Hemodynamics (mm Hg):  Apical Left Ventricular Pressure: 222/1, 11  LVOT Left ventricular pressure: 120/7, 22  Central Aortic Pressure:  115/65 (83)    3/11/2020 CORRIE: EF 55-60%. Severe asymmetric hypertrpohy of the basal septum. Moderate MR.    2/2020 CMRI (): HCM. Mod HK of basal anterior and AS segments. Pap muscles displaced to the apex and not well organized. Rebeca flor enhancement present. + SITA. Severe MR.    12/2019 Echo: EF 65-70%. LVOT grd of 103 mm Hg. Grade 2 DD. Severe MR. Mild TR.    76 y.o. now s/p myecomty and MV replacement (25 mm Mosaic). Issues:  -HCM s/p myectomy  -Severe MR s/p replacement  -Hypercholesterolemia    Recs:  -Cont post-op care  -Pulm toilet  -BB when tolerates low dose for AF prevention  -ASA and statin when taking po  -Will follow    Sameer Degroot MD, [facility] [place], [facility]

## 2020-06-29 NOTE — ANESTHESIA POSTPROCEDURE EVALUATION
Department of Anesthesiology  Postprocedure Note    Patient: Didier Gallegos  MRN: [id number]  YOB: 1955  Date of evaluation: 6/29/2020  Time:  12:29 PM     Procedure Summary     Date:  06/29/20 Room / Location:  [address] [place] [address] [address] [place] [address] / [facility]    Anesthesia Start:  0312 Anesthesia Stop:      Procedure:  CORRIE; TCPB; resection of hypertrophic muscle from septal LVOT; miital valve replacement w/ 25mm Medtronic Mosaic bovine pericardial bioprosthetic valve; TCPB; ICNB x 5 levels bilatereally (N/A Chest) Diagnosis:       Nonrheumatic mitral valve regurgitation      Obstructive hypertrophic cardiomyopathy (HCC)      (Severe mitral valve regurgitation [I34.0] Obstructive hypertrophic cardiomyopathy ([address] [I42.1])    Surgeon:  Anton Jiménez MD Responsible Provider:  Angelika Leonard DO    Anesthesia Type:  general ASA Status:  4          Anesthesia Type: general    Eleon Phase I: Eleno Score: 10    Eleno Phase II:      Last vitals: Reviewed and per EMR flowsheets.        Anesthesia Post Evaluation    Patient location during evaluation: ICU  Patient participation: complete - patient cannot participate  Level of consciousness: sedated and ventilated  Pain score: 0  Airway patency: patent  Nausea & Vomiting: no nausea and no vomiting  Complications: no  Cardiovascular status: blood pressure returned to baseline and hemodynamically stable  Respiratory status: acceptable, intubated and ventilator  Hydration status: stable  Comments: Report given to rn / pacer dep

## 2020-06-29 NOTE — ANESTHESIA PROCEDURE NOTES
Procedure Performed: CORRIE       Start Time:  6/29/2020 7:30 AM       End Time:   6/29/2020 8:05 AM    Preanesthesia Checklist:  Patient identified, IV assessed, risks and benefits discussed, monitors and equipment assessed, procedure being performed at surgeon's request and anesthesia consent obtained. General Procedure Information  Diagnostic Indications for Echo:  hemodynamic monitoring and assessment of valve function  Physician Requesting Echo: Juan Carlos Rose MD  Location performed:  OR  Intubated  Heart visualized  Probe Type:  3D and biplane  Modalities:  Color flow mapping    Echocardiographic and Doppler Measurements    Ventricles    Right Ventricle:  Cavity size normal.  Thrombus not present.   Global function normal.    Left Ventricle:  Cavity size normal.    Other Ventricular Findings:       Severe MR    Ventricular Regional Function:  1- Basal Anteroseptal:  normal  2- Basal Anterior:  normal  3- Basal Anterolateral:  normal  4- Basal Inferolateral:  normal  5- Basal Inferior:  normal  6- Basal Inferoseptal:  normal  7- Mid Anteroseptal:  normal  8- Mid Anterior:  normal  9- Mid Anterolateral:  normal  10- Mid Inferolateral:  normal  11- Mid Inferior:  normal  12- Mid Inferoseptal:  normal  13- Apical Anterior:  normal  14- Apical Lateral:  normal  15- Apical Inferior:  normal  16- Apical Septal:  normal  17- Porter:  normal

## 2020-06-29 NOTE — PROGRESS NOTES
Patient has been successfully weaned from Mechanical Ventilation. RSBI before extubation was 49 with EtCO2 of 29 and SpO2 of 94 on 40% FiO2. Patient extubated and placed on 6 liters/min via high flow cannula. Post extubation SpO2 is 94% with HR  88 bpm and RR 24 breaths/min. Patient had moderate cough that was non-productive. Extubation Well tolerated by patient. no stridor present.

## 2020-06-29 NOTE — BRIEF OP NOTE
Pre-op Dx:hypertrophic obstrutive cardiomyopathy; mitral valve regurgitation; diastolic heart failure      Post-op Dx:same; rheumatic mitral valve disease; acute blood loss anemia      Procedure:CORRIE; TCPB; resection of hypertrophic muscle from septal LVOT; miital valve replacement w/ 25mm Medtronic Mosaic bovine pericardial bioprosthetic valve; TCPB; ICNB x 5 levels bilatereally      Anesthesia:General endotracheal anesthesia      Surgeon/Assistants:Shavonne      Specimens:LVOT septal muscle      Complications: none      Findings:septal hypertrophy as expected; 4+ MR pre-op found to be secondary to rheumativ mitral valve disease; preserved EF post op; zero perivalvular leak; unable to see LVOT post repair because of shadowing from MV prosthesis; thrill in ascending aorta pre-op completely gone post op

## 2020-06-29 NOTE — PROGRESS NOTES
RESPIRATORY THERAPY ASSESSMENT    Name:  Lor Benson  Record Number:  [id number]  Age: 76 y.o. Gender: female  : 1955  Today's Date:  2020  Room:  8016/6807-52    Assessment     Is the patient being admitted for a COPD or Asthma exacerbation? No   (If yes the patient will be seen every 4 hours for the first 24 hours and then reassessed)    Patient Admission Diagnosis      Allergies  Allergies   Allergen Reactions    Tolectin [Tolmetin] Swelling     Face & Lips       Minimum Predicted Vital Capacity:     918           Actual Vital Capacity:      400              Pulmonary History:No history  Home Oxygen Therapy:  room air  Home Respiratory Therapy:Budesonide/Formoterol    Current Respiratory Therapy:  pulmicort BID, brovona BID, 02, albuterol Q6prn          Respiratory Severity Index(RSI)   Patients with orders for inhalation medications, oxygen, or any therapeutic treatment modality will be placed on Respiratory Protocol. They will be assessed with the first treatment and at least every 72 hours thereafter. The following severity scale will be used to determine frequency of treatment intervention.     Smoking History: Pulmonary Disease or Smoking History, Greater than 15 pack year = 2    Social History  Social History     Tobacco Use    Smoking status: Former Smoker     Packs/day: 1.00     Years: 38.00     Pack years: 38.00     Types: Cigarettes     Start date: 1972     Last attempt to quit: 2010     Years since quitting: 10.1    Smokeless tobacco: Never Used   Substance Use Topics    Alcohol use: Not Currently     Alcohol/week: 0.0 standard drinks     Frequency: 4 or more times a week     Drinks per session: 1 or 2     Binge frequency: Never    Drug use: No       Recent Surgical History: Thoracic Mitral valve replacement = 4  Past Surgical History  Past Surgical History:   Procedure Laterality Date    CARDIAC CATHETERIZATION  2020    CARDIOVASCULAR STRESS TEST 2016    normal    CHOLECYSTECTOMY, LAPAROSCOPIC N/A 3/27/2019    w/cholangiogram w/C-arm, performed by Ruthann Councilman, MD at [address]  7/13    COLONOSCOPY  03/29/2018    Afshan (random bxs)-microscopic colitis    KNEE SURGERY Right 1975    synovectomy    OVARY REMOVAL Left     Partial    TRANSESOPHAGEAL ECHOCARDIOGRAM  03/11/2020    Dr. Clarissa Lu  03/2018    Normal    WRIST FRACTURE SURGERY Left 2009       Level of Consciousness: Alert, Oriented, and Cooperative = 0    Level of Activity: Bedridden, unresponsive or quadriplegic = 4    Respiratory Pattern: Regular Pattern; RR 8-20 = 0    Breath Sounds: Diminshed bilaterally and/or crackles = 2    Sputum   ,  ,    Cough: Strong, spontaneous, non-productive = 0    Vital Signs   BP (!) 69/32   Pulse 97   Temp 100 °F (37.8 °C) (Core)   Resp 17   Ht 5' 5\" (1.651 m)   Wt 162 lb 8 oz (73.7 kg)   SpO2 100%   BMI 27.04 kg/m²   SPO2 (COPD values may differ): 86-87% on room air or greater than 92% on FiO2 35- 50% = 3    Peak Flow (asthma only): not applicable = 0    RSI: 98-03 = Q4WA (every four hours while awake) and Q4hrs PRN        Plan       Goals: medication delivery, mobilize retained secretions, volume expansion and improve oxygenation    Patient/caregiver was educated on the proper method of use for Respiratory Care Devices:  Yes      Level of patient/caregiver understanding able to:   ? Verbalize understanding   ? Demonstrate understanding       ? Teach back        ? Needs reinforcement       ? No available caregiver               ? Other:     Response to education:  Good     Is patient being placed on Home Treatment Regimen? No     Does the patient have everything they need prior to discharge? NA     Comments: pt assessed, chart reviewed, no distress noted.  Pt extubated to 6lpm high flow cannula    Plan of Care: albuterol HHN Q4w/a, pulmicort BID, Brovona BID    Electronically signed by Albaro Jain DANIELE Crews on 6/29/2020 at 1:23 PM    Respiratory Protocol Guidelines     1. Assessment and treatment by Respiratory Therapy will be initiated for medication and therapeutic interventions upon initiation of aerosolized medication. 2. Physician will be contacted for respiratory rate (RR) greater than 35 breaths per minute. Therapy will be held for heart rate (HR) greater than 140 beats per minute, pending direction from physician. 3. Bronchodilators will be administered via Metered Dose Inhaler (MDI) with spacer when the following criteria are met:  a. Alert and cooperative     b. HR < 140 bpm  c. RR < 30 bpm                d. Can demonstrate a 2-3 second inspiratory hold  4. Bronchodilators will be administered via Hand Held Nebulizer [facility]) to patients when ANY of the following criteria are met  a. Incognizant or uncooperative          b. Patients treated with HHN at Home        c. Unable to demonstrate proper use of MDI with spacer     d. RR > 30 bpm   5. Bronchodilators will be delivered via Metered Dose Inhaler (MDI), HHN, Aerogen to intubated patients on mechanical ventilation. 6. Inhalation medication orders will be delivered and/or substituted as outlined below. Aerosolized Medications Ordering and Administration Guidelines:    1. All Medications will be ordered by a physician, and their frequency and/or modality will be adjusted as defined by the patients Respiratory Severity Index (RSI) score. 2. If the patient does not have documented COPD, consider discontinuing anticholinergics when RSI is less than 9.  3. If the bronchospasm worsens (increased RSI), then the bronchodilator frequency can be increased to a maximum of every 4 hours. If greater than every 4 hours is required, the physician will be contacted. 4. If the bronchospasm improves, the frequency of the bronchodilator can be decreased, based on the patient's RSI, but not less than home treatment regimen frequency.   5. Bronchodilator(s)

## 2020-06-30 LAB
ANION GAP SERPL CALCULATED.3IONS-SCNC: 6 MMOL/L (ref 3–16)
BUN BLDV-MCNC: 7 MG/DL (ref 7–20)
CALCIUM IONIZED: 1.31 MMOL/L (ref 1.12–1.32)
CALCIUM IONIZED: 1.32 MMOL/L (ref 1.12–1.32)
CALCIUM SERPL-MCNC: 9.1 MG/DL (ref 8.3–10.6)
CHLORIDE BLD-SCNC: 106 MMOL/L (ref 99–110)
CO2: 23 MMOL/L (ref 21–32)
CREAT SERPL-MCNC: <0.5 MG/DL (ref 0.6–1.2)
GFR AFRICAN AMERICAN: >60
GFR NON-AFRICAN AMERICAN: >60
GLUCOSE BLD-MCNC: 101 MG/DL (ref 70–99)
GLUCOSE BLD-MCNC: 102 MG/DL (ref 70–99)
GLUCOSE BLD-MCNC: 103 MG/DL (ref 70–99)
GLUCOSE BLD-MCNC: 103 MG/DL (ref 70–99)
GLUCOSE BLD-MCNC: 104 MG/DL (ref 70–99)
GLUCOSE BLD-MCNC: 104 MG/DL (ref 70–99)
GLUCOSE BLD-MCNC: 109 MG/DL (ref 70–99)
GLUCOSE BLD-MCNC: 112 MG/DL (ref 70–99)
GLUCOSE BLD-MCNC: 113 MG/DL (ref 70–99)
GLUCOSE BLD-MCNC: 118 MG/DL (ref 70–99)
GLUCOSE BLD-MCNC: 121 MG/DL (ref 70–99)
GLUCOSE BLD-MCNC: 146 MG/DL (ref 70–99)
GLUCOSE BLD-MCNC: 167 MG/DL (ref 70–99)
GLUCOSE BLD-MCNC: 97 MG/DL (ref 70–99)
HCT VFR BLD CALC: 30 % (ref 36–48)
HEMOGLOBIN: 10.1 G/DL (ref 12–16)
INR BLD: 1.19 (ref 0.86–1.14)
MAGNESIUM: 2.3 MG/DL (ref 1.8–2.4)
MCH RBC QN AUTO: 32.2 PG (ref 26–34)
MCHC RBC AUTO-ENTMCNC: 33.6 G/DL (ref 31–36)
MCV RBC AUTO: 96 FL (ref 80–100)
PDW BLD-RTO: 12.7 % (ref 12.4–15.4)
PERFORMED ON: ABNORMAL
PERFORMED ON: NORMAL
PLATELET # BLD: 128 K/UL (ref 135–450)
PMV BLD AUTO: 8.2 FL (ref 5–10.5)
POC POTASSIUM: 4.1 MMOL/L (ref 3.5–5.1)
POC POTASSIUM: 4.3 MMOL/L (ref 3.5–5.1)
POC SAMPLE TYPE: ABNORMAL
POC SAMPLE TYPE: ABNORMAL
POTASSIUM SERPL-SCNC: 4.2 MMOL/L (ref 3.5–5.1)
PROTHROMBIN TIME: 13.8 SEC (ref 10–13.2)
RBC # BLD: 3.13 M/UL (ref 4–5.2)
SODIUM BLD-SCNC: 135 MMOL/L (ref 136–145)
WBC # BLD: 12.5 K/UL (ref 4–11)

## 2020-06-30 PROCEDURE — 6370000000 HC RX 637 (ALT 250 FOR IP): Performed by: THORACIC SURGERY (CARDIOTHORACIC VASCULAR SURGERY)

## 2020-06-30 PROCEDURE — 82330 ASSAY OF CALCIUM: CPT

## 2020-06-30 PROCEDURE — 97530 THERAPEUTIC ACTIVITIES: CPT

## 2020-06-30 PROCEDURE — 82947 ASSAY GLUCOSE BLOOD QUANT: CPT

## 2020-06-30 PROCEDURE — 85610 PROTHROMBIN TIME: CPT

## 2020-06-30 PROCEDURE — 2700000000 HC OXYGEN THERAPY PER DAY

## 2020-06-30 PROCEDURE — 94150 VITAL CAPACITY TEST: CPT

## 2020-06-30 PROCEDURE — 99232 SBSQ HOSP IP/OBS MODERATE 35: CPT | Performed by: NURSE PRACTITIONER

## 2020-06-30 PROCEDURE — 6370000000 HC RX 637 (ALT 250 FOR IP): Performed by: CLINICAL NURSE SPECIALIST

## 2020-06-30 PROCEDURE — 97116 GAIT TRAINING THERAPY: CPT

## 2020-06-30 PROCEDURE — 80048 BASIC METABOLIC PNL TOTAL CA: CPT

## 2020-06-30 PROCEDURE — 97535 SELF CARE MNGMENT TRAINING: CPT

## 2020-06-30 PROCEDURE — 94640 AIRWAY INHALATION TREATMENT: CPT

## 2020-06-30 PROCEDURE — 84132 ASSAY OF SERUM POTASSIUM: CPT

## 2020-06-30 PROCEDURE — 83735 ASSAY OF MAGNESIUM: CPT

## 2020-06-30 PROCEDURE — 37799 UNLISTED PX VASCULAR SURGERY: CPT

## 2020-06-30 PROCEDURE — 97166 OT EVAL MOD COMPLEX 45 MIN: CPT

## 2020-06-30 PROCEDURE — 97161 PT EVAL LOW COMPLEX 20 MIN: CPT

## 2020-06-30 PROCEDURE — 2000000000 HC ICU R&B

## 2020-06-30 PROCEDURE — 6360000002 HC RX W HCPCS: Performed by: THORACIC SURGERY (CARDIOTHORACIC VASCULAR SURGERY)

## 2020-06-30 PROCEDURE — 94761 N-INVAS EAR/PLS OXIMETRY MLT: CPT

## 2020-06-30 PROCEDURE — 85027 COMPLETE CBC AUTOMATED: CPT

## 2020-06-30 PROCEDURE — 2580000003 HC RX 258: Performed by: THORACIC SURGERY (CARDIOTHORACIC VASCULAR SURGERY)

## 2020-06-30 RX ADMIN — BUDESONIDE 250 MCG: 0.25 SUSPENSION RESPIRATORY (INHALATION) at 21:40

## 2020-06-30 RX ADMIN — OXYCODONE HYDROCHLORIDE AND ACETAMINOPHEN 2 TABLET: 5; 325 TABLET ORAL at 19:02

## 2020-06-30 RX ADMIN — ALBUTEROL SULFATE 2.5 MG: 2.5 SOLUTION RESPIRATORY (INHALATION) at 21:40

## 2020-06-30 RX ADMIN — POTASSIUM CHLORIDE 10 MEQ: 750 TABLET, EXTENDED RELEASE ORAL at 11:17

## 2020-06-30 RX ADMIN — ALPRAZOLAM 0.5 MG: 0.5 TABLET ORAL at 19:02

## 2020-06-30 RX ADMIN — CHLORHEXIDINE GLUCONATE 15 ML: 1.2 RINSE ORAL at 21:17

## 2020-06-30 RX ADMIN — LISINOPRIL 2.5 MG: 2.5 TABLET ORAL at 11:17

## 2020-06-30 RX ADMIN — ARFORMOTEROL TARTRATE 15 MCG: 15 SOLUTION RESPIRATORY (INHALATION) at 21:40

## 2020-06-30 RX ADMIN — ASPIRIN 325 MG: 325 TABLET, COATED ORAL at 09:23

## 2020-06-30 RX ADMIN — FENTANYL CITRATE 25 MCG: 50 INJECTION, SOLUTION INTRAMUSCULAR; INTRAVENOUS at 04:28

## 2020-06-30 RX ADMIN — FENTANYL CITRATE 25 MCG: 50 INJECTION, SOLUTION INTRAMUSCULAR; INTRAVENOUS at 11:17

## 2020-06-30 RX ADMIN — MUPIROCIN: 20 OINTMENT TOPICAL at 09:25

## 2020-06-30 RX ADMIN — INSULIN LISPRO 2 UNITS: 100 INJECTION, SOLUTION INTRAVENOUS; SUBCUTANEOUS at 21:57

## 2020-06-30 RX ADMIN — Medication 400 MG: at 09:23

## 2020-06-30 RX ADMIN — POTASSIUM CHLORIDE 10 MEQ: 750 TABLET, EXTENDED RELEASE ORAL at 17:47

## 2020-06-30 RX ADMIN — POTASSIUM CHLORIDE 20 MEQ: 29.8 INJECTION, SOLUTION INTRAVENOUS at 01:12

## 2020-06-30 RX ADMIN — CEFAZOLIN 2 G: 10 INJECTION, POWDER, FOR SOLUTION INTRAVENOUS at 09:20

## 2020-06-30 RX ADMIN — VANCOMYCIN HYDROCHLORIDE 1000 MG: 10 INJECTION, POWDER, LYOPHILIZED, FOR SOLUTION INTRAVENOUS at 19:23

## 2020-06-30 RX ADMIN — METOPROLOL TARTRATE 12.5 MG: 25 TABLET, FILM COATED ORAL at 09:21

## 2020-06-30 RX ADMIN — FENTANYL CITRATE 25 MCG: 50 INJECTION, SOLUTION INTRAMUSCULAR; INTRAVENOUS at 00:21

## 2020-06-30 RX ADMIN — Medication 10 ML: at 21:17

## 2020-06-30 RX ADMIN — ATORVASTATIN CALCIUM 40 MG: 40 TABLET, FILM COATED ORAL at 21:16

## 2020-06-30 RX ADMIN — CEFAZOLIN 2 G: 10 INJECTION, POWDER, FOR SOLUTION INTRAVENOUS at 23:31

## 2020-06-30 RX ADMIN — METOPROLOL TARTRATE 12.5 MG: 25 TABLET, FILM COATED ORAL at 21:16

## 2020-06-30 RX ADMIN — FENTANYL CITRATE 25 MCG: 50 INJECTION, SOLUTION INTRAMUSCULAR; INTRAVENOUS at 07:00

## 2020-06-30 RX ADMIN — ALBUTEROL SULFATE 2.5 MG: 2.5 SOLUTION RESPIRATORY (INHALATION) at 13:06

## 2020-06-30 RX ADMIN — CLOPIDOGREL BISULFATE 75 MG: 75 TABLET ORAL at 09:21

## 2020-06-30 RX ADMIN — Medication 10 ML: at 09:24

## 2020-06-30 RX ADMIN — OXYCODONE HYDROCHLORIDE AND ACETAMINOPHEN 2 TABLET: 5; 325 TABLET ORAL at 14:43

## 2020-06-30 RX ADMIN — ALBUTEROL SULFATE 2.5 MG: 2.5 SOLUTION RESPIRATORY (INHALATION) at 17:21

## 2020-06-30 RX ADMIN — ALBUTEROL SULFATE 2.5 MG: 2.5 SOLUTION RESPIRATORY (INHALATION) at 09:23

## 2020-06-30 RX ADMIN — CEFAZOLIN 2 G: 10 INJECTION, POWDER, FOR SOLUTION INTRAVENOUS at 00:19

## 2020-06-30 RX ADMIN — FUROSEMIDE 40 MG: 10 INJECTION, SOLUTION INTRAMUSCULAR; INTRAVENOUS at 17:47

## 2020-06-30 RX ADMIN — VANCOMYCIN HYDROCHLORIDE 1000 MG: 10 INJECTION, POWDER, LYOPHILIZED, FOR SOLUTION INTRAVENOUS at 09:20

## 2020-06-30 RX ADMIN — FUROSEMIDE 40 MG: 10 INJECTION, SOLUTION INTRAMUSCULAR; INTRAVENOUS at 09:20

## 2020-06-30 RX ADMIN — LEVOTHYROXINE SODIUM 125 MCG: 125 TABLET ORAL at 09:23

## 2020-06-30 RX ADMIN — POLYETHYLENE GLYCOL 3350 17 G: 17 POWDER, FOR SOLUTION ORAL at 09:21

## 2020-06-30 RX ADMIN — CEFAZOLIN 2 G: 10 INJECTION, POWDER, FOR SOLUTION INTRAVENOUS at 15:45

## 2020-06-30 RX ADMIN — ARFORMOTEROL TARTRATE 15 MCG: 15 SOLUTION RESPIRATORY (INHALATION) at 09:23

## 2020-06-30 RX ADMIN — OXYCODONE HYDROCHLORIDE AND ACETAMINOPHEN 2 TABLET: 5; 325 TABLET ORAL at 08:28

## 2020-06-30 RX ADMIN — MUPIROCIN: 20 OINTMENT TOPICAL at 21:17

## 2020-06-30 RX ADMIN — Medication 400 MG: at 21:16

## 2020-06-30 RX ADMIN — BUDESONIDE 250 MCG: 0.25 SUSPENSION RESPIRATORY (INHALATION) at 09:22

## 2020-06-30 RX ADMIN — INSULIN GLARGINE 9 UNITS: 100 INJECTION, SOLUTION SUBCUTANEOUS at 21:57

## 2020-06-30 RX ADMIN — POTASSIUM CHLORIDE 10 MEQ: 750 TABLET, EXTENDED RELEASE ORAL at 09:22

## 2020-06-30 RX ADMIN — FENTANYL CITRATE 25 MCG: 50 INJECTION, SOLUTION INTRAMUSCULAR; INTRAVENOUS at 06:00

## 2020-06-30 ASSESSMENT — PAIN - FUNCTIONAL ASSESSMENT
PAIN_FUNCTIONAL_ASSESSMENT: ACTIVITIES ARE NOT PREVENTED

## 2020-06-30 ASSESSMENT — PAIN SCALES - GENERAL
PAINLEVEL_OUTOF10: 5
PAINLEVEL_OUTOF10: 3
PAINLEVEL_OUTOF10: 7
PAINLEVEL_OUTOF10: 5
PAINLEVEL_OUTOF10: 5
PAINLEVEL_OUTOF10: 8
PAINLEVEL_OUTOF10: 5
PAINLEVEL_OUTOF10: 6
PAINLEVEL_OUTOF10: 5
PAINLEVEL_OUTOF10: 6
PAINLEVEL_OUTOF10: 0

## 2020-06-30 ASSESSMENT — PAIN DESCRIPTION - LOCATION
LOCATION: STERNUM

## 2020-06-30 ASSESSMENT — PAIN DESCRIPTION - FREQUENCY
FREQUENCY: CONTINUOUS

## 2020-06-30 ASSESSMENT — PAIN DESCRIPTION - PROGRESSION
CLINICAL_PROGRESSION: NOT CHANGED

## 2020-06-30 ASSESSMENT — PAIN DESCRIPTION - PAIN TYPE
TYPE: SURGICAL PAIN

## 2020-06-30 ASSESSMENT — PAIN DESCRIPTION - ONSET
ONSET: GRADUAL

## 2020-06-30 ASSESSMENT — PAIN DESCRIPTION - DESCRIPTORS
DESCRIPTORS: ACHING;DISCOMFORT

## 2020-06-30 NOTE — PLAN OF CARE
Problem: Discharge Planning:  Goal: Discharged to appropriate level of care  Description: Discharged to appropriate level of care  Outcome: Ongoing     Problem: Cardiac Output - Decreased:  Goal: Cardiac output within specified parameters  Description: Cardiac output within specified parameters  Outcome: Ongoing     Problem: Infection - Surgical Site:  Goal: Will show no infection signs and symptoms  Description: Will show no infection signs and symptoms  Outcome: Ongoing     Problem: Pain - Acute:  Goal: Pain level will decrease  Description: Pain level will decrease  6/30/2020 1838 by Eddie Connor RN  Outcome: Ongoing  6/30/2020 0734 by Francesca Prakash RN  Outcome: Ongoing  Note: Reported intermittent surgical pain, stated satisfactory relief with fentanyl. Problem: Venous Thromboembolism:  Goal: Absence of signs or symptoms of impaired coagulation  Description: Absence of signs or symptoms of impaired coagulation  Outcome: Ongoing     Problem: Falls - Risk of:  Goal: Will remain free from falls  Description: Will remain free from falls  6/30/2020 1838 by Eddie Connor RN  Outcome: Ongoing  6/30/2020 0734 by Francesca Prakash RN  Outcome: Ongoing  Note: Fall risk protocol in place: Bed alarm on, fall risk bracelet applied, yellow indicator in hallway on, non-skid footwear in use. Patient/family educated on fall risk protocol, instructed to call for assistance when needed.   Goal: Absence of physical injury  Description: Absence of physical injury  Outcome: Ongoing     Problem: Skin Integrity:  Goal: Will show no infection signs and symptoms  Description: Will show no infection signs and symptoms  Outcome: Ongoing  Goal: Absence of new skin breakdown  Description: Absence of new skin breakdown  6/30/2020 1838 by Eddie Connor RN  Outcome: Ongoing  6/30/2020 0734 by Francesca Prakash RN  Outcome: Ongoing  Note: Patient unable to turn self adequately in bed, so pillow supports in use with repositioning every two hours. Sacral Heart Mepilex in place to protect, skin intact underneath. Problem: Pain:  Goal: Pain level will decrease  Description: Pain level will decrease  6/30/2020 1838 by Lucy Grijalva RN  Outcome: Ongoing  6/30/2020 0734 by Hao Denny RN  Outcome: Ongoing  Note: Reported intermittent surgical pain, stated satisfactory relief with fentanyl.    Goal: Control of acute pain  Description: Control of acute pain  Outcome: Ongoing  Goal: Control of chronic pain  Description: Control of chronic pain  Outcome: Ongoing

## 2020-06-30 NOTE — PROGRESS NOTES
Occupational Therapy   Occupational Therapy Initial Assessment and Treatment Note   Date: 2020   Patient Name: Emerita London  MRN: [id number]     : 1955    Date of Service: 2020    Discharge Recommendations:Mindy Suarez scored a 16/24 on the AM-PAC ADL Inpatient form. Current research shows that an AM-PAC score of 18 or greater is typically associated with a discharge to the patient's home setting. Based on the patient's AM-PAC score, and their current ADL deficits, it is recommended that the patient have 2-3 sessions per week of Occupational Therapy at d/c to increase the patient's independence. At this time, this patient demonstrates the endurance and safety to discharge home with [address] [place] [address]. Please see assessment section for further patient specific details. If patient discharges prior to next session this note will serve as a discharge summary. Please see below for the latest assessment towards goals. OT Equipment Recommendations  Equipment Needed: No    Assessment   Performance deficits / Impairments: Decreased functional mobility ; Decreased ADL status; Decreased safe awareness;Decreased endurance;Decreased cognition  Assessment: Pt from home with spouse. Pt demo needing Min /CGA for functional transfers and Mod/Max A with LE ADls. Pt limited by poor endurance and post op confusion. Anticipate good progress toward goals. Pt would benefit from ongoing OT at d/c. Pt plans for home with spouse at d/c. Treatment Diagnosis: impaired ADLs/ functional transfers / decreased endurance 2/2 MVR sx   Prognosis: Fair;Good  Decision Making: Medium Complexity  OT Education: OT Role;Plan of Care  Patient Education: verb partial understanding - reinforce as needed   REQUIRES OT FOLLOW UP: Yes  Activity Tolerance  Activity Tolerance: Patient limited by fatigue  Activity Tolerance: Pt on 6L o2 -- Sats > 92%.   Pt fatigues easily   Safety Devices  Safety Devices in place: Yes  Type of devices: Call light within reach; Chair alarm in place; Left in chair;Nurse notified           Patient Diagnosis(es): Diagnoses of Nonrheumatic mitral valve regurgitation and Obstructive hypertrophic cardiomyopathy ([address] were pertinent to this visit. has a past medical history of Adrenal adenoma, Anxiety, Arthritis, Asthma, Chronic nausea, Environmental allergies, GERD (gastroesophageal reflux disease), HOCM (hypertrophic obstructive cardiomyopathy) ([address] HTN (hypertension), Irritable bowel syndrome, Lung disease, Microscopic colitis, Migraine, Mitral regurgitation, Nephrolithiasis, Nosebleed, Osteoporosis, Rash, Restless leg syndrome, TMJ dysfunction, Vitreous detachment, and Wrist fracture, bilateral.   has a past surgical history that includes Wrist fracture surgery (Left, 2009); knee surgery (Right, 1975); Ovary removal (Left); Colonoscopy (7/13); Colonoscopy (03/29/2018); Upper gastrointestinal endoscopy (03/2018); cardiovascular stress test (2016); Cholecystectomy, laparoscopic (N/A, 3/27/2019); Cardiac catheterization (02/28/2020); transesophageal echocardiogram (03/11/2020); and Mitral valve repair (N/A, 6/29/2020). Treatment Diagnosis: impaired ADLs/ functional transfers / decreased endurance 2/2 MVR sx       Restrictions  Position Activity Restriction  Other position/activity restrictions: ambulate, sternal precautions    Subjective   General  Chart Reviewed: Yes  Additional Pertinent Hx: Admit 6/29 s/p mitral valve replacement                          PMHX: HTN,OA,mitral value regurg, IBS,GERD,anxiety and Erich wrist fx/s   Family / Caregiver Present: No  Diagnosis: MVR s/p mitral valve replacement 6/29    Subjective  Subjective: \" I'm alright \" pt found up in chair. Pt alert and oriented x 2.  Pt reoriented and agreeable for OT eval and tx   Patient Currently in Pain: Yes(incisional 5/10, RN gave pain med)    Social/Functional History  Social/Functional History  Lives With: Spouse  Type of Home: (condo)  Home Layout: Multi-level, Able to Live on Main level with bedroom/bathroom(laundry on main level)  Home Access: Level entry  Bathroom Shower/Tub: Walk-in shower  Bathroom Toilet: Standard  Bathroom Equipment: Grab bars around toilet  Home Equipment: Cane  ADL Assistance: [address]: (pt does laundry, shares cooking with spouse, has cleaning lady)  Ambulation Assistance: Independent(without AD)  Transfer Assistance: Independent  Active : Yes  Occupation: Retired  Leisure & Hobbies: watching TV  Additional Comments:  is retired and able to assist 24/7 at d/c. Reports had a fall a few months ago - fell out of bed. Objective  Treatment included functional transfer training, ADL's and pt. education. Orientation  Overall Orientation Status: Impaired  Orientation Level: Oriented to place;Oriented to person;Disoriented to time;Disoriented to situation     Balance  Sitting Balance: Stand by assistance(unsupported)  Standing Balance: Minimal assistance(to CGA with walker )  Standing Balance  Time: 4 mins x 2 and 1 mins   Activity: functional mobility in room / stance   Functional Mobility  Functional - Mobility Device: 4-Wheeled Walker  Activity: Other; To/from bathroom  Assist Level: Minimal assistance  Functional Mobility Comments: pt needing cues for hand placement / steering   Toilet Transfers  Toilet - Technique: Ambulating  Equipment Used: Standard bedside commode  Toilet Transfer: Minimal assistance  Toilet Transfers Comments: simulated -- Pt needing cues for sternal precautions   ADL  Feeding: Setup;Verbal cueing; Increased time to complete(2/2 lethargy / meds )  Grooming: Minimal assistance;Verbal cueing; Increased time to complete(seated  -- fatigues easily )  LE Dressing: Maximum assistance(with attempts to don/doff socks. )  Toileting: Maximum assistance;Dependent/Total(pt with braun cath in place)  Additional Comments: Pts ADLs limited by pain/ fatigue and 53.32 (06/30/20 0859)  ADL Inpatient CMS G-Code Modifier : CK (06/30/20 0859)    Goals  Short term goals  Time Frame for Short term goals: at d/c   Short term goal 1: Stance x 6 mins with Supervision for ADL  Short term goal 2: LE dressing with CGA and AE prn   Short term goal 3: Commode transfers with SBA   Short term goal 4: Verb 2 home  to use at home   Patient Goals   Patient goals : go home at d/c      Therapy Time   Individual Concurrent Group Co-treatment   Time In 0813         Time Out 0853         Minutes 40            Timed Code Treatment Minutes:   24 mins     Total Treatment Minutes:  40 mins       Malka Young, OT

## 2020-06-30 NOTE — PROGRESS NOTES
Patient medicated for pain with percocet and fentanyl. Stressed importance of using incentive spirometer, patient with poor effort thus far. Up to chair and ambulated several feet in rush. Poor appetite, attempting dinner with much encouragement. Blood sugars well controlled.

## 2020-06-30 NOTE — PROGRESS NOTES
Physical Therapy    Facility/Department: [facility]  Initial Assessment and Treatment    NAME: Josh Marte  : 1955  MRN: [id number]    Date of Service: 2020    Discharge Recommendations:    Josh Marte scored a 16/24 on the AM-PAC short mobility form. Current research shows that an AM-PAC score of 18 or greater is typically associated with a discharge to the patient's home setting. Based on the patient's AM-PAC score and their current functional mobility deficits, it is recommended that the patient have 2-3 sessions per week of Physical Therapy at d/c to increase the patient's independence. At this time, this patient demonstrates the endurance and safety to discharge home with home PT and a follow up treatment frequency of 2-3x/wk. Please see assessment section for further patient specific details. If patient discharges prior to next session this note will serve as a discharge summary. Please see below for the latest assessment towards goals. PT Equipment Recommendations  Equipment Needed: (cont to assess)    Assessment   Body structures, Functions, Activity limitations: Decreased functional mobility ; Decreased endurance  Assessment: Pt with decreased independent mobility from baseline s/p mitral valve replacement. Pt reports normally independent with mobility without AD. Currently needing CG/min assist with transfers and gt with walker. Should progress well. Plans to return home with  who is able to assist . Rec cont skillied PT to maximize mobility and independence  Treatment Diagnosis: impaired functional mobility s/p mitral valve replacement  Decision Making: Low Complexity  PT Education: Goals;Plan of Care;PT Role;General Safety; Functional Mobility Training  Patient Education: Pt verbalized understanding - may need reinforcement  REQUIRES PT FOLLOW UP: Yes       Patient Diagnosis(es): Diagnoses of Nonrheumatic mitral valve regurgitation and Obstructive Home: (condo)  Home Layout: Multi-level, Able to Live on Main level with bedroom/bathroom(laundry on main level)  Home Access: Level entry  Bathroom Shower/Tub: Walk-in shower  Bathroom Toilet: Standard  Bathroom Equipment: Grab bars around toilet  Home Equipment: Cane  ADL Assistance: [address]: (pt does laundry, shares cooking with spouse, has cleaning lady)  Ambulation Assistance: Independent(without AD)  Transfer Assistance: Independent  Active : Yes  Occupation: Retired  Leisure & Hobbies: watching TV  Additional Comments:  is retired and able to assist 24/7 at d/c. Reports had a fall a few months ago - fell out of bed.   Cognition        Objective          AROM RLE (degrees)  RLE AROM: WFL  AROM LLE (degrees)  LLE AROM : WFL  Strength RLE  Strength RLE: WFL  Strength LLE  Strength LLE: WFL           Transfers  Sit to Stand: Contact guard assistance(from chair, cues for sternal precautions)  Stand to sit: Contact guard assistance(to chair, cues for safety and sternal precautions)  Ambulation  Ambulation?: Yes  Ambulation 1  Device: Rollator  Other Apparatus: (chest tube, braun, monitor, O2 6L, IV)  Assistance: Contact guard assistance(to min assist)  Quality of Gait: decreased bilat step length/height, assist at times to maneuver walker  Distance: 15'x 2            Treatment included: transfers, gt, pt education    Plan   Plan  Times per week: 2-5  Current Treatment Recommendations: Functional Mobility Training, Endurance Training, Gait Training, Patient/Caregiver Education & Training, Safety Education & Training  Safety Devices  Type of devices: Chair alarm in place, Call light within reach, Nurse notified, Left in chair    G-Code       OutComes Score                                                  AM-PAC Score  AM-PAC Inpatient Mobility Raw Score : 16 (06/30/20 0924)  AM-PAC Inpatient T-Scale Score : 40.78 (06/30/20 0924)  Mobility Inpatient CMS 0-100% Score: 54.16 (06/30/20 4625)  Mobility Inpatient CMS G-Code Modifier : CK (06/30/20 3105)          Goals  Short term goals  Time Frame for Short term goals: By discharge  Short term goal 1: Sit to stand supervision  Short term goal 2: Pt will amb >200' with LRAD supervision       Therapy Time   Individual Concurrent Group Co-treatment   Time In 0815         Time Out 0853         Minutes 38           Timed Code Treatment Minutes:23       Total Treatment Minutes:  [address], PT

## 2020-06-30 NOTE — PROGRESS NOTES
Patient states she still isn't ready to try to eat, told patient to let me know if she wants to try anything.

## 2020-06-30 NOTE — PROGRESS NOTES
CC:   HPI:65 y.o. s/p septal myectomy and MVR for HCM and severe MR.      S: Ambulating the halls with therapy this morning. Mild chest discomfort. Breathing ok. Tele: S[place]u     O:  Physical Exam:  BP (!) 133/51   Pulse 90   Temp 100.2 °F (37.9 °C) (Core)   Resp 26   Ht 5' 5\" (1.651 m)   Wt 177 lb 14.6 oz (80.7 kg)   SpO2 99%   BMI 29.61 kg/m²    General (appearance):  No acute distress  Eyes: anicteric   Neck: soft, No JVD  Ears/Nose/Mouth/Thorat: No cyanosis  CV: RRR   Respiratory:  Diminished, rales in bases. GI: soft, non-tender, non-distended  Skin: Warm, dry. No rashes  Neuro/Psych: Alert and oriented x 3. Appropriate behavior  Ext:  No c/c. Mild LE edema  Pulses:  2+ radial     I.O's= +1.3 L     Weight  Admission: Weight: 163 lb (73.9 kg)   Today: Weight: 177 lb 14.6 oz (80.7 kg)    CBC:   Recent Labs     20  1232 20  0446   WBC 16.6* 15.8* 12.5*   HGB 10.9* 10.5* 10.1*   HCT 32.5* 31.2* 30.0*   MCV 95.8 95.6 96.0    194 128*     BMP:   Recent Labs     20  1232 20  0446    142 135*   K 4.2 4.4 4.2   * 111* 106   CO2 23 21 23   BUN 8 9 7   CREATININE 0.6 0.5* <0.5*     Mag:   Lab Results   Component Value Date    MG 2.30 2020     PT/INR:   Recent Labs     20  1232 20  0446   PROTIME 13.5* 12.4 13.8*   INR 1.16* 1.07 1.19*     APTT:   Recent Labs     20  1232   APTT 41.9*     Pro-BNP:   Lab Results   Component Value Date    PROBNP 2,358 2019     Imagin/2020 LHC:  Angiographic Findings:  Right dominant system  Left Main:  Normal  Left Anterior Descending:  Normal  Circumflex:  Normal  Right Coronary:  Normal  Left Ventriculogram:  Not performed. Hemodynamics (mm Hg):  Apical Left Ventricular Pressure: 222/1, 11  LVOT Left ventricular pressure: 120/7, 22  Central Aortic Pressure:  115/65 (83)     3/11/2020 CORRIE: EF 55-60%. Severe asymmetric hypertrpohy of the basal septum.

## 2020-06-30 NOTE — PLAN OF CARE
Problem: Pain - Acute:  Goal: Pain level will decrease  Description: Pain level will decrease  Outcome: Ongoing  Note: Reported intermittent surgical pain, stated satisfactory relief with fentanyl. Problem: Falls - Risk of:  Goal: Will remain free from falls  Description: Will remain free from falls  Outcome: Ongoing  Note: Fall risk protocol in place: Bed alarm on, fall risk bracelet applied, yellow indicator in hallway on, non-skid footwear in use. Patient/family educated on fall risk protocol, instructed to call for assistance when needed. Problem: Skin Integrity:  Goal: Absence of new skin breakdown  Description: Absence of new skin breakdown  Outcome: Ongoing  Note: Patient unable to turn self adequately in bed, so pillow supports in use with repositioning every two hours. Sacral Heart Mepilex in place to protect, skin intact underneath.

## 2020-06-30 NOTE — PROGRESS NOTES
Patient up to chair and worked with therapy, tolerated well.  at bedside, updated by Dr. Scotty Parra.

## 2020-06-30 NOTE — PROGRESS NOTES
Patient ambulated back to bed. Tolerated well. Patient denies pain at this time. Currenlty on 4 Liter nasal cannula. O2 sat decreases at times to upper 80s but returns to above 92%. Will continue to monitor.

## 2020-07-01 LAB
ANION GAP SERPL CALCULATED.3IONS-SCNC: 7 MMOL/L (ref 3–16)
BUN BLDV-MCNC: 6 MG/DL (ref 7–20)
CALCIUM SERPL-MCNC: 10.5 MG/DL (ref 8.3–10.6)
CHLORIDE BLD-SCNC: 101 MMOL/L (ref 99–110)
CO2: 28 MMOL/L (ref 21–32)
CREAT SERPL-MCNC: <0.5 MG/DL (ref 0.6–1.2)
GFR AFRICAN AMERICAN: >60
GFR NON-AFRICAN AMERICAN: >60
GLUCOSE BLD-MCNC: 136 MG/DL (ref 70–99)
GLUCOSE BLD-MCNC: 136 MG/DL (ref 70–99)
GLUCOSE BLD-MCNC: 139 MG/DL (ref 70–99)
GLUCOSE BLD-MCNC: 143 MG/DL (ref 70–99)
GLUCOSE BLD-MCNC: 147 MG/DL (ref 70–99)
GLUCOSE BLD-MCNC: 149 MG/DL (ref 70–99)
HCT VFR BLD CALC: 29.9 % (ref 36–48)
HEMOGLOBIN: 10.1 G/DL (ref 12–16)
MAGNESIUM: 1.9 MG/DL (ref 1.8–2.4)
MCH RBC QN AUTO: 32.4 PG (ref 26–34)
MCHC RBC AUTO-ENTMCNC: 33.8 G/DL (ref 31–36)
MCV RBC AUTO: 95.9 FL (ref 80–100)
ORGANISM: ABNORMAL
PDW BLD-RTO: 12.3 % (ref 12.4–15.4)
PERFORMED ON: ABNORMAL
PLATELET # BLD: 139 K/UL (ref 135–450)
PMV BLD AUTO: 8.4 FL (ref 5–10.5)
POTASSIUM SERPL-SCNC: 4.4 MMOL/L (ref 3.5–5.1)
RBC # BLD: 3.12 M/UL (ref 4–5.2)
SODIUM BLD-SCNC: 136 MMOL/L (ref 136–145)
URINE CULTURE, ROUTINE: ABNORMAL
WBC # BLD: 13.2 K/UL (ref 4–11)

## 2020-07-01 PROCEDURE — 2000000000 HC ICU R&B

## 2020-07-01 PROCEDURE — 6370000000 HC RX 637 (ALT 250 FOR IP): Performed by: CLINICAL NURSE SPECIALIST

## 2020-07-01 PROCEDURE — 94150 VITAL CAPACITY TEST: CPT

## 2020-07-01 PROCEDURE — 6370000000 HC RX 637 (ALT 250 FOR IP): Performed by: THORACIC SURGERY (CARDIOTHORACIC VASCULAR SURGERY)

## 2020-07-01 PROCEDURE — 6360000002 HC RX W HCPCS: Performed by: THORACIC SURGERY (CARDIOTHORACIC VASCULAR SURGERY)

## 2020-07-01 PROCEDURE — 94761 N-INVAS EAR/PLS OXIMETRY MLT: CPT

## 2020-07-01 PROCEDURE — 97535 SELF CARE MNGMENT TRAINING: CPT

## 2020-07-01 PROCEDURE — 97530 THERAPEUTIC ACTIVITIES: CPT

## 2020-07-01 PROCEDURE — 2700000000 HC OXYGEN THERAPY PER DAY

## 2020-07-01 PROCEDURE — 94640 AIRWAY INHALATION TREATMENT: CPT

## 2020-07-01 PROCEDURE — 83735 ASSAY OF MAGNESIUM: CPT

## 2020-07-01 PROCEDURE — 99232 SBSQ HOSP IP/OBS MODERATE 35: CPT | Performed by: INTERNAL MEDICINE

## 2020-07-01 PROCEDURE — 80048 BASIC METABOLIC PNL TOTAL CA: CPT

## 2020-07-01 PROCEDURE — 85027 COMPLETE CBC AUTOMATED: CPT

## 2020-07-01 PROCEDURE — 97110 THERAPEUTIC EXERCISES: CPT

## 2020-07-01 PROCEDURE — 97116 GAIT TRAINING THERAPY: CPT

## 2020-07-01 PROCEDURE — 2580000003 HC RX 258: Performed by: THORACIC SURGERY (CARDIOTHORACIC VASCULAR SURGERY)

## 2020-07-01 RX ORDER — CIPROFLOXACIN 500 MG/1
500 TABLET, FILM COATED ORAL EVERY 12 HOURS SCHEDULED
Status: DISCONTINUED | OUTPATIENT
Start: 2020-07-01 | End: 2020-07-03 | Stop reason: HOSPADM

## 2020-07-01 RX ORDER — DULOXETIN HYDROCHLORIDE 30 MG/1
90 CAPSULE, DELAYED RELEASE ORAL DAILY
Status: DISCONTINUED | OUTPATIENT
Start: 2020-07-01 | End: 2020-07-03 | Stop reason: HOSPADM

## 2020-07-01 RX ORDER — INSULIN LISPRO 100 [IU]/ML
0-12 INJECTION, SOLUTION INTRAVENOUS; SUBCUTANEOUS
Status: DISCONTINUED | OUTPATIENT
Start: 2020-07-01 | End: 2020-07-02

## 2020-07-01 RX ADMIN — LISINOPRIL 2.5 MG: 2.5 TABLET ORAL at 11:38

## 2020-07-01 RX ADMIN — INSULIN LISPRO 2 UNITS: 100 INJECTION, SOLUTION INTRAVENOUS; SUBCUTANEOUS at 11:39

## 2020-07-01 RX ADMIN — ASPIRIN 325 MG: 325 TABLET, COATED ORAL at 10:02

## 2020-07-01 RX ADMIN — CIPROFLOXACIN 500 MG: 500 TABLET, FILM COATED ORAL at 21:26

## 2020-07-01 RX ADMIN — CLOPIDOGREL BISULFATE 75 MG: 75 TABLET ORAL at 10:02

## 2020-07-01 RX ADMIN — ARFORMOTEROL TARTRATE 15 MCG: 15 SOLUTION RESPIRATORY (INHALATION) at 08:28

## 2020-07-01 RX ADMIN — POLYETHYLENE GLYCOL 3350 17 G: 17 POWDER, FOR SOLUTION ORAL at 10:04

## 2020-07-01 RX ADMIN — POTASSIUM CHLORIDE 10 MEQ: 750 TABLET, EXTENDED RELEASE ORAL at 10:03

## 2020-07-01 RX ADMIN — BUDESONIDE 250 MCG: 0.25 SUSPENSION RESPIRATORY (INHALATION) at 21:35

## 2020-07-01 RX ADMIN — MAGNESIUM SULFATE HEPTAHYDRATE 2 G: 40 INJECTION, SOLUTION INTRAVENOUS at 07:02

## 2020-07-01 RX ADMIN — ALBUTEROL SULFATE 2.5 MG: 2.5 SOLUTION RESPIRATORY (INHALATION) at 12:45

## 2020-07-01 RX ADMIN — POTASSIUM CHLORIDE 10 MEQ: 750 TABLET, EXTENDED RELEASE ORAL at 18:59

## 2020-07-01 RX ADMIN — OXYCODONE HYDROCHLORIDE AND ACETAMINOPHEN 2 TABLET: 5; 325 TABLET ORAL at 02:05

## 2020-07-01 RX ADMIN — Medication 400 MG: at 10:02

## 2020-07-01 RX ADMIN — METOPROLOL TARTRATE 25 MG: 25 TABLET, FILM COATED ORAL at 10:02

## 2020-07-01 RX ADMIN — POTASSIUM CHLORIDE 10 MEQ: 750 TABLET, EXTENDED RELEASE ORAL at 13:57

## 2020-07-01 RX ADMIN — Medication 400 MG: at 21:26

## 2020-07-01 RX ADMIN — MUPIROCIN: 20 OINTMENT TOPICAL at 10:06

## 2020-07-01 RX ADMIN — CHLORHEXIDINE GLUCONATE 15 ML: 1.2 RINSE ORAL at 21:27

## 2020-07-01 RX ADMIN — ALBUTEROL SULFATE 2.5 MG: 2.5 SOLUTION RESPIRATORY (INHALATION) at 16:20

## 2020-07-01 RX ADMIN — OXYCODONE HYDROCHLORIDE AND ACETAMINOPHEN 1 TABLET: 5; 325 TABLET ORAL at 11:43

## 2020-07-01 RX ADMIN — ALBUTEROL SULFATE 2.5 MG: 2.5 SOLUTION RESPIRATORY (INHALATION) at 08:28

## 2020-07-01 RX ADMIN — OXYCODONE HYDROCHLORIDE AND ACETAMINOPHEN 2 TABLET: 5; 325 TABLET ORAL at 16:06

## 2020-07-01 RX ADMIN — ALBUTEROL SULFATE 2.5 MG: 2.5 SOLUTION RESPIRATORY (INHALATION) at 01:58

## 2020-07-01 RX ADMIN — METOPROLOL TARTRATE 25 MG: 25 TABLET, FILM COATED ORAL at 21:26

## 2020-07-01 RX ADMIN — Medication 10 ML: at 21:27

## 2020-07-01 RX ADMIN — ALBUTEROL SULFATE 2.5 MG: 2.5 SOLUTION RESPIRATORY (INHALATION) at 21:35

## 2020-07-01 RX ADMIN — Medication 10 ML: at 10:05

## 2020-07-01 RX ADMIN — OXYCODONE HYDROCHLORIDE AND ACETAMINOPHEN 1 TABLET: 5; 325 TABLET ORAL at 07:02

## 2020-07-01 RX ADMIN — INSULIN LISPRO 2 UNITS: 100 INJECTION, SOLUTION INTRAVENOUS; SUBCUTANEOUS at 02:07

## 2020-07-01 RX ADMIN — FUROSEMIDE 40 MG: 10 INJECTION, SOLUTION INTRAMUSCULAR; INTRAVENOUS at 10:04

## 2020-07-01 RX ADMIN — FUROSEMIDE 40 MG: 10 INJECTION, SOLUTION INTRAMUSCULAR; INTRAVENOUS at 18:59

## 2020-07-01 RX ADMIN — ATORVASTATIN CALCIUM 40 MG: 40 TABLET, FILM COATED ORAL at 21:26

## 2020-07-01 RX ADMIN — CIPROFLOXACIN 500 MG: 500 TABLET, FILM COATED ORAL at 10:02

## 2020-07-01 RX ADMIN — MUPIROCIN: 20 OINTMENT TOPICAL at 21:28

## 2020-07-01 RX ADMIN — DULOXETINE HYDROCHLORIDE 90 MG: 30 CAPSULE, DELAYED RELEASE ORAL at 10:04

## 2020-07-01 RX ADMIN — LEVOTHYROXINE SODIUM 125 MCG: 125 TABLET ORAL at 10:03

## 2020-07-01 RX ADMIN — ARFORMOTEROL TARTRATE 15 MCG: 15 SOLUTION RESPIRATORY (INHALATION) at 21:35

## 2020-07-01 RX ADMIN — BUDESONIDE 250 MCG: 0.25 SUSPENSION RESPIRATORY (INHALATION) at 08:29

## 2020-07-01 RX ADMIN — DIPHENHYDRAMINE HYDROCHLORIDE 25 MG: 25 TABLET ORAL at 21:26

## 2020-07-01 ASSESSMENT — PAIN DESCRIPTION - PAIN TYPE
TYPE: CHRONIC PAIN;SURGICAL PAIN
TYPE: SURGICAL PAIN
TYPE: SURGICAL PAIN

## 2020-07-01 ASSESSMENT — PAIN DESCRIPTION - LOCATION
LOCATION: CHEST
LOCATION: CHEST
LOCATION: STERNUM;BACK

## 2020-07-01 ASSESSMENT — PAIN - FUNCTIONAL ASSESSMENT
PAIN_FUNCTIONAL_ASSESSMENT: ACTIVITIES ARE NOT PREVENTED

## 2020-07-01 ASSESSMENT — PAIN DESCRIPTION - ONSET
ONSET: ON-GOING

## 2020-07-01 ASSESSMENT — PAIN SCALES - GENERAL
PAINLEVEL_OUTOF10: 0
PAINLEVEL_OUTOF10: 7
PAINLEVEL_OUTOF10: 0
PAINLEVEL_OUTOF10: 7
PAINLEVEL_OUTOF10: 0
PAINLEVEL_OUTOF10: 4
PAINLEVEL_OUTOF10: 4
PAINLEVEL_OUTOF10: 0

## 2020-07-01 ASSESSMENT — PAIN DESCRIPTION - DESCRIPTORS
DESCRIPTORS: ACHING
DESCRIPTORS: ACHING
DESCRIPTORS: ACHING;DISCOMFORT

## 2020-07-01 ASSESSMENT — PAIN DESCRIPTION - FREQUENCY
FREQUENCY: CONTINUOUS

## 2020-07-01 ASSESSMENT — PAIN DESCRIPTION - PROGRESSION
CLINICAL_PROGRESSION: NOT CHANGED

## 2020-07-01 ASSESSMENT — PAIN DESCRIPTION - ORIENTATION
ORIENTATION: MID
ORIENTATION: MID

## 2020-07-01 NOTE — PROGRESS NOTES
Patient ambulated in rush, tolerated well. Vitals remained stable, patient returned to chair after walk.

## 2020-07-01 NOTE — PLAN OF CARE
Problem: Discharge Planning:  Goal: Discharged to appropriate level of care  Description: Discharged to appropriate level of care  Outcome: Ongoing     Problem: Cardiac Output - Decreased:  Goal: Cardiac output within specified parameters  Description: Cardiac output within specified parameters  Outcome: Ongoing     Problem: Infection - Surgical Site:  Goal: Will show no infection signs and symptoms  Description: Will show no infection signs and symptoms  Outcome: Ongoing     Problem: Pain - Acute:  Goal: Pain level will decrease  Description: Pain level will decrease  Outcome: Ongoing     Problem: Venous Thromboembolism:  Goal: Absence of signs or symptoms of impaired coagulation  Description: Absence of signs or symptoms of impaired coagulation  Outcome: Ongoing     Problem: Falls - Risk of:  Goal: Will remain free from falls  Description: Will remain free from falls  Outcome: Ongoing  Goal: Absence of physical injury  Description: Absence of physical injury  Outcome: Ongoing     Problem: Skin Integrity:  Goal: Will show no infection signs and symptoms  Description: Will show no infection signs and symptoms  Outcome: Ongoing  Goal: Absence of new skin breakdown  Description: Absence of new skin breakdown  Outcome: Ongoing     Problem: Pain:  Goal: Pain level will decrease  Description: Pain level will decrease  Outcome: Ongoing  Goal: Control of acute pain  Description: Control of acute pain  Outcome: Ongoing  Goal: Control of chronic pain  Description: Control of chronic pain  Outcome: Ongoing     Problem: Urinary Elimination:  Goal: Signs and symptoms of infection will decrease  Description: Signs and symptoms of infection will decrease  Outcome: Ongoing  Goal: Complications related to the disease process, condition or treatment will be avoided or minimized  Description: Complications related to the disease process, condition or treatment will be avoided or minimized  Outcome: Ongoing

## 2020-07-01 NOTE — FLOWSHEET NOTE
07/01/20 1730   Encounter Summary   Services provided to: Patient;Patient not available  (Busy with staff)   Referral/Consult From: Rounding   Continue Visiting   (7/1, claudia )   Complexity of Encounter Low   Length of Encounter 15 minutes   Routine   Type Initial   Staff Clinton solis MA

## 2020-07-01 NOTE — PLAN OF CARE
Problem: Pain - Acute:  Goal: Pain level will decrease  Description: Pain level will decrease  7/1/2020 0250 by Santi Brown RN  Outcome: Met This Shift  Note: Pain reassessed every hour. Utilizing CPOT and 0-10 pain scale to monitor patients pain in order to keep controled. Will continue to monitor. Problem: Falls - Risk of:  Goal: Will remain free from falls  Description: Will remain free from falls  7/1/2020 0250 by Santi Brown RN  Outcome: Met This Shift  Note: Bed Alarm On, Patient educated on how to use call light. Fall risk bracelet applied. Patient will be free from falls during hospital stay. Will continue to monitor throughout hospital stay. Problem: Skin Integrity:  Goal: Will show no infection signs and symptoms  Description: Will show no infection signs and symptoms  7/1/2020 0250 by Santi Brown RN  Outcome: Met This Shift  Note: Surgical Incisions Noted, otherwise. Patients skin is intact and no sign of skin breakdown. Patient has sacral heart placed. Encouraged to turn/shift weight to avoid skin breakdown. Patient turned q2h with pillow support. Will continue to monitor     Problem: Urinary Elimination:  Goal: Signs and symptoms of infection will decrease  Description: Signs and symptoms of infection will decrease  Outcome: Met This Shift  Note: Patient has braun catheter in place for urinary output measurement. Braun performed once a shift in order to avoid infection related to the catheter. Will continue to monitor.

## 2020-07-01 NOTE — PROGRESS NOTES
functional transfers /stance at sink and functional mobility in hallway  Functional Mobility  Functional - Mobility Device: Rolling Walker  Activity: To/from bathroom; Other  Assist Level: Stand by assistance  Functional Mobility Comments: Pt needing cues for steering. Toilet Transfers  Toilet - Technique: Ambulating  Equipment Used: Standard toilet  Toilet Transfer: Contact guard assistance  Toilet Transfers Comments:  Pt needing cues for sternal precautions   Bed mobility  Sit to Supine: Moderate assistance(with BLEs )  Comment: Pt concerned about high bed at home.     Transfers  Sit to stand: Contact guard assistance  Stand to sit: Contact guard assistance  Transfer Comments: min v.cues for sternal precautions      Cognition  Overall Cognitive Status: WFL     LUE PROM (degrees)  LUE General PROM: Pt encouraged to perform gentle AROM all planes      Plan   Plan  Times per week: 2-5x  Times per day: Daily  Current Treatment Recommendations: Safety Education & Training, Self-Care / ADL, Patient/Caregiver Education & Training, Endurance Training    AM-PAC Score  AM-[facility] Inpatient Daily Activity Raw Score: 19 (07/01/20 1118)  AM-PAC Inpatient ADL T-Scale Score : 40.22 (07/01/20 1118)  ADL Inpatient CMS 0-100% Score: 42.8 (07/01/20 1118)  ADL Inpatient CMS G-Code Modifier : CK (07/01/20 1118)    Goals  Short term goals  Time Frame for Short term goals: at d/c   Short term goal 1: Stance x 6 mins with Supervision for ADL - not met   Short term goal 2: LE dressing with CGA and AE prn - not met   Short term goal 3: Commode transfers with SBA - not met   Short term goal 4: Verb 2 home  to use at home - part met   Patient Goals   Patient goals : go home at d/c      Therapy Time   Individual Concurrent Group Co-treatment   Time In 1041         Time Out 1121         Minutes 40            Timed Code Treatment Minutes:   40  mins     Total Treatment Minutes: 40 mins       Russ Jaramillo OT

## 2020-07-01 NOTE — PROGRESS NOTES
Physical Therapy  Facility/Department: [facility]  Daily Treatment Note  NAME: Clement Magallanes  : 1955  MRN: [id number]    Date of Service: 2020    Discharge Recommendations:  Clement Magallanes scored a 17/24 on the AM-PAC short mobility form. Current research shows that an AM-PAC score of 18 or greater is typically associated with a discharge to the patient's home setting. Based on the patient's AM-PAC score and their current functional mobility deficits, it is recommended that the patient have 2-3 sessions per week of Physical Therapy at d/c to increase the patient's independence. At this time, this patient demonstrates the endurance and safety to discharge home with HHPT services and a follow up treatment frequency of 2-3x/wk. Please see assessment section for further patient specific details. If patient discharges prior to next session this note will serve as a discharge summary. Please see below for the latest assessment towards goals. PT Equipment Recommendations  Equipment Needed: Yes  Mobility Devices: Pippa Singleton: Rolling    Assessment   Body structures, Functions, Activity limitations: Decreased functional mobility ; Decreased endurance;Decreased posture;Decreased strength  Assessment: Pt progressing well with therapy & towards goals. Pt participated in functional txf training, gait training on level surfaces & LE therex. She continues to fatigue with activity; therefore requiring inc'd time & therapeutic rest breaks between tasks however improvement noted in quality & distances of gait/txfs today. Pt plans to DC home with support from family upon medical clearance & will benefit from HHPT services. PT will continue to assess, progress & tx pt per POC. Treatment Diagnosis: impaired functional mobility s/p mitral valve replacement  Decision Making: Low Complexity  PT Education: PT Role;General Safety; Functional Mobility Training;Gait Training;Transfer Training  Patient Education: Pt v.u   Barriers to Learning: n/a   REQUIRES PT FOLLOW UP: Yes  Activity Tolerance  Activity Tolerance: Patient limited by fatigue     Patient Diagnosis(es): Diagnoses of Nonrheumatic mitral valve regurgitation and Obstructive hypertrophic cardiomyopathy ([address] were pertinent to this visit. has a past medical history of Adrenal adenoma, Anxiety, Arthritis, Asthma, Chronic nausea, Environmental allergies, GERD (gastroesophageal reflux disease), HOCM (hypertrophic obstructive cardiomyopathy) ([address] HTN (hypertension), Irritable bowel syndrome, Lung disease, Microscopic colitis, Migraine, Mitral regurgitation, Nephrolithiasis, Nosebleed, Osteoporosis, Rash, Restless leg syndrome, TMJ dysfunction, Vitreous detachment, and Wrist fracture, bilateral.   has a past surgical history that includes Wrist fracture surgery (Left, 2009); knee surgery (Right, 1975); Ovary removal (Left); Colonoscopy (7/13); Colonoscopy (03/29/2018); Upper gastrointestinal endoscopy (03/2018); cardiovascular stress test (2016); Cholecystectomy, laparoscopic (N/A, 3/27/2019); Cardiac catheterization (02/28/2020); transesophageal echocardiogram (03/11/2020); and Mitral valve repair (N/A, 6/29/2020). Restrictions  Position Activity Restriction  Other position/activity restrictions: ambulate, sternal precautions     Subjective   General  Chart Reviewed: Yes  Additional Pertinent Hx: Pt is a 76 y.o. female adm s/p mitral valve replacement on 6/29. PMH: HTN, asthma, arthritis, restless leg syndrome, osteoporosis, IBS, GERD, anxiety, adrenal adenoma  Response To Previous Treatment: Patient with no complaints from previous session. Family / Caregiver Present: Yes  Referring Practitioner: Ronald Wasserman MD  Subjective  Subjective: Pt agreeable to PT tx.    General Comment  Comments: Pt using bathroom with PCA presence/assist upon PT arrival   Pain Screening  Patient Currently in Pain: Denies  Vital Signs  Patient Currently in Pain: Denies       Orientation  Orientation  Overall Orientation Status: Within Normal Limits     Cognition   Cognition  Overall Cognitive Status: WFL  Arousal/Alertness: Delayed responses to stimuli  Following Commands: Follows one step commands with repetition  Insights: Decreased awareness of deficits  Initiation: Requires cues for some  Sequencing: Requires cues for some     Objective      Transfers  Sit to Stand: Contact guard assistance(up to walker )  Stand to sit: Contact guard assistance     Ambulation  Ambulation?: Yes  WB Status: n/a   More Ambulation?: No  Ambulation 1  Device: No Device;Rollator  Assistance: Contact guard assistance  Quality of Gait: NBOS with dec'd B step length/stride length, dec'd B foot clearance, dec'd B amna with a slow shuffling gait pattern. No overt LOB.  Intermittent path deviation requiring assist/cues for walker management   Distance: 75' x 2 repetitions  Comments: VCs to increase AMBER & for improved midline/walker management   Stairs/Curb  Stairs?: No          Balance  Posture: Fair  Sitting - Static: (I in bedside chair )  Sitting - Dynamic: (I in bedside chair )  Standing - Static: (CGA<>S with walker )  Standing - Dynamic: (CGA w/walker )  Other exercises  Other exercises?: Yes  Other exercises 1: 2 sets x 5 repetitions of repetitive sit<>stand txfs for facilitation of LE strength/conditioning                    AM-PAC Score  AM-PAC Inpatient Mobility Raw Score : 17 (07/01/20 1416)  AM-PAC Inpatient T-Scale Score : 42.13 (07/01/20 1416)  Mobility Inpatient CMS 0-100% Score: 50.57 (07/01/20 1416)  Mobility Inpatient CMS G-Code Modifier : CK (07/01/20 1416)          Goals  Short term goals  Time Frame for Short term goals: By discharge  Short term goal 1: Sit to stand supervision  Short term goal 2: Pt will amb >200' with LRAD supervision  Patient Goals   Patient goals : none stated    Plan    Plan  Times per week: 2-5  Current Treatment Recommendations: Functional Mobility Training, Endurance Training, Gait Training, Patient/Caregiver Education & Training, Safety Education & Training, Strengthening, Stair training, Equipment Evaluation, Education, & procurement, Transfer Training  Safety Devices  Type of devices: Chair alarm in place, Call light within reach, Left in chair  Restraints  Initially in place: No     Therapy Time   Individual Concurrent Group Co-treatment   Time In 1320         Time Out 1400         Minutes 40         Timed Code Treatment Minutes: [address], PT

## 2020-07-01 NOTE — CARE COORDINATION
Case Management Assessment           Daily Note                 Date/ Time of Note: 7/1/2020 3:52 PM         Note completed by: Elina Kaba Day    Patient Name: Edison Ferrell  YOB: 1955    Diagnosis:Nonrheumatic mitral valve regurgitation [I34.0]  Obstructive hypertrophic cardiomyopathy (HCC) [I42.1]  Hypertrophic obstructive cardiomyopathy with diastolic heart failure ([address] [I50.30, I42.1]  Patient Admission Status: Inpatient    Date of Admission:6/29/2020  5:04 AM Length of Stay: 2 GLOS:        Current Plan of Care: Continue therapy work,  POD2 from Valve repair.   ________________________________________________________________________________________  PT AM-PAC: 16 / 24 per last evaluation on: 7/1    OT AM-PAC: 23 / 24 per last evaluation on: 7/1    DME Needs for discharge: Shakira Johnson (will send to [address]   ________________________________________________________________________________________  Discharge Plan: Home with [address]: PT/OT/RN     Tentative discharge date:7/3 vs 7/4    Current barriers to discharge: Medical Clearance, Home care selection     Referrals completed: [facility]     Resources/ information provided: [address] List  ________________________________________________________________________________________  Case Management Notes: SW rounded on this date. SW met with patient and patient's sister at bedside. SW answered questions and dicussed therapy recommendations today. Patient agreeable to a Rolling Walker at discharge. Patent reported willing to go with [address]. SW made referral.     Plan for discharge Friday vs. Saturday per CT surg team.       Fidelina Mckeon and her family were provided with choice of provider; she and her family are in agreement with the discharge plan.     Care Transition Patient: DELL Simon  The [address] [place] [address] ICU  Case Management Department  Ph: [phone]

## 2020-07-01 NOTE — PROGRESS NOTES
Patient's O2 saturation ranging between 88-90%. Currently on 4L nasal cannula. Notified Respiratory to give patient PRN breathing treatment. Patient's lung sounds are clear, patient achieving 300-400 on IS. PRN pain medication given for chest/sternal pain. Will continue to monitor.

## 2020-07-01 NOTE — PROGRESS NOTES
Patient ambulated up to the chair this AM. Patient tolerated well. Chest Tube dressings changed and CHG bath given. Shaikh Catheter pulled per nurse drive protocol and order set. Will continue to monitor.

## 2020-07-01 NOTE — PROGRESS NOTES
Pacer wires removed without incident, patient instructed to remain in bed for one hour, patient agreeable.

## 2020-07-01 NOTE — PROGRESS NOTES
LDLCALC 120 (H) 06/29/2020    LDLCALC 119 (H) 06/23/2020    LDLCALC 133 (H) 02/06/2020     Lab Results   Component Value Date    LABVLDL 18 06/29/2020    LABVLDL 15 06/23/2020    LABVLDL 17 02/06/2020     No results found for: CHOLHDLRATIO    2/2020 [facility]:  Angiographic Findings:  Right dominant system  Left Main:  Normal  Left Anterior Descending:  Normal  Circumflex:  Normal  Right Coronary:  Normal  Left Ventriculogram:  Not performed. Hemodynamics (mm Hg):  Apical Left Ventricular Pressure: 222/1, 11  LVOT Left ventricular pressure: 120/7, 22  Central Aortic Pressure:  115/65 (83)    3/11/2020 [facility]E: EF 55-60%. Severe asymmetric hypertrpohy of the basal septum. Moderate MR.    2/2020 [facility] (): HCM. Mod HK of basal anterior and AS segments. Pap muscles displaced to the apex and not well organized. Rebeca flor enhancement present. + SITA. Severe MR.    12/2019 Echo: EF 65-70%. LVOT grd of 103 mm Hg. Grade 2 DD. Severe MR. Mild TR.    76 y.o. now s/p myecomty and MV replacement (25 mm Mosaic). Issues:  -HCM s/p myectomy  -Severe MR s/p replacement  -Hypercholesterolemia    Recs:  -Cont post-op care  -Pulm toilet  -BB and lisinporil  -ASA  -Will follow  -Cymbalta now ordered. D/W CTS team    Niya Conrad MD, [facility]

## 2020-07-02 LAB
ANION GAP SERPL CALCULATED.3IONS-SCNC: 6 MMOL/L (ref 3–16)
BUN BLDV-MCNC: 8 MG/DL (ref 7–20)
CALCIUM SERPL-MCNC: 9.5 MG/DL (ref 8.3–10.6)
CHLORIDE BLD-SCNC: 99 MMOL/L (ref 99–110)
CO2: 28 MMOL/L (ref 21–32)
CREAT SERPL-MCNC: <0.5 MG/DL (ref 0.6–1.2)
EKG ATRIAL RATE: 96 BPM
EKG DIAGNOSIS: NORMAL
EKG P AXIS: 66 DEGREES
EKG P-R INTERVAL: 166 MS
EKG Q-T INTERVAL: 384 MS
EKG QRS DURATION: 132 MS
EKG QTC CALCULATION (BAZETT): 485 MS
EKG R AXIS: 18 DEGREES
EKG T AXIS: 162 DEGREES
EKG VENTRICULAR RATE: 96 BPM
GFR AFRICAN AMERICAN: >60
GFR NON-AFRICAN AMERICAN: >60
GLUCOSE BLD-MCNC: 115 MG/DL (ref 70–99)
GLUCOSE BLD-MCNC: 124 MG/DL (ref 70–99)
GLUCOSE BLD-MCNC: 127 MG/DL (ref 70–99)
GLUCOSE BLD-MCNC: 151 MG/DL (ref 70–99)
HCT VFR BLD CALC: 26.5 % (ref 36–48)
HEMOGLOBIN: 9.1 G/DL (ref 12–16)
MAGNESIUM: 1.8 MG/DL (ref 1.8–2.4)
MCH RBC QN AUTO: 32.7 PG (ref 26–34)
MCHC RBC AUTO-ENTMCNC: 34.4 G/DL (ref 31–36)
MCV RBC AUTO: 95 FL (ref 80–100)
PDW BLD-RTO: 12.4 % (ref 12.4–15.4)
PERFORMED ON: ABNORMAL
PLATELET # BLD: 144 K/UL (ref 135–450)
PMV BLD AUTO: 8.3 FL (ref 5–10.5)
POTASSIUM SERPL-SCNC: 3.8 MMOL/L (ref 3.5–5.1)
RBC # BLD: 2.79 M/UL (ref 4–5.2)
SODIUM BLD-SCNC: 133 MMOL/L (ref 136–145)
WBC # BLD: 9.2 K/UL (ref 4–11)

## 2020-07-02 PROCEDURE — 97530 THERAPEUTIC ACTIVITIES: CPT

## 2020-07-02 PROCEDURE — 2000000000 HC ICU R&B

## 2020-07-02 PROCEDURE — 99232 SBSQ HOSP IP/OBS MODERATE 35: CPT | Performed by: INTERNAL MEDICINE

## 2020-07-02 PROCEDURE — 94761 N-INVAS EAR/PLS OXIMETRY MLT: CPT

## 2020-07-02 PROCEDURE — 2580000003 HC RX 258: Performed by: THORACIC SURGERY (CARDIOTHORACIC VASCULAR SURGERY)

## 2020-07-02 PROCEDURE — 6360000002 HC RX W HCPCS: Performed by: THORACIC SURGERY (CARDIOTHORACIC VASCULAR SURGERY)

## 2020-07-02 PROCEDURE — 6370000000 HC RX 637 (ALT 250 FOR IP): Performed by: CLINICAL NURSE SPECIALIST

## 2020-07-02 PROCEDURE — 6370000000 HC RX 637 (ALT 250 FOR IP): Performed by: THORACIC SURGERY (CARDIOTHORACIC VASCULAR SURGERY)

## 2020-07-02 PROCEDURE — 93010 ELECTROCARDIOGRAM REPORT: CPT | Performed by: INTERNAL MEDICINE

## 2020-07-02 PROCEDURE — 80048 BASIC METABOLIC PNL TOTAL CA: CPT

## 2020-07-02 PROCEDURE — 93005 ELECTROCARDIOGRAM TRACING: CPT | Performed by: THORACIC SURGERY (CARDIOTHORACIC VASCULAR SURGERY)

## 2020-07-02 PROCEDURE — 2700000000 HC OXYGEN THERAPY PER DAY

## 2020-07-02 PROCEDURE — 97110 THERAPEUTIC EXERCISES: CPT

## 2020-07-02 PROCEDURE — 85027 COMPLETE CBC AUTOMATED: CPT

## 2020-07-02 PROCEDURE — 36592 COLLECT BLOOD FROM PICC: CPT

## 2020-07-02 PROCEDURE — 94150 VITAL CAPACITY TEST: CPT

## 2020-07-02 PROCEDURE — 94669 MECHANICAL CHEST WALL OSCILL: CPT

## 2020-07-02 PROCEDURE — 97116 GAIT TRAINING THERAPY: CPT

## 2020-07-02 PROCEDURE — 94640 AIRWAY INHALATION TREATMENT: CPT

## 2020-07-02 PROCEDURE — 83735 ASSAY OF MAGNESIUM: CPT

## 2020-07-02 RX ORDER — POTASSIUM CHLORIDE 20 MEQ/1
20 TABLET, EXTENDED RELEASE ORAL ONCE
Status: COMPLETED | OUTPATIENT
Start: 2020-07-02 | End: 2020-07-02

## 2020-07-02 RX ORDER — POTASSIUM CHLORIDE 20 MEQ/1
20 TABLET, EXTENDED RELEASE ORAL DAILY
Status: DISCONTINUED | OUTPATIENT
Start: 2020-07-02 | End: 2020-07-02

## 2020-07-02 RX ADMIN — CIPROFLOXACIN 500 MG: 500 TABLET, FILM COATED ORAL at 08:44

## 2020-07-02 RX ADMIN — BUDESONIDE 250 MCG: 0.25 SUSPENSION RESPIRATORY (INHALATION) at 08:10

## 2020-07-02 RX ADMIN — MAGNESIUM SULFATE HEPTAHYDRATE 2 G: 40 INJECTION, SOLUTION INTRAVENOUS at 06:38

## 2020-07-02 RX ADMIN — CHLORHEXIDINE GLUCONATE 15 ML: 1.2 RINSE ORAL at 08:46

## 2020-07-02 RX ADMIN — ALBUTEROL SULFATE 2.5 MG: 2.5 SOLUTION RESPIRATORY (INHALATION) at 08:11

## 2020-07-02 RX ADMIN — CIPROFLOXACIN 500 MG: 500 TABLET, FILM COATED ORAL at 21:27

## 2020-07-02 RX ADMIN — POTASSIUM CHLORIDE 20 MEQ: 20 TABLET, EXTENDED RELEASE ORAL at 08:45

## 2020-07-02 RX ADMIN — MUPIROCIN: 20 OINTMENT TOPICAL at 21:31

## 2020-07-02 RX ADMIN — CHLORHEXIDINE GLUCONATE 15 ML: 1.2 RINSE ORAL at 21:27

## 2020-07-02 RX ADMIN — METOPROLOL TARTRATE 25 MG: 25 TABLET, FILM COATED ORAL at 08:44

## 2020-07-02 RX ADMIN — ALBUTEROL SULFATE 2.5 MG: 2.5 SOLUTION RESPIRATORY (INHALATION) at 11:31

## 2020-07-02 RX ADMIN — DIPHENHYDRAMINE HYDROCHLORIDE 25 MG: 25 TABLET ORAL at 21:27

## 2020-07-02 RX ADMIN — DULOXETINE HYDROCHLORIDE 90 MG: 30 CAPSULE, DELAYED RELEASE ORAL at 08:55

## 2020-07-02 RX ADMIN — Medication 10 ML: at 21:27

## 2020-07-02 RX ADMIN — ALBUTEROL SULFATE 2.5 MG: 2.5 SOLUTION RESPIRATORY (INHALATION) at 15:19

## 2020-07-02 RX ADMIN — ARFORMOTEROL TARTRATE 15 MCG: 15 SOLUTION RESPIRATORY (INHALATION) at 08:10

## 2020-07-02 RX ADMIN — OXYCODONE HYDROCHLORIDE AND ACETAMINOPHEN 2 TABLET: 5; 325 TABLET ORAL at 15:43

## 2020-07-02 RX ADMIN — METOPROLOL TARTRATE 25 MG: 25 TABLET, FILM COATED ORAL at 21:27

## 2020-07-02 RX ADMIN — ATORVASTATIN CALCIUM 40 MG: 40 TABLET, FILM COATED ORAL at 21:27

## 2020-07-02 RX ADMIN — BUDESONIDE 250 MCG: 0.25 SUSPENSION RESPIRATORY (INHALATION) at 20:27

## 2020-07-02 RX ADMIN — MUPIROCIN: 20 OINTMENT TOPICAL at 08:49

## 2020-07-02 RX ADMIN — ALBUTEROL SULFATE 2.5 MG: 2.5 SOLUTION RESPIRATORY (INHALATION) at 20:27

## 2020-07-02 RX ADMIN — Medication 400 MG: at 08:45

## 2020-07-02 RX ADMIN — Medication 400 MG: at 21:27

## 2020-07-02 RX ADMIN — ASPIRIN 325 MG: 325 TABLET, COATED ORAL at 08:45

## 2020-07-02 RX ADMIN — CLOPIDOGREL BISULFATE 75 MG: 75 TABLET ORAL at 08:43

## 2020-07-02 RX ADMIN — POLYETHYLENE GLYCOL 3350 17 G: 17 POWDER, FOR SOLUTION ORAL at 08:46

## 2020-07-02 RX ADMIN — Medication 10 ML: at 08:50

## 2020-07-02 RX ADMIN — LEVOTHYROXINE SODIUM 125 MCG: 125 TABLET ORAL at 08:40

## 2020-07-02 RX ADMIN — OXYCODONE HYDROCHLORIDE AND ACETAMINOPHEN 1 TABLET: 5; 325 TABLET ORAL at 00:42

## 2020-07-02 RX ADMIN — LISINOPRIL 2.5 MG: 2.5 TABLET ORAL at 12:15

## 2020-07-02 RX ADMIN — POTASSIUM CHLORIDE 20 MEQ: 20 TABLET, EXTENDED RELEASE ORAL at 12:15

## 2020-07-02 RX ADMIN — ARFORMOTEROL TARTRATE 15 MCG: 15 SOLUTION RESPIRATORY (INHALATION) at 20:27

## 2020-07-02 RX ADMIN — OXYCODONE HYDROCHLORIDE AND ACETAMINOPHEN 2 TABLET: 5; 325 TABLET ORAL at 08:38

## 2020-07-02 ASSESSMENT — PAIN SCALES - GENERAL
PAINLEVEL_OUTOF10: 0
PAINLEVEL_OUTOF10: 5
PAINLEVEL_OUTOF10: 0
PAINLEVEL_OUTOF10: 8
PAINLEVEL_OUTOF10: 0
PAINLEVEL_OUTOF10: 8
PAINLEVEL_OUTOF10: 0
PAINLEVEL_OUTOF10: 0

## 2020-07-02 ASSESSMENT — PAIN DESCRIPTION - PROGRESSION
CLINICAL_PROGRESSION: GRADUALLY WORSENING
CLINICAL_PROGRESSION: GRADUALLY WORSENING
CLINICAL_PROGRESSION: NOT CHANGED

## 2020-07-02 ASSESSMENT — PAIN DESCRIPTION - LOCATION
LOCATION: CHEST

## 2020-07-02 ASSESSMENT — PAIN DESCRIPTION - PAIN TYPE
TYPE: SURGICAL PAIN

## 2020-07-02 ASSESSMENT — PAIN DESCRIPTION - DESCRIPTORS
DESCRIPTORS: ACHING

## 2020-07-02 ASSESSMENT — PAIN DESCRIPTION - ORIENTATION
ORIENTATION: MID

## 2020-07-02 ASSESSMENT — PAIN - FUNCTIONAL ASSESSMENT
PAIN_FUNCTIONAL_ASSESSMENT: ACTIVITIES ARE NOT PREVENTED

## 2020-07-02 ASSESSMENT — PAIN DESCRIPTION - ONSET
ONSET: ON-GOING

## 2020-07-02 ASSESSMENT — PAIN DESCRIPTION - FREQUENCY
FREQUENCY: INTERMITTENT
FREQUENCY: CONTINUOUS
FREQUENCY: INTERMITTENT

## 2020-07-02 NOTE — PLAN OF CARE
Problem: Discharge Planning:  Goal: Discharged to appropriate level of care  Description: Discharged to appropriate level of care  Outcome: Ongoing  Note: Patient planned for discharge on July 3rd to home with home health care. Her  will be with her 24 hours.      Problem: Cardiac Output - Decreased:  Goal: Cardiac output within specified parameters  Description: Cardiac output within specified parameters  Outcome: Met This Shift  Note: Vital signs remained stable      Problem: Pain - Acute:  Goal: Pain level will decrease  Description: Pain level will decrease  Note: Pain controlled with PRN ordered medications

## 2020-07-02 NOTE — PROGRESS NOTES
109/43   Pulse: 88 86 86 85   Resp:       Temp:  98.2 °F (36.8 °C)     TempSrc:  Oral     SpO2: 93% 93% 94% 95%   Weight:    167 lb 12.3 oz (76.1 kg)   Height:           Intake/Output Summary (Last 24 hours) at 7/2/2020 0755  Last data filed at 7/2/2020 0630  Gross per 24 hour   Intake 1320 ml   Output 1550 ml   Net -230 ml     I/O last 3 completed shifts: In: 1320 [P.O.:1320]  Out: 8769 [Urine:1450; Chest Tube:100]  Wt Readings from Last 3 Encounters:   07/02/20 167 lb 12.3 oz (76.1 kg)   06/24/20 167 lb 9.6 oz (76 kg)   05/26/20 173 lb (78.5 kg)       Admit Wt: Weight: 163 lb (73.9 kg)   Todays Wt: Weight: 167 lb 12.3 oz (76.1 kg)    TELEMETRY: Sinus with non-specific intraventricular block    Physical Exam:   General:  Awake, alert, NAD  Eyes: Anicteric, EOMI, PERRL  Skin:  Warm and dry  Neck:  No JVP  Chest:  Clear to auscultation, respiration normal, RRR, +systolic ejection murmur  Cardiovascular:  RRR S1S2  Abdomen:  Soft NTND  Extremities:  Trace peripheral edema    Objective    Labs:   Recent Labs     06/30/20 0446 07/01/20 0449 07/02/20  0433   * 136 133*   K 4.2 4.4 3.8   BUN 7 6* 8   CREATININE <0.5* <0.5* <0.5*    101 99   CO2 23 28 28   GLUCOSE 109* 147* 127*   CALCIUM 9.1 10.5 9.5   MG 2.30 1.90 1.80     Recent Labs     06/30/20  0446 07/01/20  0449 07/02/20  0433   WBC 12.5* 13.2* 9.2   HGB 10.1* 10.1* 9.1*   HCT 30.0* 29.9* 26.5*   * 139 144   MCV 96.0 95.9 95.0     No results for input(s): CHOLTOT, TRIG, HDL in the last 72 hours. Invalid input(s): LIPIDCOMM, CHOLHDL, VLDCHOL, LDL  Recent Labs     06/29/20  1232 06/29/20 2001 06/30/20  0446   INR 1.16* 1.07 1.19*     No results for input(s): CKTOTAL, CKMB, CKMBINDEX, TROPONINI in the last 72 hours. No results for input(s): BNP in the last 72 hours. No results for input(s): NTPROBNP in the last 72 hours. No results for input(s): TSH in the last 72 hours.     Imaging:   I personally reviewed imaging studies including CXR, Stress test, TTE/CORRIE.    2/2020 [facility]:  Angiographic Findings:  Right dominant system  Left Main:  Normal  Left Anterior Descending:  Normal  Circumflex:  Normal  Right Coronary:  Normal  Left Ventriculogram:  Not performed. Hemodynamics (mm Hg):  Apical Left Ventricular Pressure: 222/1, 11  LVOT Left ventricular pressure: 120/7, 22  Central Aortic Pressure:  115/65 (83)     3/11/2020 [facility]: EF 55-60%. Severe asymmetric hypertrpohy of the basal septum. Moderate MR.     2/2020 [facility] (): HCM. Mod HK of basal anterior and AS segments. Pap muscles displaced to the apex and not well organized. Rebeca flor enhancement present. + SITA. Severe MR.     12/2019 Echo: EF 65-70%. LVOT grd of 103 mm Hg. Grade 2 DD. Severe MR. Mild TR. Assessment & Plan:     Assessment:  - HCM POD3 s/p myectomy  - Severe MR POD3 s/p replacement w/ 25mm [employer] Mosaic bovine pericardial BPV  - Hypercholesterolemia    Recs:  - Continue post-op care  - Continue metoprolol tartrate 25 mg po BID, lisinopril 2.5 mg po qd  - Continue ASA 81 qd, Plavix 75 mg po qd  - Continue Lipitor 40 mg po qhs  - Holding Lasix in setting of hyponatremia  - Will follow patient while in house    Thank you for allowing to us to participate in the care of Valentín Rodriguez. Please call our service with questions. All questions and concerns were addressed to the patient/family. Alternatives to my treatment were discussed. The note was completed using EMR. Every effort was made to ensure accuracy; however, inadvertent computerized transcription errors may be present. I have personally reviewed the reports and images of labs, radiological studies, cardiac studies including ECG's and telemetry, current and old medical records.      Carl Burgess MD   Internal Medicine, PGY3  7/2/2020 7:55 AM  [place] via [place]

## 2020-07-02 NOTE — PROGRESS NOTES
RESPIRATORY THERAPY ASSESSMENT    Name:  Lor Benson  Record Number:  [id number]  Age: 76 y.o. Gender: female  : 1955  Today's Date:  2020  Room:  5597/3225-15    Assessment     Is the patient being admitted for a COPD or Asthma exacerbation? No   (If yes the patient will be seen every 4 hours for the first 24 hours and then reassessed)    Patient Admission Diagnosis    Minimum Predicted Vital Capacity: 918    Actual Vital Capacity: 750      Allergies  Allergies   Allergen Reactions    Tolectin [Tolmetin] Swelling     Face & Lips       Pulmonary History:COPD and Asthma  Home Oxygen Therapy:  room air  Home Respiratory Therapy: Pro-Air and Symbicort    Current Respiratory Therapy:  Albuterol, Brovana and Pulmicort   Treatment Type: HHN, IS, Positive expiratory pressure therapy  Medications: Albuterol    Respiratory Severity Index(RSI)   Patients with orders for inhalation medications, oxygen, or any therapeutic treatment modality will be placed on Respiratory Protocol. They will be assessed with the first treatment and at least every 72 hours thereafter. The following severity scale will be used to determine frequency of treatment intervention.     Smoking History: Pulmonary Disease or Smoking History, Greater than 15 pack year = 2    Social History  Social History     Tobacco Use    Smoking status: Former Smoker     Packs/day: 1.00     Years: 38.00     Pack years: 38.00     Types: Cigarettes     Start date: 1972     Last attempt to quit: 2010     Years since quitting: 10.1    Smokeless tobacco: Never Used   Substance Use Topics    Alcohol use: Not Currently     Alcohol/week: 0.0 standard drinks     Frequency: 4 or more times a week     Drinks per session: 1 or 2     Binge frequency: Never    Drug use: No       Recent Surgical History: Thoracic (Mitral Valve) = 3  Past Surgical History  Past Surgical History:   Procedure Laterality Date    CARDIAC CATHETERIZATION Other:     Response to education:  Good     Is patient being placed on Home Treatment Regimen? No     Does the patient have everything they need prior to discharge? NA     Comments: Breath sounds are diminished and SpO2 is 90-91% on room air     Plan of Care: Continue with Albuterol q4 wa, Brovana and Conrad     Electronically signed by Gisella Bowman RCP on 7/2/2020 at 5:34 PM    Respiratory Protocol Guidelines     1. Assessment and treatment by Respiratory Therapy will be initiated for medication and therapeutic interventions upon initiation of aerosolized medication. 2. Physician will be contacted for respiratory rate (RR) greater than 35 breaths per minute. Therapy will be held for heart rate (HR) greater than 140 beats per minute, pending direction from physician. 3. Bronchodilators will be administered via Metered Dose Inhaler (MDI) with spacer when the following criteria are met:  a. Alert and cooperative     b. HR < 140 bpm  c. RR < 30 bpm                d. Can demonstrate a 2-3 second inspiratory hold  4. Bronchodilators will be administered via Hand Held Nebulizer [facility]) to patients when ANY of the following criteria are met  a. Incognizant or uncooperative          b. Patients treated with HHN at Home        c. Unable to demonstrate proper use of MDI with spacer     d. RR > 30 bpm   5. Bronchodilators will be delivered via Metered Dose Inhaler (MDI), HHN, Aerogen to intubated patients on mechanical ventilation. 6. Inhalation medication orders will be delivered and/or substituted as outlined below. Aerosolized Medications Ordering and Administration Guidelines:    1. All Medications will be ordered by a physician, and their frequency and/or modality will be adjusted as defined by the patients Respiratory Severity Index (RSI) score.   2. If the patient does not have documented COPD, consider discontinuing anticholinergics when RSI is less than 9.  3. If the bronchospasm worsens (increased RSI),

## 2020-07-02 NOTE — OP NOTE
[address]               [address]                                OPERATIVE REPORT    PATIENT NAME: Maureen Blum                 :        1955  MED REC NO:   [id number]                          ROOM:       4518  ACCOUNT NO:   [de-identified]                           ADMIT DATE: 2020  PROVIDER:     Dilip Smalls MD    DATE OF PROCEDURE:  2020    PREOPERATIVE DIAGNOSES:  Hypertrophic obstructive cardiomyopathy; class  II diastolic heart failure; mitral valve regurgitation, severe. POSTOPERATIVE DIAGNOSES:  Hypertrophic obstructive cardiomyopathy; class  II diastolic heart failure; mitral valve regurgitation, severe;  rheumatic mitral valve disease; acute blood loss anemia. PROCEDURES:  Transesophageal echocardiography; total cardiopulmonary  bypass; resection of hypertrophic septal musculature and left  ventricular outflow tract; mitral valve replacement with 25-mm Medtronic  Mosaic bovine pericardial bioprosthesis; total cardiopulmonary bypass;  intercostal nerve block x5 levels bilaterally. SURGEON:  Mariann Awan MD    ASSISTANTS:  Adrianna Hearn MD; and Jas Jackson CSA    ANESTHESIA:  General endotracheal.    EBL: autotransfusion    INDICATIONS:  The patient is a 58-year-old woman with progressive heart  failure and exertional dyspnea. In the course of being evaluated for  the case of this, she was found to have a severe subaortic valvular  stenosis secondary to asymmetric septal hypertrophy. In addition to  this, she was also found to have moderate-to-severe mitral valve  regurgitation. In deference of these findings, she was referred for  surgical correction of these problems. Her surgery has been rescheduled  multiple times because of electrolyte abnormalities that have hopefully  been corrected. Initially, she had urinary tract infection which needed  to be managed.   Given all this, she Dacron tapes. The patient was systemically heparinized. Routine cannulation of the distal ascending aorta and both venae cavae  was completed. Perfusion was initiated once an adequate ACT was noted. The pericardial valve was flushed throughout the procedure with 5 liters  per minute of carbon dioxide. A vented antegrade cardioplegic cannula  was placed in the highest portion of the ascending aorta. A manually  inflating retrograde cardioplegic cannula was used and placed in the  coronary sinus under direct vision once the atrium had been opened. Once on stable cardiopulmonary bypass and having completed all  preparations for the procedure, a second mini timeout procedure was  completed to make sure that all preparations were needed for the  proposed procedure. This being the case, the aorta was then  cross-clamped. Cardioplegia was delivered through the aortic root. Initial dose of general anesthesia was given. This provided thorough  myocardial cooling and satisfactory arrest.  Additional doses were given  primarily through the retrograde route via the cannula placed in the  coronary sinus directly and as a last dose through the aortic root once  again once the aorta had been closed. With the heart arrested, a transverse aortotomy was made just above the  level of sinotubular junction. We had excellent visibility in the left  ventricular outflow tract. The asymmetric musculature was as expected. What I was surprised to see was obvious rheumatic changes of her mitral  valve. This explained the appearance of the leaflets and the chordae  pre-procedurally. Also explained the rather severe mitral valve  regurgitation she had. We then made an oblique right atriotomy and in the left atrium through  an incision in the fossa ovalis extending up to the septum secundum. Superb visibility of the mitral valve was seen. We could see  commissural fusion both anteriorly and posteriorly.   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 Having appreciated this, attention was returned to the aortic outflow  tract. With the aortic valve being carefully retracted and held out of  the way, I then resected an extensive amount of musculature from the  septum going well down into the septum. Vertical incisions were made in  the anterolateral portion of the muscular septum to break the muscular  [place] to help with relaxation of the outflow tract. At the conclusion  of this resection, the outflow tract was wide open and completely normal  in diameter. In returning to the left atrium, the anterior leaflet of the mitral  valve was excised. The orifice was able to accommodate a 25 mm  bioprosthesis. Horizontal mattress pledgeted two Ethibond sutures were  then placed circumferentially. The posterior leaflet was reefed up into  the sutures. The sutures were then passed through a 25 mm Medtronic  Mosaic bovine porcine bioprosthesis. The valve was lowered into  position and once nicely seated, the sutures were secured with  Cor-Knots. The entirety of the circumferential valve was inspected with a dental  mirror and also with direct vision to make sure that good  circumferential tissue coaptation was in evidence. The valve was also  inspected from the aortic outflow tract. I tried to orient the valve in  such a way as to not to have one of the posts interfering with the  outflow tract but I was off by a few degrees. One of the posts was,  indeed, sticking out into part of the outflow tract. My hope was that  with _____ in the ventricle that this would not be an issue, it turned  out not to be. With all those being done, the left atriotomy and right atriotomy were  both closed with running 4-0 Prolene sutures. Similarly, the aortotomy  was closed with running 4-0 Prolene suture using the overlapping  technique that is my custom.   The heart was then thoroughly and  meticulously deaired after releasing the encircling tapes on the venae  cavae. Once deairing was complete, the cross-clamp was released. The  aortic valve was fully competent. Only atrial activity was visible on the electrocardiogram.  Ventricular  pacing was needed throughout the remainder of the procedure as she was  in complete heart block. With ventricular pacing and after warming had  been completed, she was readily weaned from cardiopulmonary bypass  without need for pressor support and, indeed, only required some  nitroglycerin. She came up the heart-lung machine quite easily. She remained  hemodynamically stable thereafter. Initial postprocedural CORRIE showed  that the one post of the mitral prosthesis was shadowing the left  ventricular outflow tract, so we could not assess the adequacy of  muscular resection. I thought this was a bit frustrating. I was  confident in the amount of musculature I had resected and also seeing  that she had the heart block that in all likelihood we took a fairly  deep cut into the septum to resect the musculature. Of note was that,  she had 0 thrill left in her ascending aorta whereas preoperatively she  had a very definable and palpable thrill in the ascending aorta from  _____ from the outflow tract. The venae cavae were then decannulated. The patient's heparin was then  reversed with protamine sulfate. Once all protamine had been given, the  aortic cannula was removed and the site secured and then oversewn with  5-0 Prolene over-and-over suture. Given that she was pacemaker  dependent, we placed two bipolar ventricular pacing wires in the  anterior surface of the right ventricle. The pericardium was tacked  together anteriorly with a total of five simple 0-Vicryl sutures. A  single 36-Angolan chest tube was placed anteriorly. Throughout this, the  patient's heparin was being reversed with protamine sulfate.   Once all  protamine had been

## 2020-07-02 NOTE — PROGRESS NOTES
Progress Note  Hospital Day: 4  POD#  3  S/P resection of hypertrophic muscle from septal LVOT; mitral valve replacement w/ 25mm Medtronic Mosaic bovine pericardial bioprosthetic valve )2020)    Chief Complaint: Postop follow up    Ms. Ann Goss is sitting up in the chair with smile on face, feels well      24 hour Interval History:    - Braun, pacing wires, chest tubes dc. Ambulated around ICU. Weaning O2   - Transitioned. OOB. Small walks, poor appetite    VITAL SIGNS   Temperature:  Current - Temp: 98.2 °F (36.8 °C); Max - Temp  Av.5 °F (36.9 °C)  Min: 98 °F (36.7 °C)  Max: 99 °F (37.2 °C)    Respiratory Rate : Resp  Av.6  Min: 13  Max: 26    Pulse Range: Pulse  Av.3  Min: 76  Max: 110    Blood Pressure Range:  Systolic (37WBD), ORM:559 , Min:95 , CAROLE:764   ; Diastolic (59PBW), CWR:41, Min:37, Max:53    Pulse ox Range: SpO2  Av.8 %  Min: 89 %  Max: 100 %  O2 Flow Rate (L/min): 1 L/min    Admission Weight: Weight: 163 lb (73.9 kg)    Current Weight: 1 lbs up from preop  Patient Vitals for the past 96 hrs (Last 3 readings):   Weight   20 0600 167 lb 12.3 oz (76.1 kg)   20 0645 169 lb 8.5 oz (76.9 kg)   20 0645 177 lb 14.6 oz (80.7 kg)     I/O:     Intake/Output Summary (Last 24 hours) at 2020 3926  Last data filed at 2020 0630  Gross per 24 hour   Intake 1320 ml   Output 1550 ml   Net -230 ml     Urine (braun): 4858-3349-1185 ml/shift  Chest tube: 140-20-50  ml/shift  serosanguinous, on suction, no leak    LINES/ACCESS:   Central Access: RIJ Day #: 3  Peripheral Access: Lt forearm Day #: 3                                  Diet: DIET CARDIAC; No Added Salt (3-4 GM);  Daily Fluid Restriction: 1500 ml    MEDICATIONS:      potassium chloride  20 mEq Oral Daily    ciprofloxacin  500 mg Oral 2 times per day    metoprolol tartrate  25 mg Oral BID    DULoxetine  90 mg Oral Daily    sodium chloride flush  10 mL Intravenous 2 times per day    lisinopril 2.5 mg Oral Lunch    chlorhexidine  15 mL Mouth/Throat BID    [Held by provider] furosemide  40 mg Intravenous BID    magnesium oxide  400 mg Oral BID    mupirocin   Nasal BID    atorvastatin  40 mg Oral Nightly    aspirin  325 mg Oral Daily    clopidogrel  75 mg Oral Daily    polyethylene glycol  17 g Oral Daily    levothyroxine  125 mcg Oral Daily    budesonide  0.25 mg Nebulization BID    Arformoterol Tartrate  15 mcg Nebulization BID    albuterol  2.5 mg Nebulization 4x daily     DATA REVIEW:   CBC:   Recent Labs     06/29/20  1232 06/29/20 2001 06/30/20 0446 07/01/20 0449 07/02/20  0433   WBC 16.6* 15.8* 12.5* 13.2* 9.2   HGB 10.9* 10.5* 10.1* 10.1* 9.1*   HCT 32.5* 31.2* 30.0* 29.9* 26.5*   MCV 95.8 95.6 96.0 95.9 95.0    194 128* 139 144     BMP:   Recent Labs     06/29/20  1232 06/29/20 2001 06/30/20 0446 07/01/20 0449 07/02/20  0433    142 135* 136 133*   K 4.2 4.4 4.2 4.4 3.8   * 111* 106 101 99   CO2 23 21 23 28 28   GLUCOSE 111* 153* 109* 147* 127*   BUN 8 9 7 6* 8   CREATININE 0.6 0.5* <0.5* <0.5* <0.5*   CALCIUM 7.8* 8.6 9.1 10.5 9.5   MG 3.00* 2.40 2.30 1.90 1.80     Accucheck Glucoses:   Recent Labs     07/01/20  0615 07/01/20  0956 07/01/20  1739 07/01/20 2015 07/02/20  0650   POCGLU 139* 149* 136* 136* 115*     PT/INR:   Recent Labs     06/29/20  1232 06/29/20 2001 06/30/20 0446   PROTIME 13.5* 12.4 13.8*   INR 1.16* 1.07 1.19*     APTT:   Recent Labs     06/29/20  1232   APTT 41.9*     ABG:    Lab Results   Component Value Date    PHART 7.390 06/29/2020    PO2ART 94.3 06/29/2020    BFE4BNB 37.3 06/29/2020    J1MVJITF 97 06/29/2020    BDR7ORC 24 06/29/2020    KFK4CZH 22.6 06/29/2020    BEART -2 06/29/2020    BEART 0 06/29/2020    BEART -5 06/29/2020    BEART -4 06/29/2020    BEART -3 06/29/2020     Urine Culture 6/29/2020 (intraop):    Enterococcus faecalis     EKG 7/2/2020: NSR w occas PVC's    ASSESSMENT:   Patient Active Problem List:     Hypertension Microscopic colitis     GERD (gastroesophageal reflux disease)     Migraine     Osteoporosis     Anxiety     Neck arthritis C6/C7     Nephrolithiasis     Hypothyroidism     Endometrial hyperplasia     Chronic nausea     Mild persistent asthma without complication     Mitral valve regurgitation     Fatty liver     Hypertrophic cardiomyopathy (HCC)     Multiple thyroid nodules     Bacteriuria     S/P coronary angiogram     Left ventricular outflow tract obstruction     Severe mitral regurgitation     Preop examination     Hypertrophic obstructive cardiomyopathy with diastolic heart failure (HCC)     Obstructive hypertrophic cardiomyopathy (HCC)     Mixed hyperlipidemia     Status post ventricular septal myectomy     H/O mitral valve replacement      Neuro: awake, alert and oriented x 3  CV:   Sinus  Pulm:  Diminished, using IS to 750 ml  Abd:  soft, non-tender; bowel sounds normal, +BM this AM  Wounds: clean, dry and intact  Ext: warm, mild edema    PLAN:    Neuro - PT/OT -  PT score 17/24 on the AM-PAC                               OT score 19/24 on the AM-PAC        - continue preop cymbalta, prn Xanax/Valium  · CV - on Beta blocker, ace inhibitor, statin, ASA/Plavix  · Pulm - wean O2 as tolerated, continue incentive spirometry, continue preop inhalers  · Renal - creatinine stable - hold Lasix for hyponatremia  · Heme - stable  · ID - D#2 Cipro for pre-existing UTI intraop urine culture E Facaelis (7-10 day course)  · FEN - cardiac diet with 1500 ml fluid restriction     - DC SSI  · Continue preop levothyroxine and cymbalta  · VTE prophylaxis - SCD and ILYA hose  · Disposition -  Continue PT/OT, home health at discharge   Discussed patient with Dr Syed Rob who is agreeable to assessment and plan.         Rj Key, APRN  [phone] pager  7/2/2020  8:07 AM

## 2020-07-02 NOTE — DISCHARGE SUMMARY
DISCHARGE SUMMARY    Patient ID: Lili Araujo  MRN:  [id number]  YOB: 1955      Admission Date:  6/29/2020  5:04 AM  Discharge Date:  7/3/2020 12:06 PM     Principle Diagnosis:  Hypertrophic obstructive cardiomyopathy with diastolic heart failure ([address]    Secondary Diagnosis:  Principal Problem:    Hypertrophic obstructive cardiomyopathy with diastolic heart failure ([address]  Active Problems:    Hypertension    Hypothyroidism    Bacteriuria    Obstructive hypertrophic cardiomyopathy ([address]    Mixed hyperlipidemia    Status post ventricular septal myectomy    H/O mitral valve replacement  Resolved Problems:    * No resolved hospital problems. *      Procedure:  Transesophageal echocardiography; total cardiopulmonary  bypass; resection of hypertrophic septal musculature and left  ventricular outflow tract; mitral valve replacement with 25-mm Medtronic  Mosaic bovine pericardial bioprosthesis; total cardiopulmonary bypass;  intercostal nerve block x5 levels bilaterally. History:  The patient is a 76 y.o.  female with progressive heart  failure and exertional dyspnea. In the course of evaulation, she was found to have a severe subaortic valvular stenosis secondary to asymmetric septal hypertrophy. Additionally, she was also found to have moderate-to-severe mitral valve regurgitation. In deference of these findings, she was referred for  surgical correction of these problems. Her surgery has been rescheduled  multiple times because of electrolyte abnormalities and a urinary tract infection. Hospital Course: The patient elective resection of hypertrophic muscle from septal LVOT; miital valve replacement w/ 25mm Medtronic Mosaic bovine pericardial bioprosthetic valve on June 29, 2020. In surgery, the preoperative CORRIE noted that her left ventricular ejection fraction was around 55%.   She had severe mitral valve regurgitation with what appeared to be a shortened and thickened chordae tendineae and thickened and fibrotic mitral valve leaflets, both anteriorly and posteriorly. The central hypertrophy was relatively appreciable in the long axis view. Intraoperatively, of note is that she had rheumatic mitral valve disease. All chordae tendineae were quite thickened and foreshortened. Both the anterior and posterior leaflets were similarly thickened and fibrotic. The asymmetric hypertrophy was as expected. Upon completion of the resection, the outflow tract was wide open. Postoperative CORRIE was somewhat unfulfilling in that the mitral valve prosthesis looked to be well seated and had 0 perivalvular leakage. It however was shadowing the outflow tract to the point where one could not see the adequacy of the resection. Of note, however, was that preoperatively she had a palpable thrill in her ascending aorta and postoperatively she had no thrill in the ascending aorta suggesting that the turbulent flow from the outflow tract had been corrected. Her operative course was uncomplicated and she transferred to ICU for ongoing care. She was extubated the day of surgery. She transitioned to progressive care on the first postoperative day. The urinary catheter, pacing wires and chest tubes were removed on the second postop day. She was ambulating around the ICU. She was restarted on her preop depression medications. She did have hyponatremia which resolved by holding her diuretic. She was hemodynamically stable and discharged home on the fourth postoperative day.       Consults:  IP CONSULT TO CARDIAC REHAB  IP CONSULT TO CASE MANAGEMENT  IP CONSULT TO SOCIAL WORK  IP CONSULT TO DIETITIAN  IP CONSULT TO HOME CARE NEEDS     Significant Diagnostic Studies:   Chest X-ray  EKG    Treatments: IV hydration, antibiotics: Ancef, vancomycin and Cipro, cardiac meds: lisinopril (generic) and metoprolol, anticoagulation: ASA and Plavix,  and therapies: PT and OT    LABS:  Lab Results   Component Value Date    WBC 6.9 07/03/2020    HGB 9.4 (L) 07/03/2020    HCT 27.3 (L) 07/03/2020    MCV 95.7 07/03/2020     07/03/2020     Lab Results   Component Value Date     07/03/2020    K 3.8 07/03/2020     07/03/2020    CO2 27 07/03/2020    BUN 7 07/03/2020    CREATININE <0.5 07/03/2020    GLUCOSE 118 07/03/2020    CALCIUM 9.6 07/03/2020        Condition at discharge: Stable     Disposition:  Home with home healthcare    Physical Exam on discharge day:   BP (!) 119/54   Pulse 80   Temp 99.5 °F (37.5 °C) (Oral)   Resp 18   Ht 5' 5\" (1.651 m)   Wt 167 lb 12.3 oz (76.1 kg)   SpO2 92%   BMI 27.92 kg/m²   Neuro: awake, alert and oriented x 3  CV:   Sinus  Pulm:  Diminished, using IS to 750 ml  Abd:  soft, non-tender; bowel sounds normal, +BM  Wounds: clean, dry and intact  Ext: warm, mild edema    Discharge Medications:      Medication List      START taking these medications    aspirin 325 MG EC tablet  Take 1 tablet by mouth daily     atorvastatin 40 MG tablet  Commonly known as:  LIPITOR  Take 1 tablet by mouth nightly     ciprofloxacin 500 MG tablet  Commonly known as:  CIPRO  Take 1 tablet by mouth every 12 hours for 7 days     clopidogrel 75 MG tablet  Commonly known as:  PLAVIX  Take 1 tablet by mouth daily     lisinopril 2.5 MG tablet  Commonly known as:  PRINIVIL;ZESTRIL  Take 1 tablet by mouth Daily with lunch     oxyCODONE-acetaminophen 5-325 MG per tablet  Commonly known as:  PERCOCET  Take 1 tablet by mouth every 4 hours as needed for Pain for up to 20 days. CHANGE how you take these medications    Symbicort 80-4.5 MCG/ACT Aero  Generic drug:  budesonide-formoterol  INHALE TWO PUFFS BY MOUTH TWICE A DAY  What changed:  See the new instructions. CONTINUE taking these medications    ALPRAZolam 0.5 MG tablet  Commonly known as:  XANAX  Take 1 tablet by mouth daily as needed for Anxiety.      budesonide 3 MG extended release capsule  Commonly known as:  ENTOCORT EC     butalbital-aspirin-caffeine -40 MG capsule  Commonly known as:  FIORINAL  TAKE ONE CAPSULE BY MOUTH EVERY 4 HOURS AS NEEDED FOR HEADACHES     CALTRATE 600+D PO     CENTRUM SILVER PO     dicyclomine 20 MG tablet  Commonly known as:  BENTYL  TAKE ONE TABLET BY MOUTH FOUR TIMES A DAY AS NEEDED     DULoxetine 30 MG extended release capsule  Commonly known as:  CYMBALTA  TAKE THREE CAPSULES BY MOUTH DAILY     famotidine 40 MG tablet  Commonly known as:  PEPCID  Take 1 tablet by mouth daily     FIBER PO     Fish Oil 1200 MG Caps     FLAXSEED OIL PO     levothyroxine 125 MCG tablet  Commonly known as:  SYNTHROID  TAKE ONE TABLET BY MOUTH DAILY     meloxicam 15 MG tablet  Commonly known as:  MOBIC  TAKE ONE TABLET BY MOUTH DAILY     metoprolol tartrate 25 MG tablet  Commonly known as:  LOPRESSOR  TAKE 1 TABLET TWICE A DAY     prochlorperazine 10 MG tablet  Commonly known as:  COMPAZINE  Take one tablet by mouth every 6 hours as needed     vitamin D3 10 MCG (400 UNIT) Tabs tablet  Commonly known as:  CHOLECALCIFEROL        STOP taking these medications    AEROCHAMBER PLUS-FLOW SIGNAL Misc     diazePAM 5 MG tablet  Commonly known as:  VALIUM     loratadine 10 MG tablet  Commonly known as:  CLARITIN     nitrofurantoin 100 MG capsule  Commonly known as:  Macrodantin     omeprazole 40 MG delayed release capsule  Commonly known as:  PRILOSEC     potassium chloride 20 MEQ extended release tablet  Commonly known as:  KLOR-CON M     ProAir  (90 Base) MCG/ACT inhaler  Generic drug:  albuterol sulfate HFA     vitamin C 500 MG tablet  Commonly known as:  ASCORBIC ACID           Where to Get Your Medications      These medications were sent to [facility] [address] [place] [address], [id number]  [address] [place] [address]    Phone:  [phone]   · aspirin 325 MG EC tablet  · atorvastatin 40 MG tablet  · ciprofloxacin 500 MG tablet  · clopidogrel 75 MG tablet  · lisinopril 2.5 MG tablet     You

## 2020-07-02 NOTE — PROGRESS NOTES
Attempted to wean patient to room air. Patient's O2 saturations maintained in the high 80s. Patient placed back on 1 L nasal cannula. Patient ambulated up to the chair this morning. Will continue to monitor.

## 2020-07-02 NOTE — CARE COORDINATION
SW made referral to [facility] to deliver rolling walker to patient in room today.     Electronically signed by DELL Mcmahon, SIDNEY on 7/2/2020 at [address] [facility], DELL, [address] [place] [address] Worker  [phone]

## 2020-07-02 NOTE — PROGRESS NOTES
Physical Therapy  Facility/Department: [facility] ICU  Daily Treatment Note  NAME: Edison Ferrell  : 1955  MRN: [id number]    Date of Service: 2020    Discharge Recommendations:    Edison Ferrell scored a 18/24 on the AM-PAC short mobility form. Current research shows that an AM-PAC score of 18 or greater is typically associated with a discharge to the patient's home setting. Based on the patient's AM-PAC score and their current functional mobility deficits, it is recommended that the patient have 2-3 sessions per week of Physical Therapy at d/c to increase the patient's independence. At this time, this patient demonstrates the endurance and safety to discharge home with home PT and a follow up treatment frequency of 2-3x/wk. Please see assessment section for further patient specific details. If patient discharges prior to next session this note will serve as a discharge summary. Please see below for the latest assessment towards goals. PT Equipment Recommendations  Equipment Needed: (rolling walker)    Assessment   Body structures, Functions, Activity limitations: Decreased functional mobility ; Decreased endurance;Decreased posture;Decreased strength  Assessment: Decreased assist for transfers. Increased gt endurance. Ambulated without AD this date - needing CGA for safety. Should cont to progress well with gradual increase in activity. Plans to go home with  able to assist.  Rec cont skilled PT to maximize mobility and independence  Treatment Diagnosis: impaired functional mobility s/p mitral valve replacement  PT Education: PT Role;General Safety; Functional Mobility Training;Gait Training;Transfer Training  Patient Education: Pt v.u   REQUIRES PT FOLLOW UP: Yes     Patient Diagnosis(es): Diagnoses of Nonrheumatic mitral valve regurgitation and Obstructive hypertrophic cardiomyopathy ([address] were pertinent to this visit.      has a past medical history of Adrenal adenoma, Anxiety, Arthritis, Asthma, Chronic nausea, Environmental allergies, GERD (gastroesophageal reflux disease), HOCM (hypertrophic obstructive cardiomyopathy) ([address] HTN (hypertension), Irritable bowel syndrome, Lung disease, Microscopic colitis, Migraine, Mitral regurgitation, Nephrolithiasis, Nosebleed, Osteoporosis, Rash, Restless leg syndrome, TMJ dysfunction, Vitreous detachment, and Wrist fracture, bilateral.   has a past surgical history that includes Wrist fracture surgery (Left, 2009); knee surgery (Right, 1975); Ovary removal (Left); Colonoscopy (7/13); Colonoscopy (03/29/2018); Upper gastrointestinal endoscopy (03/2018); cardiovascular stress test (2016); Cholecystectomy, laparoscopic (N/A, 3/27/2019); Cardiac catheterization (02/28/2020); transesophageal echocardiogram (03/11/2020); and Mitral valve repair (N/A, 6/29/2020). Restrictions  Position Activity Restriction  Other position/activity restrictions: ambulate, sternal precautions  Subjective   General  Chart Reviewed: Yes  Additional Pertinent Hx: Pt is a 76 y.o. female adm s/p mitral valve replacement on 6/29.   PMH: HTN, asthma, arthritis, restless leg syndrome, osteoporosis, IBS, GERD, anxiety, adrenal adenoma  Family / Caregiver Present: Yes()  Subjective  Subjective: Pt found standing in room with RN present  Pain Screening  Patient Currently in Pain: No  Vital Signs  Patient Currently in Pain: No       Orientation     Cognition      Objective      Transfers  Sit to Stand: Stand by assistance(holds pillow)  Stand to sit: Stand by assistance(holding pillow)  Ambulation  Ambulation?: Yes  Ambulation 1  Device: No Device  Assistance: Contact guard assistance(to SBA)  Quality of Gait: mildly guarded, decreased amna, decreased bilat step length/height, occasional path deviation requiring CGA  Distance: 150'                                  G-Code     OutComes Score                                                     AM-PAC Score  AM-PAC Inpatient Mobility Raw Score : 16 (06/30/20 0924)  AM-PAC Inpatient T-Scale Score : 40.78 (06/30/20 0924)  Mobility Inpatient CMS 0-100% Score: 54.16 (06/30/20 0924)  Mobility Inpatient CMS G-Code Modifier : CK (06/30/20 0924)          Goals  Short term goals  Time Frame for Short term goals: By discharge  Short term goal 1: Sit to stand supervision. Ongoing  Short term goal 2: Pt will amb >200' with LRAD supervision.   Ongoing  Patient Goals   Patient goals : none stated    Plan    Plan  Times per week: 2-5  Current Treatment Recommendations: Functional Mobility Training, Endurance Training, Gait Training, Patient/Caregiver Education & Training, Safety Education & Training, Strengthening, Stair training, Equipment Evaluation, Education, & procurement, Transfer Training  Safety Devices  Type of devices: Chair alarm in place, Call light within reach, Left in chair, Nurse notified  Restraints  Initially in place: No     Therapy Time   Individual Concurrent Group Co-treatment   Time In 1158         Time Out 1214         Minutes 16              Timed Code Treatment Minutes:  16    Total Treatment Minutes:  [address], PT

## 2020-07-02 NOTE — PROGRESS NOTES
Occupational Therapy  Facility/Department: [facility] ICU  Daily Treatment Note  NAME: Richard Posey  : 1955  MRN: [id number]    Date of Service: 2020    Discharge Recommendations:     OT Equipment Recommendations  Equipment Needed: No    Assessment   Performance deficits / Impairments: Decreased functional mobility ; Decreased ADL status; Decreased safe awareness;Decreased endurance;Decreased cognition  Assessment: Pt demonstrated increased sit to stand transfers and increased activity tolerance to participate in funcitonal mobility in hallway. Pt declined ADLs this date, however would benefit from further ADL assessment to increase independence at home. Pt required verbal cueing to complete PLB and deep breathing techniques during exercises and mobility. Pt would benefit from continued skilled OT while in acute care, continue OT POC. Treatment Diagnosis: impaired ADLs/ functional transfers / decreased endurance 2/2 MVR sx   OT Education: OT Role;Plan of Care  Patient Education: verb partial understanding - reinforce as needed   REQUIRES OT FOLLOW UP: Yes  Activity Tolerance  Activity Tolerance: Patient Tolerated treatment well  Activity Tolerance: Pt reported min fatigue upon return to bedside chair, O2 sats at 91% with mobility in hallway, recovering quickly to high 90s with standing rest break and cueing for deep breathing. Upon return to room seated in bedside chair pts O2 down to 88% requiring increased time to recover. /46, RN aware. Safety Devices  Safety Devices in place: Yes  Type of devices: Nurse notified;Call light within reach; Left in chair;Chair alarm in place(RT in room for Tx at end of session)         Patient Diagnosis(es): Diagnoses of Nonrheumatic mitral valve regurgitation and Obstructive hypertrophic cardiomyopathy ([address] were pertinent to this visit.       has a past medical history of Adrenal adenoma, Anxiety, Arthritis, Asthma, Chronic nausea, Environmental allergies, GERD (gastroesophageal reflux disease), HOCM (hypertrophic obstructive cardiomyopathy) [address] HTN (hypertension), Irritable bowel syndrome, Lung disease, Microscopic colitis, Migraine, Mitral regurgitation, Nephrolithiasis, Nosebleed, Osteoporosis, Rash, Restless leg syndrome, TMJ dysfunction, Vitreous detachment, and Wrist fracture, bilateral.   has a past surgical history that includes Wrist fracture surgery (Left, 2009); knee surgery (Right, 1975); Ovary removal (Left); Colonoscopy (7/13); Colonoscopy (03/29/2018); Upper gastrointestinal endoscopy (03/2018); cardiovascular stress test (2016); Cholecystectomy, laparoscopic (N/A, 3/27/2019); Cardiac catheterization (02/28/2020); transesophageal echocardiogram (03/11/2020); and Mitral valve repair (N/A, 6/29/2020). Restrictions  Position Activity Restriction  Other position/activity restrictions: ambulate, sternal precautions  Subjective   General  Chart Reviewed: Yes  Additional Pertinent Hx: Admit 6/29 s/p mitral valve replacement                          PMHX: HTN,OA,mitral value regurg, IBS,GERD,anxiety and Erich wrist fx/s   Family / Caregiver Present: No  Diagnosis: MVR s/p mitral valve replacement 6/29    Subjective  Subjective: Pt seated in bedside chair upon OT arrival, agreeable to OT session and pt requesting mobility in hallway.  \"I already went to the bathroom and brushed my teeth and everything already this morning\"  Vital Signs  Patient Currently in Pain: (unrated pain in chest/ incision when pt coughing)   Orientation  Orientation  Overall Orientation Status: Within Functional Limits  Objective    ADL  Additional Comments: Pt declined ADLs this am        Balance  Standing Balance: Stand by assistance(with 4ww)  Standing Balance  Time: 15 mins  Activity: mobility in room, in hallway greater than household distance  Comment: with 4ww  Functional Mobility  Functional - Mobility Device: 4-Wheeled Walker  Activity: Other(in hallway greater than household distance)  Assist Level: Stand by assistance  Functional Mobility Comments: cueing for PLB and to take standing rest breaks     Transfers  Sit to stand: Contact guard assistance  Stand to sit: Contact guard assistance  Transfer Comments: good adhearance to sternal precautions                       Cognition  Overall Cognitive Status: WFL                    Type of ROM/Therapeutic Exercise  Type of ROM/Therapeutic Exercise: AROM  Comment: pt completed while seated in bedside chair, required rest breaks between exercises  Exercises  Scapular Protraction: forward punches B UE x 10 reps  Shoulder Flexion: B UE x 10 reps to shoulder level  Elbow Flexion: B UE x 10 reps                    Plan   Plan  Times per week: 2-5x  Times per day: Daily  Current Treatment Recommendations: Safety Education & Training, Self-Care / ADL, Patient/Caregiver Education & Training, Endurance Training          Goals  Short term goals  Time Frame for Short term goals: at d/c   Short term goal 1: Stance x 6 mins with Supervision for ADL - not met   Short term goal 2: LE dressing with CGA and AE prn - not met   Short term goal 3: Commode transfers with SBA - not met   Short term goal 4: Verb 2 home  to use at home - part met   Patient Goals   Patient goals : go home at d/c        Therapy Time   Individual Concurrent Group Co-treatment   Time In 0748         Time Out 0814         Minutes 26         Timed Code Treatment Minutes: [place]  [address], OTR/L [id number]

## 2020-07-03 VITALS
WEIGHT: 167.77 LBS | HEART RATE: 80 BPM | RESPIRATION RATE: 18 BRPM | OXYGEN SATURATION: 92 % | TEMPERATURE: 99.5 F | BODY MASS INDEX: 27.95 KG/M2 | DIASTOLIC BLOOD PRESSURE: 54 MMHG | SYSTOLIC BLOOD PRESSURE: 119 MMHG | HEIGHT: 65 IN

## 2020-07-03 LAB
ANION GAP SERPL CALCULATED.3IONS-SCNC: 5 MMOL/L (ref 3–16)
BUN BLDV-MCNC: 7 MG/DL (ref 7–20)
CALCIUM SERPL-MCNC: 9.6 MG/DL (ref 8.3–10.6)
CHLORIDE BLD-SCNC: 103 MMOL/L (ref 99–110)
CO2: 27 MMOL/L (ref 21–32)
CREAT SERPL-MCNC: <0.5 MG/DL (ref 0.6–1.2)
GFR AFRICAN AMERICAN: >60
GFR NON-AFRICAN AMERICAN: >60
GLUCOSE BLD-MCNC: 118 MG/DL (ref 70–99)
HCT VFR BLD CALC: 27.3 % (ref 36–48)
HEMOGLOBIN: 9.4 G/DL (ref 12–16)
MAGNESIUM: 1.9 MG/DL (ref 1.8–2.4)
MCH RBC QN AUTO: 32.8 PG (ref 26–34)
MCHC RBC AUTO-ENTMCNC: 34.3 G/DL (ref 31–36)
MCV RBC AUTO: 95.7 FL (ref 80–100)
PDW BLD-RTO: 12.1 % (ref 12.4–15.4)
PLATELET # BLD: 197 K/UL (ref 135–450)
PMV BLD AUTO: 7.9 FL (ref 5–10.5)
POTASSIUM SERPL-SCNC: 3.8 MMOL/L (ref 3.5–5.1)
RBC # BLD: 2.86 M/UL (ref 4–5.2)
SODIUM BLD-SCNC: 135 MMOL/L (ref 136–145)
WBC # BLD: 6.9 K/UL (ref 4–11)

## 2020-07-03 PROCEDURE — 36592 COLLECT BLOOD FROM PICC: CPT

## 2020-07-03 PROCEDURE — 85027 COMPLETE CBC AUTOMATED: CPT

## 2020-07-03 PROCEDURE — 99232 SBSQ HOSP IP/OBS MODERATE 35: CPT | Performed by: INTERNAL MEDICINE

## 2020-07-03 PROCEDURE — 94150 VITAL CAPACITY TEST: CPT

## 2020-07-03 PROCEDURE — 94669 MECHANICAL CHEST WALL OSCILL: CPT

## 2020-07-03 PROCEDURE — 83735 ASSAY OF MAGNESIUM: CPT

## 2020-07-03 PROCEDURE — 36415 COLL VENOUS BLD VENIPUNCTURE: CPT

## 2020-07-03 PROCEDURE — 80048 BASIC METABOLIC PNL TOTAL CA: CPT

## 2020-07-03 PROCEDURE — 6370000000 HC RX 637 (ALT 250 FOR IP): Performed by: THORACIC SURGERY (CARDIOTHORACIC VASCULAR SURGERY)

## 2020-07-03 PROCEDURE — 6370000000 HC RX 637 (ALT 250 FOR IP): Performed by: CLINICAL NURSE SPECIALIST

## 2020-07-03 PROCEDURE — 2580000003 HC RX 258: Performed by: THORACIC SURGERY (CARDIOTHORACIC VASCULAR SURGERY)

## 2020-07-03 PROCEDURE — 6360000002 HC RX W HCPCS: Performed by: THORACIC SURGERY (CARDIOTHORACIC VASCULAR SURGERY)

## 2020-07-03 PROCEDURE — 94640 AIRWAY INHALATION TREATMENT: CPT

## 2020-07-03 RX ORDER — CIPROFLOXACIN 500 MG/1
500 TABLET, FILM COATED ORAL EVERY 12 HOURS SCHEDULED
Qty: 14 TABLET | Refills: 0 | Status: SHIPPED | OUTPATIENT
Start: 2020-07-03 | End: 2020-07-10

## 2020-07-03 RX ORDER — CLOPIDOGREL BISULFATE 75 MG/1
75 TABLET ORAL DAILY
Qty: 30 TABLET | Refills: 3 | Status: SHIPPED | OUTPATIENT
Start: 2020-07-03 | End: 2020-10-29 | Stop reason: SDUPTHER

## 2020-07-03 RX ORDER — LISINOPRIL 2.5 MG/1
2.5 TABLET ORAL
Qty: 30 TABLET | Refills: 3 | Status: SHIPPED | OUTPATIENT
Start: 2020-07-03 | End: 2020-10-01 | Stop reason: SDUPTHER

## 2020-07-03 RX ORDER — OXYCODONE HYDROCHLORIDE AND ACETAMINOPHEN 5; 325 MG/1; MG/1
1 TABLET ORAL EVERY 4 HOURS PRN
Qty: 50 TABLET | Refills: 0 | Status: SHIPPED | OUTPATIENT
Start: 2020-07-03 | End: 2020-07-23

## 2020-07-03 RX ORDER — ATORVASTATIN CALCIUM 40 MG/1
40 TABLET, FILM COATED ORAL NIGHTLY
Qty: 30 TABLET | Refills: 3 | Status: SHIPPED | OUTPATIENT
Start: 2020-07-03 | End: 2020-10-22 | Stop reason: SDUPTHER

## 2020-07-03 RX ADMIN — CIPROFLOXACIN 500 MG: 500 TABLET, FILM COATED ORAL at 08:42

## 2020-07-03 RX ADMIN — BUDESONIDE 250 MCG: 0.25 SUSPENSION RESPIRATORY (INHALATION) at 07:32

## 2020-07-03 RX ADMIN — LEVOTHYROXINE SODIUM 125 MCG: 125 TABLET ORAL at 08:42

## 2020-07-03 RX ADMIN — ASPIRIN 325 MG: 325 TABLET, COATED ORAL at 08:42

## 2020-07-03 RX ADMIN — DULOXETINE HYDROCHLORIDE 90 MG: 30 CAPSULE, DELAYED RELEASE ORAL at 08:42

## 2020-07-03 RX ADMIN — OXYCODONE HYDROCHLORIDE AND ACETAMINOPHEN 1 TABLET: 5; 325 TABLET ORAL at 06:18

## 2020-07-03 RX ADMIN — ALBUTEROL SULFATE 2.5 MG: 2.5 SOLUTION RESPIRATORY (INHALATION) at 11:36

## 2020-07-03 RX ADMIN — ALBUTEROL SULFATE 2.5 MG: 2.5 SOLUTION RESPIRATORY (INHALATION) at 07:31

## 2020-07-03 RX ADMIN — ARFORMOTEROL TARTRATE 15 MCG: 15 SOLUTION RESPIRATORY (INHALATION) at 07:31

## 2020-07-03 RX ADMIN — MUPIROCIN: 20 OINTMENT TOPICAL at 08:45

## 2020-07-03 RX ADMIN — OXYCODONE HYDROCHLORIDE AND ACETAMINOPHEN 1 TABLET: 5; 325 TABLET ORAL at 00:09

## 2020-07-03 RX ADMIN — OXYCODONE HYDROCHLORIDE AND ACETAMINOPHEN 1 TABLET: 5; 325 TABLET ORAL at 11:34

## 2020-07-03 RX ADMIN — Medication 10 ML: at 08:44

## 2020-07-03 RX ADMIN — METOPROLOL TARTRATE 25 MG: 25 TABLET, FILM COATED ORAL at 08:42

## 2020-07-03 RX ADMIN — CLOPIDOGREL BISULFATE 75 MG: 75 TABLET ORAL at 08:42

## 2020-07-03 RX ADMIN — Medication 400 MG: at 08:42

## 2020-07-03 ASSESSMENT — PAIN DESCRIPTION - PAIN TYPE
TYPE: SURGICAL PAIN
TYPE: SURGICAL PAIN

## 2020-07-03 ASSESSMENT — PAIN SCALES - GENERAL
PAINLEVEL_OUTOF10: 5
PAINLEVEL_OUTOF10: 5
PAINLEVEL_OUTOF10: 0
PAINLEVEL_OUTOF10: 0
PAINLEVEL_OUTOF10: 6
PAINLEVEL_OUTOF10: 0

## 2020-07-03 ASSESSMENT — PAIN DESCRIPTION - FREQUENCY
FREQUENCY: CONTINUOUS
FREQUENCY: CONTINUOUS

## 2020-07-03 ASSESSMENT — PAIN DESCRIPTION - DESCRIPTORS
DESCRIPTORS: ACHING
DESCRIPTORS: ACHING

## 2020-07-03 ASSESSMENT — PAIN DESCRIPTION - ONSET
ONSET: ON-GOING
ONSET: ON-GOING

## 2020-07-03 ASSESSMENT — PAIN DESCRIPTION - PROGRESSION
CLINICAL_PROGRESSION: GRADUALLY WORSENING
CLINICAL_PROGRESSION: NOT CHANGED

## 2020-07-03 ASSESSMENT — PAIN - FUNCTIONAL ASSESSMENT
PAIN_FUNCTIONAL_ASSESSMENT: PREVENTS OR INTERFERES SOME ACTIVE ACTIVITIES AND ADLS
PAIN_FUNCTIONAL_ASSESSMENT: ACTIVITIES ARE NOT PREVENTED

## 2020-07-03 ASSESSMENT — PAIN DESCRIPTION - ORIENTATION
ORIENTATION: MID
ORIENTATION: MID

## 2020-07-03 ASSESSMENT — PAIN DESCRIPTION - LOCATION
LOCATION: CHEST
LOCATION: NECK

## 2020-07-03 NOTE — CARE COORDINATION
[facility]    Spoke with patient's spouse [facility] regarding [facility] services. Patient and spouse aware and agreeable to services. Faxed orders to [facility].     Li De Los Santos LPN  Care Transition Nurse  [address]  [phone]

## 2020-07-03 NOTE — PROGRESS NOTES
Patient discharged per order. Discharge instructions given to her  and the patient. Verbalizes understanding . Belongings with the  [facility].

## 2020-07-03 NOTE — DISCHARGE SUMMARY
DISCHARGE SUMMARY    Patient ID: Ana Cristina Rasmussen  MRN:  [id number]  YOB: 1955      Admission Date:  6/29/2020  5:04 AM  Discharge Date:  No discharge date for patient encounter. Principle Diagnosis:  Hypertrophic obstructive cardiomyopathy with diastolic heart failure (HCC)    Secondary Diagnosis:  Principal Problem:    Hypertrophic obstructive cardiomyopathy with diastolic heart failure (HCC)  Active Problems:    Hypertension    Hypothyroidism    Bacteriuria    Obstructive hypertrophic cardiomyopathy ([address]    Mixed hyperlipidemia    Status post ventricular septal myectomy    H/O mitral valve replacement  Resolved Problems:    * No resolved hospital problems. *      Procedure:  Transesophageal echocardiography; total cardiopulmonary  bypass; resection of hypertrophic septal musculature and left  ventricular outflow tract; mitral valve replacement with 25-mm Medtronic  Mosaic bovine pericardial bioprosthesis; total cardiopulmonary bypass;  intercostal nerve block x5 levels bilaterally. History:  The patient is a 76 y.o.  female with progressive heart  failure and exertional dyspnea. In the course of evaulation, she was found to have a severe subaortic valvular stenosis secondary to asymmetric septal hypertrophy. Additionally, she was also found to have moderate-to-severe mitral valve regurgitation. In deference of these findings, she was referred for  surgical correction of these problems. Her surgery has been rescheduled  multiple times because of electrolyte abnormalities and a urinary tract infection. Hospital Course: The patient elective resection of hypertrophic muscle from septal LVOT; miital valve replacement w/ 25mm Medtronic Mosaic bovine pericardial bioprosthetic valve on June 29, 2020. In surgery, the preoperative CORRIE noted that her left ventricular ejection fraction was around 55%.   She had severe mitral valve regurgitation with what appeared to be a shortened  133* 135*   K 4.4 3.8 3.8    99 103   CO2 28 28 27   BUN 6* 8 7   CREATININE <0.5* <0.5* <0.5*   GLUCOSE 147* 127* 118*     No results for input(s): INR in the last 72 hours. Condition at discharge: Stable     Disposition:  Home with home healthcare    Physical Exam on discharge day:   BP (!) 119/54   Pulse 80   Temp 98.2 °F (36.8 °C) (Oral)   Resp 18   Ht 5' 5\" (1.651 m)   Wt 167 lb 12.3 oz (76.1 kg)   SpO2 97%   BMI 27.92 kg/m²       Discharge Medications:      Medication List      START taking these medications    aspirin 325 MG EC tablet  Take 1 tablet by mouth daily     atorvastatin 40 MG tablet  Commonly known as:  LIPITOR  Take 1 tablet by mouth nightly     ciprofloxacin 500 MG tablet  Commonly known as:  CIPRO  Take 1 tablet by mouth every 12 hours for 7 days     clopidogrel 75 MG tablet  Commonly known as:  PLAVIX  Take 1 tablet by mouth daily     lisinopril 2.5 MG tablet  Commonly known as:  PRINIVIL;ZESTRIL  Take 1 tablet by mouth Daily with lunch     oxyCODONE-acetaminophen 5-325 MG per tablet  Commonly known as:  PERCOCET  Take 1 tablet by mouth every 4 hours as needed for Pain for up to 20 days. CHANGE how you take these medications    Symbicort 80-4.5 MCG/ACT Aero  Generic drug:  budesonide-formoterol  INHALE TWO PUFFS BY MOUTH TWICE A DAY  What changed:  See the new instructions. CONTINUE taking these medications    ALPRAZolam 0.5 MG tablet  Commonly known as:  XANAX  Take 1 tablet by mouth daily as needed for Anxiety.      budesonide 3 MG extended release capsule  Commonly known as:  ENTOCORT EC     butalbital-aspirin-caffeine -40 MG capsule  Commonly known as:  FIORINAL  TAKE ONE CAPSULE BY MOUTH EVERY 4 HOURS AS NEEDED FOR HEADACHES     CALTRATE 600+D PO     CENTRUM SILVER PO     dicyclomine 20 MG tablet  Commonly known as:  BENTYL  TAKE ONE TABLET BY MOUTH FOUR TIMES A DAY AS NEEDED     DULoxetine 30 MG extended release capsule  Commonly known as: CYMBALTA  TAKE THREE CAPSULES BY MOUTH DAILY     famotidine 40 MG tablet  Commonly known as:  PEPCID  Take 1 tablet by mouth daily     FIBER PO     Fish Oil 1200 MG Caps     FLAXSEED OIL PO     levothyroxine 125 MCG tablet  Commonly known as:  SYNTHROID  TAKE ONE TABLET BY MOUTH DAILY     meloxicam 15 MG tablet  Commonly known as:  MOBIC  TAKE ONE TABLET BY MOUTH DAILY     metoprolol tartrate 25 MG tablet  Commonly known as:  LOPRESSOR  TAKE 1 TABLET TWICE A DAY     prochlorperazine 10 MG tablet  Commonly known as:  COMPAZINE  Take one tablet by mouth every 6 hours as needed     vitamin D3 10 MCG (400 UNIT) Tabs tablet  Commonly known as:  CHOLECALCIFEROL        STOP taking these medications    AEROCHAMBER PLUS-FLOW SIGNAL Misc     diazePAM 5 MG tablet  Commonly known as:  VALIUM     loratadine 10 MG tablet  Commonly known as:  CLARITIN     nitrofurantoin 100 MG capsule  Commonly known as:  Macrodantin     omeprazole 40 MG delayed release capsule  Commonly known as:  PRILOSEC     potassium chloride 20 MEQ extended release tablet  Commonly known as:  KLOR-CON M     ProAir  (90 Base) MCG/ACT inhaler  Generic drug:  albuterol sulfate HFA     vitamin C 500 MG tablet  Commonly known as:  ASCORBIC ACID           Where to Get Your Medications      [facility] [phone]  [address] [place] [address],[address]    Phone:  [phone]   · aspirin 325 MG EC tablet  · atorvastatin 40 MG tablet  · ciprofloxacin 500 MG tablet  · clopidogrel 75 MG tablet  · lisinopril 2.5 MG tablet     You can get these medications from any pharmacy    Bring a paper prescription for each of these medications  · oxyCODONE-acetaminophen 5-325 MG per tablet         Allergies   Allergen Reactions    Tolectin [Tolmetin] Swelling     Face & Lips       Discharge Instructions:   Activity: No heavy lifting greater than 5 pounds for 6 weeks

## 2020-07-03 NOTE — PROGRESS NOTES
Patient ambulated in hallway on room air without walker. Patient tolerated well. Returned to bed with pillow support. Patient denies pain at this time. Will continue to monitor.

## 2020-07-03 NOTE — PROGRESS NOTES
Patient BP reading 89/64. Patient's BP cuff malpositioned. BP retaken 105/45. Will continue to monitor. Patient denies pain at this time.

## 2020-07-03 NOTE — PROGRESS NOTES
76 y.o. woman s/p septal myectomy and MVR for HCM and severe MR.  HPI/Update: Doing well overall. Has some chest discomfort but it's manageble. No n/v/LH/dizziness. No syncope. Wants to go home. PE:  Blood pressure (!) 119/54, pulse 80, temperature 99.5 °F (37.5 °C), temperature source Oral, resp. rate 18, height 5' 5\" (1.651 m), weight 167 lb 12.3 oz (76.1 kg), SpO2 92 %, not currently breastfeeding. General (appearance): Well devel. No distress  Eyes: Anicteric. EOMI. Neck: supple  Ears/Nose/Mouth/Thorat: No cyanosis  CV: RRR. + LPACIDO   Respiratory:     GI: abd soft  Skin: Warm, dry. No rashes  Neuro/Psych: Alert and oriented x 3.  Appropriate behavior  Ext:  No c/c. + edema  Pulses:  2+ carotid    Lab Results   Component Value Date    WBC 6.9 07/03/2020    HGB 9.4 (L) 07/03/2020    HCT 27.3 (L) 07/03/2020    MCV 95.7 07/03/2020     07/03/2020     Lab Results   Component Value Date     (L) 07/03/2020    K 3.8 07/03/2020     07/03/2020    CO2 27 07/03/2020    BUN 7 07/03/2020    CREATININE <0.5 (L) 07/03/2020    GLUCOSE 118 (H) 07/03/2020    CALCIUM 9.6 07/03/2020    PROT 6.7 06/23/2020    LABALBU 4.1 06/23/2020    BILITOT 0.3 06/23/2020    ALKPHOS 62 06/23/2020    AST 48 (H) 06/23/2020    ALT 45 (H) 06/23/2020    LABGLOM >60 07/03/2020    GFRAA >60 07/03/2020    AGRATIO 1.6 06/23/2020    GLOB 2.6 06/23/2020     Lab Results   Component Value Date    INR 1.19 (H) 06/30/2020    INR 1.07 06/29/2020    INR 1.16 (H) 06/29/2020    PROTIME 13.8 (H) 06/30/2020    PROTIME 12.4 06/29/2020    PROTIME 13.5 (H) 06/29/2020     Lab Results   Component Value Date    CHOL 195 06/29/2020    CHOL 191 06/23/2020    CHOL 227 (H) 02/06/2020     Lab Results   Component Value Date    TRIG 91 06/29/2020    TRIG 75 06/23/2020    TRIG 86 02/06/2020     Lab Results   Component Value Date    HDL 57 06/29/2020    HDL 57 06/23/2020    HDL 77 (H) 02/06/2020     Lab Results   Component Value Date    LDLCALC 120 (H) 06/29/2020 LDLCALC 119 (H) 06/23/2020    LDLCALC 133 (H) 02/06/2020     Lab Results   Component Value Date    LABVLDL 18 06/29/2020    LABVLDL 15 06/23/2020    LABVLDL 17 02/06/2020     No results found for: CHOLHDLRATIO    2/2020 LHC:  Angiographic Findings:  Right dominant system  Left Main:  Normal  Left Anterior Descending:  Normal  Circumflex:  Normal  Right Coronary:  Normal  Left Ventriculogram:  Not performed. Hemodynamics (mm Hg):  Apical Left Ventricular Pressure: 222/1, 11  LVOT Left ventricular pressure: 120/7, 22  Central Aortic Pressure:  115/65 (83)    3/11/2020 [facility]E: EF 55-60%. Severe asymmetric hypertrpohy of the basal septum. Moderate MR.    2/2020 [facility] (): HCM. Mod HK of basal anterior and AS segments. Pap muscles displaced to the apex and not well organized. Rebeca flor enhancement present. + SITA. Severe MR.    12/2019 Echo: EF 65-70%. LVOT grd of 103 mm Hg. Grade 2 DD. Severe MR. Mild TR.    76 y.o. now s/p myecomty and MV replacement (25 mm Mosaic). Issues:  -HCM s/p myectomy  -Severe MR s/p replacement  -Hypercholesterolemia    Recs:  -Cont post-op care  -Pulm toilet  -BB and lisinporil  -ASA  -Hopefully home soon    Margo Brantley MD, [facility], [facility]

## 2020-07-03 NOTE — CARE COORDINATION
Case Management Assessment            Discharge Note                    Date / Time of Note: 7/3/2020 10:06 AM                  Discharge Note Completed by: Audrey Bebeto    Patient Name: Abilio Garsia   YOB: 1955  Diagnosis: Nonrheumatic mitral valve regurgitation [I34.0]  Obstructive hypertrophic cardiomyopathy ([facility] [address] [I42.1]  Hypertrophic obstructive cardiomyopathy with diastolic heart failure ([address] [I50.30, I42.1]   Date / Time: 6/29/2020  5:04 AM    Current PCP: Diana Ovalle MD  Clinic patient: No    Hospitalization in the last 30 days: No    Advance Directives:  Code Status: Full Code  [place] DNR form completed and on chart: Not Indicated    Financial:  Payor: MEDICARE / Plan: MEDICARE PART A AND B / Product Type: *No Product type* /      Pharmacy:    [facility] [address] [phone]  [address] [place] [address]  Phone: [phone] Fax: [phone] [phone] - F [phone]  [address]  Phone: [phone] [facility]ax: [address] [facility] [phone] Brian Wu [phone] [address] [id number]  Phone: [phone] Fax: [phone] [facility] medications?:    Assistance provided by Case Management: None at this time    Does patient want to participate in local refill/ meds to beds program?:      Meds To Beds General Rules:  1. Can ONLY be done Monday- Friday between 8:30am-5pm  2. Prescription(s) must be in pharmacy by 3pm to be filled same day  3. Copy of patient's insurance/ prescription drug card and patient face sheet must be sent along with the prescription(s)  4. Cost of Rx cannot be added to hospital bill.  If financial assistance is needed, please contact unit  or social

## 2020-07-06 ENCOUNTER — TELEPHONE (OUTPATIENT)
Dept: CARDIOTHORACIC SURGERY | Age: 65
End: 2020-07-06

## 2020-07-06 NOTE — TELEPHONE ENCOUNTER
Received call from Sid Nagel PT w/[facility] reporting that when he arrived for in-home PT session today, patient's BP was 80/50. She was tired but not dizzy. 99/55 on recheck. Patient instructed to cut metoprolol tartrate to half of 25 mg tab for dose of 12.5 mg BID instead of whole tab. Instructed to take lisinopril 2.5 mg at noon. She was discharged 7/3/20 from [facility] MVR w/septal myectomy done 6/29/2020 by Dr. Daniela Floyd.

## 2020-07-07 ENCOUNTER — TELEPHONE (OUTPATIENT)
Dept: CARDIOTHORACIC SURGERY | Age: 65
End: 2020-07-07

## 2020-07-07 ENCOUNTER — TELEPHONE (OUTPATIENT)
Dept: PHARMACY | Facility: CLINIC | Age: 65
End: 2020-07-07

## 2020-07-07 PROCEDURE — 1111F DSCHRG MED/CURRENT MED MERGE: CPT

## 2020-07-07 RX ORDER — ALBUTEROL SULFATE 90 UG/1
2 AEROSOL, METERED RESPIRATORY (INHALATION) EVERY 6 HOURS PRN
COMMUNITY
End: 2021-08-26

## 2020-07-07 RX ORDER — DOCUSATE SODIUM 100 MG/1
100 CAPSULE, LIQUID FILLED ORAL 2 TIMES DAILY PRN
COMMUNITY

## 2020-07-07 NOTE — TELEPHONE ENCOUNTER
CLINICAL PHARMACY NOTE  Post-Discharge Transitions of Care ([facility])    Non-face-to-face services provided:  Assessment and support for treatment adherence and medication management-medication reconciliation    Subjective/Objective:  Edison Ferrell is a 76 y.o. female. [facility] with a diagnosis of mitral valve replacement. Patient outreach to review discharge medications and provide medication review and management. Spoke with patient    Allergies   Allergen Reactions    Tolectin [Tolmetin] Swelling     Face & Lips       Discharge Medications (as per discharging medication list): There are NEW medications for you. START taking them after you leave the hospital   aspirin 325 MG EC tablet  · Taking, no issues to report. Discussed bleeding SE in great detail. Take 1 tablet by mouth daily    atorvastatin (LIPITOR) 40 MG tablet  · Taking, no issues to report. Take 1 tablet by mouth nightly    ciprofloxacin (CIPRO) 500 MG tablet  · Taking, no issues to report. Reviewed C&S, appropriate therapy and dose. Take 1 tablet by mouth every 12 hours for 7 days    clopidogrel (PLAVIX) 75 MG tablet  · Taking, no issues to report. Discussed bleeding SE in great detail. Take 1 tablet by mouth daily    lisinopril (PRINIVIL;ZESTRIL) 2.5 MG tablet  · Taking, no issues to report. Take 1 tablet by mouth Daily with lunch    oxyCODONE-acetaminophen (PERCOCET) 5-325 MG per tablet  · Taking, discussed constipation and sedation SE in great detail. Take 1 tablet by mouth every 4 hours as needed for Pain for up to 20 days. You told us you were taking these medications at home, but the amount or how often you take this medication has CHANGED   metoprolol tartrate (LOPRESSOR) 25 MG tablet  · See encounter on 7/6/20; instructed to reduce dose to 12.5 mg BID.  Pt taking reduced dose, updated home med list. Take 12.5 mg by mouth 2 times daily      SYMBICORT 80-4.5 MCG/ACT AERO  · Pt mouth 2 times daily    Flaxseed, Linseed, (FLAXSEED OIL PO) Take 1,300 mg by mouth daily    budesonide (ENTOCORT EC) 3 MG extended release capsule  · Discussed refill history - pt reports that she was originally prescribed to take 3 capsules (9 mg) daily, but has backed down to only 1 cap per day. Take 3 mg by mouth every morning     FIBER PO Take 1 capsule by mouth daily      These are the medications you have told us you were taking at home, STOP taking them after you leave the hospital   · Diazepam - pt reports she was taking this for IBS spasms and she called her GI MD to see if she can take this. Educated pt on the risk of taking alprazolam, diazepam and Percocet together (increased risk of sedation and falls) and that she can resume if her GI MD instructs her to do so. · Loratadine - unsure why this was stopped - instructed pt she can resume if she feels that she needs it for allergy symptoms. · Nitrofurantoin - instructed pt she should only be taking ciprofloxacin at home for UTI. Pt confirms that she is. · Omeprazole - instructed pt to hold for now - continue taking famotidine. · KCl - pt has stopped this medication as instructed. · ProAir - added back to home med list, unsure why this was stopped. Pt with history of asthma. · Vitamin C - pt has stopped this medication as instructed. Meds to Beds:No    CrCl cannot be calculated (This lab value cannot be used to calculate CrCl because it is not a number: <0.5). Assessment/Plan:  - Medication reconciliation completed. Number of medications reviewed: 23    - Pt is not taking medications as directed by discharging physician. Number of discrepancies: 4. Instructions per discharge list provided except per below documentation. Identified medication discrepancies/issues:   · Category 1 (0)  · Category 2 (0)  · Category 3 (2):  1. ProAir - unsure why this was stopped, patient with history of asthma. Instructed pt to use if she needs it. Added back to home med list.   2. Meloxicam - educated pt the risk of bleeding with meloxicam therapy (taking for sciatica pain prn) combined with ASA, Plavix and Fiorinal (pt reports taking Fiorinal on a regular basis). Pt states that she will no longer take this medication and will use APAP only. Removed from home med list.   · Category 4 (2):  1. Metoprolol - pt was instructed to reduce her dose of metoprolol from 25 mg BID to 12.5 mg BID on 7/6/2020. Updated home med list.   2. Docusate - pt is taking for constipation since returning home and starting Percocet. Added to home medication list.     - [facility] active medication list updated:  · Medications Added (2):  ProAir, docusate  · Medications Removed (1):  Meloxicam  · Medications Changed (1):  Metoprolol     - Identified Potential Medication Interactions: No clinically significant interactions identified via [employer] Interaction Analysis as category D or higher.    - Renal Dosing: No renal adjustments necessary.    - Follow up appointment date (7 days for more severe illness, 14 days for others):    · Patient reminded of upcoming appointment with CTS on 7/15/2020.      Thank you,    Alondra Rick, PharmD, [facility]  Direct: [phone]  Department, toll free [phone], option 7          For Pharmacy Admin Tracking Only    TCM Call Made?: No  [address] [place] [address],[address] Patient?: Yes  Total # of Interventions Recommended: 4 -   - Discontinued Medication #: 1  - Updated Order #: 3  Total # Interventions Accepted: 4  Intervention Severity:   - Level 1 Intervention Present?: No   - Level 2 #: 0   - Level 3 #: 2  Outreach Status: Review Complete  Care Coordinator Outreach to Patient?: No  Provider Contacted?: No  Time Spent (min): 60

## 2020-07-08 ENCOUNTER — TELEPHONE (OUTPATIENT)
Dept: CARDIOTHORACIC SURGERY | Age: 65
End: 2020-07-08

## 2020-07-08 NOTE — TELEPHONE ENCOUNTER
Patient called asking if she was allowed to take her Valium if needed. She takes this for her IBS. Medication was discontinued at discharge from hospital.  I spoke with Dr. Manjeet Man and patient may resume Valium if needed. She is aware and agreeable.

## 2020-07-15 ENCOUNTER — OFFICE VISIT (OUTPATIENT)
Dept: CARDIOTHORACIC SURGERY | Age: 65
End: 2020-07-15

## 2020-07-15 VITALS
BODY MASS INDEX: 27.46 KG/M2 | HEIGHT: 65 IN | HEART RATE: 70 BPM | WEIGHT: 164.8 LBS | SYSTOLIC BLOOD PRESSURE: 130 MMHG | OXYGEN SATURATION: 95 % | DIASTOLIC BLOOD PRESSURE: 70 MMHG | TEMPERATURE: 98.2 F

## 2020-07-15 PROCEDURE — 99024 POSTOP FOLLOW-UP VISIT: CPT | Performed by: THORACIC SURGERY (CARDIOTHORACIC VASCULAR SURGERY)

## 2020-07-15 ASSESSMENT — ENCOUNTER SYMPTOMS
APNEA: 0
WHEEZING: 0
CHEST TIGHTNESS: 0
ABDOMINAL PAIN: 0
ABDOMINAL DISTENTION: 0
COUGH: 0
STRIDOR: 0
CHOKING: 0
VOICE CHANGE: 0
SHORTNESS OF BREATH: 0

## 2020-07-15 NOTE — PROGRESS NOTES
Outpatient Clinic Established Patient Note    Patient: Ana Cristina Client  : 1955 (43 y.o.)  Date: 7/15/2020    CC: Post-op f/u    HPI:    Amber Johnson is a 76year old female with PMHx of HTN, HCM s/p myectomy and severe MR s/p replacement. She presents today for f/u visit. She reports since discharge from the hospital she has been doing well. She has been ambulating daily since discharge and denies any incisional pain at rest. She denies any fevers, chills, SOB, abdominal pain or any peripheral edema. She has been complaint with her medications at home. Her sternal incisional site appears to be healing well. Home Meds:  Prior to Visit Medications    Medication Sig Taking? Authorizing Provider   docusate sodium (COLACE) 100 MG capsule Take 100 mg by mouth 2 times daily as needed for Constipation Yes Historical Provider, MD   albuterol sulfate HFA (PROAIR HFA) 108 (90 Base) MCG/ACT inhaler Inhale 2 puffs into the lungs every 6 hours as needed for Wheezing Yes Historical Provider, MD   metoprolol tartrate (LOPRESSOR) 25 MG tablet Take 12.5 mg by mouth 2 times daily Yes Historical Provider, MD   aspirin 325 MG EC tablet Take 1 tablet by mouth daily Yes Wilfredo Coats MD   atorvastatin (LIPITOR) 40 MG tablet Take 1 tablet by mouth nightly Yes Wilfredo Coats MD   clopidogrel (PLAVIX) 75 MG tablet Take 1 tablet by mouth daily Yes Wilfredo Coats MD   lisinopril (PRINIVIL;ZESTRIL) 2.5 MG tablet Take 1 tablet by mouth Daily with lunch Yes Wilfredo Coats MD   oxyCODONE-acetaminophen (PERCOCET) 5-325 MG per tablet Take 1 tablet by mouth every 4 hours as needed for Pain for up to 20 days. Yes Wilfredo Coats MD   ALPRAZolam Patel Lash) 0.5 MG tablet Take 1 tablet by mouth daily as needed for Anxiety.  Yes Fausto Sanders MD   dicyclomine (BENTYL) 20 MG tablet TAKE ONE TABLET BY MOUTH FOUR TIMES A DAY AS NEEDED Yes Fausto Sanders MD   levothyroxine (SYNTHROID) 125 MCG tablet TAKE ONE TABLET BY MOUTH DAILY Yes Vito Seth Paulino Smith MD   prochlorperazine (COMPAZINE) 10 MG tablet Take one tablet by mouth every 6 hours as needed Yes Erna Fontenot MD   butalbital-aspirin-caffeine [facility]) -40 MG capsule TAKE ONE CAPSULE BY MOUTH EVERY 4 HOURS AS NEEDED FOR HEADACHES Yes Erna Fontenot MD   famotidine (PEPCID) 40 MG tablet Take 1 tablet by mouth daily Yes Erna Fontenot MD   DULoxetine (CYMBALTA) 30 MG extended release capsule TAKE THREE CAPSULES BY MOUTH DAILY Yes Erna Fontenot MD   Multiple Vitamins-Minerals (CENTRUM SILVER PO) Take 1 tablet by mouth daily Yes Historical Provider, MD   Calcium Carbonate-Vitamin D (CALTRATE 600+D PO) Take 1 tablet by mouth 2 times daily Yes Historical Provider, MD   vitamin D3 (CHOLECALCIFEROL) 10 MCG (400 UNIT) TABS tablet Take 400 Units by mouth daily Yes Historical Provider, MD   Omega-3 Fatty Acids (FISH OIL) 1200 MG CAPS Take 1 capsule by mouth 2 times daily Yes Historical Provider, MD   Flaxseed, Linseed, (FLAXSEED OIL PO) Take 1,300 mg by mouth daily Yes Historical Provider, MD   SYMBICORT 80-4.5 MCG/ACT AERO INHALE TWO PUFFS BY MOUTH TWICE A DAY Yes Kevin Young MD   budesonide (ENTOCORT EC) 3 MG extended release capsule Take 3 mg by mouth every morning  Yes Historical Provider, MD   FIBER PO Take 1 capsule by mouth daily  Yes Historical Provider, MD       Allergies: Tolectin [tolmetin]    There are no preventive care reminders to display for this patient.     Immunization History   Administered Date(s) Administered    Influenza 09/30/2014    Influenza Vaccine, unspecified formulation 09/13/2013, 09/30/2014, 09/21/2016, 10/05/2017    Influenza Virus Vaccine 09/30/2015, 10/05/2017    Influenza, Quadv, Recombinant, IM PF (Flublok 18 yrs and older) 09/11/2018    Influenza, Triv, inactivated, subunit, adjuvanted, IM (Fluad 65 yrs and older) 10/09/2019    Pneumococcal Conjugate 13-valent ([id number]) 11/16/2016    Pneumococcal Polysaccharide ([id number]) 02/12/2019    Tdap (Boostrix, Adacel) 12/17/2015    Zoster Live (Zostavax) 07/23/2014    Zoster Recombinant (Shingrix) 02/12/2019, 07/12/2019       Review of Systems   Constitutional: Positive for fatigue. Negative for activity change, appetite change, chills, diaphoresis, fever and unexpected weight change. HENT: Negative for congestion and voice change. Respiratory: Negative for apnea, cough, choking, chest tightness, shortness of breath, wheezing and stridor. Cardiovascular: Negative for chest pain, palpitations and leg swelling. Gastrointestinal: Negative for abdominal distention and abdominal pain. Musculoskeletal: Negative for arthralgias. Neurological: Negative for dizziness, tremors, weakness, light-headedness, numbness and headaches. A 10-organ Review Of Systems was obtained and otherwise unremarkable except as per HPI. Data: Old records have been reviewed electronically. PHYSICAL EXAM:  /70 (Site: Left Upper Arm, Position: Sitting)   Pulse 70   Temp 98.2 °F (36.8 °C) (Infrared)   Ht 5' 5\" (1.651 m)   Wt 164 lb 12.8 oz (74.8 kg)   SpO2 95%   BMI 27.42 kg/m²   Physical Exam  Constitutional:       Appearance: Normal appearance. HENT:      Head: Normocephalic and atraumatic. Mouth/Throat:      Mouth: Mucous membranes are dry. Eyes:      Pupils: Pupils are equal, round, and reactive to light. Cardiovascular:      Rate and Rhythm: Normal rate and regular rhythm. Pulmonary:      Effort: Pulmonary effort is normal. No respiratory distress. Breath sounds: Normal breath sounds. No stridor. No rhonchi. Chest:      Chest wall: No tenderness. Abdominal:      General: Abdomen is flat. There is no distension. Musculoskeletal: Normal range of motion. General: No swelling or tenderness. Skin:     General: Skin is warm. Comments: Sternal incisional site appears dry and healing well. Neurological:      General: No focal deficit present.       Mental Status: She is alert and oriented to person, place, and time. Psychiatric:         Mood and Affect: Mood normal.         Assessment & Plan:    1. HCM  2. Severe MR s/p replacement  3. HTN    Plan: She is to continue all of her current medications without any change. She has been doing well and ambulating at home appropriately. Wound appears well and healing. Continue sternal precautions and return for follow up visit in six weeks. Dispo: Pt has been staffed with Dr. Scotty Parra   _______________  Amber Louis MD, 7/15/2020 10:42 AM   PGY-3    I saw and evaluated the patient. I agree with the findings/plan of care in the fellow's note.       Laura Mccabe MD  7/15/2020  12:43 PM

## 2020-07-17 ENCOUNTER — TELEPHONE (OUTPATIENT)
Dept: INTERNAL MEDICINE CLINIC | Age: 65
End: 2020-07-17

## 2020-07-17 NOTE — TELEPHONE ENCOUNTER
Home care would like to know if Lorena Campbell will follow pt with home care. Home care is requesting PT/OT, skilled nursing orders.  Home care would like to know if Lorena Campbell would sign orders and give verbal consent

## 2020-07-24 PROBLEM — Z01.818 PREOP EXAMINATION: Status: RESOLVED | Noted: 2020-06-24 | Resolved: 2020-07-24

## 2020-07-28 ENCOUNTER — TELEPHONE (OUTPATIENT)
Dept: INTERNAL MEDICINE CLINIC | Age: 65
End: 2020-07-28

## 2020-07-28 RX ORDER — PREDNISONE 20 MG/1
TABLET ORAL
Qty: 7 TABLET | Refills: 0 | Status: SHIPPED | OUTPATIENT
Start: 2020-07-28 | End: 2020-08-05 | Stop reason: ALTCHOICE

## 2020-07-28 NOTE — TELEPHONE ENCOUNTER
I will give her a short course of some prednisone to see if it breaks the headache cycle. If not she would probably have to be seen.     Prednisone 20 mg    #7      1 daily -take with food
no

## 2020-08-05 ENCOUNTER — OFFICE VISIT (OUTPATIENT)
Dept: INTERNAL MEDICINE CLINIC | Age: 65
End: 2020-08-05
Payer: MEDICARE

## 2020-08-05 VITALS
WEIGHT: 158.2 LBS | TEMPERATURE: 98.6 F | HEIGHT: 65 IN | DIASTOLIC BLOOD PRESSURE: 64 MMHG | SYSTOLIC BLOOD PRESSURE: 128 MMHG | BODY MASS INDEX: 26.36 KG/M2

## 2020-08-05 PROBLEM — R51.9 FRONTAL HEADACHE: Status: ACTIVE | Noted: 2020-08-05

## 2020-08-05 PROCEDURE — G8427 DOCREV CUR MEDS BY ELIG CLIN: HCPCS | Performed by: INTERNAL MEDICINE

## 2020-08-05 PROCEDURE — 99214 OFFICE O/P EST MOD 30 MIN: CPT | Performed by: INTERNAL MEDICINE

## 2020-08-05 PROCEDURE — 1036F TOBACCO NON-USER: CPT | Performed by: INTERNAL MEDICINE

## 2020-08-05 PROCEDURE — 1123F ACP DISCUSS/DSCN MKR DOCD: CPT | Performed by: INTERNAL MEDICINE

## 2020-08-05 PROCEDURE — 3017F COLORECTAL CA SCREEN DOC REV: CPT | Performed by: INTERNAL MEDICINE

## 2020-08-05 PROCEDURE — G8399 PT W/DXA RESULTS DOCUMENT: HCPCS | Performed by: INTERNAL MEDICINE

## 2020-08-05 PROCEDURE — 4040F PNEUMOC VAC/ADMIN/RCVD: CPT | Performed by: INTERNAL MEDICINE

## 2020-08-05 PROCEDURE — 1090F PRES/ABSN URINE INCON ASSESS: CPT | Performed by: INTERNAL MEDICINE

## 2020-08-05 PROCEDURE — G8417 CALC BMI ABV UP PARAM F/U: HCPCS | Performed by: INTERNAL MEDICINE

## 2020-08-05 RX ORDER — PREDNISONE 10 MG/1
TABLET ORAL
Qty: 50 TABLET | Refills: 0 | Status: SHIPPED | OUTPATIENT
Start: 2020-08-05 | End: 2020-08-15

## 2020-08-05 RX ORDER — BUTALBITAL, ASPIRIN, AND CAFFEINE 325; 50; 40 MG/1; MG/1; MG/1
CAPSULE ORAL
Qty: 60 CAPSULE | Refills: 2 | Status: SHIPPED | OUTPATIENT
Start: 2020-08-05 | End: 2020-11-25 | Stop reason: ALTCHOICE

## 2020-08-05 ASSESSMENT — ENCOUNTER SYMPTOMS
SINUS PRESSURE: 0
SHORTNESS OF BREATH: 0
NAUSEA: 0
EYE PAIN: 0
TROUBLE SWALLOWING: 0
RHINORRHEA: 0

## 2020-08-05 NOTE — PROGRESS NOTES
normal. No respiratory distress. Breath sounds: Normal breath sounds. Musculoskeletal:      Right lower leg: No edema. Left lower leg: No edema. Lymphadenopathy:      Cervical: No cervical adenopathy. Skin:     Coloration: Skin is not jaundiced or pale. Neurological:      General: No focal deficit present. Mental Status: She is alert and oriented to person, place, and time. Cranial Nerves: No cranial nerve deficit. Motor: No weakness. Gait: Gait normal.   Psychiatric:         Mood and Affect: Mood normal.         Thought Content: Thought content normal.         Judgment: Judgment normal.         ASSESSMENT:       Encounter Diagnoses   Name Primary?  Migraine without status migrainosus, not intractable, unspecified migraine type     Anxiety     Frontal headache     Chronic nausea     Status post ventricular septal myectomy     Severe mitral regurgitation        Frontal headache  Somewhat different from her usual migraines. Prednisone helped slightly. Will treat with a higher dose for 3 weeks. Try to minimize Fiorinal use. Did refill Fiorinal.  If no better or recurrent we will get CT scan of head and sinuses. I have also asked her to see her eye doctor check for glaucoma-she has a history of elevated intraocular pressure. Consider neurology opinion. Chronic nausea  Occasional Valium still helps. Status post ventricular septal myectomy  Doing well since surgery    Severe mitral regurgitation  Bovine mitral valve replacement        PLAN:See ASSESSMENT for evaluation & PLAN     No orders of the defined types were placed in this encounter.    , PMH, SH and FH reviewed and noted. Recent and past labs, tests and consultsalso reviewed. Recent or new meds also reviewed.

## 2020-08-10 ENCOUNTER — OFFICE VISIT (OUTPATIENT)
Dept: CARDIOLOGY CLINIC | Age: 65
End: 2020-08-10
Payer: MEDICARE

## 2020-08-10 VITALS
DIASTOLIC BLOOD PRESSURE: 78 MMHG | TEMPERATURE: 97.3 F | SYSTOLIC BLOOD PRESSURE: 126 MMHG | WEIGHT: 161 LBS | HEIGHT: 65 IN | BODY MASS INDEX: 26.82 KG/M2 | HEART RATE: 64 BPM

## 2020-08-10 PROCEDURE — G8427 DOCREV CUR MEDS BY ELIG CLIN: HCPCS | Performed by: INTERNAL MEDICINE

## 2020-08-10 PROCEDURE — 99214 OFFICE O/P EST MOD 30 MIN: CPT | Performed by: INTERNAL MEDICINE

## 2020-08-10 PROCEDURE — 1123F ACP DISCUSS/DSCN MKR DOCD: CPT | Performed by: INTERNAL MEDICINE

## 2020-08-10 PROCEDURE — 1090F PRES/ABSN URINE INCON ASSESS: CPT | Performed by: INTERNAL MEDICINE

## 2020-08-10 PROCEDURE — 3017F COLORECTAL CA SCREEN DOC REV: CPT | Performed by: INTERNAL MEDICINE

## 2020-08-10 PROCEDURE — 4040F PNEUMOC VAC/ADMIN/RCVD: CPT | Performed by: INTERNAL MEDICINE

## 2020-08-10 PROCEDURE — 1036F TOBACCO NON-USER: CPT | Performed by: INTERNAL MEDICINE

## 2020-08-10 PROCEDURE — G8417 CALC BMI ABV UP PARAM F/U: HCPCS | Performed by: INTERNAL MEDICINE

## 2020-08-10 PROCEDURE — G8399 PT W/DXA RESULTS DOCUMENT: HCPCS | Performed by: INTERNAL MEDICINE

## 2020-08-10 RX ORDER — METOPROLOL SUCCINATE 25 MG/1
25 TABLET, EXTENDED RELEASE ORAL DAILY
Qty: 90 TABLET | Refills: 3 | Status: SHIPPED | OUTPATIENT
Start: 2020-08-10 | End: 2020-12-09 | Stop reason: SDUPTHER

## 2020-08-10 NOTE — PROGRESS NOTES
History     Tobacco Use    Smoking status: Former Smoker     Packs/day: 1.00     Years: 38.00     Pack years: 38.00     Types: Cigarettes     Start date: 5/1/1972     Last attempt to quit: 5/1/2010     Years since quitting: 10.2    Smokeless tobacco: Never Used   Substance Use Topics    Alcohol use: Not Currently     Alcohol/week: 0.0 standard drinks     Frequency: 4 or more times a week     Drinks per session: 1 or 2     Binge frequency: Never    Drug use: No     Allergies   Allergen Reactions    Tolectin [Tolmetin] Swelling     Face & Lips     Family History   Problem Relation Age of Onset    Cancer Mother         leukemia    Hypertension Mother     Other Father         CRF    Heart Surgery Father         CABG    Blindness Father     Hypertension Father     Hearing Loss Father      Review of Systems   General: No fevers, chills, fatigue, or night sweats. No abnormal changes in weight. HEENT: No blurry or decreased vision. No changes in hearing, nasal discharge or sore throat. Cardiovascular: See HPI. No cramping in legs or buttocks when walking. Respiratory: No cough, hemoptysis, or wheezing. No history of asthma. Gastrointestinal: No abdominal pain, hematochezia, melana, or history of GI ulcers. Genito-Urinary: No dysuria or hematuria. No urgency or polyuria. Musculoskeletal: No complaints of joint pain, joint swelling or muscular weakness/soreness. Neurological: No dizziness or headaches. No numbness/tingling, speech problems or weakness. No history of a stroke or TIA. Psychological: No anxiety or depression  Hematological and Lymphatic: No abnormal bleeding or bruising, blood clots, jaundice. Endocrine: No malaise/lethargy, palpitations, polydipsia/polyuria, temperature intolerance or unexpected weight changes. Skin: No rashes or non-healing ulcers.     PE:  Blood pressure 126/78, pulse 64, temperature 97.3 °F (36.3 °C), height 5' 5\" (1.651 m), weight 161 lb (73 kg), not currently breastfeeding. General (appearance): Well devel. No distress  Eyes: Anicteric. EOMI. Neck: supple  Ears/Nose/Mouth/Thorat: No cyanosis  CV: RRR. + PLACIDO   Respiratory:  Intubated. GI: abd soft  Skin: Warm, dry. No rashes  Neuro/Psych: Alert and oriented x 3.  Appropriate behavior  Ext:  No c/c. + edema  Pulses:  2+ carotid    Lab Results   Component Value Date    WBC 6.9 07/03/2020    HGB 9.4 (L) 07/03/2020    HCT 27.3 (L) 07/03/2020    MCV 95.7 07/03/2020     07/03/2020     Lab Results   Component Value Date     (L) 07/03/2020    K 3.8 07/03/2020     07/03/2020    CO2 27 07/03/2020    BUN 7 07/03/2020    CREATININE <0.5 (L) 07/03/2020    GLUCOSE 118 (H) 07/03/2020    CALCIUM 9.6 07/03/2020    PROT 6.7 06/23/2020    LABALBU 4.1 06/23/2020    BILITOT 0.3 06/23/2020    ALKPHOS 62 06/23/2020    AST 48 (H) 06/23/2020    ALT 45 (H) 06/23/2020    LABGLOM >60 07/03/2020    GFRAA >60 07/03/2020    AGRATIO 1.6 06/23/2020    GLOB 2.6 06/23/2020     Lab Results   Component Value Date    INR 1.19 (H) 06/30/2020    INR 1.07 06/29/2020    INR 1.16 (H) 06/29/2020    PROTIME 13.8 (H) 06/30/2020    PROTIME 12.4 06/29/2020    PROTIME 13.5 (H) 06/29/2020     Lab Results   Component Value Date    CHOL 195 06/29/2020    CHOL 191 06/23/2020    CHOL 227 (H) 02/06/2020     Lab Results   Component Value Date    TRIG 91 06/29/2020    TRIG 75 06/23/2020    TRIG 86 02/06/2020     Lab Results   Component Value Date    HDL 57 06/29/2020    HDL 57 06/23/2020    HDL 77 (H) 02/06/2020     Lab Results   Component Value Date    LDLCALC 120 (H) 06/29/2020    LDLCALC 119 (H) 06/23/2020    LDLCALC 133 (H) 02/06/2020     Lab Results   Component Value Date    LABVLDL 18 06/29/2020    LABVLDL 15 06/23/2020    LABVLDL 17 02/06/2020     No results found for: CHOLHDLRATIO    2/2020 LHC:  Angiographic Findings:  Right dominant system  Left Main:  Normal  Left Anterior Descending:  Normal  Circumflex:  Normal  Right Coronary: Normal  Left Ventriculogram:  Not performed. Hemodynamics (mm Hg):  Apical Left Ventricular Pressure: 222/1, 11  LVOT Left ventricular pressure: 120/7, 22  Central Aortic Pressure:  115/65 (83)    3/11/2020 [facility]: EF 55-60%. Severe asymmetric hypertrpohy of the basal septum. Moderate MR.    2/2020 [facility] (): HCM. Mod HK of basal anterior and AS segments. Pap muscles displaced to the apex and not well organized. Rebeca flor enhancement present. + SITA. Severe MR.    12/2019 Echo: EF 65-70%. LVOT grd of 103 mm Hg. Grade 2 DD. Severe MR. Mild TR.     Current Outpatient Medications   Medication Instructions    albuterol sulfate HFA (PROAIR HFA) 108 (90 Base) MCG/ACT inhaler 2 puffs, Inhalation, EVERY 6 HOURS PRN    ALPRAZolam (XANAX) 0.5 mg, Oral, DAILY PRN    aspirin 325 mg, Oral, DAILY    atorvastatin (LIPITOR) 40 mg, Oral, NIGHTLY    budesonide (ENTOCORT EC) 3 mg, Oral, EVERY MORNING    butalbital-aspirin-caffeine (FIORINAL) -40 MG capsule TAKE ONE CAPSULE BY MOUTH EVERY 4 HOURS AS NEEDED FOR HEADACHES    Calcium Carbonate-Vitamin D (CALTRATE 600+D PO) 1 tablet, Oral, 2 TIMES DAILY    clopidogrel (PLAVIX) 75 mg, Oral, DAILY    dicyclomine (BENTYL) 20 MG tablet TAKE ONE TABLET BY MOUTH FOUR TIMES A DAY AS NEEDED    docusate sodium (COLACE) 100 mg, Oral, 2 TIMES DAILY PRN    DULoxetine (CYMBALTA) 30 MG extended release capsule TAKE THREE CAPSULES BY MOUTH DAILY    famotidine (PEPCID) 40 mg, Oral, DAILY    FIBER PO 1 capsule, Oral, DAILY    Flaxseed, Linseed, (FLAXSEED OIL PO) 1,300 mg, Oral, DAILY    levothyroxine (SYNTHROID) 125 MCG tablet TAKE ONE TABLET BY MOUTH DAILY    lisinopril (PRINIVIL;ZESTRIL) 2.5 mg, Oral, DAILY WITH LUNCH    metoprolol tartrate (LOPRESSOR) 12.5 mg, Oral, 2 TIMES DAILY    Multiple Vitamins-Minerals (CENTRUM SILVER PO) 1 tablet, Oral, DAILY    Omega-3 Fatty Acids (FISH OIL) 1200 MG CAPS 1 capsule, Oral, 2 TIMES DAILY    predniSONE (DELTASONE) 10 MG tablet 4qdx5d then 3qdx5d then 2qdx5d then 1qd until gone    prochlorperazine (COMPAZINE) 10 MG tablet Take one tablet by mouth every 6 hours as needed    SYMBICORT 80-4.5 MCG/ACT AERO INHALE TWO PUFFS BY MOUTH TWICE A DAY    vitamin D3 (CHOLECALCIFEROL) 400 Units, Oral, DAILY       76 y.o. now s/p myecomty and MV replacement (25 mm Mosaic). 1. HOCM (hypertrophic obstructive cardiomyopathy) ([address]    2. S/P MVR (mitral valve replacement)    3. Hypercholesterolemia          Recs:  -Cont asa and statin  -Cont low-dose bb and lisinopril; change bb to toprol  -F/U me in 4 mo    Sameer Meek MD, [facility] - [place], [place]

## 2020-08-25 ENCOUNTER — TELEPHONE (OUTPATIENT)
Dept: INTERNAL MEDICINE CLINIC | Age: 65
End: 2020-08-25

## 2020-08-25 NOTE — TELEPHONE ENCOUNTER
Patient called to advise she has completed her prednisone and she also went to the eye doctor. Patient states she is still having the headaches and would like to know the next step she should take. Please call and advise.

## 2020-08-25 NOTE — TELEPHONE ENCOUNTER
I recommend CT head and sinuses without contrast.  Diagnosis headache. If this does not help with diagnosis and treatment then I recommend referral to Dr. Vivien Ruiz at [facility]- she is a neurologist who specializes in headaches. Phone number is [phone] for scheduling.

## 2020-08-26 ENCOUNTER — OFFICE VISIT (OUTPATIENT)
Dept: CARDIOTHORACIC SURGERY | Age: 65
End: 2020-08-26

## 2020-08-26 VITALS
SYSTOLIC BLOOD PRESSURE: 118 MMHG | TEMPERATURE: 98.3 F | BODY MASS INDEX: 26.92 KG/M2 | OXYGEN SATURATION: 97 % | WEIGHT: 161.6 LBS | HEART RATE: 83 BPM | DIASTOLIC BLOOD PRESSURE: 63 MMHG | HEIGHT: 65 IN

## 2020-08-26 PROCEDURE — 99024 POSTOP FOLLOW-UP VISIT: CPT | Performed by: THORACIC SURGERY (CARDIOTHORACIC VASCULAR SURGERY)

## 2020-08-26 RX ORDER — ASPIRIN 81 MG/1
81 TABLET ORAL DAILY
COMMUNITY

## 2020-08-26 NOTE — PROGRESS NOTES
Mrs. Michelle Torres presents for her second postoperative visit after her mitral valve replacement for what was found to be evolving rheumatic disease combined with hypertrophic obstructive cardiomyopathy. Overall she looks great. She is out walking with her  every day. They go for 20 to 30 minutes at a time without stopping. They are going up and down hills and she is only mildly short of breath with exertion whereas beforehand she could not negotiate this type of physical challenge. They are both delighted with her result. Exam she looks well. Her incision is healed beautifully. She has no murmurs of any kind. This is a notable change from the murmur she had preoperatively. She has no ankle edema. There is no jugular venous distention. Her lungs are thoroughly clear bilaterally. Overall Mrs. Jazmin Fitch is doing beautifully. I have made no changes in her medications. I talked with her about lifting the weight restrictions for her arms at this point. She is welcome to return to driving at her discretion. Going to leave further follow-up with my office open-ended. She is going to seeing Dr. Suraj Shell for additional follow-up in December. She and her  know they are welcome to call or return at anytime in the future should there be any new problems, questions or concerns for which we can be of help.

## 2020-08-27 ENCOUNTER — HOSPITAL ENCOUNTER (OUTPATIENT)
Dept: CT IMAGING | Age: 65
Discharge: HOME | End: 2020-08-27
Payer: MEDICARE

## 2020-08-27 PROCEDURE — 70486 CT MAXILLOFACIAL W/O DYE: CPT

## 2020-08-27 PROCEDURE — 70450 CT HEAD/BRAIN W/O DYE: CPT

## 2020-09-09 RX ORDER — DIAZEPAM 5 MG/1
TABLET ORAL
COMMUNITY
Start: 2020-08-18 | End: 2020-11-25 | Stop reason: SDUPTHER

## 2020-09-11 ENCOUNTER — TELEPHONE (OUTPATIENT)
Dept: CARDIOLOGY CLINIC | Age: 65
End: 2020-09-11

## 2020-09-11 NOTE — TELEPHONE ENCOUNTER
Would wait until 6 mo after her heart surgery if possible. If emergent/urgent (like for cancer), then ok. Otherwise, wait until December/january.     Brandon Hay

## 2020-09-11 NOTE — TELEPHONE ENCOUNTER
The patient called stating she is going to have eyelid surgery on 10/08/20 and she needs to know if she can hold her Aspirin 2 weeks prior. Please call the patient back at [phone] to advise.

## 2020-09-14 ENCOUNTER — OFFICE VISIT (OUTPATIENT)
Dept: PULMONOLOGY | Age: 65
End: 2020-09-14
Payer: MEDICARE

## 2020-09-14 VITALS — HEART RATE: 76 BPM | OXYGEN SATURATION: 96 %

## 2020-09-14 PROCEDURE — 3017F COLORECTAL CA SCREEN DOC REV: CPT | Performed by: INTERNAL MEDICINE

## 2020-09-14 PROCEDURE — G8417 CALC BMI ABV UP PARAM F/U: HCPCS | Performed by: INTERNAL MEDICINE

## 2020-09-14 PROCEDURE — 4040F PNEUMOC VAC/ADMIN/RCVD: CPT | Performed by: INTERNAL MEDICINE

## 2020-09-14 PROCEDURE — G8399 PT W/DXA RESULTS DOCUMENT: HCPCS | Performed by: INTERNAL MEDICINE

## 2020-09-14 PROCEDURE — 99214 OFFICE O/P EST MOD 30 MIN: CPT | Performed by: INTERNAL MEDICINE

## 2020-09-14 PROCEDURE — 1123F ACP DISCUSS/DSCN MKR DOCD: CPT | Performed by: INTERNAL MEDICINE

## 2020-09-14 PROCEDURE — G8427 DOCREV CUR MEDS BY ELIG CLIN: HCPCS | Performed by: INTERNAL MEDICINE

## 2020-09-14 PROCEDURE — 1036F TOBACCO NON-USER: CPT | Performed by: INTERNAL MEDICINE

## 2020-09-14 PROCEDURE — 1090F PRES/ABSN URINE INCON ASSESS: CPT | Performed by: INTERNAL MEDICINE

## 2020-09-14 NOTE — PROGRESS NOTES
Subjective:      Patient ID: Haresh Louie is a 76 y.o. female. HPI Mrs. Ann Goss returns for follow-up for asthma. She was last seen informally in the hospital, when she was admitted for mitral valve replacement and ventricular septal myectomy. She has recovered from surgery, and is regaining exercise tolerance. She and her  go for a walk every morning without fail. She does not experienced exertional dyspnea, and reports that her endurance is improving consistently. Rare cough episode. No chest pain. She has not required PRN use of short acting bronchodilator. She continues on Symbicort once daily. Review of Systems    Objective:   Physical Exam  Vitals signs reviewed. Constitutional:       Appearance: She is well-developed and overweight. HENT:      Head: Normocephalic and atraumatic. Mouth/Throat:      Mouth: Mucous membranes are moist.      Pharynx: Oropharynx is clear. No oropharyngeal exudate or posterior oropharyngeal erythema. Eyes:      General: No scleral icterus. Right eye: No discharge. Left eye: No discharge. Neck:      Thyroid: No thyromegaly. Trachea: No tracheal deviation. Cardiovascular:      Rate and Rhythm: Normal rate and regular rhythm. Pulses:           Radial pulses are 2+ on the right side and 2+ on the left side. Heart sounds: S1 normal and S2 normal. Murmur present. Systolic murmur present with a grade of 2/6. Pulmonary:      Effort: Pulmonary effort is normal. No respiratory distress. Breath sounds: Normal breath sounds. No stridor. No wheezing, rhonchi or rales. Chest:      Chest wall: No tenderness. Musculoskeletal:      Right lower leg: No edema. Left lower leg: No edema. Lymphadenopathy:      Cervical: No cervical adenopathy. Skin:     General: Skin is warm and dry. Neurological:      Mental Status: She is alert and oriented to person, place, and time.    Psychiatric:         Mood and Affect: Mood normal.         Behavior: Behavior normal.         Thought Content: Thought content normal.         Judgment: Judgment normal.         Assessment:      Mild intermittent asthma is under good control with ICS/LABA. Exertional dyspnea and cough have substantially improved with mitral valve replacement and myectomy.   She has received influenza vaccine      Plan:      Continue Symbicort inhaler daily  Continue daily exercise, increasing endurance        Marlene Gutierrez MD

## 2020-10-01 ENCOUNTER — TELEPHONE (OUTPATIENT)
Dept: CARDIOLOGY CLINIC | Age: 65
End: 2020-10-01

## 2020-10-01 RX ORDER — LISINOPRIL 2.5 MG/1
2.5 TABLET ORAL
Qty: 30 TABLET | Refills: 5 | Status: SHIPPED | OUTPATIENT
Start: 2020-10-01 | End: 2020-10-22 | Stop reason: SDUPTHER

## 2020-10-01 NOTE — TELEPHONE ENCOUNTER
Patient called stating she wants a prescription for lisinopril 2.5mg. sent to [facility] on [address]. Patient states Dr. De León Nicely had been prescribing it but recently denied Last OV 8/10/20 next 12/09/20 labs done 7. 3.20

## 2020-10-01 NOTE — TELEPHONE ENCOUNTER
I see where a request to  Radha Whittaker denied the prescription but not . After speaking with patient she wants to speak with  office before I ask  Teddy Batista if he will take over this. She said she only met  briefly in hospital and isn't sure why her medication went to him.  I explained to her sometimes meds that were given in hosp are requested to a hospital doc again and not the doctor managing it outside of the hospital.

## 2020-10-22 ENCOUNTER — CASE MANAGEMENT (OUTPATIENT)
Dept: WOMENS IMAGING | Age: 65
End: 2020-10-22

## 2020-10-22 ENCOUNTER — HOSPITAL ENCOUNTER (OUTPATIENT)
Dept: WOMENS IMAGING | Age: 65
Discharge: HOME | End: 2020-10-22
Payer: MEDICARE

## 2020-10-22 PROCEDURE — 77067 SCR MAMMO BI INCL CAD: CPT

## 2020-10-22 RX ORDER — LISINOPRIL 2.5 MG/1
2.5 TABLET ORAL
Qty: 30 TABLET | Refills: 5 | Status: SHIPPED | OUTPATIENT
Start: 2020-10-22 | End: 2021-10-14

## 2020-10-22 RX ORDER — ATORVASTATIN CALCIUM 40 MG/1
40 TABLET, FILM COATED ORAL NIGHTLY
Qty: 30 TABLET | Refills: 3 | Status: SHIPPED
Start: 2020-10-22 | End: 2021-01-05 | Stop reason: DRUGHIGH

## 2020-10-23 ENCOUNTER — TELEPHONE (OUTPATIENT)
Dept: INTERNAL MEDICINE CLINIC | Age: 65
End: 2020-10-23

## 2020-10-23 NOTE — TELEPHONE ENCOUNTER
Patient states she was advised additional imaging of her left breat is needed and wants to know if orders could be placed. Please advise.

## 2020-10-29 RX ORDER — CLOPIDOGREL BISULFATE 75 MG/1
75 TABLET ORAL DAILY
Qty: 30 TABLET | Refills: 5 | Status: SHIPPED | OUTPATIENT
Start: 2020-10-29 | End: 2021-08-26

## 2020-10-29 NOTE — TELEPHONE ENCOUNTER
[facility] Medication Refills:       clopidogrel (PLAVIX) 75 MG tablet  Take 1 tablet by mouth daily, Disp-30 tablet, R-3     [facility] [address] [place] [address] [phone] [facility] [phone]         Last Office Visit: 08/10/20    Next Office Visit: 12/09/20    Last Refill: 07/03/20    Last Labs: 07/03/20

## 2020-11-02 ENCOUNTER — HOSPITAL ENCOUNTER (OUTPATIENT)
Dept: ULTRASOUND IMAGING | Age: 65
Discharge: HOME | End: 2020-11-02
Payer: MEDICARE

## 2020-11-02 ENCOUNTER — HOSPITAL ENCOUNTER (OUTPATIENT)
Dept: WOMENS IMAGING | Age: 65
Discharge: HOME | End: 2020-11-02
Payer: MEDICARE

## 2020-11-02 PROCEDURE — G0279 TOMOSYNTHESIS, MAMMO: HCPCS

## 2020-11-02 PROCEDURE — 76642 ULTRASOUND BREAST LIMITED: CPT

## 2020-11-06 ENCOUNTER — TELEPHONE (OUTPATIENT)
Dept: CARDIOLOGY CLINIC | Age: 65
End: 2020-11-06

## 2020-11-06 NOTE — TELEPHONE ENCOUNTER
Pain could be related to CABG but why don't you have the patient come in a see me next week for further evaluation and an ecg.

## 2020-11-06 NOTE — TELEPHONE ENCOUNTER
Patient has pain above left breast. She had open heart surgery in June. She was advised by Bella Smith in Dr. Conner Roamn office to check with cardiology about this. Please call her about this at [phone]. Thanks.

## 2020-11-11 NOTE — TELEPHONE ENCOUNTER
Pt called back in stated FYI she has to stay in for 14 days before she can make another appt. Pt states she will keep her appt on 12-09-20 with Dr. Janeth Duncan. Pt wanted to make sure nurse Cameron Paz knew what was going on.  Thanks

## 2020-11-11 NOTE — TELEPHONE ENCOUNTER
Spoke with pt. She is being tested on Friday for Covid.   Advised pt that if she has any increased cp or sob over the weekend to call us or go to the ED for assessment,

## 2020-11-13 ENCOUNTER — OFFICE VISIT (OUTPATIENT)
Dept: PRIMARY CARE CLINIC | Age: 65
End: 2020-11-13
Payer: MEDICARE

## 2020-11-13 PROCEDURE — 99211 OFF/OP EST MAY X REQ PHY/QHP: CPT | Performed by: NURSE PRACTITIONER

## 2020-11-13 NOTE — PROGRESS NOTES
Marissa Chase received a viral test for COVID-19. They were educated on isolation and quarantine as appropriate. For any symptoms, they were directed to seek care from their PCP, given contact information to establish with a doctor, directed to an urgent care or the emergency room.

## 2020-11-17 LAB — SARS-COV-2, NAA: NOT DETECTED

## 2020-11-20 RX ORDER — CIPROFLOXACIN 500 MG/1
500 TABLET, FILM COATED ORAL 2 TIMES DAILY
Qty: 14 TABLET | Refills: 0 | Status: SHIPPED | OUTPATIENT
Start: 2020-11-20 | End: 2020-11-27

## 2020-11-20 NOTE — TELEPHONE ENCOUNTER
Patient has a UTI and wanted to see if you could send something in. She states it hurts and walsh.     Julius Vargas [address] [place] [address], [place] [id number]

## 2020-11-23 ENCOUNTER — OFFICE VISIT (OUTPATIENT)
Dept: CARDIOLOGY CLINIC | Age: 65
End: 2020-11-23
Payer: MEDICARE

## 2020-11-23 VITALS
BODY MASS INDEX: 26.29 KG/M2 | WEIGHT: 157.8 LBS | TEMPERATURE: 98.4 F | DIASTOLIC BLOOD PRESSURE: 74 MMHG | SYSTOLIC BLOOD PRESSURE: 128 MMHG | HEIGHT: 65 IN | HEART RATE: 81 BPM

## 2020-11-23 PROCEDURE — 93000 ELECTROCARDIOGRAM COMPLETE: CPT | Performed by: NURSE PRACTITIONER

## 2020-11-23 PROCEDURE — G8484 FLU IMMUNIZE NO ADMIN: HCPCS | Performed by: NURSE PRACTITIONER

## 2020-11-23 PROCEDURE — 1123F ACP DISCUSS/DSCN MKR DOCD: CPT | Performed by: NURSE PRACTITIONER

## 2020-11-23 PROCEDURE — 3017F COLORECTAL CA SCREEN DOC REV: CPT | Performed by: NURSE PRACTITIONER

## 2020-11-23 PROCEDURE — G8417 CALC BMI ABV UP PARAM F/U: HCPCS | Performed by: NURSE PRACTITIONER

## 2020-11-23 PROCEDURE — G8428 CUR MEDS NOT DOCUMENT: HCPCS | Performed by: NURSE PRACTITIONER

## 2020-11-23 PROCEDURE — 1090F PRES/ABSN URINE INCON ASSESS: CPT | Performed by: NURSE PRACTITIONER

## 2020-11-23 PROCEDURE — 1036F TOBACCO NON-USER: CPT | Performed by: NURSE PRACTITIONER

## 2020-11-23 PROCEDURE — G8399 PT W/DXA RESULTS DOCUMENT: HCPCS | Performed by: NURSE PRACTITIONER

## 2020-11-23 PROCEDURE — 99214 OFFICE O/P EST MOD 30 MIN: CPT | Performed by: NURSE PRACTITIONER

## 2020-11-23 PROCEDURE — 4040F PNEUMOC VAC/ADMIN/RCVD: CPT | Performed by: NURSE PRACTITIONER

## 2020-11-23 RX ORDER — GABAPENTIN 300 MG/1
300 CAPSULE ORAL 3 TIMES DAILY
COMMUNITY
End: 2021-08-26

## 2020-11-23 NOTE — PROGRESS NOTES
CC/HPI:    76 y.o. patient of Dr Joel Gaitan with HOCM/myectomy, severe MR/MVR, (surgery 6/2020) HTN, and HLD who presented with CP and ANGULO. C/o sharp, constant, non-radiating, non-exertional left sided chest pain for the last few weeks. She also notes numbness across the chest. She's not sure if this pain related to recent open heart surgery. She denies LH/dizziness, palpitations or syncope. She has chronic nausea. Denies diaphoresis. No LE edema, or PND. Denies change to chronic orthopnea. No fever, chills, or GI/ bleeding.        Past Medical History:   Diagnosis Date    Adrenal adenoma     left 8 mm - stable - CT 8/13    Anxiety     Arthritis     Asthma     Chronic nausea     Environmental allergies     GERD (gastroesophageal reflux disease)     HOCM (hypertrophic obstructive cardiomyopathy) (HCC)     HTN (hypertension)     Irritable bowel syndrome     Lung disease     Microscopic colitis     Migraine     Mitral regurgitation     Nephrolithiasis 1/21/15    R side -s/p lithotripsy    Nosebleed     Osteoporosis     DEXA 8/8/13 - scotty lumbar spine    Rash     Restless leg syndrome     TMJ dysfunction     Vitreous detachment     Wrist fracture, bilateral      Past Surgical History:   Procedure Laterality Date    CARDIAC CATHETERIZATION  02/28/2020    CARDIOVASCULAR STRESS TEST  2016    normal    CHOLECYSTECTOMY, LAPAROSCOPIC N/A 3/27/2019    w/cholangiogram w/C-arm, performed by Chasity Matta MD at [address] [place] [address]  7/13    COLONOSCOPY  03/29/2018    Afshan (random bxs)-microscopic colitis    KNEE SURGERY Right 1975    synovectomy    MITRAL VALVE REPAIR N/A 6/29/2020    [facility]; TCPB; resection of hypertrophic muscle from septal LVOT; miital valve replacement w/ 25mm Medtronic Mosaic bovine pericardial bioprosthetic valve; TCPB; ICNB x 5 levels bilatereally performed by Brooke Aguilar MD at [address] [place] [address] Left     Partial    TRANSESOPHAGEAL ECHOCARDIOGRAM 03/11/2020    Dr. Margaret Cordova  03/2018    Normal    WRIST FRACTURE SURGERY Left 2009     Family History   Problem Relation Age of Onset    Cancer Mother         leukemia    Hypertension Mother     Other Father         CRF    Heart Surgery Father         CABG    Blindness Father     Hypertension Father     Hearing Loss Father      Social History     Tobacco Use    Smoking status: Former Smoker     Packs/day: 1.00     Years: 38.00     Pack years: 38.00     Types: Cigarettes     Start date: 5/1/1972     Last attempt to quit: 5/1/2010     Years since quitting: 10.5    Smokeless tobacco: Never Used   Substance Use Topics    Alcohol use: Not Currently     Alcohol/week: 0.0 standard drinks     Frequency: 4 or more times a week     Drinks per session: 1 or 2     Binge frequency: Never    Drug use: No     Allergies: Tolectin [tolmetin]    Review of Systems  General: No changes in weight, fatigue, or night sweats. HEENT: No blurry or decreased vision. No changes in hearing, nasal discharge or sore throat. Cardiovascular:  See HPI. Respiratory: No cough, hemoptysis, or wheezing.  + history of asthma. Gastrointestinal:  No abdominal pain, hematochezia, melana, constipation, diarrhea, or history of GI ulcers. Chronic nausea and GERD and IBS  Genito-Urinary: No dysuria or hematuria. No urgency or polyuria. Musculoskeletal:  No complaints of joint pain, joint swelling or muscular weakness/soreness. + arthritis   Neurological:  No dizziness, headaches, numbness/tingling, speech problems or weakness. No history of a stroke or TIA. Psychological: + anxiety   Hematological and Lymphatic: No abnormal bleeding or bruising, blood clots, jaundice or swollen lymph nodes. Endocrine:   No malaise/lethargy, palpitations, polydipsia/polyuria, temperature intolerance or unexpected weight changes  Skin:  No rashes or non-healing ulcers.     Physical Exam:  /82   Pulse 81   Temp 98.4 °F (36.9 °C)   Ht 5' 5\" (1.651 m)   Wt 157 lb 12.8 oz (71.6 kg)   BMI 26.26 kg/m²    Repeat /74  General (appearance):  No acute distress  Eyes: anicteric   Neck: soft, No JVD  Ears/Nose/Mouth/Thorat: No cyanosis  CV: RRR soft PLACIDO  Respiratory: Clear   GI: soft, non-tender, non-distended  Skin: Warm, dry. No rashes  Neuro/Psych: Alert and oriented RRR. Appropriate behavior  Ext:  No c/c.  No edema  Pulses:  2+ radial     Weight  Wt Readings from Last 3 Encounters:   11/23/20 157 lb 12.8 oz (71.6 kg)   08/26/20 161 lb 9.6 oz (73.3 kg)   08/10/20 161 lb (73 kg)          CBC:   Lab Results   Component Value Date    WBC 6.9 07/03/2020    HGB 9.4 (L) 07/03/2020    HCT 27.3 (L) 07/03/2020    MCV 95.7 07/03/2020     07/03/2020     BMP:  Lab Results   Component Value Date    CREATININE <0.5 (L) 07/03/2020    BUN 7 07/03/2020     (L) 07/03/2020    K 3.8 07/03/2020     07/03/2020    CO2 27 07/03/2020     Mag:   Lab Results   Component Value Date    MG 1.90 07/03/2020     LIVER PROFILE:   Lab Results   Component Value Date    ALT 45 (H) 06/23/2020    AST 48 (H) 06/23/2020    ALKPHOS 62 06/23/2020    BILITOT 0.3 06/23/2020     PT/INR:   Lab Results   Component Value Date    INR 1.19 (H) 06/30/2020    INR 1.07 06/29/2020    INR 1.16 (H) 06/29/2020    PROTIME 13.8 (H) 06/30/2020    PROTIME 12.4 06/29/2020    PROTIME 13.5 (H) 06/29/2020     Pro-BNP   Lab Results   Component Value Date    PROBNP 2,358 12/18/2019     LIPIDS:  No components found for: CHLPL  Lab Results   Component Value Date    TRIG 91 06/29/2020    TRIG 75 06/23/2020    TRIG 86 02/06/2020     Lab Results   Component Value Date    HDL 57 06/29/2020    HDL 57 06/23/2020    HDL 77 (H) 02/06/2020     Lab Results   Component Value Date    LDLCALC 120 (H) 06/29/2020    LDLCALC 119 (H) 06/23/2020    LDLCALC 133 (H) 02/06/2020     Lab Results   Component Value Date    LABVLDL 18 06/29/2020    LABVLDL 15 06/23/2020    LABVLDL 17 02/06/2020 TSH:  Lab Results   Component Value Date    TSH 1.75 02/06/2020       IMAGING:     3/11/2020 [facility]:  Technically difficult study due to loss of probe contact due to loss of gel   and air interference. Left ventricular cavity size is normal. There is   severe asymmetric hypertrophy of the basal septum. Overall left ventricular   systolic function appears normal with an estimated ejection fraction of   55-60%. No regional wall motion abnormalities are noted. Mitral valve leaflets are mildly thickened. Moderate mitral regurgitation is   present. 2/2020 [facility]:  Angiographic Findings:  Right dominant system  Left Main:  Normal  Left Anterior Descending:  Normal  Circumflex:  Normal  Right Coronary:  Normal  Left Ventriculogram:  Not performed. Hemodynamics (mm Hg):  Apical Left Ventricular Pressure: 222/1, 11  LVOT Left ventricular pressure: 120/7, 22  Central Aortic Pressure:  115/65 (83)        2/2020 [facility] (): HCM. Mod HK of basal anterior and AS segments. Pap muscles displaced to the apex and not well organized. Rebeca flor enhancement present. + SITA. Severe MR    12/2019 Echo:   Left ventricular cavity size is normal.   There is severe asymmetric hypertrophy of the basal septum. Ejection fraction is estimated to be 65-70%. Overall left ventricular systolic function appears borderline hyperdynamic. There is a late peaking gradient recorded across the LV outflow tract   consistent with dynamic obstruction with a peak gradient of 103 mmHg. Diastolic filling parameters suggest grade II diastolic dysfunction. Mild tricuspid regurgitation. Medications:   Current Outpatient Medications   Medication Sig Dispense Refill    gabapentin (NEURONTIN) 300 MG capsule Take 300 mg by mouth 3 times daily.       ciprofloxacin (CIPRO) 500 MG tablet Take 1 tablet by mouth 2 times daily for 7 days 14 tablet 0    clopidogrel (PLAVIX) 75 MG tablet Take 1 tablet by mouth daily 30 tablet 5    atorvastatin (LIPITOR) 40 MG HEADACHES 60 capsule 2     No current facility-administered medications for this visit. Assessment:  1. HOCM (hypertrophic obstructive cardiomyopathy) ([address]    2. S/P MVR (mitral valve replacement)    3. Severe mitral regurgitation    4. Essential hypertension    5. Hypercholesterolemia    6. ANGULO (dyspnea on exertion)    7. Status post ventricular septal myectomy    8. Atypical chest pain          Plan:  HOCM/hx myectomy, severe MR hx MVR   ASA, plavix   Lipitor   Lisinopril   Toprol  HTN   /82 today but pt states pt frequent 90's-low 100's   Cont toprol and lisinopril   Cont to monitor  HLD   Lipitor  ANGULO and chest pain   Echo   Recent cath showed no obstructive CAD   Recent MVR and myectomy     Requesting pain meds stronger than tylenol.  Recommended she discuss with Dr Pam Gardner and/or PCP

## 2020-11-25 ENCOUNTER — OFFICE VISIT (OUTPATIENT)
Dept: INTERNAL MEDICINE CLINIC | Age: 65
End: 2020-11-25
Payer: MEDICARE

## 2020-11-25 VITALS
HEIGHT: 65 IN | WEIGHT: 158 LBS | SYSTOLIC BLOOD PRESSURE: 128 MMHG | DIASTOLIC BLOOD PRESSURE: 70 MMHG | BODY MASS INDEX: 26.33 KG/M2 | TEMPERATURE: 97.5 F

## 2020-11-25 PROBLEM — I42.1 HYPERTROPHIC OBSTRUCTIVE CARDIOMYOPATHY WITH DIASTOLIC HEART FAILURE (HCC): Status: RESOLVED | Noted: 2020-06-29 | Resolved: 2020-11-25

## 2020-11-25 PROBLEM — R51.9 FRONTAL HEADACHE: Status: RESOLVED | Noted: 2020-08-05 | Resolved: 2020-11-25

## 2020-11-25 PROBLEM — R07.89 CHEST WALL PAIN: Status: ACTIVE | Noted: 2020-11-25

## 2020-11-25 PROBLEM — I34.0 MITRAL VALVE REGURGITATION: Status: RESOLVED | Noted: 2019-02-12 | Resolved: 2020-11-25

## 2020-11-25 PROBLEM — I50.30 HYPERTROPHIC OBSTRUCTIVE CARDIOMYOPATHY WITH DIASTOLIC HEART FAILURE (HCC): Status: RESOLVED | Noted: 2020-06-29 | Resolved: 2020-11-25

## 2020-11-25 PROBLEM — R82.71 BACTERIURIA: Status: RESOLVED | Noted: 2020-02-13 | Resolved: 2020-11-25

## 2020-11-25 PROCEDURE — G8399 PT W/DXA RESULTS DOCUMENT: HCPCS | Performed by: INTERNAL MEDICINE

## 2020-11-25 PROCEDURE — 1090F PRES/ABSN URINE INCON ASSESS: CPT | Performed by: INTERNAL MEDICINE

## 2020-11-25 PROCEDURE — 1036F TOBACCO NON-USER: CPT | Performed by: INTERNAL MEDICINE

## 2020-11-25 PROCEDURE — G8484 FLU IMMUNIZE NO ADMIN: HCPCS | Performed by: INTERNAL MEDICINE

## 2020-11-25 PROCEDURE — G8427 DOCREV CUR MEDS BY ELIG CLIN: HCPCS | Performed by: INTERNAL MEDICINE

## 2020-11-25 PROCEDURE — 3017F COLORECTAL CA SCREEN DOC REV: CPT | Performed by: INTERNAL MEDICINE

## 2020-11-25 PROCEDURE — G8417 CALC BMI ABV UP PARAM F/U: HCPCS | Performed by: INTERNAL MEDICINE

## 2020-11-25 PROCEDURE — 4040F PNEUMOC VAC/ADMIN/RCVD: CPT | Performed by: INTERNAL MEDICINE

## 2020-11-25 PROCEDURE — 99214 OFFICE O/P EST MOD 30 MIN: CPT | Performed by: INTERNAL MEDICINE

## 2020-11-25 PROCEDURE — 1123F ACP DISCUSS/DSCN MKR DOCD: CPT | Performed by: INTERNAL MEDICINE

## 2020-11-25 RX ORDER — DIAZEPAM 5 MG/1
5 TABLET ORAL DAILY
Qty: 30 TABLET | Refills: 2 | Status: SHIPPED | OUTPATIENT
Start: 2020-11-25 | End: 2021-02-25 | Stop reason: SDUPTHER

## 2020-11-25 RX ORDER — DULOXETIN HYDROCHLORIDE 30 MG/1
CAPSULE, DELAYED RELEASE ORAL
Qty: 90 CAPSULE | Refills: 5 | Status: SHIPPED | OUTPATIENT
Start: 2020-11-25 | End: 2021-05-17

## 2020-11-25 RX ORDER — ALPRAZOLAM 0.5 MG/1
0.5 TABLET ORAL DAILY PRN
Qty: 30 TABLET | Refills: 2 | Status: SHIPPED | OUTPATIENT
Start: 2020-11-25 | End: 2021-02-25 | Stop reason: SDUPTHER

## 2020-11-25 RX ORDER — PROCHLORPERAZINE MALEATE 10 MG
TABLET ORAL
Qty: 30 TABLET | Refills: 5 | Status: SHIPPED | OUTPATIENT
Start: 2020-11-25 | End: 2021-04-12

## 2020-11-25 ASSESSMENT — ENCOUNTER SYMPTOMS
SHORTNESS OF BREATH: 0
TROUBLE SWALLOWING: 0
WHEEZING: 0
NAUSEA: 1

## 2020-11-25 NOTE — PROGRESS NOTES
SUBJECTIVE:  Patient ID: Donis Medel is an 76 y.o. female. HPI: Patient here today for the f/u of chronic problems-- see Problem List and associated comments. New issues or complaints include (also see Assessment for more details): Here for 3-month follow-up. He has been having some atypical chest pain and she saw cardiology last week. The pain was felt to be noncardiac. She has an echocardiogram coming up. She describes the pain as occurring randomly and can last from just a couple seconds to a couple minutes. It is a small focal area on her left chest.  There is no associated shortness of breath, nausea or diaphoresis. She is also received her first set of Botox injections for her migraine headaches. She is also on gabapentin. She believes that so far there has been some mild improvement. Her anxiety is stable. She needs a refill on her medications. Review of Systems   Constitutional: Negative for fever. HENT: Negative for trouble swallowing. Respiratory: Negative for shortness of breath and wheezing. Cardiovascular: Positive for chest pain. Negative for palpitations. Gastrointestinal: Positive for nausea. Musculoskeletal: Positive for arthralgias. Neurological: Positive for headaches. Psychiatric/Behavioral: Negative for dysphoric mood. The patient is nervous/anxious. OBJECTIVE:    /70 (Site: Left Upper Arm)   Temp 97.5 °F (36.4 °C)   Ht 5' 5\" (1.651 m)   Wt 158 lb (71.7 kg)   BMI 26.29 kg/m²      Physical Exam  Constitutional:       General: She is not in acute distress. Appearance: Normal appearance. She is not ill-appearing or diaphoretic. Eyes:      Extraocular Movements: Extraocular movements intact. Pupils: Pupils are equal, round, and reactive to light. Comments: Fundi okay   Neck:      Musculoskeletal: Normal range of motion and neck supple. Cardiovascular:      Rate and Rhythm: Normal rate and regular rhythm.       Pulses: Carotid pulses are 2+ on the right side and 2+ on the left side. Radial pulses are 2+ on the right side and 2+ on the left side. Heart sounds: Murmur present. Systolic murmur present with a grade of 1/6. No gallop. Pulmonary:      Effort: Pulmonary effort is normal. No respiratory distress. Breath sounds: Normal breath sounds. Chest:       Abdominal:      General: Bowel sounds are normal.      Palpations: Abdomen is soft. Musculoskeletal:      Right lower leg: No edema. Left lower leg: No edema. Lymphadenopathy:      Cervical: No cervical adenopathy. Skin:     Coloration: Skin is not jaundiced or pale. Neurological:      General: No focal deficit present. Mental Status: She is alert and oriented to person, place, and time. Cranial Nerves: No cranial nerve deficit. Motor: No weakness. Gait: Gait normal.   Psychiatric:         Mood and Affect: Mood normal.         Behavior: Behavior normal.         Thought Content: Thought content normal.         Judgment: Judgment normal.         ASSESSMENT:       Encounter Diagnoses   Name Primary?  Anxiety     Essential hypertension     Migraine without status migrainosus, not intractable, unspecified migraine type     Hypertrophic cardiomyopathy (HCC)     Chronic nausea     Chest wall pain        Hypertension  BP okay    Migraine  Per neurology-now seeing Dr. Sandie Hensley at [facility]. She has started gabapentin and Botox injections with some mild benefit so far. Anxiety  Monitor report done. Refill Valium and Xanax. Takes Valium daily with Xanax as her backup. This combination is worked well for her for several years. Hypertrophic cardiomyopathy ([address]  Status post surgical intervention. Per cardiology. She has follow-up echo in 1 month. Chronic nausea  Moderately improved. Refill Phenergan. Chest wall pain  Focal area of tenderness sternal costal junction.   Suspect this is the cause of her chest pain. No specific intervention necessary at this time. She has pain medication. PLAN:See ASSESSMENT for evaluation & PLAN     No orders of the defined types were placed in this encounter.    , PMH, SH and FH reviewed and noted. Recent and past labs, tests and consultsalso reviewed. Recent or new meds also reviewed.

## 2020-11-25 NOTE — ASSESSMENT & PLAN NOTE
Focal area of tenderness sternal costal junction. Suspect this is the cause of her chest pain. No specific intervention necessary at this time. She has pain medication.

## 2020-11-25 NOTE — ASSESSMENT & PLAN NOTE
Monitor report done. Refill Valium and Xanax. Takes Valium daily with Xanax as her backup. This combination is worked well for her for several years.

## 2020-11-25 NOTE — ASSESSMENT & PLAN NOTE
Per neurology-now seeing Dr. Hugo Owen at [facility]. She has started gabapentin and Botox injections with some mild benefit so far.

## 2020-12-02 ENCOUNTER — HOSPITAL ENCOUNTER (OUTPATIENT)
Dept: NON INVASIVE DIAGNOSTICS | Age: 65
Discharge: HOME | End: 2020-12-02
Payer: MEDICARE

## 2020-12-02 LAB
LV EF: 58 %
LVEF MODALITY: NORMAL

## 2020-12-02 PROCEDURE — 93306 TTE W/DOPPLER COMPLETE: CPT

## 2020-12-03 ENCOUNTER — TELEPHONE (OUTPATIENT)
Dept: CARDIOLOGY CLINIC | Age: 65
End: 2020-12-03

## 2020-12-03 NOTE — TELEPHONE ENCOUNTER
The patient called requesting her echo results from 12/02/20. Please call the patient back at [phone] to advise.

## 2020-12-09 ENCOUNTER — OFFICE VISIT (OUTPATIENT)
Dept: CARDIOLOGY CLINIC | Age: 65
End: 2020-12-09
Payer: MEDICARE

## 2020-12-09 VITALS
HEIGHT: 65 IN | WEIGHT: 157 LBS | DIASTOLIC BLOOD PRESSURE: 90 MMHG | HEART RATE: 88 BPM | SYSTOLIC BLOOD PRESSURE: 142 MMHG | BODY MASS INDEX: 26.16 KG/M2 | TEMPERATURE: 97.5 F

## 2020-12-09 PROCEDURE — 1036F TOBACCO NON-USER: CPT | Performed by: INTERNAL MEDICINE

## 2020-12-09 PROCEDURE — G8427 DOCREV CUR MEDS BY ELIG CLIN: HCPCS | Performed by: INTERNAL MEDICINE

## 2020-12-09 PROCEDURE — G8417 CALC BMI ABV UP PARAM F/U: HCPCS | Performed by: INTERNAL MEDICINE

## 2020-12-09 PROCEDURE — G8399 PT W/DXA RESULTS DOCUMENT: HCPCS | Performed by: INTERNAL MEDICINE

## 2020-12-09 PROCEDURE — G8484 FLU IMMUNIZE NO ADMIN: HCPCS | Performed by: INTERNAL MEDICINE

## 2020-12-09 PROCEDURE — 1123F ACP DISCUSS/DSCN MKR DOCD: CPT | Performed by: INTERNAL MEDICINE

## 2020-12-09 PROCEDURE — 99214 OFFICE O/P EST MOD 30 MIN: CPT | Performed by: INTERNAL MEDICINE

## 2020-12-09 PROCEDURE — 1090F PRES/ABSN URINE INCON ASSESS: CPT | Performed by: INTERNAL MEDICINE

## 2020-12-09 PROCEDURE — 3017F COLORECTAL CA SCREEN DOC REV: CPT | Performed by: INTERNAL MEDICINE

## 2020-12-09 PROCEDURE — 4040F PNEUMOC VAC/ADMIN/RCVD: CPT | Performed by: INTERNAL MEDICINE

## 2020-12-09 RX ORDER — METOPROLOL SUCCINATE 50 MG/1
50 TABLET, EXTENDED RELEASE ORAL DAILY
Qty: 30 TABLET | Refills: 5 | Status: SHIPPED | OUTPATIENT
Start: 2020-12-09 | End: 2020-12-17 | Stop reason: SDUPTHER

## 2020-12-09 NOTE — PROGRESS NOTES
76 y.o. woman s/p septal myectomy and MV replacement (tissue) for HCM and severe MR done June 29, 2020. Had some cp and saw Blayne Suzie for it a few mo ago. Still has cp on L side of chest. It's totally random. Shart, shooting. No radiation. Sharp pain for seconds, after that has some pain for several minutes. Has occ sob; sob is worse now than pre surgery. Always has nausea. No diarrhea. No constipation. No f/c/s.     Past Medical History:   Diagnosis Date    Adrenal adenoma     left 8 mm - stable - CT 8/13    Anxiety     Arthritis     Asthma     Chronic nausea     Environmental allergies     GERD (gastroesophageal reflux disease)     HOCM (hypertrophic obstructive cardiomyopathy) (HCC)     HTN (hypertension)     Irritable bowel syndrome     Lung disease     Microscopic colitis     Migraine     Mitral regurgitation     Nephrolithiasis 1/21/15    R side -s/p lithotripsy    Nosebleed     Osteoporosis     DEXA 8/8/13 - scotty lumbar spine    Rash     Restless leg syndrome     TMJ dysfunction     Vitreous detachment     Wrist fracture, bilateral      Past Surgical History:   Procedure Laterality Date    CARDIAC CATHETERIZATION  02/28/2020    CARDIOVASCULAR STRESS TEST  2016    normal    CHOLECYSTECTOMY, LAPAROSCOPIC N/A 3/27/2019    w/cholangiogram w/C-arm, performed by Patito Granda MD at [address] [place] [address] Rd  7/13    COLONOSCOPY  03/29/2018    Afshan (random bxs)-microscopic colitis    KNEE SURGERY Right 1975    synovectomy    MITRAL VALVE REPAIR N/A 6/29/2020    [facility]; TCPB; resection of hypertrophic muscle from septal LVOT; miital valve replacement w/ 25mm Medtronic Mosaic bovine pericardial bioprosthetic valve; TCPB; ICNB x 5 levels bilatereally performed by Arabella Varma MD at [address] Left     Partial    TRANSESOPHAGEAL ECHOCARDIOGRAM  03/11/2020    Dr. Reji Palma ENDOSCOPY  03/2018    Normal    WRIST FRACTURE SURGERY Left 2009 kg), not currently breastfeeding. General (appearance): Well devel. No distress  Eyes: Anicteric. EOMI. Neck: supple  Ears/Nose/Mouth/Thorat: No cyanosis  CV: RRR. 2/6 PLACIDO   Respiratory:  Intubated. GI: abd soft  Skin: Warm, dry. No rashes  Neuro/Psych: Alert and oriented x 3.  Appropriate behavior  Ext:  No c/c. + edema  Pulses:  2+ carotid    Lab Results   Component Value Date    WBC 6.9 07/03/2020    HGB 9.4 (L) 07/03/2020    HCT 27.3 (L) 07/03/2020    MCV 95.7 07/03/2020     07/03/2020     Lab Results   Component Value Date     (L) 07/03/2020    K 3.8 07/03/2020     07/03/2020    CO2 27 07/03/2020    BUN 7 07/03/2020    CREATININE <0.5 (L) 07/03/2020    GLUCOSE 118 (H) 07/03/2020    CALCIUM 9.6 07/03/2020    PROT 6.7 06/23/2020    LABALBU 4.1 06/23/2020    BILITOT 0.3 06/23/2020    ALKPHOS 62 06/23/2020    AST 48 (H) 06/23/2020    ALT 45 (H) 06/23/2020    LABGLOM >60 07/03/2020    GFRAA >60 07/03/2020    AGRATIO 1.6 06/23/2020    GLOB 2.6 06/23/2020     Lab Results   Component Value Date    INR 1.19 (H) 06/30/2020    INR 1.07 06/29/2020    INR 1.16 (H) 06/29/2020    PROTIME 13.8 (H) 06/30/2020    PROTIME 12.4 06/29/2020    PROTIME 13.5 (H) 06/29/2020     Lab Results   Component Value Date    CHOL 195 06/29/2020    CHOL 191 06/23/2020    CHOL 227 (H) 02/06/2020     Lab Results   Component Value Date    TRIG 91 06/29/2020    TRIG 75 06/23/2020    TRIG 86 02/06/2020     Lab Results   Component Value Date    HDL 57 06/29/2020    HDL 57 06/23/2020    HDL 77 (H) 02/06/2020     Lab Results   Component Value Date    LDLCALC 120 (H) 06/29/2020    LDLCALC 119 (H) 06/23/2020    LDLCALC 133 (H) 02/06/2020     Lab Results   Component Value Date    LABVLDL 18 06/29/2020    LABVLDL 15 06/23/2020    LABVLDL 17 02/06/2020     No results found for: CHOLHDLRATIO    2/2020 [facility]:  Angiographic Findings:  Right dominant system  Left Main:  Normal  Left Anterior Descending:  Normal  Circumflex:  Normal  Right Coronary:  Normal  Left Ventriculogram:  Not performed. Hemodynamics (mm Hg):  Apical Left Ventricular Pressure: 222/1, 11  LVOT Left ventricular pressure: 120/7, 22  Central Aortic Pressure:  115/65 (83)    12/2020 Echo:  Normal left ventricle size and systolic function with an estimated ejection   fraction of 55-60%. The maximum prosthetic mitral valve pressure gradient is 8.4 mmHg and the   mean pressure gradient is 8 mmHg. The mitral valve appears to be obstructing into the LVOT. There is   subvalvular elevated velocities    3 m/s    3/11/2020 [facility]: EF 55-60%. Severe asymmetric hypertrpohy of the basal septum. Moderate MR.    2/2020 [facility] (): HCM. Mod HK of basal anterior and AS segments. Pap muscles displaced to the apex and not well organized. Rebeca flor enhancement present. + SITA. Severe MR.    12/2019 Echo: EF 65-70%. LVOT grd of 103 mm Hg. Grade 2 DD. Severe MR. Mild TR.     Current Outpatient Medications   Medication Instructions    albuterol sulfate HFA (PROAIR HFA) 108 (90 Base) MCG/ACT inhaler 2 puffs, Inhalation, EVERY 6 HOURS PRN    ALPRAZolam (XANAX) 0.5 mg, Oral, DAILY PRN    aspirin 81 mg, Oral, DAILY    atorvastatin (LIPITOR) 40 mg, Oral, NIGHTLY    budesonide (ENTOCORT EC) 3 mg, Oral, EVERY MORNING    Calcium Carbonate-Vitamin D (CALTRATE 600+D PO) 1 tablet, Oral, 2 TIMES DAILY    clopidogrel (PLAVIX) 75 mg, Oral, DAILY    diazePAM (VALIUM) 5 mg, Oral, DAILY    dicyclomine (BENTYL) 20 MG tablet TAKE ONE TABLET BY MOUTH FOUR TIMES A DAY AS NEEDED    docusate sodium (COLACE) 100 mg, Oral, 2 TIMES DAILY PRN    DULoxetine (CYMBALTA) 30 MG extended release capsule TAKE THREE CAPSULES BY MOUTH DAILY    famotidine (PEPCID) 40 mg, Oral, DAILY    FIBER PO 1 capsule, Oral, DAILY    Flaxseed, Linseed, (FLAXSEED OIL PO) 1,300 mg, Oral, DAILY    gabapentin (NEURONTIN) 300 mg, Oral, 3 TIMES DAILY    levothyroxine (SYNTHROID) 125 MCG tablet TAKE ONE TABLET BY MOUTH DAILY    lisinopril (PRINIVIL;ZESTRIL) 2.5 mg, Oral, DAILY WITH LUNCH    metoprolol succinate (TOPROL XL) 25 mg, Oral, DAILY    Multiple Vitamins-Minerals (CENTRUM SILVER PO) 1 tablet, Oral, DAILY    Omega-3 Fatty Acids (FISH OIL) 1200 MG CAPS 1 capsule, Oral, 2 TIMES DAILY    prochlorperazine (COMPAZINE) 10 MG tablet Take one tablet by mouth every 6 hours as needed    SYMBICORT 80-4.5 MCG/ACT AERO INHALE TWO PUFFS BY MOUTH TWICE A DAY    vitamin D3 (CHOLECALCIFEROL) 400 Units, Oral, DAILY       76 y.o. now s/p myecomty and MV replacement (25 mm Mosaic) June 29, 2020  1. HOCM (hypertrophic obstructive cardiomyopathy) ([address]    2. S/P MVR (mitral valve replacement)    3. Essential hypertension    4. ANGULO (dyspnea on exertion)    5. Atypical chest pain      Recs:  -Increase Toprol to 50 mg daily.  -See [employer] in 2 weeks. Increase to Toprol 100 mg if side effects are tolerable and HR over 60, bp over 130. After next visit with dodie, see her back in 2 weeks again to increase Toprol to 200 if possible (that will be our target dose). -ASA 81 mg daily  -Stop plavix  -Can take ibuprofen or aleve for chest wall pain for 2 weeks; then stop.  -Cont lisinopril and your statin  -See me in 4 months.  -I have reviewed the echo with Dr. Ct Timmons. Hold off on surgical intervention for now (no role, per Dr. Ct Timmons). Kiera Santos MD, [facility]

## 2020-12-11 ENCOUNTER — TELEPHONE (OUTPATIENT)
Dept: PULMONOLOGY | Age: 65
End: 2020-12-11

## 2020-12-17 ENCOUNTER — OFFICE VISIT (OUTPATIENT)
Dept: CARDIOLOGY CLINIC | Age: 65
End: 2020-12-17
Payer: MEDICARE

## 2020-12-17 VITALS
SYSTOLIC BLOOD PRESSURE: 118 MMHG | HEART RATE: 64 BPM | HEIGHT: 65 IN | BODY MASS INDEX: 26.73 KG/M2 | TEMPERATURE: 96.6 F | WEIGHT: 160.4 LBS | DIASTOLIC BLOOD PRESSURE: 70 MMHG

## 2020-12-17 PROCEDURE — G8484 FLU IMMUNIZE NO ADMIN: HCPCS | Performed by: NURSE PRACTITIONER

## 2020-12-17 PROCEDURE — 99214 OFFICE O/P EST MOD 30 MIN: CPT | Performed by: NURSE PRACTITIONER

## 2020-12-17 PROCEDURE — 1090F PRES/ABSN URINE INCON ASSESS: CPT | Performed by: NURSE PRACTITIONER

## 2020-12-17 PROCEDURE — 3017F COLORECTAL CA SCREEN DOC REV: CPT | Performed by: NURSE PRACTITIONER

## 2020-12-17 PROCEDURE — G8399 PT W/DXA RESULTS DOCUMENT: HCPCS | Performed by: NURSE PRACTITIONER

## 2020-12-17 PROCEDURE — 4040F PNEUMOC VAC/ADMIN/RCVD: CPT | Performed by: NURSE PRACTITIONER

## 2020-12-17 PROCEDURE — G8417 CALC BMI ABV UP PARAM F/U: HCPCS | Performed by: NURSE PRACTITIONER

## 2020-12-17 PROCEDURE — 1123F ACP DISCUSS/DSCN MKR DOCD: CPT | Performed by: NURSE PRACTITIONER

## 2020-12-17 PROCEDURE — G8427 DOCREV CUR MEDS BY ELIG CLIN: HCPCS | Performed by: NURSE PRACTITIONER

## 2020-12-17 PROCEDURE — 1036F TOBACCO NON-USER: CPT | Performed by: NURSE PRACTITIONER

## 2020-12-17 RX ORDER — METOPROLOL SUCCINATE 50 MG/1
100 TABLET, EXTENDED RELEASE ORAL DAILY
Qty: 30 TABLET | Refills: 5
Start: 2020-12-17 | End: 2021-01-05 | Stop reason: SDUPTHER

## 2020-12-17 NOTE — PROGRESS NOTES
[facility]  Office Visit                     Aloma Mcardle APRN FNP CVNP       Cardiology             Located within [facility]  1955 December 17, 2020    Primary Cardiologist:  Ramesh Jefferson    CC:Interval Hx: 76 y.o. woman s/p septal myectomy and MV replacement (tissue) for HCM and severe MR done June 29, 2020 / HTN b/p 118/70 HR 59 /  ANGULO denies today / atypical cp sharp that is better but not totally gone. Today she denies any SOB/edema/sleeps well denies CODI  states doing well on metoprolol succinite 50mg daily   Spoke with Dr. Ramesh Jefferson he said to try metoprolol succinate 100mg daily   education and emotional support given   Fu in approx 2 weeks    complaint with meds   Discussed nutrition / sodium intake / fluid intake   Recommend activity as tolerated       2/2020 LHC:  Angiographic Findings:  Right dominant system  Left Main:  Normal  Left Anterior Descending:  Normal  Circumflex:  Normal  Right Coronary:  Normal  Left Ventriculogram:  Not performed. Hemodynamics (mm Hg):  Apical Left Ventricular Pressure: 222/1, 11  LVOT Left ventricular pressure: 120/7, 22  Central Aortic Pressure:  115/65 (83)    12/2020 TTE:  Normal left ventricle size and systolic function with an estimated ejection   fraction of 55-60%.   The maximum prosthetic mitral valve pressure gradient is 8.4 mmHg and the   mean pressure gradient is 8 mmHg.   The mitral valve appears to be obstructing into the LVOT. There is   subvalvular elevated velocities    3 m/s     3/11/2020 CORRIE: EF 55-60%. Severe asymmetric hypertrpohy of the basal septum. Moderate MR.     2/2020 [facility]RI (): HCM. Mod HK of basal anterior and AS segments. Pap muscles displaced to the apex and not well organized. Rebeca flor enhancement present. + SITA. Severe MR.     12/2019 Echo: EF 65-70%. LVOT grd of 103 mm Hg. Grade 2 DD. Severe MR. Mild TR.     I have examined pt and reviewed notes / any lab work EKGs stress test, angiograms, & images reviewed  I  have spent >20 minutes of face to face time with the patient with more than 50% spent counseling and coordinating care this patient. Review of Systems:  Constitutional: Denies  fatigue, weakness, night sweats or fever. HEENT: Denies new visual changes, ringing in ears, nosebleeds,nasal congestion  Respiratory: Denies new or change in SOB, PND, orthopnea or cough. Cardiovascular: see HPI  GI: Denies N/V, diarrhea, constipation, abdominal pain, change in bowel habits, melena or hematochezia  : Denies urinary frequency, urgency, incontinence, hematuria or dysuria. Skin: Denies rash, hives, or cyanosis  Musculoskeletal: Denies joint or muscle aches/pain  Neurological: Denies syncope or TIA-like symptoms.   Psychiatric: Denies anxiety, insomnia or depression     Past Medical History:   Diagnosis Date    Adrenal adenoma     left 8 mm - stable - CT 8/13    Anxiety     Arthritis     Asthma     Chronic nausea     Environmental allergies     GERD (gastroesophageal reflux disease)     HOCM (hypertrophic obstructive cardiomyopathy) (HCC)     HTN (hypertension)     Irritable bowel syndrome     Lung disease     Microscopic colitis     Migraine     Mitral regurgitation     Nephrolithiasis 1/21/15    R side -s/p lithotripsy    Nosebleed     Osteoporosis     DEXA 8/8/13 - scotty lumbar spine    Rash     Restless leg syndrome     TMJ dysfunction     Vitreous detachment     Wrist fracture, bilateral      Past Surgical History:   Procedure Laterality Date    CARDIAC CATHETERIZATION  02/28/2020    CARDIOVASCULAR STRESS TEST  2016    normal    CHOLECYSTECTOMY, LAPAROSCOPIC N/A 3/27/2019    w/cholangiogram w/C-arm, performed by Nathan Bermudez MD at [address]  7/13    COLONOSCOPY  03/29/2018    Afshan (random bxs)-microscopic colitis    KNEE SURGERY Right 1975    synovectomy    MITRAL VALVE REPAIR N/A 6/29/2020    [facility]E; TCPB; resection of hypertrophic muscle from septal LVOT; miital valve replacement w/ 25mm Medtronic Mosaic bovine pericardial bioprosthetic valve; TCPB; ICNB x 5 levels bilatereally performed by Abner Leonard MD at [address] Left     Partial    TRANSESOPHAGEAL ECHOCARDIOGRAM  03/11/2020    Dr. Yip Limb  03/2018    Normal    WRIST FRACTURE SURGERY Left 2009     Family History   Problem Relation Age of Onset    Cancer Mother         leukemia    Hypertension Mother     Other Father         CRF    Heart Surgery Father         CABG    Blindness Father     Hypertension Father     Hearing Loss Father      Social History     Tobacco Use    Smoking status: Former Smoker     Packs/day: 1.00     Years: 38.00     Pack years: 38.00     Types: Cigarettes     Start date: 5/1/1972     Quit date: 5/1/2010     Years since quitting: 10.6    Smokeless tobacco: Never Used   Substance Use Topics    Alcohol use: Not Currently     Alcohol/week: 0.0 standard drinks     Frequency: 4 or more times a week     Drinks per session: 1 or 2     Binge frequency: Never    Drug use: No       Allergies   Allergen Reactions    Tolectin [Tolmetin] Swelling     Face & Lips     Current Outpatient Medications   Medication Sig Dispense Refill    metoprolol succinate (TOPROL XL) 50 MG extended release tablet Take 1 tablet by mouth daily 30 tablet 5    prochlorperazine (COMPAZINE) 10 MG tablet Take one tablet by mouth every 6 hours as needed 30 tablet 5    DULoxetine (CYMBALTA) 30 MG extended release capsule TAKE THREE CAPSULES BY MOUTH DAILY 90 capsule 5    diazePAM (VALIUM) 5 MG tablet Take 1 tablet by mouth daily. 30 tablet 2    ALPRAZolam (XANAX) 0.5 MG tablet Take 1 tablet by mouth daily as needed for Anxiety. 30 tablet 2    gabapentin (NEURONTIN) 300 MG capsule Take 300 mg by mouth 3 times daily.       clopidogrel (PLAVIX) 75 MG tablet Take 1 tablet by mouth daily 30 tablet 5    atorvastatin (LIPITOR) 40 MG tablet Take 1 tablet by mouth nightly 30 tablet 3    lisinopril (PRINIVIL;ZESTRIL) 2.5 MG tablet Take 1 tablet by mouth Daily with lunch 30 tablet 5    aspirin 81 MG EC tablet Take 81 mg by mouth daily      docusate sodium (COLACE) 100 MG capsule Take 100 mg by mouth 2 times daily as needed for Constipation      albuterol sulfate HFA (PROAIR HFA) 108 (90 Base) MCG/ACT inhaler Inhale 2 puffs into the lungs every 6 hours as needed for Wheezing      dicyclomine (BENTYL) 20 MG tablet TAKE ONE TABLET BY MOUTH FOUR TIMES A DAY AS NEEDED 120 tablet 4    levothyroxine (SYNTHROID) 125 MCG tablet TAKE ONE TABLET BY MOUTH DAILY 60 tablet 4    famotidine (PEPCID) 40 MG tablet Take 1 tablet by mouth daily 90 tablet 3    Multiple Vitamins-Minerals (CENTRUM SILVER PO) Take 1 tablet by mouth daily      Calcium Carbonate-Vitamin D (CALTRATE 600+D PO) Take 1 tablet by mouth 2 times daily      vitamin D3 (CHOLECALCIFEROL) 10 MCG (400 UNIT) TABS tablet Take 400 Units by mouth daily      Omega-3 Fatty Acids (FISH OIL) 1200 MG CAPS Take 1 capsule by mouth 2 times daily      Flaxseed, Linseed, (FLAXSEED OIL PO) Take 1,300 mg by mouth daily      SYMBICORT 80-4.5 MCG/ACT AERO INHALE TWO PUFFS BY MOUTH TWICE A DAY 10.2 g 10    budesonide (ENTOCORT EC) 3 MG extended release capsule Take 3 mg by mouth every morning       FIBER PO Take 1 capsule by mouth daily        No current facility-administered medications for this visit. Physical Exam:   /70   Pulse 64   Temp 96.6 °F (35.9 °C)   Ht 5' 5\" (1.651 m)   Wt 160 lb 6.4 oz (72.8 kg)   BMI 26.69 kg/m²   BP Readings from Last 3 Encounters:   12/17/20 118/70   12/09/20 (!) 142/90   11/25/20 128/70     Pulse Readings from Last 3 Encounters:   12/17/20 64   12/09/20 88   11/23/20 81     Wt Readings from Last 3 Encounters:   12/17/20 160 lb 6.4 oz (72.8 kg)   12/09/20 157 lb (71.2 kg)   11/25/20 158 lb (71.7 kg)     Constitutional: She is oriented to person, place, and time.  She appears well-developed and well-nourished. In no acute distress. HEENT: Normocephalic and atraumatic. Sclerae anicteric. No xanthelasmas. Conjunctiva white, no subconjunctival hemorrhage   External inspection of ears nose teeth & gums   Eyes:PERRLA EOM's intact. Neck: Neck supple. No JVD present. Carotids without bruits. No mass and no thyromegaly present. No lymphadenopathy present. Cardiovascular: RRR, normal S1 and S2; no murmur/gallop or rub, PMI nondisplaced  Pulmonary/Chest: Effort normal.  Lungs clear to auscultation. Chest wall nontender  Abdominal: soft, nontender, nondistended. + bowel sounds; no organomegaly or bruits. Aorta normal  Extremities: No edema, cyanosis, or clubbing. Pulses are 2+ radial/carotid/dorsalis pedis bilaterally. Cap refill brisk. Neurological: No cranial nerve deficit. Psychiatric: She has a normal mood and affect. Her speech is normal and behavior is normal.     Lab Review:   Lab Results   Component Value Date    TRIG 91 06/29/2020    HDL 57 06/29/2020    LDLCALC 120 06/29/2020    LABVLDL 18 06/29/2020     Lab Results   Component Value Date     07/03/2020    K 3.8 07/03/2020     07/03/2020    CO2 27 07/03/2020    BUN 7 07/03/2020    CREATININE <0.5 07/03/2020    GLUCOSE 118 07/03/2020    CALCIUM 9.6 07/03/2020      Lab Results   Component Value Date    WBC 6.9 07/03/2020    HGB 9.4 (L) 07/03/2020    HCT 27.3 (L) 07/03/2020    MCV 95.7 07/03/2020     07/03/2020       I have reviewed any available labs, images, any stress test, [facility] on this pt         Assessment:    1. HOCM (hypertrophic obstructive cardiomyopathy) ([address]    2. S/P MVR (mitral valve replacement)    3. Essential hypertension    4. ANGULO (dyspnea on exertion)    5. Atypical chest pain        12/2/20 TTE Normal left ventricle size and systolic function with an estimated ejection   fraction of 55-60%. The maximum prosthetic mitral valve pressure gradient is 8.4 mmHg and the   mean pressure gradient is 8 mmHg.    The mitral valve appears to be obstructing into the LVOT. There is   subvalvular elevated velocities    3 m/s   Recommend CORRIE    Plan:  76 y.o. woman s/p septal myectomy and MV replacement (tissue) for HCM and severe MR done June 29, 2020 / HTN b/p 118/70 HR 64 /  ANGULO denies today / atypical cp sharp that is better but not totally gone.    Today she denies any SOB/edema/sleeps well denies CODI  states doing well on metoprolol succinite 50mg daily   Spoke with Dr. Rojas Nunez he said to try metoprolol succinate 100mg daily   Fu in approx 2 weeks       Marbella LU,CVNP

## 2021-01-05 ENCOUNTER — OFFICE VISIT (OUTPATIENT)
Dept: CARDIOLOGY CLINIC | Age: 66
End: 2021-01-05
Payer: MEDICARE

## 2021-01-05 VITALS
SYSTOLIC BLOOD PRESSURE: 116 MMHG | HEART RATE: 64 BPM | DIASTOLIC BLOOD PRESSURE: 68 MMHG | TEMPERATURE: 96.9 F | BODY MASS INDEX: 27.39 KG/M2 | WEIGHT: 164.4 LBS | HEIGHT: 65 IN

## 2021-01-05 DIAGNOSIS — I34.0 SEVERE MITRAL REGURGITATION: ICD-10-CM

## 2021-01-05 DIAGNOSIS — E78.00 HYPERCHOLESTEROLEMIA: ICD-10-CM

## 2021-01-05 DIAGNOSIS — Z98.890 STATUS POST VENTRICULAR SEPTAL MYECTOMY: ICD-10-CM

## 2021-01-05 DIAGNOSIS — I10 ESSENTIAL HYPERTENSION: ICD-10-CM

## 2021-01-05 DIAGNOSIS — Z95.2 S/P MVR (MITRAL VALVE REPLACEMENT): ICD-10-CM

## 2021-01-05 DIAGNOSIS — I42.1 HOCM (HYPERTROPHIC OBSTRUCTIVE CARDIOMYOPATHY) (HCC): Primary | ICD-10-CM

## 2021-01-05 PROCEDURE — G8484 FLU IMMUNIZE NO ADMIN: HCPCS | Performed by: NURSE PRACTITIONER

## 2021-01-05 PROCEDURE — 4040F PNEUMOC VAC/ADMIN/RCVD: CPT | Performed by: NURSE PRACTITIONER

## 2021-01-05 PROCEDURE — G8427 DOCREV CUR MEDS BY ELIG CLIN: HCPCS | Performed by: NURSE PRACTITIONER

## 2021-01-05 PROCEDURE — G8417 CALC BMI ABV UP PARAM F/U: HCPCS | Performed by: NURSE PRACTITIONER

## 2021-01-05 PROCEDURE — 1036F TOBACCO NON-USER: CPT | Performed by: NURSE PRACTITIONER

## 2021-01-05 PROCEDURE — 3017F COLORECTAL CA SCREEN DOC REV: CPT | Performed by: NURSE PRACTITIONER

## 2021-01-05 PROCEDURE — 1123F ACP DISCUSS/DSCN MKR DOCD: CPT | Performed by: NURSE PRACTITIONER

## 2021-01-05 PROCEDURE — G8399 PT W/DXA RESULTS DOCUMENT: HCPCS | Performed by: NURSE PRACTITIONER

## 2021-01-05 PROCEDURE — 1090F PRES/ABSN URINE INCON ASSESS: CPT | Performed by: NURSE PRACTITIONER

## 2021-01-05 PROCEDURE — 99214 OFFICE O/P EST MOD 30 MIN: CPT | Performed by: NURSE PRACTITIONER

## 2021-01-05 RX ORDER — METOPROLOL SUCCINATE 100 MG/1
100 TABLET, EXTENDED RELEASE ORAL 2 TIMES DAILY
Qty: 60 TABLET | Refills: 5 | Status: SHIPPED | OUTPATIENT
Start: 2021-01-05 | End: 2021-04-05

## 2021-01-05 RX ORDER — ATORVASTATIN CALCIUM 80 MG/1
80 TABLET, FILM COATED ORAL EVERY EVENING
Qty: 30 TABLET | Refills: 5 | Status: SHIPPED | OUTPATIENT
Start: 2021-01-05 | End: 2021-01-28 | Stop reason: SDUPTHER

## 2021-01-05 NOTE — PROGRESS NOTES
CC/HPI:    76 y.o. patient of Dr June Shaikh with HOCM/myectomy, severe MR/MVR, (surgery 6/2020), HTN, and HLD. The sharp, stabbing pain has resolved. SOB is mild and better than before. No LE edema, orthopnea or PND. No LH/dizziness, palpitations or syncope. No fever, chills, v/d or GI/ bleeding.        Past Medical History:   Diagnosis Date    Adrenal adenoma     left 8 mm - stable - CT 8/13    Anxiety     Arthritis     Asthma     Chronic nausea     Environmental allergies     GERD (gastroesophageal reflux disease)     HOCM (hypertrophic obstructive cardiomyopathy) (HCC)     HTN (hypertension)     Irritable bowel syndrome     Lung disease     Microscopic colitis     Migraine     Mitral regurgitation     Nephrolithiasis 1/21/15    R side -s/p lithotripsy    Nosebleed     Osteoporosis     DEXA 8/8/13 - scotty lumbar spine    Rash     Restless leg syndrome     TMJ dysfunction     Vitreous detachment     Wrist fracture, bilateral      Past Surgical History:   Procedure Laterality Date    CARDIAC CATHETERIZATION  02/28/2020    CARDIOVASCULAR STRESS TEST  2016    normal    CHOLECYSTECTOMY, LAPAROSCOPIC N/A 3/27/2019    w/cholangiogram w/C-arm, performed by Vanda Pereira MD at [address] [place] [address] Rd  7/13    COLONOSCOPY  03/29/2018    Afshan (random bxs)-microscopic colitis    KNEE SURGERY Right 1975    synovectomy    MITRAL VALVE REPAIR N/A 6/29/2020    [facility]; TCPB; resection of hypertrophic muscle from septal LVOT; miital valve replacement w/ 25mm Medtronic Mosaic bovine pericardial bioprosthetic valve; TCPB; ICNB x 5 levels bilatereally performed by Fadia Glez MD at [address] Left     Partial    TRANSESOPHAGEAL ECHOCARDIOGRAM  03/11/2020    Dr. Indiana Darden ENDOSCOPY  03/2018    Normal    WRIST FRACTURE SURGERY Left 2009     Family History   Problem Relation Age of Onset    Cancer Mother         leukemia    Hypertension Mother    Radha Escobedo Other Father         CRF    Heart Surgery Father         CABG    Blindness Father     Hypertension Father     Hearing Loss Father      Social History     Tobacco Use    Smoking status: Former Smoker     Packs/day: 1.00     Years: 38.00     Pack years: 38.00     Types: Cigarettes     Start date: 5/1/1972     Quit date: 5/1/2010     Years since quitting: 10.6    Smokeless tobacco: Never Used   Substance Use Topics    Alcohol use: Not Currently     Alcohol/week: 0.0 standard drinks     Frequency: 4 or more times a week     Drinks per session: 1 or 2     Binge frequency: Never    Drug use: No     Allergies: Tolectin [tolmetin]    Review of Systems  General: No changes in weight, fatigue, or night sweats. HEENT: No blurry or decreased vision. No changes in hearing, nasal discharge or sore throat. Cardiovascular:  See HPI. Respiratory: No cough, hemoptysis, or wheezing.  + history of asthma. Gastrointestinal:  No abdominal pain, hematochezia, melana, constipation, diarrhea, or history of GI ulcers. Chronic nausea and GERD and IBS  Genito-Urinary: No dysuria or hematuria. No urgency or polyuria. Musculoskeletal:  No complaints of joint pain, joint swelling or muscular weakness/soreness. + arthritis   Neurological:  No dizziness, headaches, numbness/tingling, speech problems or weakness. No history of a stroke or TIA. Psychological: + anxiety   Hematological and Lymphatic: No abnormal bleeding or bruising, blood clots, jaundice or swollen lymph nodes. Endocrine:   No malaise/lethargy, palpitations, polydipsia/polyuria, temperature intolerance or unexpected weight changes  Skin:  No rashes or non-healing ulcers.     Physical Exam:  /68   Pulse 64   Temp 96.9 °F (36.1 °C)   Ht 5' 5\" (1.651 m)   Wt 164 lb 6.4 oz (74.6 kg)   BMI 27.36 kg/m²    Repeat /74  General (appearance):  No acute distress  Eyes: anicteric   Neck: soft, No JVD  Ears/Nose/Mouth/Thorat: No cyanosis  CV: RRR soft PLACIDO  Respiratory: Clear   GI: soft, non-tender, non-distended  Skin: Warm, dry. No rashes  Neuro/Psych: Alert and oriented x 3. Appropriate behavior  Ext:  No c/c. No edema  Pulses:  2+ radial     Weight  Wt Readings from Last 3 Encounters:   21 164 lb 6.4 oz (74.6 kg)   20 160 lb 6.4 oz (72.8 kg)   20 157 lb (71.2 kg)          CBC:   Lab Results   Component Value Date    WBC 6.9 2020    HGB 9.4 (L) 2020    HCT 27.3 (L) 2020    MCV 95.7 2020     2020     BMP:  Lab Results   Component Value Date    CREATININE <0.5 (L) 2020    BUN 7 2020     (L) 2020    K 3.8 2020     2020    CO2 27 2020     Mag:   Lab Results   Component Value Date    MG 1.90 2020     LIVER PROFILE:   Lab Results   Component Value Date    ALT 45 (H) 2020    AST 48 (H) 2020    ALKPHOS 62 2020    BILITOT 0.3 2020     PT/INR:   Lab Results   Component Value Date    INR 1.19 (H) 2020    INR 1.07 2020    INR 1.16 (H) 2020    PROTIME 13.8 (H) 2020    PROTIME 12.4 2020    PROTIME 13.5 (H) 2020     Pro-BNP   Lab Results   Component Value Date    PROBNP 2,358 2019     LIPIDS:  No components found for: CHLPL  Lab Results   Component Value Date    TRIG 91 2020    TRIG 75 2020    TRIG 86 2020     Lab Results   Component Value Date    HDL 57 2020    HDL 57 2020    HDL 77 (H) 2020     Lab Results   Component Value Date    LDLCALC 120 (H) 2020    LDLCALC 119 (H) 2020    LDLCALC 133 (H) 2020     Lab Results   Component Value Date    LABVLDL 18 2020    LABVLDL 15 2020    LABVLDL 17 2020     TSH:  Lab Results   Component Value Date    TSH 1.75 2020       IMAGIN/2/2020 Echo:   Normal left ventricle size and systolic function with an estimated ejection   fraction of 55-60%.    The maximum prosthetic mitral valve pressure gradient hours as needed 30 tablet 5    DULoxetine (CYMBALTA) 30 MG extended release capsule TAKE THREE CAPSULES BY MOUTH DAILY 90 capsule 5    diazePAM (VALIUM) 5 MG tablet Take 1 tablet by mouth daily. 30 tablet 2    ALPRAZolam (XANAX) 0.5 MG tablet Take 1 tablet by mouth daily as needed for Anxiety. 30 tablet 2    gabapentin (NEURONTIN) 300 MG capsule Take 300 mg by mouth 3 times daily.  clopidogrel (PLAVIX) 75 MG tablet Take 1 tablet by mouth daily 30 tablet 5    atorvastatin (LIPITOR) 40 MG tablet Take 1 tablet by mouth nightly 30 tablet 3    lisinopril (PRINIVIL;ZESTRIL) 2.5 MG tablet Take 1 tablet by mouth Daily with lunch 30 tablet 5    aspirin 81 MG EC tablet Take 81 mg by mouth daily      docusate sodium (COLACE) 100 MG capsule Take 100 mg by mouth 2 times daily as needed for Constipation      albuterol sulfate HFA (PROAIR HFA) 108 (90 Base) MCG/ACT inhaler Inhale 2 puffs into the lungs every 6 hours as needed for Wheezing      dicyclomine (BENTYL) 20 MG tablet TAKE ONE TABLET BY MOUTH FOUR TIMES A DAY AS NEEDED 120 tablet 4    levothyroxine (SYNTHROID) 125 MCG tablet TAKE ONE TABLET BY MOUTH DAILY 60 tablet 4    famotidine (PEPCID) 40 MG tablet Take 1 tablet by mouth daily 90 tablet 3    Multiple Vitamins-Minerals (CENTRUM SILVER PO) Take 1 tablet by mouth daily      Calcium Carbonate-Vitamin D (CALTRATE 600+D PO) Take 1 tablet by mouth 2 times daily      vitamin D3 (CHOLECALCIFEROL) 10 MCG (400 UNIT) TABS tablet Take 400 Units by mouth daily      Omega-3 Fatty Acids (FISH OIL) 1200 MG CAPS Take 1 capsule by mouth 2 times daily      Flaxseed, Linseed, (FLAXSEED OIL PO) Take 1,300 mg by mouth daily      SYMBICORT 80-4.5 MCG/ACT AERO INHALE TWO PUFFS BY MOUTH TWICE A DAY 10.2 g 10    budesonide (ENTOCORT EC) 3 MG extended release capsule Take 3 mg by mouth every morning       FIBER PO Take 1 capsule by mouth daily        No current facility-administered medications for this visit.

## 2021-01-28 RX ORDER — ATORVASTATIN CALCIUM 80 MG/1
80 TABLET, FILM COATED ORAL EVERY EVENING
Qty: 30 TABLET | Refills: 5 | Status: SHIPPED | OUTPATIENT
Start: 2021-01-28 | End: 2021-10-20

## 2021-02-02 ENCOUNTER — TELEPHONE (OUTPATIENT)
Dept: CARDIOLOGY CLINIC | Age: 66
End: 2021-02-02

## 2021-02-02 NOTE — TELEPHONE ENCOUNTER
Spoke with pt and clearance letter placed in epic. All questions answered at this time. episode of unresponsiveness.

## 2021-02-02 NOTE — TELEPHONE ENCOUNTER
Patient called to dinora jorge her eye surgery was 2/26/21 and if she has questions call [phone] September 10, 2017    Patient: Satish Meza   Date of Visit: 9/10/2017       To Whom It May Concern:    Jennifer Montano was seen and treated in our emergency department on 9/10/2017. He should not return to school until 9/13/2017.     If you have a

## 2021-02-17 ENCOUNTER — TELEPHONE (OUTPATIENT)
Dept: INTERNAL MEDICINE CLINIC | Age: 66
End: 2021-02-17

## 2021-02-17 DIAGNOSIS — I10 ESSENTIAL HYPERTENSION: ICD-10-CM

## 2021-02-17 DIAGNOSIS — Z00.00 ENCOUNTER FOR ANNUAL WELLNESS VISIT (AWV) IN MEDICARE PATIENT: Primary | ICD-10-CM

## 2021-02-17 DIAGNOSIS — E03.9 HYPOTHYROIDISM, UNSPECIFIED TYPE: Chronic | ICD-10-CM

## 2021-02-19 NOTE — PROGRESS NOTES
02/18/21 8548   Clinical Encounter Type   Visited With Patient; Family   Continue Visiting Yes  (patient and famiy would appreciate a visit in the morning before discharge)   Referral From Nurse   Patient Spiritual Encounters   Spiritual Assessment Compl drinks     Comment: 7-8 drinks per week on average    Drug use: No     Allergies   Allergen Reactions    Tolectin [Tolmetin] Swelling     Face & Lips     Family History   Problem Relation Age of Onset    Cancer Mother         leukemia    Other Father         CRF     Review of Systems   General: No fevers, chills, fatigue, or night sweats. No abnormal changes in weight. HEENT: No blurry or decreased vision. No changes in hearing, nasal discharge or sore throat. Cardiovascular: See HPI. No cramping in legs or buttocks when walking. Respiratory: No cough, hemoptysis, or wheezing. No history of asthma. Gastrointestinal: No abdominal pain, hematochezia, melana, or history of GI ulcers. Genito-Urinary: No dysuria or hematuria. No urgency or polyuria. Musculoskeletal: No complaints of joint pain, joint swelling or muscular weakness/soreness. Neurological: No dizziness or headaches. No numbness/tingling, speech problems or weakness. No history of a stroke or TIA. Psychological: No anxiety or depression  Hematological and Lymphatic: No abnormal bleeding or bruising, blood clots, jaundice. Endocrine: No malaise/lethargy, palpitations, polydipsia/polyuria, temperature intolerance or unexpected weight changes. Skin: No rashes or non-healing ulcers. PE:  Blood pressure 136/70, pulse 84, height 5' 5\" (1.651 m), weight 173 lb 9.6 oz (78.7 kg), not currently breastfeeding. General (appearance): Well devel. No distress. Eyes: Anicteric. EOMI. Neck: Supple. No JVD  Ears/Nose/Mouth/Thorat: No cyanosis  CV: RRR. 2/6 PLACIDO. + S2   Respiratory:  Clear B, normal respiratory effort  GI: abd s/nt/nd. No peritoneal signs  Skin: Warm, dry. No rashes  Neuro/Psych: Alert and oriented x 3. Appropriate behavior  Ext:  No c/c.  No pitting edema  Pulses:  2+ radial.    Lab Results   Component Value Date    WBC 6.7 02/06/2020    HGB 15.4 02/06/2020    HCT 43.7 02/06/2020    MCV 90.8 02/06/2020     02/06/2020 Lab Results   Component Value Date     (L) 02/06/2020    K 4.0 02/06/2020    CL 93 (L) 02/06/2020    CO2 27 02/06/2020    BUN 10 02/06/2020    CREATININE 0.7 02/06/2020    GLUCOSE 104 (H) 02/06/2020    CALCIUM 10.5 02/06/2020    PROT 7.2 02/06/2020    LABALBU 4.4 02/06/2020    BILITOT 0.4 02/06/2020    ALKPHOS 72 02/06/2020    AST 18 02/06/2020    ALT 15 02/06/2020    LABGLOM >60 02/06/2020    GFRAA >60 02/06/2020    AGRATIO 1.6 02/06/2020    GLOB 2.8 02/06/2020     No results found for: INR, PROTIME    Lab Results   Component Value Date    CHOL 227 (H) 02/06/2020    CHOL 218 (H) 02/06/2019    CHOL 225 (H) 11/28/2017     Lab Results   Component Value Date    TRIG 86 02/06/2020    TRIG 111 02/06/2019    TRIG 101 11/28/2017     Lab Results   Component Value Date    HDL 77 (H) 02/06/2020    HDL 73 (H) 02/06/2019    HDL 70 (H) 11/28/2017     Lab Results   Component Value Date    LDLCALC 133 (H) 02/06/2020    LDLCALC 123 (H) 02/06/2019    LDLCALC 135 (H) 11/28/2017     Lab Results   Component Value Date    LABVLDL 17 02/06/2020    LABVLDL 22 02/06/2019    LABVLDL 20 11/28/2017     No results found for: CHOLHDLRATIO    2/2020 [facility]RI ():  Findings are consistent with hypertrophic cardiomyopathy.       There is moderate hypokinesis of the basal anterior and anteroseptal segments. The left ventricular mass is mildly increased.  There is asymmetric septal hypertrophy with the    basal anteroseptum measuring 13 mm.     The papillary muscles are displaced towards the apex and not well organized.     Late gadolinium enhancement is present.  The percent LGE is 15.4% at 3 standard deviations.     Turbulence is visualized in the LVOT.  The resting LVOT velocity is 2.1 m/s. SITA is visualized with resultant posteriorly directed and severe mitral regurgitation.         12/2019 Echo: EF 65-70%. LVOT grd of 103 mm Hg. Grade 2 DD. Severe MR. Mild TR.        Current Outpatient Medications:     omeprazole (PRILOSEC) 40 MG atrial enlargement      Recs:  -Cath  -Refer to CT surgery given HCM, severe MR, and ANGULO. Keaton Rothman MD, [facility]

## 2021-02-22 DIAGNOSIS — I10 ESSENTIAL HYPERTENSION: ICD-10-CM

## 2021-02-22 DIAGNOSIS — Z00.00 ENCOUNTER FOR ANNUAL WELLNESS VISIT (AWV) IN MEDICARE PATIENT: ICD-10-CM

## 2021-02-22 DIAGNOSIS — E03.9 HYPOTHYROIDISM, UNSPECIFIED TYPE: Chronic | ICD-10-CM

## 2021-02-22 LAB
A/G RATIO: 1.6 (ref 1.1–2.2)
ALBUMIN SERPL-MCNC: 4.4 G/DL (ref 3.4–5)
ALP BLD-CCNC: 77 U/L (ref 40–129)
ALT SERPL-CCNC: 44 U/L (ref 10–40)
ANION GAP SERPL CALCULATED.3IONS-SCNC: 8 MMOL/L (ref 3–16)
AST SERPL-CCNC: 32 U/L (ref 15–37)
BASOPHILS ABSOLUTE: 0.1 K/UL (ref 0–0.2)
BASOPHILS RELATIVE PERCENT: 0.6 %
BILIRUB SERPL-MCNC: 0.4 MG/DL (ref 0–1)
BILIRUBIN URINE: NEGATIVE
BLOOD, URINE: NEGATIVE
BUN BLDV-MCNC: 12 MG/DL (ref 7–20)
CALCIUM SERPL-MCNC: 10.9 MG/DL (ref 8.3–10.6)
CHLORIDE BLD-SCNC: 103 MMOL/L (ref 99–110)
CHOLESTEROL, TOTAL: 133 MG/DL (ref 0–199)
CLARITY: CLEAR
CO2: 28 MMOL/L (ref 21–32)
COLOR: YELLOW
CREAT SERPL-MCNC: 0.8 MG/DL (ref 0.6–1.2)
EOSINOPHILS ABSOLUTE: 0 K/UL (ref 0–0.6)
EOSINOPHILS RELATIVE PERCENT: 0.4 %
GFR AFRICAN AMERICAN: >60
GFR NON-AFRICAN AMERICAN: >60
GLOBULIN: 2.8 G/DL
GLUCOSE BLD-MCNC: 107 MG/DL (ref 70–99)
GLUCOSE URINE: NEGATIVE MG/DL
HCT VFR BLD CALC: 44.6 % (ref 36–48)
HDLC SERPL-MCNC: 72 MG/DL (ref 40–60)
HEMOGLOBIN: 14.9 G/DL (ref 12–16)
KETONES, URINE: NEGATIVE MG/DL
LDL CHOLESTEROL CALCULATED: 48 MG/DL
LEUKOCYTE ESTERASE, URINE: NEGATIVE
LYMPHOCYTES ABSOLUTE: 1.6 K/UL (ref 1–5.1)
LYMPHOCYTES RELATIVE PERCENT: 13.6 %
MCH RBC QN AUTO: 30.3 PG (ref 26–34)
MCHC RBC AUTO-ENTMCNC: 33.5 G/DL (ref 31–36)
MCV RBC AUTO: 90.6 FL (ref 80–100)
MICROSCOPIC EXAMINATION: NORMAL
MONOCYTES ABSOLUTE: 0.8 K/UL (ref 0–1.3)
MONOCYTES RELATIVE PERCENT: 6.7 %
NEUTROPHILS ABSOLUTE: 9.2 K/UL (ref 1.7–7.7)
NEUTROPHILS RELATIVE PERCENT: 78.7 %
NITRITE, URINE: NEGATIVE
PDW BLD-RTO: 13.1 % (ref 12.4–15.4)
PH UA: 7 (ref 5–8)
PLATELET # BLD: 299 K/UL (ref 135–450)
PMV BLD AUTO: 8.4 FL (ref 5–10.5)
POTASSIUM SERPL-SCNC: 4.8 MMOL/L (ref 3.5–5.1)
PROTEIN UA: NEGATIVE MG/DL
RBC # BLD: 4.92 M/UL (ref 4–5.2)
SODIUM BLD-SCNC: 139 MMOL/L (ref 136–145)
SPECIFIC GRAVITY UA: 1.01 (ref 1–1.03)
TOTAL PROTEIN: 7.2 G/DL (ref 6.4–8.2)
TRIGL SERPL-MCNC: 63 MG/DL (ref 0–150)
TSH REFLEX: 1.26 UIU/ML (ref 0.27–4.2)
URINE REFLEX TO CULTURE: NORMAL
URINE TYPE: NORMAL
UROBILINOGEN, URINE: 0.2 E.U./DL
VLDLC SERPL CALC-MCNC: 13 MG/DL
WBC # BLD: 11.7 K/UL (ref 4–11)

## 2021-02-25 ENCOUNTER — OFFICE VISIT (OUTPATIENT)
Dept: INTERNAL MEDICINE CLINIC | Age: 66
End: 2021-02-25
Payer: MEDICARE

## 2021-02-25 VITALS
BODY MASS INDEX: 26.36 KG/M2 | TEMPERATURE: 96.9 F | SYSTOLIC BLOOD PRESSURE: 128 MMHG | WEIGHT: 158.2 LBS | HEIGHT: 65 IN | DIASTOLIC BLOOD PRESSURE: 68 MMHG

## 2021-02-25 DIAGNOSIS — R07.89 CHEST WALL PAIN: ICD-10-CM

## 2021-02-25 DIAGNOSIS — K21.9 GASTROESOPHAGEAL REFLUX DISEASE, UNSPECIFIED WHETHER ESOPHAGITIS PRESENT: ICD-10-CM

## 2021-02-25 DIAGNOSIS — I42.2 HYPERTROPHIC CARDIOMYOPATHY (HCC): ICD-10-CM

## 2021-02-25 DIAGNOSIS — E83.52 HYPERCALCEMIA: ICD-10-CM

## 2021-02-25 DIAGNOSIS — K52.839 MICROSCOPIC COLITIS, UNSPECIFIED MICROSCOPIC COLITIS TYPE: ICD-10-CM

## 2021-02-25 DIAGNOSIS — E04.2 MULTIPLE THYROID NODULES: ICD-10-CM

## 2021-02-25 DIAGNOSIS — M81.0 OSTEOPOROSIS, UNSPECIFIED OSTEOPOROSIS TYPE, UNSPECIFIED PATHOLOGICAL FRACTURE PRESENCE: ICD-10-CM

## 2021-02-25 DIAGNOSIS — J45.30 MILD PERSISTENT ASTHMA WITHOUT COMPLICATION: ICD-10-CM

## 2021-02-25 DIAGNOSIS — Z00.00 PREVENTATIVE HEALTH CARE: Primary | ICD-10-CM

## 2021-02-25 DIAGNOSIS — I34.0 SEVERE MITRAL REGURGITATION: ICD-10-CM

## 2021-02-25 DIAGNOSIS — G43.909 MIGRAINE WITHOUT STATUS MIGRAINOSUS, NOT INTRACTABLE, UNSPECIFIED MIGRAINE TYPE: ICD-10-CM

## 2021-02-25 DIAGNOSIS — F41.9 ANXIETY: ICD-10-CM

## 2021-02-25 DIAGNOSIS — I10 ESSENTIAL HYPERTENSION: ICD-10-CM

## 2021-02-25 DIAGNOSIS — E78.2 MIXED HYPERLIPIDEMIA: ICD-10-CM

## 2021-02-25 DIAGNOSIS — E03.9 HYPOTHYROIDISM, UNSPECIFIED TYPE: Chronic | ICD-10-CM

## 2021-02-25 DIAGNOSIS — Z00.00 ROUTINE GENERAL MEDICAL EXAMINATION AT A HEALTH CARE FACILITY: ICD-10-CM

## 2021-02-25 PROCEDURE — 99214 OFFICE O/P EST MOD 30 MIN: CPT | Performed by: INTERNAL MEDICINE

## 2021-02-25 PROCEDURE — G8399 PT W/DXA RESULTS DOCUMENT: HCPCS | Performed by: INTERNAL MEDICINE

## 2021-02-25 PROCEDURE — G8427 DOCREV CUR MEDS BY ELIG CLIN: HCPCS | Performed by: INTERNAL MEDICINE

## 2021-02-25 PROCEDURE — 1090F PRES/ABSN URINE INCON ASSESS: CPT | Performed by: INTERNAL MEDICINE

## 2021-02-25 PROCEDURE — G8417 CALC BMI ABV UP PARAM F/U: HCPCS | Performed by: INTERNAL MEDICINE

## 2021-02-25 PROCEDURE — 1123F ACP DISCUSS/DSCN MKR DOCD: CPT | Performed by: INTERNAL MEDICINE

## 2021-02-25 PROCEDURE — 3017F COLORECTAL CA SCREEN DOC REV: CPT | Performed by: INTERNAL MEDICINE

## 2021-02-25 PROCEDURE — G8484 FLU IMMUNIZE NO ADMIN: HCPCS | Performed by: INTERNAL MEDICINE

## 2021-02-25 PROCEDURE — 1036F TOBACCO NON-USER: CPT | Performed by: INTERNAL MEDICINE

## 2021-02-25 PROCEDURE — G0439 PPPS, SUBSEQ VISIT: HCPCS | Performed by: INTERNAL MEDICINE

## 2021-02-25 PROCEDURE — 4040F PNEUMOC VAC/ADMIN/RCVD: CPT | Performed by: INTERNAL MEDICINE

## 2021-02-25 RX ORDER — DICYCLOMINE HCL 20 MG
TABLET ORAL
Qty: 120 TABLET | Refills: 4 | Status: SHIPPED | OUTPATIENT
Start: 2021-02-25 | End: 2021-05-27 | Stop reason: SDUPTHER

## 2021-02-25 RX ORDER — ALPRAZOLAM 0.5 MG/1
0.5 TABLET ORAL DAILY PRN
Qty: 30 TABLET | Refills: 2 | Status: SHIPPED | OUTPATIENT
Start: 2021-02-25 | End: 2021-05-27 | Stop reason: SDUPTHER

## 2021-02-25 RX ORDER — DIAZEPAM 5 MG/1
5 TABLET ORAL DAILY
Qty: 30 TABLET | Refills: 2 | Status: SHIPPED | OUTPATIENT
Start: 2021-02-25 | End: 2021-05-27 | Stop reason: SDUPTHER

## 2021-02-25 ASSESSMENT — LIFESTYLE VARIABLES: HOW OFTEN DO YOU HAVE A DRINK CONTAINING ALCOHOL: 0

## 2021-02-25 ASSESSMENT — ENCOUNTER SYMPTOMS
CHEST TIGHTNESS: 0
SHORTNESS OF BREATH: 0
GASTROINTESTINAL NEGATIVE: 1
TROUBLE SWALLOWING: 0

## 2021-02-25 ASSESSMENT — PATIENT HEALTH QUESTIONNAIRE - PHQ9
SUM OF ALL RESPONSES TO PHQ9 QUESTIONS 1 & 2: 0
SUM OF ALL RESPONSES TO PHQ QUESTIONS 1-9: 0

## 2021-02-25 NOTE — ASSESSMENT & PLAN NOTE
Mild elevation. Suspect due to vitamin D and calcium replacement. She was on Caltrate plus D twice daily plus additional vitamin D supplements, plus One-A-Day vitamin. I asked her to hold all vitamins and recheck labs and PTH in 2 months. If okay then can restart daily vitamin and Caltrate plus D at once daily.

## 2021-02-25 NOTE — PATIENT INSTRUCTIONS
Personalized Preventive Plan for Joao Pate - 2/25/2021  Medicare offers a range of preventive health benefits. Some of the tests and screenings are paid in full while other may be subject to a deductible, co-insurance, and/or copay. Some of these benefits include a comprehensive review of your medical history including lifestyle, illnesses that may run in your family, and various assessments and screenings as appropriate. After reviewing your medical record and screening and assessments performed today your provider may have ordered immunizations, labs, imaging, and/or referrals for you. A list of these orders (if applicable) as well as your Preventive Care list are included within your After Visit Summary for your review. Other Preventive Recommendations:    · A preventive eye exam performed by an eye specialist is recommended every 1-2 years to screen for glaucoma; cataracts, macular degeneration, and other eye disorders. · A preventive dental visit is recommended every 6 months. · Try to get at least 150 minutes of exercise per week or 10,000 steps per day on a pedometer . · Order or download the FREE \"Exercise & Physical Activity: Your Everyday Guide\" from [employer]. Call 7-916.902.9676 or search [employer] online. · You need 6970-9124 mg of calcium and 5484-6024 IU of vitamin D per day. It is possible to meet your calcium requirement with diet alone, but a vitamin D supplement is usually necessary to meet this goal.  · When exposed to the sun, use a sunscreen that protects against both UVA and UVB radiation with an SPF of 30 or greater. Reapply every 2 to 3 hours or after sweating, drying off with a towel, or swimming. · Always wear a seat belt when traveling in a car. Always wear a helmet when riding a bicycle or motorcycle.

## 2021-02-25 NOTE — ASSESSMENT & PLAN NOTE
Routine checkup. Cardiology notes reviewed. Labs reviewed. She is trying to get her vaccine for Covid.

## 2021-02-25 NOTE — PROGRESS NOTES
SUBJECTIVE:  Patient ID: Katarzyna Mcmillan is an 78 y.o. female. HPI: Patient here today for the f/u of chronic problems-- see Problem List and associated comments. New issues or complaints include (also see Assessment for more details): Here for checkup. She has recovered well from her heart surgery. No signs or symptoms of Covid during the pandemic. She is trying to get the vaccine. She is scheduled for eyelid surgery very soon due to upper lid lag. Review of Systems   Constitutional: Negative for fatigue and fever. HENT: Negative for trouble swallowing. Eyes: Negative for visual disturbance. Scheduled for eyelid surgery soon   Respiratory: Negative for chest tightness and shortness of breath. Cardiovascular: Negative for chest pain and palpitations. Gastrointestinal: Negative. Genitourinary: Negative for difficulty urinating. Musculoskeletal: Positive for arthralgias. Skin: Negative for rash. Allergic/Immunologic: Negative for immunocompromised state. Neurological: Positive for headaches. Psychiatric/Behavioral: Negative for dysphoric mood. The patient is nervous/anxious. OBJECTIVE:    /68 (Site: Right Upper Arm)   Temp 96.9 °F (36.1 °C)   Ht 5' 5\" (1.651 m)   Wt 158 lb 3.2 oz (71.8 kg)   BMI 26.33 kg/m²      Physical Exam  Constitutional:       General: She is not in acute distress. Appearance: She is well-developed. She is not diaphoretic. HENT:      Right Ear: External ear normal.      Left Ear: External ear normal.      Mouth/Throat:      Pharynx: No oropharyngeal exudate or posterior oropharyngeal erythema. Eyes:      General: No scleral icterus. Extraocular Movements: Extraocular movements intact. Comments: Excessive tissue bilateral upper lids   Neck:      Musculoskeletal: Normal range of motion. Thyroid: Thyromegaly (Small bilateral nodules) present. Vascular: No carotid bruit. Trachea: No tracheal deviation. Cardiovascular:      Rate and Rhythm: Normal rate and regular rhythm. Pulses:           Carotid pulses are 2+ on the right side and 2+ on the left side. Radial pulses are 2+ on the right side and 2+ on the left side. Heart sounds: Murmur present. Systolic murmur present with a grade of 2/6. Pulmonary:      Effort: Pulmonary effort is normal. No respiratory distress. Breath sounds: Normal breath sounds. Abdominal:      General: Bowel sounds are normal. There is no distension or abdominal bruit. Palpations: Abdomen is soft. There is no hepatomegaly, splenomegaly or mass. Tenderness: There is no abdominal tenderness. Musculoskeletal:      Right lower leg: No edema. Left lower leg: No edema. Lymphadenopathy:      Head:      Right side of head: No submandibular adenopathy. Left side of head: No submandibular adenopathy. Cervical: No cervical adenopathy. Right cervical: No superficial cervical adenopathy. Left cervical: No superficial cervical adenopathy. Upper Body:      Right upper body: No supraclavicular adenopathy. Left upper body: No supraclavicular adenopathy. Skin:     Coloration: Skin is not jaundiced or pale. Neurological:      General: No focal deficit present. Mental Status: She is alert and oriented to person, place, and time. Cranial Nerves: Cranial nerves are intact. No cranial nerve deficit. Motor: Motor function is intact. No weakness or tremor. Coordination: Coordination normal.      Gait: Gait normal.      Deep Tendon Reflexes:      Reflex Scores:       Patellar reflexes are 2+ on the right side and 2+ on the left side. Comments: Mild positive Chvostek sign bilaterally   Psychiatric:         Attention and Perception: Attention normal.         Mood and Affect: Mood normal.         Speech: Speech normal.         Behavior: Behavior normal.         Thought Content:  Thought content normal.         Cognition and Memory: Cognition normal.         Judgment: Judgment normal.         ASSESSMENT:       Encounter Diagnoses   Name Primary?  Routine general medical examination at a health care facility Yes    Anxiety     Hypercalcemia     Preventative health care     Hypothyroidism, unspecified type     Essential hypertension     Microscopic colitis, unspecified microscopic colitis type     Gastroesophageal reflux disease, unspecified whether esophagitis present     Migraine without status migrainosus, not intractable, unspecified migraine type     Osteoporosis, unspecified osteoporosis type, unspecified pathological fracture presence     Mild persistent asthma without complication     Hypertrophic cardiomyopathy ([address]     Multiple thyroid nodules     Severe mitral regurgitation     Mixed hyperlipidemia     Chest wall pain        Preventative health care  Routine checkup. Cardiology notes reviewed. Labs reviewed. She is trying to get her vaccine for Covid. Hypothyroidism  TFT okay 125 mcg    Hypertension  Blood pressure okay. Metoprolol recently increased. Microscopic colitis  Asymptomatic    GERD (gastroesophageal reflux disease)  Well-controlled    Migraine  Per neurologyBotox    Osteoporosis  Calcium and vitamin D on hold now due to hypercalcemia. Mild persistent asthma without complication  Asymptomatic    Hypertrophic cardiomyopathy (HCC)  Status post  myotomydoing well at this timeper cardiology    Multiple thyroid nodules  She is 1 month overdue for her follow-up ultrasound. She knows this should be scheduled soon. Severe mitral regurgitation  Status post surgical interventiondoing well    Mixed hyperlipidemia  Lipids okayLipitor    Chest wall pain  Resolved. Hypercalcemia  Mild elevation. Suspect due to vitamin D and calcium replacement. She was on Caltrate plus D twice daily plus additional vitamin D supplements, plus One-A-Day vitamin.   I asked her to hold all vitamins and recheck labs and PTH in 2 months. If okay then can restart daily vitamin and Caltrate plus D at once daily. PLAN:See ASSESSMENT for evaluation & PLAN     Orders Placed This Encounter   Procedures    Comprehensive Metabolic Panel     Standing Status:   Future     Standing Expiration Date:   2/25/2022    Vitamin D 25 Hydroxy     Standing Status:   Future     Standing Expiration Date:   2/25/2022    PTH, Intact     Standing Status:   Future     Standing Expiration Date:   2/25/2022     , PMH, SH and FH reviewed and noted. Recent and past labs, tests and consultsalso reviewed. Recent or new meds also reviewed. The patient encounter today has included elements of the following:   - preparation to see the patient   - review of outside records, histories   - performing an appropriate medical evaluation and examination   - counseling pf patient/family/caregiver   - ordering/changing medications, tests, procedures   - referring and communicating with other HCPs when appropriate   - documentation in the EMR   - independently interpreting results and communicating results to patient/family/caregiver   - care coordination and completing appropriate referrals and forms        The patient encounter today included at least 1 out of 3 of the following:   - review of external notes, tests and outside assessments, or   - independent interpretation of outside tests performed by other HCPs, or   - discussion of management of tests and interpretations with outside HCPs.

## 2021-03-04 ENCOUNTER — TELEPHONE (OUTPATIENT)
Dept: ENT CLINIC | Age: 66
End: 2021-03-04

## 2021-03-04 NOTE — TELEPHONE ENCOUNTER
Pt was advised by Dr. Thea Vera to have repeat US of thyroid in a year. (Was seen January 2020. Pls advise patient when order is placed.

## 2021-03-12 ENCOUNTER — TELEPHONE (OUTPATIENT)
Dept: INTERNAL MEDICINE CLINIC | Age: 66
End: 2021-03-12

## 2021-03-12 NOTE — TELEPHONE ENCOUNTER
Pt called in with questions regarding bill received from [address] [place] [address] bill states she was charged for HPV vaccine which she has not received and when contacted [facility], was instructed to contact PCP.  Pt then realized that the labs that she had completed for Dr. Janes Richardson were not completed by [facility] and stated that it was her  that had labs done by [facility].

## 2021-03-16 ENCOUNTER — PROCEDURE VISIT (OUTPATIENT)
Dept: ENT CLINIC | Age: 66
End: 2021-03-16
Payer: MEDICARE

## 2021-03-16 VITALS
DIASTOLIC BLOOD PRESSURE: 66 MMHG | TEMPERATURE: 97.1 F | SYSTOLIC BLOOD PRESSURE: 124 MMHG | HEART RATE: 56 BPM | BODY MASS INDEX: 26.23 KG/M2 | WEIGHT: 157.4 LBS | HEIGHT: 65 IN

## 2021-03-16 DIAGNOSIS — H69.83 DYSFUNCTION OF BOTH EUSTACHIAN TUBES: ICD-10-CM

## 2021-03-16 DIAGNOSIS — E04.2 MULTIPLE THYROID NODULES: Primary | ICD-10-CM

## 2021-03-16 PROCEDURE — 3017F COLORECTAL CA SCREEN DOC REV: CPT | Performed by: OTOLARYNGOLOGY

## 2021-03-16 PROCEDURE — 1123F ACP DISCUSS/DSCN MKR DOCD: CPT | Performed by: OTOLARYNGOLOGY

## 2021-03-16 PROCEDURE — G8484 FLU IMMUNIZE NO ADMIN: HCPCS | Performed by: OTOLARYNGOLOGY

## 2021-03-16 PROCEDURE — G8399 PT W/DXA RESULTS DOCUMENT: HCPCS | Performed by: OTOLARYNGOLOGY

## 2021-03-16 PROCEDURE — 4040F PNEUMOC VAC/ADMIN/RCVD: CPT | Performed by: OTOLARYNGOLOGY

## 2021-03-16 PROCEDURE — 76536 US EXAM OF HEAD AND NECK: CPT | Performed by: OTOLARYNGOLOGY

## 2021-03-16 PROCEDURE — G8427 DOCREV CUR MEDS BY ELIG CLIN: HCPCS | Performed by: OTOLARYNGOLOGY

## 2021-03-16 PROCEDURE — G8417 CALC BMI ABV UP PARAM F/U: HCPCS | Performed by: OTOLARYNGOLOGY

## 2021-03-16 PROCEDURE — 99213 OFFICE O/P EST LOW 20 MIN: CPT | Performed by: OTOLARYNGOLOGY

## 2021-03-16 PROCEDURE — 1036F TOBACCO NON-USER: CPT | Performed by: OTOLARYNGOLOGY

## 2021-03-16 PROCEDURE — 1090F PRES/ABSN URINE INCON ASSESS: CPT | Performed by: OTOLARYNGOLOGY

## 2021-03-16 ASSESSMENT — ENCOUNTER SYMPTOMS
DIARRHEA: 0
TROUBLE SWALLOWING: 0
EYE REDNESS: 0
FACIAL SWELLING: 0
COUGH: 0
VOICE CHANGE: 0
SHORTNESS OF BREATH: 0
NAUSEA: 0
EYE ITCHING: 0
EYE PAIN: 0
RHINORRHEA: 0
SINUS PRESSURE: 0
CHOKING: 0
SORE THROAT: 0
SINUS PAIN: 0

## 2021-03-16 NOTE — PROGRESS NOTES
Subjective:      Patient ID: Kaveh Hyde is a 78 y.o. female. HPI  Patient here for yearly ultrasound. No changes in neck or voice. Also notices transient mild pressure in her ears. Comes and goes. No tinnius. No hearing difficulty. Review of Systems   Constitutional: Negative for activity change, appetite change, chills, fatigue and fever. HENT: Negative for congestion, ear discharge, ear pain, facial swelling, hearing loss, nosebleeds, postnasal drip, rhinorrhea, sinus pressure, sinus pain, sneezing, sore throat, tinnitus, trouble swallowing and voice change. Eyes: Negative for pain, redness, itching and visual disturbance. Respiratory: Negative for cough, choking and shortness of breath. Gastrointestinal: Negative for diarrhea and nausea. Endocrine: Negative for cold intolerance and heat intolerance. Objective:   Physical Exam  Constitutional:       General: She is not in acute distress. Appearance: She is well-developed. HENT:      Head: Not macrocephalic and not microcephalic. No Barajas's sign, abrasion, contusion, right periorbital erythema, left periorbital erythema or laceration. Right Ear: Hearing, tympanic membrane, ear canal and external ear normal. No decreased hearing noted. No laceration, drainage, swelling or tenderness. No middle ear effusion. No foreign body. No mastoid tenderness. No hemotympanum. Tympanic membrane is not injected, perforated, retracted or bulging. Tympanic membrane has normal mobility. Left Ear: Hearing, tympanic membrane, ear canal and external ear normal. No decreased hearing noted. No laceration, drainage, swelling or tenderness. No middle ear effusion. No foreign body. No mastoid tenderness. No hemotympanum. Tympanic membrane is not injected, perforated, retracted or bulging. Tympanic membrane has normal mobility. Ears:      Blair exam findings: does not lateralize. Right Rinne: AC > BC. Left Rinne: AC > BC.      Nose: No mucosal edema or rhinorrhea. Mouth/Throat:      Lips: No lesions. Mouth: No oral lesions. Dentition: Normal dentition. Tongue: No lesions. Palate: No mass. Pharynx: No oropharyngeal exudate, posterior oropharyngeal erythema or uvula swelling. Tonsils: No tonsillar abscesses. Eyes:      Extraocular Movements:      Right eye: Normal extraocular motion and no nystagmus. Left eye: Normal extraocular motion and no nystagmus. Pupils:      Right eye: Pupil is reactive. Left eye: Pupil is reactive. Neck:      Musculoskeletal: Normal range of motion and neck supple. No edema, erythema or muscular tenderness. Thyroid: No thyroid mass or thyromegaly. Trachea: No tracheal tenderness or tracheal deviation. Cardiovascular:      Rate and Rhythm: Normal rate and regular rhythm. Pulmonary:      Breath sounds: No stridor. Lymphadenopathy:      Head:      Right side of head: No submental, submandibular, tonsillar, preauricular, posterior auricular or occipital adenopathy. Left side of head: No submental, submandibular, tonsillar, preauricular, posterior auricular or occipital adenopathy. Cervical: No cervical adenopathy. Right cervical: No superficial, deep or posterior cervical adenopathy. Left cervical: No superficial, deep or posterior cervical adenopathy. Skin:     General: Skin is warm and dry. Findings: No bruising, erythema or lesion. Neurological:      Mental Status: She is alert and oriented to person, place, and time. Psychiatric:         Attention and Perception: Attention normal.         Mood and Affect: Mood normal. Mood is not anxious or depressed.          Speech: Speech normal.       NECK ULTRASOUND    Pre-op Diagnosis: thyroid nodules  Anesthesia: None  Complications: none  Estimated Blood Loss: none  Indications: surveillance  Procedure: neck US    The patient was placed in a semi-recumbent position with mild neck extension. Real time B-mode ultrasound on the neck was performed in transverse/ axial and longitudinal/ sagittal planes. Findings:   Left lobe: 1.8x1. 5x2.8 cm  Right Lobe: 1.5x1.5x3.1cm  Isthmus: 4mm    Nodules/Cysts: Right . 8x.7x.7cm solid isoechoic. Left 1.4x1.0x.8cm     Ultrasound guided fine needle aspiration was not performed. The patient tolerated the procedure well and without side effects. I attest that I was present for and did the entire procedure myself. Assessment:       Diagnosis Orders   1. Multiple thyroid nodules     2. Dysfunction of both eustachian tubes             Plan:      Patient with no growth over a year and benign FNA's last year. Recommend repeat ultrasound in 2-3 years. Recommend flonase trial for ETD. Patient refused. Recommended auto-insufflation. Patient may try when bothersome. Follow up as needed.          Yoon Cruz MD

## 2021-03-18 ENCOUNTER — OFFICE VISIT (OUTPATIENT)
Dept: PULMONOLOGY | Age: 66
End: 2021-03-18
Payer: MEDICARE

## 2021-03-18 VITALS
SYSTOLIC BLOOD PRESSURE: 110 MMHG | HEIGHT: 65 IN | BODY MASS INDEX: 26.39 KG/M2 | DIASTOLIC BLOOD PRESSURE: 70 MMHG | TEMPERATURE: 97.1 F | OXYGEN SATURATION: 98 % | WEIGHT: 158.4 LBS | HEART RATE: 62 BPM

## 2021-03-18 DIAGNOSIS — I42.2 HYPERTROPHIC CARDIOMYOPATHY (HCC): ICD-10-CM

## 2021-03-18 DIAGNOSIS — I34.0 SEVERE MITRAL REGURGITATION: ICD-10-CM

## 2021-03-18 DIAGNOSIS — J45.30 MILD PERSISTENT ASTHMA WITHOUT COMPLICATION: Primary | ICD-10-CM

## 2021-03-18 PROCEDURE — G8427 DOCREV CUR MEDS BY ELIG CLIN: HCPCS | Performed by: INTERNAL MEDICINE

## 2021-03-18 PROCEDURE — 1036F TOBACCO NON-USER: CPT | Performed by: INTERNAL MEDICINE

## 2021-03-18 PROCEDURE — G8417 CALC BMI ABV UP PARAM F/U: HCPCS | Performed by: INTERNAL MEDICINE

## 2021-03-18 PROCEDURE — G8399 PT W/DXA RESULTS DOCUMENT: HCPCS | Performed by: INTERNAL MEDICINE

## 2021-03-18 PROCEDURE — 3017F COLORECTAL CA SCREEN DOC REV: CPT | Performed by: INTERNAL MEDICINE

## 2021-03-18 PROCEDURE — 1090F PRES/ABSN URINE INCON ASSESS: CPT | Performed by: INTERNAL MEDICINE

## 2021-03-18 PROCEDURE — 4040F PNEUMOC VAC/ADMIN/RCVD: CPT | Performed by: INTERNAL MEDICINE

## 2021-03-18 PROCEDURE — 99213 OFFICE O/P EST LOW 20 MIN: CPT | Performed by: INTERNAL MEDICINE

## 2021-03-18 PROCEDURE — 1123F ACP DISCUSS/DSCN MKR DOCD: CPT | Performed by: INTERNAL MEDICINE

## 2021-03-18 PROCEDURE — G8484 FLU IMMUNIZE NO ADMIN: HCPCS | Performed by: INTERNAL MEDICINE

## 2021-03-18 NOTE — PROGRESS NOTES
Penelope Srivastava (:  1955) is a 78 y.o. female,Established patient, here for evaluation of the following chief complaint(s):  Follow-up (6  mo asthma)      ASSESSMENT/PLAN:  1. Mild persistent asthma without complication  2. Hypertrophic cardiomyopathy ([address]  3. Severe mitral regurgitation  Mrs. Edelmiro Goldmann continues to do well post mitral valve replacement and myectomy for treatment of hypertrophic cardiomyopathy and mitral valve disease. Asthma remains well controlled, on low-dose ICS/LABA. She she reports no significant exacerbation or lower respiratory infection. Exercise tolerance is limited, which may relate to deconditioning. We discussed at length restarting an exercise program for  conditioning and muscle strength, particularly important after cardiac surgery. No follow-ups on file. SUBJECTIVE/OBJECTIVE:  HPI Mrs. Edelmiro Goldmann returns for regular follow-up for mild asthma. She is now about 8 months postop mitral valve replacement and myomectomy. She reports a decline in exercise tolerance, but this is concomitant with an overall decrease in exercise from a regular habit to virtually not happening at all. She does not describe chest tightness, wheezing, or shortness of breath at rest.  She does not have nocturnal chest symptoms. No cough. No peripheral edema. Appetite is good, no change in weight. Physical Exam  Vitals signs reviewed. Constitutional:       Appearance: Normal appearance. She is well-developed. HENT:      Head: Normocephalic and atraumatic. Mouth/Throat:      Pharynx: No oropharyngeal exudate. Eyes:      General: No scleral icterus. Right eye: No discharge. Left eye: No discharge. Neck:      Thyroid: No thyromegaly. Trachea: No tracheal deviation. Cardiovascular:      Rate and Rhythm: Normal rate and regular rhythm. Pulses:           Radial pulses are 2+ on the right side and 2+ on the left side.       Heart sounds: S1 normal and S2 normal. Murmur present. Systolic murmur present with a grade of 2/6. Pulmonary:      Effort: Pulmonary effort is normal. No respiratory distress. Breath sounds: Normal breath sounds. No stridor. No wheezing, rhonchi or rales. Musculoskeletal:      Right lower leg: No edema. Left lower leg: No edema. Lymphadenopathy:      Cervical: No cervical adenopathy. Skin:     General: Skin is warm and dry. Neurological:      Mental Status: She is alert and oriented to person, place, and time. Psychiatric:         Mood and Affect: Mood normal.         Behavior: Behavior normal.         Thought Content: Thought content normal.         Judgment: Judgment normal.           On this date 3/18/2021 I have spent 20 minutes reviewing previous notes, test results and face to face with the patient discussing the diagnosis and importance of compliance with the treatment plan as well as documenting on the day of the visit. An electronic signature was used to authenticate this note.     --Shayy Grijalva MD

## 2021-03-27 PROBLEM — Z00.00 PREVENTATIVE HEALTH CARE: Status: RESOLVED | Noted: 2017-12-05 | Resolved: 2021-03-27

## 2021-04-05 ENCOUNTER — OFFICE VISIT (OUTPATIENT)
Dept: CARDIOLOGY CLINIC | Age: 66
End: 2021-04-05
Payer: MEDICARE

## 2021-04-05 VITALS
BODY MASS INDEX: 27.96 KG/M2 | HEART RATE: 52 BPM | SYSTOLIC BLOOD PRESSURE: 130 MMHG | WEIGHT: 168 LBS | DIASTOLIC BLOOD PRESSURE: 66 MMHG

## 2021-04-05 DIAGNOSIS — E78.00 HYPERCHOLESTEROLEMIA: ICD-10-CM

## 2021-04-05 DIAGNOSIS — Z95.2 S/P MVR (MITRAL VALVE REPLACEMENT): ICD-10-CM

## 2021-04-05 DIAGNOSIS — I10 ESSENTIAL HYPERTENSION: ICD-10-CM

## 2021-04-05 DIAGNOSIS — I42.1 HOCM (HYPERTROPHIC OBSTRUCTIVE CARDIOMYOPATHY) (HCC): Primary | ICD-10-CM

## 2021-04-05 PROCEDURE — 3017F COLORECTAL CA SCREEN DOC REV: CPT | Performed by: INTERNAL MEDICINE

## 2021-04-05 PROCEDURE — 1036F TOBACCO NON-USER: CPT | Performed by: INTERNAL MEDICINE

## 2021-04-05 PROCEDURE — 1123F ACP DISCUSS/DSCN MKR DOCD: CPT | Performed by: INTERNAL MEDICINE

## 2021-04-05 PROCEDURE — 1090F PRES/ABSN URINE INCON ASSESS: CPT | Performed by: INTERNAL MEDICINE

## 2021-04-05 PROCEDURE — 4040F PNEUMOC VAC/ADMIN/RCVD: CPT | Performed by: INTERNAL MEDICINE

## 2021-04-05 PROCEDURE — G8417 CALC BMI ABV UP PARAM F/U: HCPCS | Performed by: INTERNAL MEDICINE

## 2021-04-05 PROCEDURE — G8399 PT W/DXA RESULTS DOCUMENT: HCPCS | Performed by: INTERNAL MEDICINE

## 2021-04-05 PROCEDURE — G8427 DOCREV CUR MEDS BY ELIG CLIN: HCPCS | Performed by: INTERNAL MEDICINE

## 2021-04-05 PROCEDURE — 99214 OFFICE O/P EST MOD 30 MIN: CPT | Performed by: INTERNAL MEDICINE

## 2021-04-05 RX ORDER — METOPROLOL SUCCINATE 100 MG/1
100 TABLET, EXTENDED RELEASE ORAL DAILY
Qty: 60 TABLET | Refills: 5 | Status: SHIPPED | OUTPATIENT
Start: 2021-04-05 | End: 2022-05-20 | Stop reason: SDUPTHER

## 2021-04-05 NOTE — PROGRESS NOTES
78 y.o. woman s/p septal myectomy and MV replacement (tissue) for HCM and severe MR done June 29, 2020. No cp. Has some numbness on L side of chest.  No sob. No n/v/LH/dizziness ex for \"nausea all the time,\" which is normal for her. No LE edema. No orthopnea or PND.     Past Medical History:   Diagnosis Date    Adrenal adenoma     left 8 mm - stable - CT 8/13    Anxiety     Arthritis     Asthma     Chronic nausea     Environmental allergies     GERD (gastroesophageal reflux disease)     HOCM (hypertrophic obstructive cardiomyopathy) (HCC)     HTN (hypertension)     Irritable bowel syndrome     Lung disease     Microscopic colitis     Migraine     Mitral regurgitation     Nephrolithiasis 1/21/15    R side -s/p lithotripsy    Nosebleed     Osteoporosis     DEXA 8/8/13 - scotty lumbar spine    Rash     Restless leg syndrome     TMJ dysfunction     Vitreous detachment     Wrist fracture, bilateral      Past Surgical History:   Procedure Laterality Date    CARDIAC CATHETERIZATION  02/28/2020    CARDIOVASCULAR STRESS TEST  2016    normal    CHOLECYSTECTOMY, LAPAROSCOPIC N/A 3/27/2019    w/cholangiogram w/C-arm, performed by Ottoniel Herron MD at [address]  7/13    COLONOSCOPY  03/29/2018    Afshan (random bxs)microscopic colitis    KNEE SURGERY Right 1975    synovectomy    MITRAL VALVE REPAIR N/A 6/29/2020    [facility]; TCPB; resection of hypertrophic muscle from septal LVOT; miital valve replacement w/ 25mm Medtronic Mosaic bovine pericardial bioprosthetic valve; TCPB; ICNB x 5 levels bilatereally performed by Delvis Leon MD at [address] Left     Partial    TRANSESOPHAGEAL ECHOCARDIOGRAM  03/11/2020    Dr. Kriss Mercado ENDOSCOPY  03/2018    Normal    WRIST FRACTURE SURGERY Left 2009     Social History     Tobacco Use    Smoking status: Former Smoker     Packs/day: 1.00     Years: 38.00     Pack years: 38.00     Types: Cigarettes respiratory effort  GI: abd s/nt/nd  Skin: Warm, dry. No rashes  Neuro/Psych: Alert and oriented x 3. Appropriate behavior  Ext:  No c/c. No pitting edema  Pulses:  2+ carotid/radial.    Lab Results   Component Value Date    WBC 11.7 (H) 02/22/2021    HGB 14.9 02/22/2021    HCT 44.6 02/22/2021    MCV 90.6 02/22/2021     02/22/2021     Lab Results   Component Value Date     02/22/2021    K 4.8 02/22/2021     02/22/2021    CO2 28 02/22/2021    BUN 12 02/22/2021    CREATININE 0.8 02/22/2021    GLUCOSE 107 (H) 02/22/2021    CALCIUM 10.9 (H) 02/22/2021    PROT 7.2 02/22/2021    LABALBU 4.4 02/22/2021    BILITOT 0.4 02/22/2021    ALKPHOS 77 02/22/2021    AST 32 02/22/2021    ALT 44 (H) 02/22/2021    LABGLOM >60 02/22/2021    GFRAA >60 02/22/2021    AGRATIO 1.6 02/22/2021    GLOB 2.8 02/22/2021     Lab Results   Component Value Date    INR 1.19 (H) 06/30/2020    INR 1.07 06/29/2020    INR 1.16 (H) 06/29/2020    PROTIME 13.8 (H) 06/30/2020    PROTIME 12.4 06/29/2020    PROTIME 13.5 (H) 06/29/2020     Lab Results   Component Value Date    CHOL 133 02/22/2021    CHOL 195 06/29/2020    CHOL 191 06/23/2020     Lab Results   Component Value Date    TRIG 63 02/22/2021    TRIG 91 06/29/2020    TRIG 75 06/23/2020     Lab Results   Component Value Date    HDL 72 (H) 02/22/2021    HDL 57 06/29/2020    HDL 57 06/23/2020     Lab Results   Component Value Date    LDLCALC 48 02/22/2021    LDLCALC 120 (H) 06/29/2020    LDLCALC 119 (H) 06/23/2020     Lab Results   Component Value Date    LABVLDL 13 02/22/2021    LABVLDL 18 06/29/2020    LABVLDL 15 06/23/2020     No results found for: CHOLHDLRATIO    12/2020 Echo: EF 55-60%. The maximum prosthetic mitral valve pressure gradient is 8.4 mmHg and the mean pressure gradient is 8 mmHg. The mitral valve appears to be obstructing into the LVOT.  There is subvalvular elevated velocities 3 m/s    2/2020 LHC:  Angiographic Findings:  Right dominant system  Left Main:  Normal  Left albuterol sulfate HFA (PROAIR HFA) 108 (90 Base) MCG/ACT inhaler, Inhale 2 puffs into the lungs every 6 hours as needed for Wheezing, Disp: , Rfl:     levothyroxine (SYNTHROID) 125 MCG tablet, TAKE ONE TABLET BY MOUTH DAILY, Disp: 60 tablet, Rfl: 4    famotidine (PEPCID) 40 MG tablet, Take 1 tablet by mouth daily, Disp: 90 tablet, Rfl: 3    Multiple Vitamins-Minerals (CENTRUM SILVER PO), Take 1 tablet by mouth daily, Disp: , Rfl:     Calcium Carbonate-Vitamin D (CALTRATE 600+D PO), Take 1 tablet by mouth 2 times daily, Disp: , Rfl:     vitamin D3 (CHOLECALCIFEROL) 10 MCG (400 UNIT) TABS tablet, Take 400 Units by mouth daily, Disp: , Rfl:     Omega-3 Fatty Acids (FISH OIL) 1200 MG CAPS, Take 1 capsule by mouth 2 times daily, Disp: , Rfl:     Flaxseed, Linseed, (FLAXSEED OIL PO), Take 1,300 mg by mouth daily, Disp: , Rfl:     SYMBICORT 80-4.5 MCG/ACT AERO, INHALE TWO PUFFS BY MOUTH TWICE A DAY, Disp: 10.2 g, Rfl: 10    budesonide (ENTOCORT EC) 3 MG extended release capsule, Take 3 mg by mouth every morning , Disp: , Rfl:     FIBER PO, Take 1 capsule by mouth daily , Disp: , Rfl:       78 y.o. now s/p myecomty and MV replacement (25 mm Mosaic) June 29, 2020 for HCM. 1. HOCM (hypertrophic obstructive cardiomyopathy) ([facility]    2. S/P MVR (mitral valve replacement)    3. Essential hypertension    4. Hypercholesterolemia      Recs:  -Have discussed the 12/2020 TTE with Dr. Merrill ; no surgery recommended/appropriate for now  -Cont atorvastatin  -Cont Toprol 100 but do daily instead of bid.  -Cont 2.5 lisinopril  -ASA 81  -F/U [place] in 3 mo, me in 9 mo if all ok. If bp up in 3 mo, increase lisinopril. Misha Loving MD, [facility], [facility]

## 2021-04-06 DIAGNOSIS — J45.909 ASTHMA, UNSPECIFIED ASTHMA SEVERITY, UNSPECIFIED WHETHER COMPLICATED, UNSPECIFIED WHETHER PERSISTENT: ICD-10-CM

## 2021-04-06 RX ORDER — DILTIAZEM HYDROCHLORIDE 60 MG/1
TABLET, FILM COATED ORAL
Qty: 10.2 G | Refills: 9 | Status: SHIPPED | OUTPATIENT
Start: 2021-04-06 | End: 2021-07-23 | Stop reason: ALTCHOICE

## 2021-04-12 RX ORDER — PROCHLORPERAZINE MALEATE 10 MG
TABLET ORAL
Qty: 30 TABLET | Refills: 4 | Status: SHIPPED | OUTPATIENT
Start: 2021-04-12 | End: 2021-08-12

## 2021-04-16 DIAGNOSIS — E21.3 HYPERPARATHYROIDISM (HCC): ICD-10-CM

## 2021-04-16 DIAGNOSIS — E83.52 HYPERCALCEMIA: ICD-10-CM

## 2021-04-16 DIAGNOSIS — E83.52 HYPERCALCEMIA: Primary | ICD-10-CM

## 2021-04-16 LAB
A/G RATIO: 1.4 (ref 1.1–2.2)
ALBUMIN SERPL-MCNC: 4.2 G/DL (ref 3.4–5)
ALP BLD-CCNC: 96 U/L (ref 40–129)
ALT SERPL-CCNC: 55 U/L (ref 10–40)
ANION GAP SERPL CALCULATED.3IONS-SCNC: 13 MMOL/L (ref 3–16)
AST SERPL-CCNC: 43 U/L (ref 15–37)
BILIRUB SERPL-MCNC: 0.3 MG/DL (ref 0–1)
BUN BLDV-MCNC: 16 MG/DL (ref 7–20)
CALCIUM SERPL-MCNC: 10.2 MG/DL (ref 8.3–10.6)
CHLORIDE BLD-SCNC: 96 MMOL/L (ref 99–110)
CO2: 25 MMOL/L (ref 21–32)
CREAT SERPL-MCNC: 0.7 MG/DL (ref 0.6–1.2)
GFR AFRICAN AMERICAN: >60
GFR NON-AFRICAN AMERICAN: >60
GLOBULIN: 2.9 G/DL
GLUCOSE BLD-MCNC: 94 MG/DL (ref 70–99)
PARATHYROID HORMONE INTACT: 84.6 PG/ML (ref 14–72)
POTASSIUM SERPL-SCNC: 4.3 MMOL/L (ref 3.5–5.1)
SODIUM BLD-SCNC: 134 MMOL/L (ref 136–145)
TOTAL PROTEIN: 7.1 G/DL (ref 6.4–8.2)
VITAMIN D 25-HYDROXY: 39.8 NG/ML

## 2021-04-19 DIAGNOSIS — E21.3 HYPERPARATHYROIDISM (HCC): Primary | ICD-10-CM

## 2021-05-17 RX ORDER — DULOXETIN HYDROCHLORIDE 30 MG/1
CAPSULE, DELAYED RELEASE ORAL
Qty: 90 CAPSULE | Refills: 4 | Status: SHIPPED | OUTPATIENT
Start: 2021-05-17 | End: 2021-05-18 | Stop reason: SDUPTHER

## 2021-05-18 ENCOUNTER — TELEPHONE (OUTPATIENT)
Dept: INTERNAL MEDICINE CLINIC | Age: 66
End: 2021-05-18

## 2021-05-18 RX ORDER — DULOXETIN HYDROCHLORIDE 30 MG/1
CAPSULE, DELAYED RELEASE ORAL
Qty: 90 CAPSULE | Refills: 1 | Status: SHIPPED | OUTPATIENT
Start: 2021-05-18 | End: 2021-05-27 | Stop reason: SDUPTHER

## 2021-05-18 NOTE — TELEPHONE ENCOUNTER
Patient called to check in status of her prescription for :     DULoxetine (CYMBALTA) 30 MG extended release capsule [[id number]      This was sent to the pharmacy yesterday but the transmission failed, can yo please resend this?      [address] [place] [address] [phone] - F [phone]

## 2021-05-27 ENCOUNTER — OFFICE VISIT (OUTPATIENT)
Dept: INTERNAL MEDICINE CLINIC | Age: 66
End: 2021-05-27
Payer: MEDICARE

## 2021-05-27 VITALS — BODY MASS INDEX: 26.63 KG/M2 | SYSTOLIC BLOOD PRESSURE: 122 MMHG | WEIGHT: 160 LBS | DIASTOLIC BLOOD PRESSURE: 82 MMHG

## 2021-05-27 DIAGNOSIS — I10 ESSENTIAL HYPERTENSION: ICD-10-CM

## 2021-05-27 DIAGNOSIS — E78.2 MIXED HYPERLIPIDEMIA: ICD-10-CM

## 2021-05-27 DIAGNOSIS — F41.9 ANXIETY: ICD-10-CM

## 2021-05-27 DIAGNOSIS — M81.0 OSTEOPOROSIS, UNSPECIFIED OSTEOPOROSIS TYPE, UNSPECIFIED PATHOLOGICAL FRACTURE PRESENCE: Primary | ICD-10-CM

## 2021-05-27 DIAGNOSIS — E03.9 HYPOTHYROIDISM, UNSPECIFIED TYPE: Chronic | ICD-10-CM

## 2021-05-27 DIAGNOSIS — E21.3 HYPERPARATHYROIDISM (HCC): ICD-10-CM

## 2021-05-27 PROBLEM — R07.89 CHEST WALL PAIN: Status: RESOLVED | Noted: 2020-11-25 | Resolved: 2021-05-27

## 2021-05-27 PROCEDURE — 1123F ACP DISCUSS/DSCN MKR DOCD: CPT | Performed by: INTERNAL MEDICINE

## 2021-05-27 PROCEDURE — 1090F PRES/ABSN URINE INCON ASSESS: CPT | Performed by: INTERNAL MEDICINE

## 2021-05-27 PROCEDURE — G8399 PT W/DXA RESULTS DOCUMENT: HCPCS | Performed by: INTERNAL MEDICINE

## 2021-05-27 PROCEDURE — G8427 DOCREV CUR MEDS BY ELIG CLIN: HCPCS | Performed by: INTERNAL MEDICINE

## 2021-05-27 PROCEDURE — 99214 OFFICE O/P EST MOD 30 MIN: CPT | Performed by: INTERNAL MEDICINE

## 2021-05-27 PROCEDURE — 4040F PNEUMOC VAC/ADMIN/RCVD: CPT | Performed by: INTERNAL MEDICINE

## 2021-05-27 PROCEDURE — G8417 CALC BMI ABV UP PARAM F/U: HCPCS | Performed by: INTERNAL MEDICINE

## 2021-05-27 PROCEDURE — 1036F TOBACCO NON-USER: CPT | Performed by: INTERNAL MEDICINE

## 2021-05-27 PROCEDURE — 3017F COLORECTAL CA SCREEN DOC REV: CPT | Performed by: INTERNAL MEDICINE

## 2021-05-27 RX ORDER — DULOXETIN HYDROCHLORIDE 30 MG/1
CAPSULE, DELAYED RELEASE ORAL
Qty: 90 CAPSULE | Refills: 5 | Status: SHIPPED | OUTPATIENT
Start: 2021-05-27 | End: 2022-01-03 | Stop reason: DRUGHIGH

## 2021-05-27 RX ORDER — DICYCLOMINE HCL 20 MG
TABLET ORAL
Qty: 120 TABLET | Refills: 4 | Status: SHIPPED | OUTPATIENT
Start: 2021-05-27 | End: 2021-10-28 | Stop reason: SDUPTHER

## 2021-05-27 RX ORDER — ALPRAZOLAM 0.5 MG/1
0.5 TABLET ORAL DAILY PRN
Qty: 30 TABLET | Refills: 2 | Status: SHIPPED | OUTPATIENT
Start: 2021-05-27 | End: 2021-10-28 | Stop reason: SDUPTHER

## 2021-05-27 RX ORDER — DIAZEPAM 5 MG/1
5 TABLET ORAL DAILY
Qty: 30 TABLET | Refills: 2 | Status: SHIPPED | OUTPATIENT
Start: 2021-05-27 | End: 2021-09-10

## 2021-05-27 SDOH — ECONOMIC STABILITY: FOOD INSECURITY: WITHIN THE PAST 12 MONTHS, YOU WORRIED THAT YOUR FOOD WOULD RUN OUT BEFORE YOU GOT MONEY TO BUY MORE.: NEVER TRUE

## 2021-05-27 SDOH — ECONOMIC STABILITY: FOOD INSECURITY: WITHIN THE PAST 12 MONTHS, THE FOOD YOU BOUGHT JUST DIDN'T LAST AND YOU DIDN'T HAVE MONEY TO GET MORE.: NEVER TRUE

## 2021-05-27 ASSESSMENT — PATIENT HEALTH QUESTIONNAIRE - PHQ9
2. FEELING DOWN, DEPRESSED OR HOPELESS: 0
SUM OF ALL RESPONSES TO PHQ QUESTIONS 1-9: 0
1. LITTLE INTEREST OR PLEASURE IN DOING THINGS: 0

## 2021-05-27 ASSESSMENT — ENCOUNTER SYMPTOMS: RESPIRATORY NEGATIVE: 1

## 2021-05-27 NOTE — ASSESSMENT & PLAN NOTE
recheck DEXA scan. She is not inclined to go back on any therapy or biphosphonate's. In view of hyperparathyroidism may have to consider intervention.

## 2021-05-27 NOTE — ASSESSMENT & PLAN NOTE
continues to do well with combination of Cymbalta, Xanax and Valium. Monitor report done. Refills done.

## 2021-05-27 NOTE — PROGRESS NOTES
Normal range of motion. Right lower leg: No edema. Left lower leg: No edema. Lymphadenopathy:      Head:      Right side of head: No submandibular adenopathy. Left side of head: No submandibular adenopathy. Cervical: No cervical adenopathy. Right cervical: No superficial cervical adenopathy. Left cervical: No superficial cervical adenopathy. Upper Body:      Right upper body: No supraclavicular adenopathy. Left upper body: No supraclavicular adenopathy. Skin:     Coloration: Skin is not jaundiced or pale. Neurological:      General: No focal deficit present. Mental Status: She is alert and oriented to person, place, and time. Cranial Nerves: Cranial nerves are intact. No cranial nerve deficit. Motor: Motor function is intact. No weakness or tremor. Coordination: Coordination normal.      Gait: Gait normal.      Deep Tendon Reflexes:      Reflex Scores:       Patellar reflexes are 2+ on the right side and 2+ on the left side. Comments: Mild positive [place] sign bilaterally   Psychiatric:         Attention and Perception: Attention normal.         Mood and Affect: Mood normal.         Speech: Speech normal.         Behavior: Behavior normal.         Thought Content: Thought content normal.         Cognition and Memory: Cognition normal.         Judgment: Judgment normal.         ASSESSMENT:       Encounter Diagnoses   Name Primary?  Osteoporosis, unspecified osteoporosis type, unspecified pathological fracture presence Yes    Anxiety     Hypothyroidism, unspecified type     Essential hypertension     Hyperparathyroidism ([address]     Mixed hyperlipidemia        Hypothyroidism    TFTs reviewedcontinue current Rx    Hypertension    BP okay    Hyperparathyroidism ([address]    calcium level now normal off supplements. PTH slightly elevated. Remain off supplements and recheck PTH and labs in 3 months.   Also check DEXA scan given her history of osteoporosis. Although her PTH is only mildly elevated she may be candidate for intervention due to osteoporosis. Mixed hyperlipidemia    lipids okay    Anxiety    continues to do well with combination of Cymbalta, Xanax and Valium. Monitor report done. Refills done. Osteoporosis    recheck DEXA scan. She is not inclined to go back on any therapy or biphosphonate's. In view of hyperparathyroidism may have to consider intervention. PLAN:See ASSESSMENT for evaluation & PLAN     Orders Placed This Encounter   Procedures    DEXA BONE DENSITY AXIAL SKELETON     Standing Status:   Future     Standing Expiration Date:   5/27/2022     Order Specific Question:   Reason for exam:     Answer:   osteoporosis     , PMH, SH and FH reviewed and noted. Recent and past labs, tests and consultsalso reviewed. Recent or new meds also reviewed.

## 2021-06-24 ENCOUNTER — OFFICE VISIT (OUTPATIENT)
Dept: PULMONOLOGY | Age: 66
End: 2021-06-24
Payer: MEDICARE

## 2021-06-24 VITALS
BODY MASS INDEX: 27.32 KG/M2 | SYSTOLIC BLOOD PRESSURE: 108 MMHG | DIASTOLIC BLOOD PRESSURE: 66 MMHG | TEMPERATURE: 96.9 F | OXYGEN SATURATION: 98 % | HEIGHT: 65 IN | HEART RATE: 55 BPM | WEIGHT: 164 LBS

## 2021-06-24 DIAGNOSIS — Z95.2 H/O MITRAL VALVE REPLACEMENT: ICD-10-CM

## 2021-06-24 DIAGNOSIS — Z98.890 STATUS POST VENTRICULAR SEPTAL MYECTOMY: ICD-10-CM

## 2021-06-24 DIAGNOSIS — I42.2 HYPERTROPHIC CARDIOMYOPATHY (HCC): ICD-10-CM

## 2021-06-24 DIAGNOSIS — J45.30 MILD PERSISTENT ASTHMA WITHOUT COMPLICATION: Primary | ICD-10-CM

## 2021-06-24 PROCEDURE — 1123F ACP DISCUSS/DSCN MKR DOCD: CPT | Performed by: INTERNAL MEDICINE

## 2021-06-24 PROCEDURE — 1090F PRES/ABSN URINE INCON ASSESS: CPT | Performed by: INTERNAL MEDICINE

## 2021-06-24 PROCEDURE — G8427 DOCREV CUR MEDS BY ELIG CLIN: HCPCS | Performed by: INTERNAL MEDICINE

## 2021-06-24 PROCEDURE — 4040F PNEUMOC VAC/ADMIN/RCVD: CPT | Performed by: INTERNAL MEDICINE

## 2021-06-24 PROCEDURE — 1036F TOBACCO NON-USER: CPT | Performed by: INTERNAL MEDICINE

## 2021-06-24 PROCEDURE — 3017F COLORECTAL CA SCREEN DOC REV: CPT | Performed by: INTERNAL MEDICINE

## 2021-06-24 PROCEDURE — G8417 CALC BMI ABV UP PARAM F/U: HCPCS | Performed by: INTERNAL MEDICINE

## 2021-06-24 PROCEDURE — G8399 PT W/DXA RESULTS DOCUMENT: HCPCS | Performed by: INTERNAL MEDICINE

## 2021-06-24 PROCEDURE — 99213 OFFICE O/P EST LOW 20 MIN: CPT | Performed by: INTERNAL MEDICINE

## 2021-06-24 NOTE — PROGRESS NOTES
Cynthia Bazzi (:  1955) is a 78 y.o. female,Established patient, here for evaluation of the following chief complaint(s):  Follow-up         ASSESSMENT/PLAN:  1. Mild persistent asthma without complication  2. H/O mitral valve replacement  3. Hypertrophic cardiomyopathy ([address]  4. Status post ventricular septal myectomy  Asthma symptoms are well controlled, and it is not clear whether daily medication is truly necessary. Mrs. Azalia Matt will try using Symbicort inhaler only on an as-needed basis for episodes of chest tightness, wheezing, shortness of breath. She will call with any questions about ongoing symptoms. She is likely not aware of any limitations, because she is not getting regular exercise. Resuming daily exercise with her  with morning walks will be helpful in assessing lung function, as well as improving overall fitness. Regular exercise will help restore cardiac function. No follow-ups on file. Subjective   SUBJECTIVE/OBJECTIVE:  HPI   Ms. Azalia Matt returns for regular follow-up for asthma. She has been doing quite well, and has no complaints of dyspnea with exertion or at rest, cough, chest tightness, wheezing. No nocturnal chest symptoms. She generally sleeps well but notes that her  has awakened her occasionally to tell her that she stops breathing. She snores loudly, and he has recorded her snoring. She has not resumed daily exercise. After mitral valve replacement, she and her  were in the habit of taking a daily morning walk. This has fallen by the wayside. We discussed possible benefits of exercise, ways of redeveloping the habit, and how this may help evaluate further she requires ongoing treatment for asthma. Objective   Physical Exam  Vitals reviewed. Constitutional:       Appearance: She is well-developed and normal weight. HENT:      Head: Normocephalic and atraumatic.       Mouth/Throat:      Pharynx: No oropharyngeal exudate or posterior oropharyngeal erythema. Eyes:      General: No scleral icterus. Right eye: No discharge. Left eye: No discharge. Neck:      Thyroid: No thyromegaly. Trachea: No tracheal deviation. Cardiovascular:      Rate and Rhythm: Normal rate and regular rhythm. Pulses:           Radial pulses are 2+ on the right side and 2+ on the left side. Heart sounds: Murmur heard. Systolic murmur is present with a grade of 2/6. Pulmonary:      Effort: Pulmonary effort is normal. No respiratory distress. Breath sounds: Normal breath sounds. No stridor. No wheezing, rhonchi or rales. Chest:      Chest wall: No tenderness. Musculoskeletal:      Right lower leg: No edema. Left lower leg: No edema. Lymphadenopathy:      Cervical: No cervical adenopathy. Skin:     General: Skin is warm and dry. Neurological:      Mental Status: She is alert and oriented to person, place, and time. Psychiatric:         Mood and Affect: Mood normal.         Behavior: Behavior normal.         Thought Content: Thought content normal.         Judgment: Judgment normal.            On this date 6/24/2021 I have spent 25 minutes reviewing previous notes, test results and face to face with the patient discussing the diagnosis and importance of compliance with the treatment plan as well as documenting on the day of the visit. An electronic signature was used to authenticate this note.     --Francheska Galaviz MD

## 2021-07-01 ENCOUNTER — HOSPITAL ENCOUNTER (OUTPATIENT)
Dept: WOMENS IMAGING | Age: 66
Discharge: HOME | End: 2021-07-01
Payer: MEDICARE

## 2021-07-01 DIAGNOSIS — M81.0 OSTEOPOROSIS, UNSPECIFIED OSTEOPOROSIS TYPE, UNSPECIFIED PATHOLOGICAL FRACTURE PRESENCE: ICD-10-CM

## 2021-07-01 PROCEDURE — 77080 DXA BONE DENSITY AXIAL: CPT

## 2021-07-02 ENCOUNTER — TELEPHONE (OUTPATIENT)
Dept: INTERNAL MEDICINE CLINIC | Age: 66
End: 2021-07-02

## 2021-07-02 DIAGNOSIS — E21.3 HYPERPARATHYROIDISM (HCC): ICD-10-CM

## 2021-07-02 DIAGNOSIS — E83.52 HYPERCALCEMIA: ICD-10-CM

## 2021-07-02 LAB
A/G RATIO: 1.7 (ref 1.1–2.2)
ALBUMIN SERPL-MCNC: 4.1 G/DL (ref 3.4–5)
ALP BLD-CCNC: 106 U/L (ref 40–129)
ALT SERPL-CCNC: 58 U/L (ref 10–40)
ANION GAP SERPL CALCULATED.3IONS-SCNC: 10 MMOL/L (ref 3–16)
AST SERPL-CCNC: 91 U/L (ref 15–37)
BILIRUB SERPL-MCNC: 0.4 MG/DL (ref 0–1)
BUN BLDV-MCNC: 12 MG/DL (ref 7–20)
CALCIUM SERPL-MCNC: 9.9 MG/DL (ref 8.3–10.6)
CHLORIDE BLD-SCNC: 96 MMOL/L (ref 99–110)
CO2: 26 MMOL/L (ref 21–32)
CREAT SERPL-MCNC: 0.6 MG/DL (ref 0.6–1.2)
GFR AFRICAN AMERICAN: >60
GFR NON-AFRICAN AMERICAN: >60
GLOBULIN: 2.4 G/DL
GLUCOSE BLD-MCNC: 116 MG/DL (ref 70–99)
PARATHYROID HORMONE INTACT: 65.1 PG/ML (ref 14–72)
POTASSIUM SERPL-SCNC: 4.5 MMOL/L (ref 3.5–5.1)
SODIUM BLD-SCNC: 132 MMOL/L (ref 136–145)
TOTAL PROTEIN: 6.5 G/DL (ref 6.4–8.2)
VITAMIN D 25-HYDROXY: 39 NG/ML

## 2021-07-02 NOTE — TELEPHONE ENCOUNTER
Spoke to pt informed her that MD is out on vacation next week she understood and has an appt for when she returns

## 2021-07-02 NOTE — TELEPHONE ENCOUNTER
----- Message from Joyce Levy sent at 7/2/2021  3:27 PM EDT -----  Subject: Message to Provider    QUESTIONS  Information for Provider? Patient had blood work today. Called to make   appointment but no available until 7/21/2021. Would like to be seen either   7/8 or 7/9 before 10:30 before leaving for vacation. Patient leaves on the   10th.  ---------------------------------------------------------------------------  --------------  CALL BACK INFO  What is the best way for the office to contact you? OK to leave message on   voicemail  Preferred Call Back Phone Number? [phone]  ---------------------------------------------------------------------------  --------------  SCRIPT ANSWERS  Relationship to Patient?  Self

## 2021-07-06 ENCOUNTER — OFFICE VISIT (OUTPATIENT)
Dept: CARDIOLOGY CLINIC | Age: 66
End: 2021-07-06
Payer: MEDICARE

## 2021-07-06 VITALS
BODY MASS INDEX: 27.12 KG/M2 | WEIGHT: 162.8 LBS | HEART RATE: 70 BPM | HEIGHT: 65 IN | DIASTOLIC BLOOD PRESSURE: 60 MMHG | SYSTOLIC BLOOD PRESSURE: 110 MMHG

## 2021-07-06 DIAGNOSIS — I42.1 HOCM (HYPERTROPHIC OBSTRUCTIVE CARDIOMYOPATHY) (HCC): Primary | ICD-10-CM

## 2021-07-06 DIAGNOSIS — Z95.2 S/P MVR (MITRAL VALVE REPLACEMENT): ICD-10-CM

## 2021-07-06 DIAGNOSIS — I34.0 SEVERE MITRAL REGURGITATION: ICD-10-CM

## 2021-07-06 DIAGNOSIS — Z98.890 STATUS POST VENTRICULAR SEPTAL MYECTOMY: ICD-10-CM

## 2021-07-06 DIAGNOSIS — E78.00 HYPERCHOLESTEROLEMIA: ICD-10-CM

## 2021-07-06 DIAGNOSIS — I10 ESSENTIAL HYPERTENSION: ICD-10-CM

## 2021-07-06 PROCEDURE — G8417 CALC BMI ABV UP PARAM F/U: HCPCS | Performed by: NURSE PRACTITIONER

## 2021-07-06 PROCEDURE — 1090F PRES/ABSN URINE INCON ASSESS: CPT | Performed by: NURSE PRACTITIONER

## 2021-07-06 PROCEDURE — G8399 PT W/DXA RESULTS DOCUMENT: HCPCS | Performed by: NURSE PRACTITIONER

## 2021-07-06 PROCEDURE — G8427 DOCREV CUR MEDS BY ELIG CLIN: HCPCS | Performed by: NURSE PRACTITIONER

## 2021-07-06 PROCEDURE — 99214 OFFICE O/P EST MOD 30 MIN: CPT | Performed by: NURSE PRACTITIONER

## 2021-07-06 PROCEDURE — 1036F TOBACCO NON-USER: CPT | Performed by: NURSE PRACTITIONER

## 2021-07-06 PROCEDURE — 3017F COLORECTAL CA SCREEN DOC REV: CPT | Performed by: NURSE PRACTITIONER

## 2021-07-06 PROCEDURE — 1123F ACP DISCUSS/DSCN MKR DOCD: CPT | Performed by: NURSE PRACTITIONER

## 2021-07-06 PROCEDURE — 4040F PNEUMOC VAC/ADMIN/RCVD: CPT | Performed by: NURSE PRACTITIONER

## 2021-07-06 RX ORDER — LEVOTHYROXINE SODIUM 0.12 MG/1
TABLET ORAL
Qty: 60 TABLET | Refills: 3 | Status: SHIPPED | OUTPATIENT
Start: 2021-07-06 | End: 2022-03-14

## 2021-07-06 NOTE — PROGRESS NOTES
CC/HPI:    3 month follow up for HOCM/myectomy, Severe MR/MVR, HTN and HLD. 78 y.o. patient of Dr Sabas Saul with HOCM/myectomy, severe MR/MVR, (surgery 6/2020), HTN, and HLD. She is doing well. Denies complaints. No chest pain, sob, LH/dizziness, palpitations or syncope. No LE edema, orthopnea or PND. No fever, chills, v/d or GI/ bleeding. Tolerating medications.        Past Medical History:   Diagnosis Date    Adrenal adenoma     left 8 mm - stable - CT 8/13    Anxiety     Arthritis     Asthma     Chronic nausea     Environmental allergies     GERD (gastroesophageal reflux disease)     HOCM (hypertrophic obstructive cardiomyopathy) (HCC)     HTN (hypertension)     Irritable bowel syndrome     Lung disease     Microscopic colitis     Migraine     Mitral regurgitation     Nephrolithiasis 1/21/15    R side -s/p lithotripsy    Nosebleed     Osteoporosis     DEXA 8/8/13 - scotty lumbar spine    Rash     Restless leg syndrome     TMJ dysfunction     Vitreous detachment     Wrist fracture, bilateral      Past Surgical History:   Procedure Laterality Date    CARDIAC CATHETERIZATION  02/28/2020    CARDIOVASCULAR STRESS TEST  2016    normal    CHOLECYSTECTOMY, LAPAROSCOPIC N/A 3/27/2019    w/cholangiogram w/C-arm, performed by Bishop Decker MD at [address] [place] [address]  7/13    COLONOSCOPY  03/29/2018    Afshan (random bxs)-microscopic colitis    KNEE SURGERY Right 1975    synovectomy    MITRAL VALVE REPAIR N/A 6/29/2020    [facility]; TCPB; resection of hypertrophic muscle from septal LVOT; miital valve replacement w/ 25mm Medtronic Mosaic bovine pericardial bioprosthetic valve; TCPB; ICNB x 5 levels bilatereally performed by Vinicio Cline MD at [address] Left     Partial    TRANSESOPHAGEAL ECHOCARDIOGRAM  03/11/2020    Dr. Stallworth Fearing ENDOSCOPY  03/2018    Normal    WRIST FRACTURE SURGERY Left 2009     Family History   Problem Relation Age of Onset  Cancer Mother         leukemia    Hypertension Mother     Other Father         CRF    Heart Surgery Father         CABG    Blindness Father     Hypertension Father     Hearing Loss Father      Social History     Tobacco Use    Smoking status: Former Smoker     Packs/day: 1.00     Years: 38.00     Pack years: 38.00     Types: Cigarettes     Start date: 1972     Quit date: 2010     Years since quittin.1    Smokeless tobacco: Never Used   Vaping Use    Vaping Use: Never used   Substance Use Topics    Alcohol use: Not Currently     Alcohol/week: 0.0 standard drinks    Drug use: No     Allergies: Tolectin [tolmetin]    Review of Systems  General: No changes in weight, fatigue, or night sweats. HEENT: No blurry or decreased vision. No changes in hearing, nasal discharge or sore throat. Cardiovascular:  See HPI. Respiratory: No cough, hemoptysis, or wheezing.  + history of asthma. Gastrointestinal:  No abdominal pain, hematochezia, melana, constipation, diarrhea, or history of GI ulcers. Chronic nausea and GERD and IBS  Genito-Urinary: No dysuria or hematuria. No urgency or polyuria. Musculoskeletal:  No complaints of joint pain, joint swelling or muscular weakness/soreness. + arthritis   Neurological:  No dizziness, headaches, numbness/tingling, speech problems or weakness. No history of a stroke or TIA. Psychological: + anxiety   Hematological and Lymphatic: No abnormal bleeding or bruising, blood clots, jaundice or swollen lymph nodes. Endocrine:   No malaise/lethargy, palpitations, polydipsia/polyuria, temperature intolerance or unexpected weight changes  Skin:  No rashes or non-healing ulcers.     Physical Exam:  /60   Pulse 70   Ht 5' 5\" (1.651 m)   Wt 162 lb 12.8 oz (73.8 kg)   BMI 27.09 kg/m²    Repeat /74  General (appearance):  No acute distress  Eyes: anicteric   Neck: soft, No JVD  Ears/Nose/Mouth/Thorat: No cyanosis  CV: RRR soft PLACIDO  Respiratory: Clear GI: soft, non-tender, non-distended  Skin: Warm, dry. No rashes  Neuro/Psych: Alert and oriented x 3. Appropriate behavior  Ext:  No c/c. No edema  Pulses:  2+ radial     Weight  Wt Readings from Last 3 Encounters:   21 162 lb 12.8 oz (73.8 kg)   21 164 lb (74.4 kg)   21 160 lb (72.6 kg)          CBC:   Lab Results   Component Value Date    WBC 11.7 (H) 2021    HGB 14.9 2021    HCT 44.6 2021    MCV 90.6 2021     2021     BMP:  Lab Results   Component Value Date    CREATININE 0.6 2021    BUN 12 2021     (L) 2021    K 4.5 2021    CL 96 (L) 2021    CO2 26 2021     Mag:   Lab Results   Component Value Date    MG 1.90 2020     LIVER PROFILE:   Lab Results   Component Value Date    ALT 58 (H) 2021    AST 91 (H) 2021    ALKPHOS 106 2021    BILITOT 0.4 2021     PT/INR:   Lab Results   Component Value Date    INR 1.19 (H) 2020    INR 1.07 2020    INR 1.16 (H) 2020    PROTIME 13.8 (H) 2020    PROTIME 12.4 2020    PROTIME 13.5 (H) 2020     Pro-BNP   Lab Results   Component Value Date    PROBNP 2,358 2019     LIPIDS:  No components found for: CHLPL  Lab Results   Component Value Date    TRIG 63 2021    TRIG 91 2020    TRIG 75 2020     Lab Results   Component Value Date    HDL 72 (H) 2021    HDL 57 2020    HDL 57 2020     Lab Results   Component Value Date    LDLCALC 48 2021    LDLCALC 120 (H) 2020    LDLCALC 119 (H) 2020     Lab Results   Component Value Date    LABVLDL 13 2021    LABVLDL 18 2020    LABVLDL 15 2020     TSH:  Lab Results   Component Value Date    TSH 1.75 2020       IMAGIN/2/2020 Echo:   Normal left ventricle size and systolic function with an estimated ejection   fraction of 55-60%.    The maximum prosthetic mitral valve pressure gradient is 8.4 mmHg and the mean pressure gradient is 8 mmHg. The mitral valve appears to be obstructing into the LVOT. There is   subvalvular elevated velocities    3 m/s Recommend [facility]    3/11/2020 [facility]:  Technically difficult study due to loss of probe contact due to loss of gel   and air interference. Left ventricular cavity size is normal. There is   severe asymmetric hypertrophy of the basal septum. Overall left ventricular   systolic function appears normal with an estimated ejection fraction of   55-60%. No regional wall motion abnormalities are noted. Mitral valve leaflets are mildly thickened. Moderate mitral regurgitation is   present. 2/2020 [facility]:  Angiographic Findings:  Right dominant system  Left Main:  Normal  Left Anterior Descending:  Normal  Circumflex:  Normal  Right Coronary:  Normal  Left Ventriculogram:  Not performed. Hemodynamics (mm Hg):  Apical Left Ventricular Pressure: 222/1, 11  LVOT Left ventricular pressure: 120/7, 22  Central Aortic Pressure:  115/65 (83)        2/2020 [facility] (): HCM. Mod HK of basal anterior and AS segments. Pap muscles displaced to the apex and not well organized. Rebeca flor enhancement present. + SITA. Severe MR    12/2019 Echo:   Left ventricular cavity size is normal.   There is severe asymmetric hypertrophy of the basal septum. Ejection fraction is estimated to be 65-70%. Overall left ventricular systolic function appears borderline hyperdynamic. There is a late peaking gradient recorded across the LV outflow tract   consistent with dynamic obstruction with a peak gradient of 103 mmHg. Diastolic filling parameters suggest grade II diastolic dysfunction. Mild tricuspid regurgitation. Medications:   Current Outpatient Medications   Medication Sig Dispense Refill    ALPRAZolam (XANAX) 0.5 MG tablet Take 1 tablet by mouth daily as needed for Anxiety. 30 tablet 2    diazePAM (VALIUM) 5 MG tablet Take 1 tablet by mouth daily.  30 tablet 2    dicyclomine (BENTYL) 20 MG tablet Take one tablet by mouth four times a day as needed 120 tablet 4    DULoxetine (CYMBALTA) 30 MG extended release capsule Take three capsules by mouth daily 90 capsule 5    prochlorperazine (COMPAZINE) 10 MG tablet TAKE ONE TABLET BY MOUTH EVERY 6 HOURS AS NEEDED 30 tablet 4    SYMBICORT 80-4.5 MCG/ACT AERO INHALE TWO PUFFS BY MOUTH TWICE A DAY 10.2 g 9    metoprolol succinate (TOPROL XL) 100 MG extended release tablet Take 1 tablet by mouth daily 60 tablet 5    atorvastatin (LIPITOR) 80 MG tablet Take 1 tablet by mouth every evening 30 tablet 5    gabapentin (NEURONTIN) 300 MG capsule Take 300 mg by mouth 3 times daily.        clopidogrel (PLAVIX) 75 MG tablet Take 1 tablet by mouth daily 30 tablet 5    lisinopril (PRINIVIL;ZESTRIL) 2.5 MG tablet Take 1 tablet by mouth Daily with lunch 30 tablet 5    aspirin 81 MG EC tablet Take 81 mg by mouth daily      docusate sodium (COLACE) 100 MG capsule Take 100 mg by mouth 2 times daily as needed for Constipation      levothyroxine (SYNTHROID) 125 MCG tablet TAKE ONE TABLET BY MOUTH DAILY 60 tablet 4    famotidine (PEPCID) 40 MG tablet Take 1 tablet by mouth daily 90 tablet 3    Multiple Vitamins-Minerals (CENTRUM SILVER PO) Take 1 tablet by mouth daily      Calcium Carbonate-Vitamin D (CALTRATE 600+D PO) Take 1 tablet by mouth 2 times daily temporarily stopped      vitamin D3 (CHOLECALCIFEROL) 10 MCG (400 UNIT) TABS tablet Take 400 Units by mouth daily       Omega-3 Fatty Acids (FISH OIL) 1200 MG CAPS Take 1 capsule by mouth 2 times daily      Flaxseed, Linseed, (FLAXSEED OIL PO) Take 1,300 mg by mouth daily      FIBER PO Take 1 capsule by mouth daily       albuterol sulfate HFA (PROAIR HFA) 108 (90 Base) MCG/ACT inhaler Inhale 2 puffs into the lungs every 6 hours as needed for Wheezing (Patient not taking: Reported on 7/6/2021)      budesonide (ENTOCORT EC) 3 MG extended release capsule Take 3 mg by mouth every morning  (Patient not taking: Reported on

## 2021-07-13 DIAGNOSIS — E78.2 MIXED HYPERLIPIDEMIA: ICD-10-CM

## 2021-07-13 DIAGNOSIS — I10 ESSENTIAL HYPERTENSION: ICD-10-CM

## 2021-07-13 DIAGNOSIS — N85.00 ENDOMETRIAL HYPERPLASIA: ICD-10-CM

## 2021-07-13 DIAGNOSIS — E21.3 HYPERPARATHYROIDISM (HCC): ICD-10-CM

## 2021-07-13 DIAGNOSIS — F41.9 ANXIETY: ICD-10-CM

## 2021-07-13 DIAGNOSIS — N20.0 NEPHROLITHIASIS: ICD-10-CM

## 2021-07-13 DIAGNOSIS — E03.9 HYPOTHYROIDISM, UNSPECIFIED TYPE: Primary | ICD-10-CM

## 2021-07-13 LAB — PTH RELATED PEPTIDE: <2 PMOL/L (ref 0–3.4)

## 2021-07-21 ENCOUNTER — TELEPHONE (OUTPATIENT)
Dept: INTERNAL MEDICINE CLINIC | Age: 66
End: 2021-07-21

## 2021-07-21 RX ORDER — CIPROFLOXACIN 500 MG/1
500 TABLET, FILM COATED ORAL 2 TIMES DAILY
Qty: 14 TABLET | Refills: 0 | Status: SHIPPED | OUTPATIENT
Start: 2021-07-21 | End: 2021-07-23 | Stop reason: SDUPTHER

## 2021-07-21 NOTE — TELEPHONE ENCOUNTER
----- Message from Rafa Silva 12 sent at 7/21/2021  8:15 AM EDT -----  Subject: Message to Provider    QUESTIONS  Information for Provider? Patient has a UTI and would like to know if they   can be called in a prescription   ---------------------------------------------------------------------------  --------------  [address]  What is the best way for the office to contact you? OK to leave message on   voicemail  Preferred Call Back Phone Number? [phone]  ---------------------------------------------------------------------------  --------------  SCRIPT ANSWERS  Relationship to Patient?  Self

## 2021-07-23 ENCOUNTER — OFFICE VISIT (OUTPATIENT)
Dept: INTERNAL MEDICINE CLINIC | Age: 66
End: 2021-07-23
Payer: MEDICARE

## 2021-07-23 VITALS
DIASTOLIC BLOOD PRESSURE: 70 MMHG | HEIGHT: 65 IN | BODY MASS INDEX: 27.16 KG/M2 | WEIGHT: 163 LBS | SYSTOLIC BLOOD PRESSURE: 128 MMHG

## 2021-07-23 DIAGNOSIS — E21.3 HYPERPARATHYROIDISM (HCC): Primary | ICD-10-CM

## 2021-07-23 DIAGNOSIS — M81.0 OSTEOPOROSIS, UNSPECIFIED OSTEOPOROSIS TYPE, UNSPECIFIED PATHOLOGICAL FRACTURE PRESENCE: ICD-10-CM

## 2021-07-23 DIAGNOSIS — E03.9 HYPOTHYROIDISM, UNSPECIFIED TYPE: ICD-10-CM

## 2021-07-23 DIAGNOSIS — I10 ESSENTIAL HYPERTENSION: ICD-10-CM

## 2021-07-23 DIAGNOSIS — N30.00 ACUTE CYSTITIS WITHOUT HEMATURIA: ICD-10-CM

## 2021-07-23 DIAGNOSIS — E78.2 MIXED HYPERLIPIDEMIA: ICD-10-CM

## 2021-07-23 PROCEDURE — G8399 PT W/DXA RESULTS DOCUMENT: HCPCS | Performed by: INTERNAL MEDICINE

## 2021-07-23 PROCEDURE — G8427 DOCREV CUR MEDS BY ELIG CLIN: HCPCS | Performed by: INTERNAL MEDICINE

## 2021-07-23 PROCEDURE — 1090F PRES/ABSN URINE INCON ASSESS: CPT | Performed by: INTERNAL MEDICINE

## 2021-07-23 PROCEDURE — G8417 CALC BMI ABV UP PARAM F/U: HCPCS | Performed by: INTERNAL MEDICINE

## 2021-07-23 PROCEDURE — 4040F PNEUMOC VAC/ADMIN/RCVD: CPT | Performed by: INTERNAL MEDICINE

## 2021-07-23 PROCEDURE — 1036F TOBACCO NON-USER: CPT | Performed by: INTERNAL MEDICINE

## 2021-07-23 PROCEDURE — 3017F COLORECTAL CA SCREEN DOC REV: CPT | Performed by: INTERNAL MEDICINE

## 2021-07-23 PROCEDURE — 99214 OFFICE O/P EST MOD 30 MIN: CPT | Performed by: INTERNAL MEDICINE

## 2021-07-23 PROCEDURE — 1123F ACP DISCUSS/DSCN MKR DOCD: CPT | Performed by: INTERNAL MEDICINE

## 2021-07-23 RX ORDER — DENOSUMAB 60 MG/ML
60 INJECTION SUBCUTANEOUS ONCE
Qty: 1 ML | Refills: 1 | Status: SHIPPED | OUTPATIENT
Start: 2021-07-23 | End: 2022-03-03 | Stop reason: SDUPTHER

## 2021-07-23 RX ORDER — CIPROFLOXACIN 500 MG/1
500 TABLET, FILM COATED ORAL 2 TIMES DAILY
Qty: 20 TABLET | Refills: 1 | Status: SHIPPED | OUTPATIENT
Start: 2021-07-23 | End: 2021-08-26

## 2021-07-23 ASSESSMENT — ENCOUNTER SYMPTOMS: RESPIRATORY NEGATIVE: 1

## 2021-07-23 NOTE — PROGRESS NOTES
SUBJECTIVE:  Patient ID: Estrlelita Hicks is an 78 y.o. female. HPI: Patient here today for the f/u of chronic problems-- see Problem List and associated comments. New issues or complaints include (also see Assessment for more details): Here for follow-up on her labs. Status post Covid vaccination. Overall she actually is feeling very well. No unusual cardiovascular or breathing issues. Review of Systems   Constitutional: Negative for fatigue. Respiratory: Negative. Cardiovascular: Negative. Musculoskeletal: Positive for arthralgias. Neurological: Negative. Psychiatric/Behavioral: Negative. OBJECTIVE:    /70 (Site: Right Upper Arm)   Ht 5' 5\" (1.651 m)   Wt 163 lb (73.9 kg)   BMI 27.12 kg/m²      Physical Exam  Constitutional:       Appearance: She is not ill-appearing. Cardiovascular:      Rate and Rhythm: Normal rate. Pulmonary:      Effort: No respiratory distress. Skin:     Coloration: Skin is not pale. Neurological:      Mental Status: She is alert. Psychiatric:         Judgment: Judgment normal.         ASSESSMENT:       Encounter Diagnoses   Name Primary?  Hyperparathyroidism ([address] Yes    Hypothyroidism, unspecified type     Mixed hyperlipidemia     Essential hypertension     Osteoporosis, unspecified osteoporosis type, unspecified pathological fracture presence     Acute cystitis without hematuria        Hypertension    BP okay    Osteoporosis    DEXA scan showed progression compared to previous DEXA. Compatible with osteoporosis. Intolerance to Fosamax in the past.  Order Prolia. No calcium or vitamin D supplementation at this time due to her recent problem with hyperparathyroidism. Hyperparathyroidism ([address]    PTH now normal.  Calcium level normal.  Continue to monitor. No calcium or vitamin D supplementation at this time. Mixed hyperlipidemia    continue Lipitor-labs in 3 months.     Acute cystitis without hematuria    patient had recurrence of her UTI while traveling. Rx for Cipro was called in. I gave her an Rx to keep on hand for travel. PLAN:See ASSESSMENT for evaluation & PLAN     Orders Placed This Encounter   Procedures    PTH, Intact     Standing Status:   Future     Standing Expiration Date:   7/23/2022    Comprehensive Metabolic Panel     Standing Status:   Future     Standing Expiration Date:   7/23/2022    Vitamin D 25 Hydroxy     Standing Status:   Future     Standing Expiration Date:   7/23/2022    TSH without Reflex     Standing Status:   Future     Standing Expiration Date:   7/23/2022    T4, Free     Standing Status:   Future     Standing Expiration Date:   7/23/2022    Lipid Panel     Standing Status:   Future     Standing Expiration Date:   7/23/2022     Order Specific Question:   Is Patient Fasting?/# of Hours     Answer:   yes - 8 hours     , PMH, SH and FH reviewed and noted. Recent and past labs, tests and consultsalso reviewed. Recent or new meds also reviewed.

## 2021-07-23 NOTE — ASSESSMENT & PLAN NOTE
DEXA scan showed progression compared to previous DEXA. Compatible with osteoporosis. Intolerance to Fosamax in the past.  Order Prolia. No calcium or vitamin D supplementation at this time due to her recent problem with hyperparathyroidism.

## 2021-07-23 NOTE — ASSESSMENT & PLAN NOTE
patient had recurrence of her UTI while traveling. Rx for Cipro was called in. I gave her an Rx to keep on hand for travel.

## 2021-07-23 NOTE — ASSESSMENT & PLAN NOTE
PTH now normal.  Calcium level normal.  Continue to monitor. No calcium or vitamin D supplementation at this time.

## 2021-07-26 RX ORDER — FAMOTIDINE 20 MG/1
TABLET, FILM COATED ORAL
Qty: 180 TABLET | Refills: 1 | Status: SHIPPED | OUTPATIENT
Start: 2021-07-26 | End: 2021-08-26

## 2021-07-30 ENCOUNTER — TELEPHONE (OUTPATIENT)
Dept: INTERNAL MEDICINE CLINIC | Age: 66
End: 2021-07-30

## 2021-08-03 ENCOUNTER — TELEPHONE (OUTPATIENT)
Dept: INTERNAL MEDICINE CLINIC | Age: 66
End: 2021-08-03

## 2021-08-03 NOTE — TELEPHONE ENCOUNTER
Spoke to pt she stated she just wanted to know if it was sent.      Explained it was sent on 7/23 to [address] [place] [address],[address]

## 2021-08-03 NOTE — TELEPHONE ENCOUNTER
Patient jerzy montemayor because she wants to discuss getting her Prolia shot.         Please call to advise

## 2021-08-06 RX ORDER — SODIUM CHLORIDE 9 MG/ML
INJECTION, SOLUTION INTRAVENOUS CONTINUOUS
Status: CANCELLED | OUTPATIENT
Start: 2021-08-06

## 2021-08-06 RX ORDER — METHYLPREDNISOLONE SODIUM SUCCINATE 125 MG/2ML
125 INJECTION, POWDER, LYOPHILIZED, FOR SOLUTION INTRAMUSCULAR; INTRAVENOUS ONCE
Status: CANCELLED | OUTPATIENT
Start: 2021-08-06 | End: 2021-08-06

## 2021-08-06 RX ORDER — DIPHENHYDRAMINE HYDROCHLORIDE 50 MG/ML
50 INJECTION INTRAMUSCULAR; INTRAVENOUS ONCE
Status: CANCELLED | OUTPATIENT
Start: 2021-08-06 | End: 2021-08-06

## 2021-08-06 RX ORDER — EPINEPHRINE 1 MG/ML
0.3 INJECTION, SOLUTION, CONCENTRATE INTRAVENOUS PRN
Status: CANCELLED | OUTPATIENT
Start: 2021-08-06

## 2021-08-12 RX ORDER — PROCHLORPERAZINE MALEATE 10 MG
TABLET ORAL
Qty: 30 TABLET | Refills: 4 | Status: SHIPPED | OUTPATIENT
Start: 2021-08-12 | End: 2021-10-28 | Stop reason: SDUPTHER

## 2021-08-12 NOTE — TELEPHONE ENCOUNTER
Patient called to get a med refill on   prochlorperazine (COMPAZINE) 10 MG tablet    Please send to [facility] [address] [facility]    Please call to advise

## 2021-08-26 ENCOUNTER — HOSPITAL ENCOUNTER (OUTPATIENT)
Dept: INFUSION THERAPY | Age: 66
Setting detail: INFUSION SERIES
Discharge: HOME | End: 2021-08-26
Payer: MEDICARE

## 2021-08-26 VITALS
SYSTOLIC BLOOD PRESSURE: 121 MMHG | RESPIRATION RATE: 18 BRPM | WEIGHT: 159 LBS | DIASTOLIC BLOOD PRESSURE: 70 MMHG | BODY MASS INDEX: 26.49 KG/M2 | TEMPERATURE: 98.6 F | HEART RATE: 60 BPM | HEIGHT: 65 IN | OXYGEN SATURATION: 98 %

## 2021-08-26 DIAGNOSIS — M81.0 OSTEOPOROSIS WITHOUT CURRENT PATHOLOGICAL FRACTURE, UNSPECIFIED OSTEOPOROSIS TYPE: Primary | ICD-10-CM

## 2021-08-26 PROCEDURE — 96372 THER/PROPH/DIAG INJ SC/IM: CPT

## 2021-08-26 PROCEDURE — 6360000002 HC RX W HCPCS: Performed by: INTERNAL MEDICINE

## 2021-08-26 RX ORDER — LORATADINE 10 MG/1
10 TABLET ORAL DAILY
COMMUNITY

## 2021-08-26 RX ORDER — MULTIVIT-MIN/IRON/FOLIC ACID/K 18-600-40
500 CAPSULE ORAL
COMMUNITY

## 2021-08-26 RX ORDER — METHYLPREDNISOLONE SODIUM SUCCINATE 125 MG/2ML
125 INJECTION, POWDER, LYOPHILIZED, FOR SOLUTION INTRAMUSCULAR; INTRAVENOUS ONCE
Status: CANCELLED | OUTPATIENT
Start: 2022-02-24 | End: 2022-02-24

## 2021-08-26 RX ORDER — EPINEPHRINE 1 MG/ML
0.3 INJECTION, SOLUTION, CONCENTRATE INTRAVENOUS PRN
Status: CANCELLED | OUTPATIENT
Start: 2022-02-24

## 2021-08-26 RX ORDER — NARATRIPTAN 2.5 MG/1
2.5 TABLET ORAL
COMMUNITY
End: 2022-01-14

## 2021-08-26 RX ORDER — SODIUM CHLORIDE 9 MG/ML
INJECTION, SOLUTION INTRAVENOUS CONTINUOUS
Status: CANCELLED | OUTPATIENT
Start: 2022-02-24

## 2021-08-26 RX ORDER — DIPHENHYDRAMINE HYDROCHLORIDE 50 MG/ML
50 INJECTION INTRAMUSCULAR; INTRAVENOUS ONCE
Status: CANCELLED | OUTPATIENT
Start: 2022-02-24 | End: 2022-02-24

## 2021-08-26 RX ORDER — UBIDECARENONE 75 MG
200 CAPSULE ORAL
COMMUNITY

## 2021-08-26 RX ADMIN — DENOSUMAB 60 MG: 60 INJECTION SUBCUTANEOUS at 09:48

## 2021-10-14 ENCOUNTER — OFFICE VISIT (OUTPATIENT)
Dept: PULMONOLOGY | Age: 66
End: 2021-10-14
Payer: MEDICARE

## 2021-10-14 VITALS
BODY MASS INDEX: 27.12 KG/M2 | TEMPERATURE: 96.6 F | OXYGEN SATURATION: 100 % | WEIGHT: 163 LBS | HEART RATE: 56 BPM | SYSTOLIC BLOOD PRESSURE: 118 MMHG | DIASTOLIC BLOOD PRESSURE: 78 MMHG

## 2021-10-14 DIAGNOSIS — Z95.2 H/O MITRAL VALVE REPLACEMENT: ICD-10-CM

## 2021-10-14 DIAGNOSIS — Z98.890 STATUS POST VENTRICULAR SEPTAL MYECTOMY: ICD-10-CM

## 2021-10-14 DIAGNOSIS — J45.20 MILD INTERMITTENT ASTHMA WITHOUT COMPLICATION: Primary | ICD-10-CM

## 2021-10-14 PROCEDURE — 4040F PNEUMOC VAC/ADMIN/RCVD: CPT | Performed by: INTERNAL MEDICINE

## 2021-10-14 PROCEDURE — G8427 DOCREV CUR MEDS BY ELIG CLIN: HCPCS | Performed by: INTERNAL MEDICINE

## 2021-10-14 PROCEDURE — 1123F ACP DISCUSS/DSCN MKR DOCD: CPT | Performed by: INTERNAL MEDICINE

## 2021-10-14 PROCEDURE — G8417 CALC BMI ABV UP PARAM F/U: HCPCS | Performed by: INTERNAL MEDICINE

## 2021-10-14 PROCEDURE — 1036F TOBACCO NON-USER: CPT | Performed by: INTERNAL MEDICINE

## 2021-10-14 PROCEDURE — G8484 FLU IMMUNIZE NO ADMIN: HCPCS | Performed by: INTERNAL MEDICINE

## 2021-10-14 PROCEDURE — 1090F PRES/ABSN URINE INCON ASSESS: CPT | Performed by: INTERNAL MEDICINE

## 2021-10-14 PROCEDURE — G8399 PT W/DXA RESULTS DOCUMENT: HCPCS | Performed by: INTERNAL MEDICINE

## 2021-10-14 PROCEDURE — 3017F COLORECTAL CA SCREEN DOC REV: CPT | Performed by: INTERNAL MEDICINE

## 2021-10-14 PROCEDURE — 99213 OFFICE O/P EST LOW 20 MIN: CPT | Performed by: INTERNAL MEDICINE

## 2021-10-14 RX ORDER — LISINOPRIL 2.5 MG/1
TABLET ORAL
Qty: 90 TABLET | Refills: 3 | Status: SHIPPED | OUTPATIENT
Start: 2021-10-14 | End: 2022-10-14 | Stop reason: ALTCHOICE

## 2021-10-14 NOTE — PROGRESS NOTES
Chris Sims (:  1955) is a 78 y.o. female,Established patient, here for evaluation of the following chief complaint(s):  No chief complaint on file. ASSESSMENT/PLAN:  1. Mild intermittent asthma without complication  2. Status post ventricular septal myectomy  3. H/O mitral valve replacement  Asthma remains well controlled, without use of daily medication. She has not required as needed bronchodilator. Limitation to activity is related to musculoskeletal complaints. Continue as needed use of bronchodilator therapy for mild intermittent asthma. Encouraged muscle strengthening exercise to maintain better functional status and enable cardiovascular fitness exercise. Return in about 1 year (around 10/14/2022). Subjective   SUBJECTIVE/OBJECTIVE:  HPI   Mrs. Dorita Habermann returns for follow-up after about 4 months off daily medication for asthma. She reports no episodes of chest tightness, wheezing, cough, dyspnea. No nocturnal chest symptoms. She has not required as needed use of bronchodilator. She has not returned to her previous habit of regular walking to maintain cardiovascular fitness. She complains that when she was on education in [place], she somehow developed a back injury. With time this has improved and no longer is giving her pain. She is not engaged in any type of exercise or therapy for her back. She has had her flu vaccine and has had a booster vaccine for Covid. Objective   Physical Exam  Vitals reviewed. Constitutional:       Appearance: She is well-developed and normal weight. HENT:      Head: Normocephalic and atraumatic. Mouth/Throat:      Mouth: Mucous membranes are moist.      Pharynx: Oropharynx is clear. No oropharyngeal exudate or posterior oropharyngeal erythema. Eyes:      General: No scleral icterus. Right eye: No discharge. Left eye: No discharge. Neck:      Thyroid: No thyromegaly.       Trachea: No tracheal deviation. Cardiovascular:      Rate and Rhythm: Normal rate and regular rhythm. Pulses:           Radial pulses are 2+ on the right side and 2+ on the left side. Heart sounds: S1 normal and S2 normal. Murmur heard. Crescendo systolic murmur is present with a grade of 2/6. Pulmonary:      Effort: Pulmonary effort is normal. No respiratory distress. Breath sounds: Normal breath sounds. No stridor. No wheezing, rhonchi or rales. Chest:      Chest wall: No tenderness. Musculoskeletal:      Right lower leg: No edema. Left lower leg: No edema. Lymphadenopathy:      Cervical: No cervical adenopathy. Skin:     General: Skin is warm and dry. Neurological:      Mental Status: She is alert and oriented to person, place, and time. Psychiatric:         Mood and Affect: Mood normal.         Behavior: Behavior normal.         Thought Content: Thought content normal.         Judgment: Judgment normal.            On this date 10/14/2021 I have spent 20 minutes reviewing previous notes, test results and face to face with the patient discussing the diagnosis and importance of compliance with the treatment plan as well as documenting on the day of the visit. An electronic signature was used to authenticate this note.     --Martha Metz MD

## 2021-10-14 NOTE — TELEPHONE ENCOUNTER
Requested Prescriptions     Pending Prescriptions Disp Refills    lisinopril (PRINIVIL;ZESTRIL) 2.5 MG tablet [Pharmacy Med Name: LISINOPRIL 2.5 MG TABLET] 90 tablet 3     Sig: TAKE ONE TABLET BY MOUTH DAILY WITH LUNCH              Last Office Visit: 7/6/2021     Next Office Visit: 4/6/2022
yes

## 2021-10-15 DIAGNOSIS — E21.3 HYPERPARATHYROIDISM (HCC): ICD-10-CM

## 2021-10-15 DIAGNOSIS — E78.2 MIXED HYPERLIPIDEMIA: ICD-10-CM

## 2021-10-15 DIAGNOSIS — E03.9 HYPOTHYROIDISM, UNSPECIFIED TYPE: ICD-10-CM

## 2021-10-15 LAB
A/G RATIO: 1.6 (ref 1.1–2.2)
ALBUMIN SERPL-MCNC: 4.3 G/DL (ref 3.4–5)
ALP BLD-CCNC: 80 U/L (ref 40–129)
ALT SERPL-CCNC: 37 U/L (ref 10–40)
ANION GAP SERPL CALCULATED.3IONS-SCNC: 11 MMOL/L (ref 3–16)
AST SERPL-CCNC: 29 U/L (ref 15–37)
BILIRUB SERPL-MCNC: 0.3 MG/DL (ref 0–1)
BUN BLDV-MCNC: 14 MG/DL (ref 7–20)
CALCIUM SERPL-MCNC: 10.1 MG/DL (ref 8.3–10.6)
CHLORIDE BLD-SCNC: 104 MMOL/L (ref 99–110)
CHOLESTEROL, TOTAL: 137 MG/DL (ref 0–199)
CO2: 25 MMOL/L (ref 21–32)
CREAT SERPL-MCNC: 0.7 MG/DL (ref 0.6–1.2)
GFR AFRICAN AMERICAN: >60
GFR NON-AFRICAN AMERICAN: >60
GLOBULIN: 2.7 G/DL
GLUCOSE BLD-MCNC: 93 MG/DL (ref 70–99)
HDLC SERPL-MCNC: 75 MG/DL (ref 40–60)
LDL CHOLESTEROL CALCULATED: 55 MG/DL
PARATHYROID HORMONE INTACT: 83.4 PG/ML (ref 14–72)
POTASSIUM SERPL-SCNC: 4.6 MMOL/L (ref 3.5–5.1)
SODIUM BLD-SCNC: 140 MMOL/L (ref 136–145)
T4 FREE: 1.3 NG/DL (ref 0.9–1.8)
TOTAL PROTEIN: 7 G/DL (ref 6.4–8.2)
TRIGL SERPL-MCNC: 33 MG/DL (ref 0–150)
TSH SERPL DL<=0.05 MIU/L-ACNC: 0.83 UIU/ML (ref 0.27–4.2)
VITAMIN D 25-HYDROXY: 35.3 NG/ML
VLDLC SERPL CALC-MCNC: 7 MG/DL

## 2021-10-20 RX ORDER — ATORVASTATIN CALCIUM 80 MG/1
TABLET, FILM COATED ORAL
Qty: 90 TABLET | Refills: 3 | Status: SHIPPED | OUTPATIENT
Start: 2021-10-20 | End: 2022-10-25

## 2021-10-20 NOTE — TELEPHONE ENCOUNTER
Requested Prescriptions     Pending Prescriptions Disp Refills    atorvastatin (LIPITOR) 80 MG tablet [Pharmacy Med Name: ATORVASTATIN 80 MG TABLET] 90 tablet 3     Sig: TAKE ONE TABLET BY MOUTH EVERY EVENING                Last Office Visit: 7/6/2021     Next Office Visit: 4/6/2022

## 2021-10-28 ENCOUNTER — OFFICE VISIT (OUTPATIENT)
Dept: INTERNAL MEDICINE CLINIC | Age: 66
End: 2021-10-28
Payer: MEDICARE

## 2021-10-28 VITALS
DIASTOLIC BLOOD PRESSURE: 70 MMHG | WEIGHT: 164 LBS | BODY MASS INDEX: 27.32 KG/M2 | HEIGHT: 65 IN | SYSTOLIC BLOOD PRESSURE: 128 MMHG

## 2021-10-28 DIAGNOSIS — N30.00 ACUTE CYSTITIS WITHOUT HEMATURIA: ICD-10-CM

## 2021-10-28 DIAGNOSIS — K21.9 GASTROESOPHAGEAL REFLUX DISEASE, UNSPECIFIED WHETHER ESOPHAGITIS PRESENT: ICD-10-CM

## 2021-10-28 DIAGNOSIS — F41.9 ANXIETY: ICD-10-CM

## 2021-10-28 DIAGNOSIS — K52.839 MICROSCOPIC COLITIS, UNSPECIFIED MICROSCOPIC COLITIS TYPE: ICD-10-CM

## 2021-10-28 DIAGNOSIS — Z98.890 STATUS POST VENTRICULAR SEPTAL MYECTOMY: ICD-10-CM

## 2021-10-28 DIAGNOSIS — I10 PRIMARY HYPERTENSION: ICD-10-CM

## 2021-10-28 DIAGNOSIS — R11.0 CHRONIC NAUSEA: ICD-10-CM

## 2021-10-28 DIAGNOSIS — E21.3 HYPERPARATHYROIDISM (HCC): ICD-10-CM

## 2021-10-28 DIAGNOSIS — E78.2 MIXED HYPERLIPIDEMIA: ICD-10-CM

## 2021-10-28 DIAGNOSIS — E03.9 HYPOTHYROIDISM, UNSPECIFIED TYPE: Chronic | ICD-10-CM

## 2021-10-28 PROBLEM — I42.2 HYPERTROPHIC CARDIOMYOPATHY (HCC): Status: RESOLVED | Noted: 2020-01-24 | Resolved: 2021-10-28

## 2021-10-28 PROCEDURE — 99214 OFFICE O/P EST MOD 30 MIN: CPT | Performed by: INTERNAL MEDICINE

## 2021-10-28 PROCEDURE — G8484 FLU IMMUNIZE NO ADMIN: HCPCS | Performed by: INTERNAL MEDICINE

## 2021-10-28 PROCEDURE — 1123F ACP DISCUSS/DSCN MKR DOCD: CPT | Performed by: INTERNAL MEDICINE

## 2021-10-28 PROCEDURE — 4040F PNEUMOC VAC/ADMIN/RCVD: CPT | Performed by: INTERNAL MEDICINE

## 2021-10-28 PROCEDURE — G8399 PT W/DXA RESULTS DOCUMENT: HCPCS | Performed by: INTERNAL MEDICINE

## 2021-10-28 PROCEDURE — 1090F PRES/ABSN URINE INCON ASSESS: CPT | Performed by: INTERNAL MEDICINE

## 2021-10-28 PROCEDURE — G8427 DOCREV CUR MEDS BY ELIG CLIN: HCPCS | Performed by: INTERNAL MEDICINE

## 2021-10-28 PROCEDURE — 3017F COLORECTAL CA SCREEN DOC REV: CPT | Performed by: INTERNAL MEDICINE

## 2021-10-28 PROCEDURE — 1036F TOBACCO NON-USER: CPT | Performed by: INTERNAL MEDICINE

## 2021-10-28 PROCEDURE — G8417 CALC BMI ABV UP PARAM F/U: HCPCS | Performed by: INTERNAL MEDICINE

## 2021-10-28 RX ORDER — OMEPRAZOLE 40 MG/1
40 CAPSULE, DELAYED RELEASE ORAL DAILY
Qty: 90 CAPSULE | Refills: 3 | Status: SHIPPED | OUTPATIENT
Start: 2021-10-28 | End: 2022-08-09 | Stop reason: SDUPTHER

## 2021-10-28 RX ORDER — ALPRAZOLAM 0.5 MG/1
0.5 TABLET ORAL DAILY PRN
Qty: 30 TABLET | Refills: 2 | Status: SHIPPED | OUTPATIENT
Start: 2021-10-28 | End: 2022-10-28

## 2021-10-28 RX ORDER — DIAZEPAM 5 MG/1
TABLET ORAL
Qty: 45 TABLET | Refills: 1 | Status: SHIPPED | OUTPATIENT
Start: 2021-10-28 | End: 2021-11-28

## 2021-10-28 RX ORDER — PROCHLORPERAZINE MALEATE 10 MG
TABLET ORAL
Qty: 30 TABLET | Refills: 4 | Status: SHIPPED | OUTPATIENT
Start: 2021-10-28 | End: 2022-03-17

## 2021-10-28 RX ORDER — DICYCLOMINE HCL 20 MG
TABLET ORAL
Qty: 120 TABLET | Refills: 4 | Status: SHIPPED | OUTPATIENT
Start: 2021-10-28 | End: 2022-07-12

## 2021-10-28 ASSESSMENT — ENCOUNTER SYMPTOMS
SHORTNESS OF BREATH: 0
BLOOD IN STOOL: 0

## 2021-10-28 NOTE — ASSESSMENT & PLAN NOTE
she had another episode of UTI-this time with some hematuria. Successfully treated with antibiotics. I have asked her to follow-up with her urologist for the past for reevaluation.   She did have stones in the past.

## 2021-10-28 NOTE — PROGRESS NOTES
SUBJECTIVE:  Patient ID: Bishop Cesar is an 78 y.o. female. HPI: Patient here today for the f/u of chronic problems-- see Problem List and associated comments. New issues or complaints include (also see Assessment for more details): Here for checkup and refill medications. Status post Covid vaccination. She also got her flu shot. Overall she is doing very well. She is having more acid reflux. Review of Systems   Constitutional: Negative for fatigue and fever. Respiratory: Negative for shortness of breath. Cardiovascular: Negative for chest pain. Gastrointestinal: Negative for blood in stool. GERD  Occasional IBS exacerbations   Genitourinary: Negative for difficulty urinating and dysuria. Psychiatric/Behavioral: Negative for dysphoric mood. The patient is nervous/anxious. OBJECTIVE:    /70 (Site: Right Upper Arm)   Ht 5' 5\" (1.651 m)   Wt 164 lb (74.4 kg)   BMI 27.29 kg/m²      Physical Exam  Constitutional:       Appearance: She is not ill-appearing. Eyes:      Extraocular Movements: Extraocular movements intact. Neck:      Vascular: No carotid bruit. Cardiovascular:      Rate and Rhythm: Normal rate and regular rhythm. Heart sounds: Murmur heard. No gallop. Pulmonary:      Effort: No respiratory distress. Breath sounds: Normal breath sounds. Abdominal:      General: Bowel sounds are normal.   Musculoskeletal:      Right lower leg: No edema. Left lower leg: No edema. Lymphadenopathy:      Head:      Right side of head: No submandibular adenopathy. Left side of head: No submandibular adenopathy. Upper Body:      Right upper body: No supraclavicular adenopathy. Left upper body: No supraclavicular adenopathy. Skin:     Coloration: Skin is not pale. Neurological:      General: No focal deficit present. Mental Status: She is alert and oriented to person, place, and time. Cranial Nerves: Cranial nerves are intact. Motor: Motor function is intact. No tremor. Coordination: Coordination is intact. Psychiatric:         Mood and Affect: Mood normal.         Thought Content: Thought content normal.         Judgment: Judgment normal.         ASSESSMENT:       Encounter Diagnoses   Name Primary?  Anxiety     Hypothyroidism, unspecified type     Primary hypertension     Microscopic colitis, unspecified microscopic colitis type     Gastroesophageal reflux disease, unspecified whether esophagitis present     Chronic nausea     Acute cystitis without hematuria     Mixed hyperlipidemia     Status post ventricular septal myectomy     Hyperparathyroidism (HCC)        Hypothyroidism    TFT okay 125 mcg    Hypertension    BP okay    Microscopic colitis    well-controlled-occasional IBS flareup. Valium works well for her IBS flares. GERD (gastroesophageal reflux disease)    Pepcid no longer working well. Resume her omeprazole 40 mg. Chronic nausea    refill promethazine    Acute cystitis without hematuria    she had another episode of UTI-this time with some hematuria. Successfully treated with antibiotics. I have asked her to follow-up with her urologist for the past for reevaluation. She did have stones in the past.    Mixed hyperlipidemia    lipids okay-Lipitor    Status post ventricular septal myectomy    clinically doing well since surgery    Hyperparathyroidism ([address]    PTH fluctuates. Slightly elevated about 83 again. Calcium okay. Continue to monitor. PLAN:See ASSESSMENT for evaluation & PLAN     No orders of the defined types were placed in this encounter.    , PMH, SH and FH reviewed and noted. Recent and past labs, tests and consultsalso reviewed. Recent or new meds also reviewed.

## 2021-11-08 ENCOUNTER — HOSPITAL ENCOUNTER (OUTPATIENT)
Dept: CT IMAGING | Age: 66
Discharge: HOME | End: 2021-11-08
Payer: MEDICARE

## 2021-11-08 DIAGNOSIS — R31.0 GROSS HEMATURIA: ICD-10-CM

## 2021-11-08 PROCEDURE — 74178 CT ABD&PLV WO CNTR FLWD CNTR: CPT

## 2021-11-08 PROCEDURE — 6360000004 HC RX CONTRAST MEDICATION: Performed by: UROLOGY

## 2021-11-08 RX ADMIN — IOPAMIDOL 75 ML: 755 INJECTION, SOLUTION INTRAVENOUS at 08:44

## 2021-11-19 ENCOUNTER — HOSPITAL ENCOUNTER (OUTPATIENT)
Dept: WOMENS IMAGING | Age: 66
Discharge: HOME | End: 2021-11-19
Payer: MEDICARE

## 2021-11-19 ENCOUNTER — TELEPHONE (OUTPATIENT)
Dept: PULMONOLOGY | Age: 66
End: 2021-11-19

## 2021-11-19 DIAGNOSIS — R91.1 PULMONARY NODULE 1 CM OR GREATER IN DIAMETER: ICD-10-CM

## 2021-11-19 DIAGNOSIS — Z12.31 VISIT FOR SCREENING MAMMOGRAM: ICD-10-CM

## 2021-11-19 PROCEDURE — 77067 SCR MAMMO BI INCL CAD: CPT

## 2021-11-19 NOTE — TELEPHONE ENCOUNTER
At request of Dr. Gem Amato, I reviewed the abdominal CT scan dated 11/8/2021, and compared this to the CT chest from 2019 and abdominal CT from 2015. There is a nodular opacity in the lingula, measuring 1.6 cm, sitting on the fissure. In 2019, there were a few very small nodular and linear opacities in that same area, and in 2015 it was clear. Given the progressive nature, the nodular appearance, I have concerns for neoplasm. She needs a CT chest with contrast to evaluate this more carefully, and look for other lesions. I discussed this with . Portia Rodriguez. She agrees to proceed with chest CT, and I will follow up with her in the office after that.

## 2021-11-22 ENCOUNTER — TELEPHONE (OUTPATIENT)
Dept: PULMONOLOGY | Age: 66
End: 2021-11-22

## 2021-11-22 NOTE — TELEPHONE ENCOUNTER
Last CT ordered was without contrast in 12/2019.  Central scheduling will need a creatine order for upcoming CT with contrast. Thank you

## 2021-11-22 NOTE — TELEPHONE ENCOUNTER
She states that you were going to order blood work along along with CT and that you wanted blue work done 1st.   CT is schedule for Wednesday and there are no orders for labs. I did explain that it would not matter which one was done first.      If you want labs drawn please place order and I will call her to let her know.

## 2021-11-23 ENCOUNTER — TELEPHONE (OUTPATIENT)
Dept: PULMONOLOGY | Age: 66
End: 2021-11-23

## 2021-11-23 DIAGNOSIS — R91.8 MASS OF LINGULA OF LUNG: Primary | ICD-10-CM

## 2021-11-24 ENCOUNTER — TELEPHONE (OUTPATIENT)
Dept: PULMONOLOGY | Age: 66
End: 2021-11-24

## 2021-11-24 ENCOUNTER — HOSPITAL ENCOUNTER (OUTPATIENT)
Dept: CT IMAGING | Age: 66
Discharge: HOME | End: 2021-11-24
Payer: MEDICARE

## 2021-11-24 DIAGNOSIS — R91.1 PULMONARY NODULE 1 CM OR GREATER IN DIAMETER: ICD-10-CM

## 2021-11-24 PROCEDURE — 6360000004 HC RX CONTRAST MEDICATION: Performed by: INTERNAL MEDICINE

## 2021-11-24 PROCEDURE — 71260 CT THORAX DX C+: CPT

## 2021-11-24 RX ADMIN — IOPAMIDOL 75 ML: 755 INJECTION, SOLUTION INTRAVENOUS at 07:23

## 2021-11-24 NOTE — TELEPHONE ENCOUNTER
I reviewed the chest CT scan with  Zulemaviviane Kehinde. The nodular appearing opacity in the lingula is more consistent with atelectasis, associated with some volume loss and enhancement. No further evaluation or follow-up is required for this opacity.

## 2021-12-30 ENCOUNTER — TELEPHONE (OUTPATIENT)
Dept: INTERNAL MEDICINE CLINIC | Age: 66
End: 2021-12-30

## 2021-12-30 NOTE — TELEPHONE ENCOUNTER
----- Message from Eliasadamsamy Briones sent at 12/30/2021  8:46 AM EST -----  Subject: Appointment Request    Reason for Call: Urgent (Patient Request) ED Follow Up Visit    QUESTIONS  Type of Appointment? Established Patient  Reason for appointment request? Available appointments did not meet   patient need  Additional Information for Provider? PT called in for an ED follow up and   the soonest available apt is too far out. PT need apt as soon as possible. Please follow up with the PT for medical booking needs. ---------------------------------------------------------------------------  --------------  Warden Luz GARZA  What is the best way for the office to contact you? OK to leave message on   voicemail  Preferred Call Back Phone Number? [phone]  ---------------------------------------------------------------------------  --------------  SCRIPT ANSWERS  Relationship to Patient? Self  (Patient requests to see provider urgently. )? Yes  Do you have any questions for your primary care provider that need to be   answered prior to your appointment? No  Have you been diagnosed with, awaiting test results for, or told that you   are suspected of having COVID-19 (Coronavirus)? (If patient has tested   negative or was tested as a requirement for work, school, or travel and   not based on symptoms, answer no)? No  Within the past two weeks have you developed any of the following symptoms   (answer no if symptoms have been present longer than 2 weeks or began   more than 2 weeks ago)? Fever or Chills, Cough, Shortness of breath or   difficulty breathing, Loss of taste or smell, Sore throat, Nasal   congestion, Sneezing or runny nose, Fatigue or generalized body aches   (answer no if pain is specific to a body part e.g. back pain), Diarrhea,   Headache? No  Have you had close contact with someone with COVID-19 in the last 14 days? No  (Service Expert  click yes below to proceed with [employer]   Scheduling)? Yes

## 2022-01-03 ENCOUNTER — OFFICE VISIT (OUTPATIENT)
Dept: INTERNAL MEDICINE CLINIC | Age: 67
End: 2022-01-03
Payer: MEDICARE

## 2022-01-03 VITALS
BODY MASS INDEX: 28.19 KG/M2 | WEIGHT: 169.2 LBS | TEMPERATURE: 97.9 F | HEIGHT: 65 IN | DIASTOLIC BLOOD PRESSURE: 80 MMHG | SYSTOLIC BLOOD PRESSURE: 140 MMHG | OXYGEN SATURATION: 98 % | HEART RATE: 71 BPM

## 2022-01-03 DIAGNOSIS — E87.1 HYPONATREMIA: ICD-10-CM

## 2022-01-03 DIAGNOSIS — S01.01XD LACERATION OF SCALP, SUBSEQUENT ENCOUNTER: ICD-10-CM

## 2022-01-03 DIAGNOSIS — T07.XXXA MULTIPLE CONTUSIONS: Primary | ICD-10-CM

## 2022-01-03 DIAGNOSIS — F41.9 ANXIETY: ICD-10-CM

## 2022-01-03 PROCEDURE — 3017F COLORECTAL CA SCREEN DOC REV: CPT | Performed by: INTERNAL MEDICINE

## 2022-01-03 PROCEDURE — G8399 PT W/DXA RESULTS DOCUMENT: HCPCS | Performed by: INTERNAL MEDICINE

## 2022-01-03 PROCEDURE — 1036F TOBACCO NON-USER: CPT | Performed by: INTERNAL MEDICINE

## 2022-01-03 PROCEDURE — G8427 DOCREV CUR MEDS BY ELIG CLIN: HCPCS | Performed by: INTERNAL MEDICINE

## 2022-01-03 PROCEDURE — 4040F PNEUMOC VAC/ADMIN/RCVD: CPT | Performed by: INTERNAL MEDICINE

## 2022-01-03 PROCEDURE — G8484 FLU IMMUNIZE NO ADMIN: HCPCS | Performed by: INTERNAL MEDICINE

## 2022-01-03 PROCEDURE — G8417 CALC BMI ABV UP PARAM F/U: HCPCS | Performed by: INTERNAL MEDICINE

## 2022-01-03 PROCEDURE — 1123F ACP DISCUSS/DSCN MKR DOCD: CPT | Performed by: INTERNAL MEDICINE

## 2022-01-03 PROCEDURE — 1090F PRES/ABSN URINE INCON ASSESS: CPT | Performed by: INTERNAL MEDICINE

## 2022-01-03 PROCEDURE — 1111F DSCHRG MED/CURRENT MED MERGE: CPT | Performed by: INTERNAL MEDICINE

## 2022-01-03 PROCEDURE — 99214 OFFICE O/P EST MOD 30 MIN: CPT | Performed by: INTERNAL MEDICINE

## 2022-01-03 RX ORDER — DULOXETIN HYDROCHLORIDE 30 MG/1
CAPSULE, DELAYED RELEASE ORAL
Qty: 90 CAPSULE | Refills: 5 | Status: SHIPPED
Start: 2022-01-03 | End: 2022-01-31

## 2022-01-03 RX ORDER — AMITRIPTYLINE HYDROCHLORIDE 10 MG/1
10 TABLET, FILM COATED ORAL NIGHTLY
Qty: 30 TABLET | Refills: 0 | Status: SHIPPED | OUTPATIENT
Start: 2022-01-03 | End: 2022-01-31 | Stop reason: ALTCHOICE

## 2022-01-03 RX ORDER — ELETRIPTAN HYDROBROMIDE 40 MG/1
10 TABLET, FILM COATED ORAL
COMMUNITY
End: 2022-05-03 | Stop reason: ALTCHOICE

## 2022-01-03 RX ORDER — HYDROCODONE BITARTRATE AND ACETAMINOPHEN 5; 325 MG/1; MG/1
1 TABLET ORAL EVERY 4 HOURS PRN
Qty: 42 TABLET | Refills: 0 | Status: SHIPPED | OUTPATIENT
Start: 2022-01-03 | End: 2022-01-10

## 2022-01-03 ASSESSMENT — PATIENT HEALTH QUESTIONNAIRE - PHQ9
SUM OF ALL RESPONSES TO PHQ9 QUESTIONS 1 & 2: 0
1. LITTLE INTEREST OR PLEASURE IN DOING THINGS: 0
2. FEELING DOWN, DEPRESSED OR HOPELESS: 0
SUM OF ALL RESPONSES TO PHQ QUESTIONS 1-9: 0

## 2022-01-03 NOTE — PROGRESS NOTES
Post-Discharge Transitional Care Management Services or Hospital Follow Up      Margo David   YOB: 1955    Date of Office Visit:  1/3/2022  Date of Hospital Admission: 6/29/20  Date of Hospital Discharge: 7/3/20  Readmission Risk Score(high >=14%. Medium >=10%):No data recorded    Care management risk score Rising risk (score 2-5) and Complex Care (Scores >=6): 5     Non face to face  following discharge, date last encounter closed (first attempt may have been earlier): *No documented post hospital discharge outreach found in the last 14 days *No documented post hospital discharge outreach found in the last 14 days     Pasha Wilson was seen in the emergency room at [facility] after falling on her face. Call initiated 2 business days of discharge: *No response recorded in the last 14 days     Patient Active Problem List   Diagnosis    Hypertension    Microscopic colitis    GERD (gastroesophageal reflux disease)    Migraine    Osteoporosis    Anxiety    Neck arthritis C6/C7    Nephrolithiasis    Hypothyroidism    Endometrial hyperplasia    Chronic nausea    Mild intermittent asthma without complication    Acute cystitis without hematuria    Fatty liver    Multiple thyroid nodules    Severe mitral regurgitation    Mixed hyperlipidemia    Status post ventricular septal myectomy    H/O mitral valve replacement    Hyperparathyroidism (HCC)    Pulmonary nodule 1 cm or greater in diameter       Allergies   Allergen Reactions    Tolectin [Tolmetin] Swelling     Face & Lips       Medications listed as ordered at the time of discharge from hospital     Medication List          Accurate as of January 3, 2022  2:11 PM. If you have any questions, ask your nurse or doctor. CONTINUE taking these medications    ALPRAZolam 0.5 MG tablet  Commonly known as: XANAX  Take 1 tablet by mouth daily as needed for Anxiety.      aspirin 81 MG EC tablet     atorvastatin 80 MG tablet  Commonly known as: LIPITOR  TAKE ONE TABLET BY MOUTH EVERY EVENING     CALTRATE 600+D PO     CENTRUM SILVER PO     Co Q-10 200 MG Caps     dicyclomine 20 MG tablet  Commonly known as: BENTYL  Take one tablet by mouth four times a day as needed     docusate sodium 100 MG capsule  Commonly known as: COLACE     DULoxetine 30 MG extended release capsule  Commonly known as: CYMBALTA  Take three capsules by mouth daily     eletriptan 40 MG tablet  Commonly known as: RELPAX     famotidine 40 MG tablet  Commonly known as: PEPCID  Take 1 tablet by mouth daily     FIBER PO     Fish Oil 1200 MG Caps     FLAXSEED OIL PO     levothyroxine 125 MCG tablet  Commonly known as: SYNTHROID  TAKE ONE TABLET BY MOUTH DAILY     lisinopril 2.5 MG tablet  Commonly known as: PRINIVIL;ZESTRIL  TAKE ONE TABLET BY MOUTH DAILY WITH LUNCH     loratadine 10 MG tablet  Commonly known as: CLARITIN     metoprolol succinate 100 MG extended release tablet  Commonly known as: TOPROL XL  Take 1 tablet by mouth daily     naratriptan 2.5 MG tablet  Commonly known as: AMERGE     omeprazole 40 MG delayed release capsule  Commonly known as: PRILOSEC  Take 1 capsule by mouth daily     prochlorperazine 10 MG tablet  Commonly known as: COMPAZINE  Take one tablet by mouth every 6 hours as needed     Prolia 60 MG/ML Sosy SC injection  Generic drug: denosumab  Inject 1 mL into the skin once for 1 dose     Vitamin C 500 MG Caps     vitamin D3 10 MCG (400 UNIT) Tabs tablet  Commonly known as: CHOLECALCIFEROL              Medications marked \"taking\" at this time  Outpatient Medications Marked as Taking for the 1/3/22 encounter (Office Visit) with Janelle Morales MD   Medication Sig Dispense Refill    eletriptan (RELPAX) 40 MG tablet Take 10 mg by mouth once as needed      dicyclomine (BENTYL) 20 MG tablet Take one tablet by mouth four times a day as needed 120 tablet 4    ALPRAZolam (XANAX) 0.5 MG tablet Take 1 tablet by mouth daily as needed for Anxiety.  27 tablet 2    prochlorperazine (COMPAZINE) 10 MG tablet Take one tablet by mouth every 6 hours as needed 30 tablet 4    omeprazole (PRILOSEC) 40 MG delayed release capsule Take 1 capsule by mouth daily 90 capsule 3    atorvastatin (LIPITOR) 80 MG tablet TAKE ONE TABLET BY MOUTH EVERY EVENING 90 tablet 3    lisinopril (PRINIVIL;ZESTRIL) 2.5 MG tablet TAKE ONE TABLET BY MOUTH DAILY WITH LUNCH 90 tablet 3    loratadine (CLARITIN) 10 MG tablet Take 10 mg by mouth daily      Coenzyme Q10 (CO Q-10) 200 MG CAPS Take 200 mg by mouth      Ascorbic Acid (VITAMIN C) 500 MG CAPS Take 500 mg by mouth      levothyroxine (SYNTHROID) 125 MCG tablet TAKE ONE TABLET BY MOUTH DAILY 60 tablet 3    DULoxetine (CYMBALTA) 30 MG extended release capsule Take three capsules by mouth daily 90 capsule 5    metoprolol succinate (TOPROL XL) 100 MG extended release tablet Take 1 tablet by mouth daily 60 tablet 5    aspirin 81 MG EC tablet Take 81 mg by mouth daily      docusate sodium (COLACE) 100 MG capsule Take 100 mg by mouth 2 times daily as needed for Constipation      famotidine (PEPCID) 40 MG tablet Take 1 tablet by mouth daily 90 tablet 3    Multiple Vitamins-Minerals (CENTRUM SILVER PO) Take 1 tablet by mouth daily      Omega-3 Fatty Acids (FISH OIL) 1200 MG CAPS Take 1 capsule by mouth 2 times daily      Flaxseed, Linseed, (FLAXSEED OIL PO) Take 1,300 mg by mouth daily      FIBER PO Take 1 capsule by mouth daily           Medications patient taking as of now reconciled against medications ordered at time of hospital discharge: Yes    Chief Complaint   Patient presents with    Follow-Up from Hospital     fell in driveway flat on face, 5 stitches, knee and throat injury    Other     sodium level low     Fall     Fell 2 weeks ago in kitchen causing pain and swelling in right side of face        HPI    Inpatient course: Discharge summary reviewed- see chart.     Interval history/Current status: She was seen in the ER after a fall with tripping, scalp laceration and multiple contussions. She did not break any bones. She did not have syncope and was alert immediately after falling. She admits drinking a lot of water possibly 5 liters per day. Her sodium was low in the ER. She has been on Duloxetine for years. She had a previous episode of hyponatremia a year ago. Review of Systems   Endocrine: Positive for heat intolerance. Psychiatric/Behavioral:        Excess water drinking       Vitals:    01/03/22 1405   BP: (!) 140/80   Pulse: 71   Temp: 97.9 °F (36.6 °C)   SpO2: 98%   Weight: 169 lb 3.2 oz (76.7 kg)   Height: 5' 5\" (1.651 m)     Body mass index is 28.16 kg/m². Wt Readings from Last 3 Encounters:   01/03/22 169 lb 3.2 oz (76.7 kg)   10/28/21 164 lb (74.4 kg)   10/14/21 163 lb (73.9 kg)     BP Readings from Last 3 Encounters:   01/03/22 (!) 140/80   10/28/21 128/70   10/14/21 118/78       Physical Exam  Constitutional:       Appearance: Normal appearance. HENT:      Head:      Comments: Facial bruises and left forehead laceration   Eyes:      Extraocular Movements: Extraocular movements intact. Cardiovascular:      Rate and Rhythm: Normal rate and regular rhythm. Pulmonary:      Effort: Pulmonary effort is normal.      Breath sounds: Normal breath sounds. No wheezing or rales. Musculoskeletal:      Cervical back: Normal range of motion and neck supple. Right lower leg: No edema. Left lower leg: No edema. Skin:     General: Skin is warm and dry. Neurological:      Mental Status: She is alert and oriented to person, place, and time. Assessment/Plan:    1. Multiple contusions  Fall likely related to hyponatremia. Multiple contusions to head and chest.  Numerous muscle aches and pains. Scalp laceration. She was not given any pain medication.  - HYDROcodone-acetaminophen (NORCO) 5-325 MG per tablet; Take 1 tablet by mouth every 4 hours as needed for Pain for up to 7 days.  Intended supply: 7

## 2022-01-06 DIAGNOSIS — E87.1 HYPONATREMIA: ICD-10-CM

## 2022-01-06 LAB
ANION GAP SERPL CALCULATED.3IONS-SCNC: 13 MMOL/L (ref 3–16)
BUN BLDV-MCNC: 9 MG/DL (ref 7–20)
CALCIUM SERPL-MCNC: 9 MG/DL (ref 8.3–10.6)
CHLORIDE BLD-SCNC: 101 MMOL/L (ref 99–110)
CO2: 25 MMOL/L (ref 21–32)
CREAT SERPL-MCNC: 0.6 MG/DL (ref 0.6–1.2)
GFR AFRICAN AMERICAN: >60
GFR NON-AFRICAN AMERICAN: >60
GLUCOSE BLD-MCNC: 103 MG/DL (ref 70–99)
POTASSIUM SERPL-SCNC: 4.3 MMOL/L (ref 3.5–5.1)
SODIUM BLD-SCNC: 139 MMOL/L (ref 136–145)

## 2022-01-10 ENCOUNTER — TELEPHONE (OUTPATIENT)
Dept: INTERNAL MEDICINE CLINIC | Age: 67
End: 2022-01-10

## 2022-01-10 NOTE — TELEPHONE ENCOUNTER
Patient called, would like to know the results of her blood work done on 01/06/22.     Please call and advise

## 2022-01-11 NOTE — TELEPHONE ENCOUNTER
Her blood test from 1/6/22 showed her sodium level was improved and normal at 139, normal potassium level, kidney function , and sugar good at 103.

## 2022-01-12 ENCOUNTER — TELEPHONE (OUTPATIENT)
Dept: INTERNAL MEDICINE CLINIC | Age: 67
End: 2022-01-12

## 2022-01-12 NOTE — TELEPHONE ENCOUNTER
----- Message from Macey Phan sent at 1/12/2022  8:49 AM EST -----  Subject: Appointment Request    Reason for Call: Routine (Patient Request) No Script    QUESTIONS  Type of Appointment? Established Patient  Reason for appointment request? No appointments available during search  Additional Information for Provider? Pt states Nurse took a look at her   stitches and advised they were falling out. Digna Seaman states the nurse did not   remove any of her stitches and would like for Dr. Salty Sams to take a look. Requesting a call at [phone]  ---------------------------------------------------------------------------  --------------  [address]  What is the best way for the office to contact you? OK to leave message on   voicemail  Preferred Call Back Phone Number? [phone]  ---------------------------------------------------------------------------  --------------  SCRIPT ANSWERS  Relationship to Patient? Self  Specialty Confirmation? Primary Care  (Is the patient requesting to see the provider for a procedure?)? No  (Is the patient requesting to see the provider urgently  today or   tomorrow. )? No  Have you been diagnosed with, awaiting test results for, or told that you   are suspected of having COVID-19 (Coronavirus)? (If patient has tested   negative or was tested as a requirement for work, school, or travel and   not based on symptoms, answer no)? No  Within the past two weeks have you developed any of the following symptoms   (answer no if symptoms have been present longer than 2 weeks or began   more than 2 weeks ago)? Fever or Chills, Cough, Shortness of breath or   difficulty breathing, Loss of taste or smell, Sore throat, Nasal   congestion, Sneezing or runny nose, Fatigue or generalized body aches   (answer no if pain is specific to a body part e.g. back pain), Diarrhea,   Headache? No  Have you had close contact with someone with COVID-19 in the last 14 days?    No  (Service Expert  click yes below to proceed with [employer]   Scheduling)?  Yes

## 2022-01-14 ENCOUNTER — TELEPHONE (OUTPATIENT)
Dept: INTERNAL MEDICINE CLINIC | Age: 67
End: 2022-01-14

## 2022-01-14 ENCOUNTER — OFFICE VISIT (OUTPATIENT)
Dept: INTERNAL MEDICINE CLINIC | Age: 67
End: 2022-01-14
Payer: MEDICARE

## 2022-01-14 VITALS
SYSTOLIC BLOOD PRESSURE: 132 MMHG | BODY MASS INDEX: 27.99 KG/M2 | WEIGHT: 168 LBS | HEIGHT: 65 IN | DIASTOLIC BLOOD PRESSURE: 70 MMHG

## 2022-01-14 DIAGNOSIS — K58.9 IRRITABLE BOWEL SYNDROME, UNSPECIFIED TYPE: ICD-10-CM

## 2022-01-14 DIAGNOSIS — F41.9 ANXIETY: ICD-10-CM

## 2022-01-14 DIAGNOSIS — Z98.890 STATUS POST VENTRICULAR SEPTAL MYECTOMY: ICD-10-CM

## 2022-01-14 DIAGNOSIS — R55 SYNCOPE, UNSPECIFIED SYNCOPE TYPE: ICD-10-CM

## 2022-01-14 DIAGNOSIS — Z95.2 H/O MITRAL VALVE REPLACEMENT: Primary | ICD-10-CM

## 2022-01-14 DIAGNOSIS — Z48.02 VISIT FOR SUTURE REMOVAL: ICD-10-CM

## 2022-01-14 PROCEDURE — G8427 DOCREV CUR MEDS BY ELIG CLIN: HCPCS | Performed by: INTERNAL MEDICINE

## 2022-01-14 PROCEDURE — 1123F ACP DISCUSS/DSCN MKR DOCD: CPT | Performed by: INTERNAL MEDICINE

## 2022-01-14 PROCEDURE — 4040F PNEUMOC VAC/ADMIN/RCVD: CPT | Performed by: INTERNAL MEDICINE

## 2022-01-14 PROCEDURE — G8484 FLU IMMUNIZE NO ADMIN: HCPCS | Performed by: INTERNAL MEDICINE

## 2022-01-14 PROCEDURE — 99214 OFFICE O/P EST MOD 30 MIN: CPT | Performed by: INTERNAL MEDICINE

## 2022-01-14 PROCEDURE — 1090F PRES/ABSN URINE INCON ASSESS: CPT | Performed by: INTERNAL MEDICINE

## 2022-01-14 PROCEDURE — 1036F TOBACCO NON-USER: CPT | Performed by: INTERNAL MEDICINE

## 2022-01-14 PROCEDURE — G8399 PT W/DXA RESULTS DOCUMENT: HCPCS | Performed by: INTERNAL MEDICINE

## 2022-01-14 PROCEDURE — 3017F COLORECTAL CA SCREEN DOC REV: CPT | Performed by: INTERNAL MEDICINE

## 2022-01-14 PROCEDURE — G8417 CALC BMI ABV UP PARAM F/U: HCPCS | Performed by: INTERNAL MEDICINE

## 2022-01-14 RX ORDER — DIAZEPAM 5 MG/1
TABLET ORAL
Qty: 45 TABLET | Refills: 2 | Status: SHIPPED | OUTPATIENT
Start: 2022-01-14 | End: 2022-04-14

## 2022-01-14 NOTE — TELEPHONE ENCOUNTER
----- Message from Pancho Horse sent at 1/14/2022 10:22 AM EST -----  Subject: Message to Provider    QUESTIONS  Information for Provider? Patient calling about the echo she is trying to   schedule they told her it has say hospital performed not clinic performed. we have to change it so she can schedule this. cb pt asap.   ---------------------------------------------------------------------------  --------------  CALL BACK INFO  What is the best way for the office to contact you? OK to leave message on   voicemail  Preferred Call Back Phone Number? [phone]  ---------------------------------------------------------------------------  --------------  SCRIPT ANSWERS  Relationship to Patient?  Self

## 2022-01-31 ENCOUNTER — OFFICE VISIT (OUTPATIENT)
Dept: INTERNAL MEDICINE CLINIC | Age: 67
End: 2022-01-31
Payer: MEDICARE

## 2022-01-31 VITALS
WEIGHT: 163 LBS | HEIGHT: 65 IN | BODY MASS INDEX: 27.16 KG/M2 | DIASTOLIC BLOOD PRESSURE: 72 MMHG | SYSTOLIC BLOOD PRESSURE: 130 MMHG

## 2022-01-31 DIAGNOSIS — G43.909 MIGRAINE WITHOUT STATUS MIGRAINOSUS, NOT INTRACTABLE, UNSPECIFIED MIGRAINE TYPE: Primary | ICD-10-CM

## 2022-01-31 DIAGNOSIS — F41.9 ANXIETY: ICD-10-CM

## 2022-01-31 DIAGNOSIS — E03.9 HYPOTHYROIDISM, UNSPECIFIED TYPE: ICD-10-CM

## 2022-01-31 DIAGNOSIS — M81.0 OSTEOPOROSIS WITHOUT CURRENT PATHOLOGICAL FRACTURE, UNSPECIFIED OSTEOPOROSIS TYPE: ICD-10-CM

## 2022-01-31 DIAGNOSIS — E21.3 HYPERPARATHYROIDISM (HCC): ICD-10-CM

## 2022-01-31 PROCEDURE — 99214 OFFICE O/P EST MOD 30 MIN: CPT | Performed by: INTERNAL MEDICINE

## 2022-01-31 PROCEDURE — G8427 DOCREV CUR MEDS BY ELIG CLIN: HCPCS | Performed by: INTERNAL MEDICINE

## 2022-01-31 PROCEDURE — G8417 CALC BMI ABV UP PARAM F/U: HCPCS | Performed by: INTERNAL MEDICINE

## 2022-01-31 PROCEDURE — 1036F TOBACCO NON-USER: CPT | Performed by: INTERNAL MEDICINE

## 2022-01-31 PROCEDURE — G8399 PT W/DXA RESULTS DOCUMENT: HCPCS | Performed by: INTERNAL MEDICINE

## 2022-01-31 PROCEDURE — 3017F COLORECTAL CA SCREEN DOC REV: CPT | Performed by: INTERNAL MEDICINE

## 2022-01-31 PROCEDURE — 4040F PNEUMOC VAC/ADMIN/RCVD: CPT | Performed by: INTERNAL MEDICINE

## 2022-01-31 PROCEDURE — 1090F PRES/ABSN URINE INCON ASSESS: CPT | Performed by: INTERNAL MEDICINE

## 2022-01-31 PROCEDURE — 1123F ACP DISCUSS/DSCN MKR DOCD: CPT | Performed by: INTERNAL MEDICINE

## 2022-01-31 PROCEDURE — G8484 FLU IMMUNIZE NO ADMIN: HCPCS | Performed by: INTERNAL MEDICINE

## 2022-01-31 RX ORDER — ZOLMITRIPTAN 5 MG/1
5 TABLET, FILM COATED ORAL PRN
COMMUNITY

## 2022-01-31 RX ORDER — DULOXETIN HYDROCHLORIDE 20 MG/1
40 CAPSULE, DELAYED RELEASE ORAL DAILY
Qty: 60 CAPSULE | Refills: 2 | Status: SHIPPED | OUTPATIENT
Start: 2022-01-31 | End: 2022-05-03 | Stop reason: SDUPTHER

## 2022-01-31 RX ORDER — AMITRIPTYLINE HYDROCHLORIDE 25 MG/1
25 TABLET, FILM COATED ORAL NIGHTLY
Qty: 90 TABLET | Refills: 1 | Status: SHIPPED | OUTPATIENT
Start: 2022-01-31 | End: 2022-05-03 | Stop reason: SDUPTHER

## 2022-01-31 NOTE — PROGRESS NOTES
Ryan Carrion (:  1955) is a 78 y.o. female,Established patient, here for evaluation of the following chief complaint(s):  Establish Care         ASSESSMENT/PLAN:  1. Migraine without status migrainosus, not intractable, unspecified migraine type   -No change with the change in medication. Continue taper down off Cymbalta with decreased to 40 mg daily. Will increase amitriptyline to 25 mg.   -Continue follow-up with neurologist  2. Anxiety   -No change in symptoms with adjustment of medication. Cymbalta may have been causing hyponatremia, so far she is doing well with the change in medication. Change in Cymbalta and amitriptyline as above, could consider decreasing Cymbalta further after a month but may do a slower taper. 3. Hyperparathyroidism ([address]  -Stable  -Recheck blood work at next appointment  -     Comprehensive Metabolic Panel; Future  -     PTH, Intact; Future  -     Vitamin D 25 Hydroxy; Future  4. Hypothyroidism, unspecified type  -Stable levothyroxine, recheck at next appointment  -     TSH with Reflex; Future  -     Lipid Panel; Future  -     CBC Auto Differential; Future  5. Osteoporosis without current pathological fracture, unspecified osteoporosis type   -Stable, continue Prolia. Need prescription sent    Return in about 3 months (around 2022) for AWV. Subjective   SUBJECTIVE/OBJECTIVE:  HPI    She is been taking Cymbalta 60 mg for the last month, no change in headaches or mood. She did see her neurologist and had her Botox injections, triptan was changed to zolmitriptan. Rizatriptan, naratriptan, eletriptan all didn't work, cost is an issue with newer meds. She is due for her Prolia injection for osteoporosis soon. Review of Systems       Objective   Physical Exam  Vitals reviewed. Constitutional:       General: She is not in acute distress. Appearance: She is well-developed. HENT:      Head: Normocephalic and atraumatic.    Cardiovascular: Rate and Rhythm: Normal rate and regular rhythm. Heart sounds: Murmur heard. Pulmonary:      Effort: Pulmonary effort is normal. No respiratory distress. Breath sounds: Normal breath sounds. Skin:     General: Skin is warm and dry. Neurological:      Mental Status: She is alert and oriented to person, place, and time. Psychiatric:         Mood and Affect: Mood is anxious. Behavior: Behavior normal.         Thought Content: Thought content normal.         Judgment: Judgment normal.                  An electronic signature was used to authenticate this note.     --Barbie Garcia MD

## 2022-02-22 ENCOUNTER — HOSPITAL ENCOUNTER (OUTPATIENT)
Dept: NON INVASIVE DIAGNOSTICS | Age: 67
Discharge: HOME | End: 2022-02-22
Payer: MEDICARE

## 2022-02-22 DIAGNOSIS — R55 SYNCOPE, UNSPECIFIED SYNCOPE TYPE: ICD-10-CM

## 2022-02-22 DIAGNOSIS — Z95.2 H/O MITRAL VALVE REPLACEMENT: ICD-10-CM

## 2022-02-22 DIAGNOSIS — Z98.890 STATUS POST VENTRICULAR SEPTAL MYECTOMY: ICD-10-CM

## 2022-02-22 LAB
LV EF: 60 %
LVEF MODALITY: NORMAL

## 2022-02-22 PROCEDURE — 93306 TTE W/DOPPLER COMPLETE: CPT

## 2022-02-24 ENCOUNTER — TELEPHONE (OUTPATIENT)
Dept: CARDIOLOGY CLINIC | Age: 67
End: 2022-02-24

## 2022-03-02 ENCOUNTER — TELEPHONE (OUTPATIENT)
Dept: CARDIOLOGY CLINIC | Age: 67
End: 2022-03-02

## 2022-03-02 ENCOUNTER — OFFICE VISIT (OUTPATIENT)
Dept: CARDIOLOGY CLINIC | Age: 67
End: 2022-03-02
Payer: MEDICARE

## 2022-03-02 VITALS
BODY MASS INDEX: 27.72 KG/M2 | WEIGHT: 166.6 LBS | OXYGEN SATURATION: 97 % | HEART RATE: 72 BPM | DIASTOLIC BLOOD PRESSURE: 60 MMHG | SYSTOLIC BLOOD PRESSURE: 110 MMHG

## 2022-03-02 DIAGNOSIS — R55 SYNCOPE AND COLLAPSE: ICD-10-CM

## 2022-03-02 DIAGNOSIS — I34.0 SEVERE MITRAL REGURGITATION: ICD-10-CM

## 2022-03-02 DIAGNOSIS — Z95.2 S/P MVR (MITRAL VALVE REPLACEMENT): ICD-10-CM

## 2022-03-02 DIAGNOSIS — I42.1 HOCM (HYPERTROPHIC OBSTRUCTIVE CARDIOMYOPATHY) (HCC): Primary | ICD-10-CM

## 2022-03-02 DIAGNOSIS — R06.09 DOE (DYSPNEA ON EXERTION): ICD-10-CM

## 2022-03-02 DIAGNOSIS — E78.00 HYPERCHOLESTEROLEMIA: ICD-10-CM

## 2022-03-02 DIAGNOSIS — I10 ESSENTIAL HYPERTENSION: ICD-10-CM

## 2022-03-02 DIAGNOSIS — R07.89 ATYPICAL CHEST PAIN: ICD-10-CM

## 2022-03-02 PROCEDURE — G8417 CALC BMI ABV UP PARAM F/U: HCPCS | Performed by: NURSE PRACTITIONER

## 2022-03-02 PROCEDURE — G8399 PT W/DXA RESULTS DOCUMENT: HCPCS | Performed by: NURSE PRACTITIONER

## 2022-03-02 PROCEDURE — 93246 EXT ECG>7D<15D RECORDING: CPT | Performed by: INTERNAL MEDICINE

## 2022-03-02 PROCEDURE — 99214 OFFICE O/P EST MOD 30 MIN: CPT | Performed by: NURSE PRACTITIONER

## 2022-03-02 PROCEDURE — 3017F COLORECTAL CA SCREEN DOC REV: CPT | Performed by: NURSE PRACTITIONER

## 2022-03-02 PROCEDURE — 1123F ACP DISCUSS/DSCN MKR DOCD: CPT | Performed by: NURSE PRACTITIONER

## 2022-03-02 PROCEDURE — 93000 ELECTROCARDIOGRAM COMPLETE: CPT | Performed by: NURSE PRACTITIONER

## 2022-03-02 PROCEDURE — 1036F TOBACCO NON-USER: CPT | Performed by: NURSE PRACTITIONER

## 2022-03-02 PROCEDURE — G8427 DOCREV CUR MEDS BY ELIG CLIN: HCPCS | Performed by: NURSE PRACTITIONER

## 2022-03-02 PROCEDURE — 4040F PNEUMOC VAC/ADMIN/RCVD: CPT | Performed by: NURSE PRACTITIONER

## 2022-03-02 PROCEDURE — 1090F PRES/ABSN URINE INCON ASSESS: CPT | Performed by: NURSE PRACTITIONER

## 2022-03-02 PROCEDURE — G8484 FLU IMMUNIZE NO ADMIN: HCPCS | Performed by: NURSE PRACTITIONER

## 2022-03-02 RX ORDER — HYDROCHLOROTHIAZIDE 50 MG/1
50 TABLET ORAL DAILY
Qty: 90 TABLET | Refills: 3
Start: 2022-03-02

## 2022-03-02 NOTE — TELEPHONE ENCOUNTER
PT states she seen Sulma Hammond today and she put a monitor on her and pt would like to know if she can have a stress test and her an infusion with the monitor please call [phone]

## 2022-03-02 NOTE — PROGRESS NOTES
CC/HPI:    75 y.o. patient of Dr Sheela Ko with HOCM/myectomy, severe MR/MVR, (surgery 6/2020), HTN, and HLD. She was referred by PCP to return to cardiology d/t recent echo which showed mildly worsening mitral stenosis. Today she c/o two episodes of syncope last month. She was up and moving then passed out. No LH/dizziness. She 2 months ago she had one episode of sharp pain under left breat which was relieved with 4 asa. No other c/o cp, sob, LH/dizziness or palpitations. No LE edema, orthopnea or PND. No fever, chills n/v/d or GI/ bleeding.      Past Medical History:   Diagnosis Date    Adrenal adenoma     left 8 mm - stable - CT 8/13    Anxiety     Arthritis     Asthma     Chronic nausea     Environmental allergies     GERD (gastroesophageal reflux disease)     HOCM (hypertrophic obstructive cardiomyopathy) (HCC)     HTN (hypertension)     Irritable bowel syndrome     Lung disease     Microscopic colitis     Migraine     Mitral regurgitation     Nephrolithiasis 1/21/15    R side -s/p lithotripsy    Nosebleed     Osteoporosis     DEXA 8/8/13 - scotty lumbar spine    Rash     Restless leg syndrome     TMJ dysfunction     Vitreous detachment     Wrist fracture, bilateral      Past Surgical History:   Procedure Laterality Date    CARDIAC CATHETERIZATION  02/28/2020    CARDIOVASCULAR STRESS TEST  2016    normal    CHOLECYSTECTOMY, LAPAROSCOPIC N/A 3/27/2019    w/cholangiogram w/C-arm, performed by Ottoniel Herron MD at [address]  7/13    COLONOSCOPY  03/29/2018    Afshan (random bxs)-microscopic colitis    KNEE SURGERY Right 1975    synovectomy    MITRAL VALVE REPAIR N/A 6/29/2020    [facility]; TCPB; resection of hypertrophic muscle from septal LVOT; miital valve replacement w/ 25mm Medtronic Mosaic bovine pericardial bioprosthetic valve; TCPB; ICNB x 5 levels bilatereally performed by Delvis Leon MD at [address] Left     Partial    TRANSESOPHAGEAL ECHOCARDIOGRAM  2020    Dr. Nereyda Corral  2018    Normal    WRIST FRACTURE SURGERY Left 2009     Family History   Problem Relation Age of Onset    Cancer Mother         leukemia    Hypertension Mother     Other Father         CRF    Heart Surgery Father         CABG    Blindness Father     Hypertension Father     Hearing Loss Father      Social History     Tobacco Use    Smoking status: Former Smoker     Packs/day: 1.00     Years: 38.00     Pack years: 38.00     Types: Cigarettes     Start date: 1972     Quit date: 2010     Years since quittin.8    Smokeless tobacco: Never Used   Vaping Use    Vaping Use: Never used   Substance Use Topics    Alcohol use: Not Currently     Alcohol/week: 0.0 standard drinks    Drug use: No     Allergies: Tolectin [tolmetin]    Review of Systems  General: No changes in weight, fatigue, or night sweats. HEENT: No blurry or decreased vision. No changes in hearing, nasal discharge or sore throat. Cardiovascular:  See HPI. Respiratory: No cough, hemoptysis, or wheezing.  + history of asthma. Gastrointestinal:  No abdominal pain, hematochezia, melana, constipation, diarrhea, or history of GI ulcers. Chronic nausea and GERD and IBS  Genito-Urinary: No dysuria or hematuria. No urgency or polyuria. Musculoskeletal:  No complaints of joint pain, joint swelling or muscular weakness/soreness. + arthritis   Neurological:  No dizziness, headaches, numbness/tingling, speech problems or weakness. No history of a stroke or TIA. Psychological: + anxiety   Hematological and Lymphatic: No abnormal bleeding or bruising, blood clots, jaundice or swollen lymph nodes. Endocrine:   No malaise/lethargy, palpitations, polydipsia/polyuria, temperature intolerance or unexpected weight changes  Skin:  No rashes or non-healing ulcers.     Physical Exam:  Pulse 72   Wt 166 lb 9.6 oz (75.6 kg)   SpO2 97%   BMI 27.72 kg/m²    Repeat BP 128/74  General (appearance):  No acute distress  Eyes: anicteric   Neck: soft, No JVD  Ears/Nose/Mouth/Thorat: No cyanosis  CV: RRR soft PLACIDO  Respiratory: Clear   GI: soft, non-tender, non-distended  Skin: Warm, dry. No rashes  Neuro/Psych: Alert and oriented x 3. Appropriate behavior  Ext:  No c/c.  No edema  Pulses:  2+ radial     Weight  Wt Readings from Last 3 Encounters:   03/02/22 166 lb 9.6 oz (75.6 kg)   01/31/22 163 lb (73.9 kg)   01/14/22 168 lb (76.2 kg)          CBC:   Lab Results   Component Value Date    WBC 11.7 (H) 02/22/2021    HGB 14.9 02/22/2021    HCT 44.6 02/22/2021    MCV 90.6 02/22/2021     02/22/2021     BMP:  Lab Results   Component Value Date    CREATININE 0.6 01/06/2022    BUN 9 01/06/2022     01/06/2022    K 4.3 01/06/2022     01/06/2022    CO2 25 01/06/2022     Mag:   Lab Results   Component Value Date    MG 1.90 07/03/2020     LIVER PROFILE:   Lab Results   Component Value Date    ALT 37 10/15/2021    AST 29 10/15/2021    ALKPHOS 80 10/15/2021    BILITOT 0.3 10/15/2021     PT/INR:   Lab Results   Component Value Date    INR 1.19 (H) 06/30/2020    INR 1.07 06/29/2020    INR 1.16 (H) 06/29/2020    PROTIME 13.8 (H) 06/30/2020    PROTIME 12.4 06/29/2020    PROTIME 13.5 (H) 06/29/2020     Pro-BNP   Lab Results   Component Value Date    PROBNP 2,358 12/18/2019     LIPIDS:  No components found for: CHLPL  Lab Results   Component Value Date    TRIG 33 10/15/2021    TRIG 63 02/22/2021    TRIG 91 06/29/2020     Lab Results   Component Value Date    HDL 75 (H) 10/15/2021    HDL 72 (H) 02/22/2021    HDL 57 06/29/2020     Lab Results   Component Value Date    LDLCALC 55 10/15/2021    LDLCALC 48 02/22/2021    LDLCALC 120 (H) 06/29/2020     Lab Results   Component Value Date    LABVLDL 7 10/15/2021    LABVLDL 13 02/22/2021    LABVLDL 18 06/29/2020     TSH:  Lab Results   Component Value Date    TSH 0.83 10/15/2021       IMAGING:     3/2/2022 ECG: sinus, LBBB (not new block)    2/22/2022 Echo:     There is moderate concentric left ventricular hypertrophy. Ejection fraction is visually estimated to be >60%. No regional wall motion abnormalities are noted. Indeterminate diastolic function. A bioprosthetic mitral valve is well seated with peak velocity of 210m/s and   a max/mean gradient of 18/9 mmHg. Moderate mitral stenosis. Mildly dilated right ventricle. Right ventricular systolic function is normal   Consider cardiology consultation    12/2/2020 Echo:   Normal left ventricle size and systolic function with an estimated ejection   fraction of 55-60%. The maximum prosthetic mitral valve pressure gradient is 8.4 mmHg and the mean pressure gradient is 8 mmHg. The mitral valve appears to be obstructing into the LVOT. There is   subvalvular elevated velocities    3 m/s Recommend [facility]    3/11/2020 [facility]:  Technically difficult study due to loss of probe contact due to loss of gel   and air interference. Left ventricular cavity size is normal. There is   severe asymmetric hypertrophy of the basal septum. Overall left ventricular   systolic function appears normal with an estimated ejection fraction of   55-60%. No regional wall motion abnormalities are noted. Mitral valve leaflets are mildly thickened. Moderate mitral regurgitation is   present. 2/2020 [facility]:  Angiographic Findings:  Right dominant system  Left Main:  Normal  Left Anterior Descending:  Normal  Circumflex:  Normal  Right Coronary:  Normal  Left Ventriculogram:  Not performed. Hemodynamics (mm Hg):  Apical Left Ventricular Pressure: 222/1, 11  LVOT Left ventricular pressure: 120/7, 22  Central Aortic Pressure:  115/65 (83)        2/2020 [facility] (): HCM. Mod HK of basal anterior and AS segments. Pap muscles displaced to the apex and not well organized.  Rebeca flor enhancement present. + SITA. Severe MR    12/2019 Echo:   Left ventricular cavity size is normal.   There is severe asymmetric hypertrophy of the basal septum. Ejection fraction is estimated to be 65-70%. Overall left ventricular systolic function appears borderline hyperdynamic. There is a late peaking gradient recorded across the LV outflow tract   consistent with dynamic obstruction with a peak gradient of 103 mmHg. Diastolic filling parameters suggest grade II diastolic dysfunction. Mild tricuspid regurgitation. Medications:   Current Outpatient Medications   Medication Sig Dispense Refill    amitriptyline (ELAVIL) 25 MG tablet Take 1 tablet by mouth nightly 90 tablet 1    DULoxetine (CYMBALTA) 20 MG extended release capsule Take 2 capsules by mouth daily 60 capsule 2    diazePAM (VALIUM) 5 MG tablet Take daily as needed for IBS symptoms. May take second dose if first dose does not provide relief. Do not take with alprazolam 45 tablet 2    dicyclomine (BENTYL) 20 MG tablet Take one tablet by mouth four times a day as needed 120 tablet 4    ALPRAZolam (XANAX) 0.5 MG tablet Take 1 tablet by mouth daily as needed for Anxiety.  30 tablet 2    atorvastatin (LIPITOR) 80 MG tablet TAKE ONE TABLET BY MOUTH EVERY EVENING 90 tablet 3    Coenzyme Q10 (CO Q-10) 200 MG CAPS Take 200 mg by mouth      Ascorbic Acid (VITAMIN C) 500 MG CAPS Take 500 mg by mouth      denosumab (PROLIA) 60 MG/ML SOSY SC injection Inject 1 mL into the skin once for 1 dose 1 mL 1    aspirin 81 MG EC tablet Take 81 mg by mouth daily      docusate sodium (COLACE) 100 MG capsule Take 100 mg by mouth 2 times daily as needed for Constipation      ZOLMitriptan (ZOMIG) 5 MG tablet Take 5 mg by mouth as needed for Migraine      eletriptan (RELPAX) 40 MG tablet Take 10 mg by mouth once as needed (Patient not taking: Reported on 3/2/2022)      prochlorperazine (COMPAZINE) 10 MG tablet Take one tablet by mouth every 6 hours as needed 30 tablet 4    omeprazole (PRILOSEC) 40 MG delayed release capsule Take 1 capsule by mouth daily 90 capsule 3    lisinopril (PRINIVIL;ZESTRIL) 2.5 MG tablet TAKE ONE TABLET BY MOUTH DAILY WITH LUNCH 90 tablet 3    loratadine (CLARITIN) 10 MG tablet Take 10 mg by mouth daily      levothyroxine (SYNTHROID) 125 MCG tablet TAKE ONE TABLET BY MOUTH DAILY 60 tablet 3    metoprolol succinate (TOPROL XL) 100 MG extended release tablet Take 1 tablet by mouth daily 60 tablet 5    famotidine (PEPCID) 40 MG tablet Take 1 tablet by mouth daily 90 tablet 3    Multiple Vitamins-Minerals (CENTRUM SILVER PO) Take 1 tablet by mouth daily      Calcium Carbonate-Vitamin D (CALTRATE 600+D PO) Take 1 tablet by mouth 2 times daily ON HOLD 8/26/21 (Patient not taking: Reported on 3/2/2022)      vitamin D3 (CHOLECALCIFEROL) 10 MCG (400 UNIT) TABS tablet Take 400 Units by mouth daily ON HOLD 8/26/21      Omega-3 Fatty Acids (FISH OIL) 1200 MG CAPS Take 1 capsule by mouth 2 times daily      Flaxseed, Linseed, (FLAXSEED OIL PO) Take 1,300 mg by mouth daily      FIBER PO Take 1 capsule by mouth daily        No current facility-administered medications for this visit. Assessment:  1. HOCM (hypertrophic obstructive cardiomyopathy) ([facility] [address]    2. Severe mitral regurgitation    3. S/P MVR (mitral valve replacement)    4. Essential hypertension    5. Hypercholesterolemia    6. ANGULO (dyspnea on exertion)    7. Atypical chest pain    8.  Syncope and collapse        Plan:  Chest pain and syncope (acute)   Old LBBB on ECG   2 week monitor   nuc stress test    HOCM/hx myectomy, severe MR hx MVR: stable   ASA 81 mg   Lipitor 80 mg    Lisinopril 2.5 mg    Toprol 100 mg   HTN; stable   /60   Toprol   Lisinopril   HLD; stable    Lipitor 80    Follow up after testing

## 2022-03-03 ENCOUNTER — TELEPHONE (OUTPATIENT)
Dept: INTERNAL MEDICINE CLINIC | Age: 67
End: 2022-03-03

## 2022-03-03 RX ORDER — DENOSUMAB 60 MG/ML
60 INJECTION SUBCUTANEOUS ONCE
Qty: 1 ML | Refills: 1 | Status: SHIPPED | OUTPATIENT
Start: 2022-03-03 | End: 2022-05-03

## 2022-03-03 NOTE — TELEPHONE ENCOUNTER
Patient asking for a phone call. She wanted to let Freddy Sandhoff know that the first time she fell blood work was done and her sodium was low. Please call pt.  At [phone]

## 2022-03-03 NOTE — TELEPHONE ENCOUNTER
Pt would like the following medication to be sent to the infusion center through fax. denosumab (PROLIA) 60 MG/ML SOSY SC injection     Fax: [phone]    Please advise.

## 2022-03-03 NOTE — TELEPHONE ENCOUNTER
Kapil Aquino, I talked to Ms Michele Velasquez - she wanted you to know that in the past (2021) she did have low Na levels; last ck in Jan was nl. I told her I would make you aware and if you wanted her to do anything different we will call her. Thank you.     Sheeba TRACY

## 2022-03-04 NOTE — TELEPHONE ENCOUNTER
Patient called back in and said the place didn't get the fax can we fax again         Please advise and fax

## 2022-03-07 ENCOUNTER — TELEPHONE (OUTPATIENT)
Dept: INFUSION THERAPY | Age: 67
End: 2022-03-07

## 2022-03-07 NOTE — TELEPHONE ENCOUNTER
[facility] called and said they did not receive the following medication and would like the following medication to be faxed to [facility].    denosumab (PROLIA) 60 MG/ML SOSY SC injection       FAX: [phone]

## 2022-03-08 RX ORDER — ACETAMINOPHEN 325 MG/1
650 TABLET ORAL
Status: CANCELLED | OUTPATIENT
Start: 2022-03-08

## 2022-03-08 RX ORDER — ONDANSETRON 2 MG/ML
8 INJECTION INTRAMUSCULAR; INTRAVENOUS
Status: CANCELLED | OUTPATIENT
Start: 2022-03-08

## 2022-03-08 RX ORDER — DIPHENHYDRAMINE HYDROCHLORIDE 50 MG/ML
50 INJECTION INTRAMUSCULAR; INTRAVENOUS
Status: CANCELLED | OUTPATIENT
Start: 2022-03-08

## 2022-03-08 RX ORDER — SODIUM CHLORIDE 9 MG/ML
INJECTION, SOLUTION INTRAVENOUS CONTINUOUS
Status: CANCELLED | OUTPATIENT
Start: 2022-03-08

## 2022-03-14 RX ORDER — LEVOTHYROXINE SODIUM 0.12 MG/1
TABLET ORAL
Qty: 90 TABLET | Refills: 3 | Status: SHIPPED | OUTPATIENT
Start: 2022-03-14

## 2022-03-17 ENCOUNTER — HOSPITAL ENCOUNTER (OUTPATIENT)
Dept: NON INVASIVE DIAGNOSTICS | Age: 67
Discharge: HOME | End: 2022-03-17
Payer: MEDICARE

## 2022-03-17 DIAGNOSIS — I42.1 HOCM (HYPERTROPHIC OBSTRUCTIVE CARDIOMYOPATHY) (HCC): ICD-10-CM

## 2022-03-17 DIAGNOSIS — R06.09 DOE (DYSPNEA ON EXERTION): ICD-10-CM

## 2022-03-17 DIAGNOSIS — E78.00 HYPERCHOLESTEROLEMIA: ICD-10-CM

## 2022-03-17 DIAGNOSIS — R55 SYNCOPE AND COLLAPSE: ICD-10-CM

## 2022-03-17 DIAGNOSIS — R07.89 ATYPICAL CHEST PAIN: ICD-10-CM

## 2022-03-17 DIAGNOSIS — I34.0 SEVERE MITRAL REGURGITATION: ICD-10-CM

## 2022-03-17 DIAGNOSIS — Z95.2 S/P MVR (MITRAL VALVE REPLACEMENT): ICD-10-CM

## 2022-03-17 DIAGNOSIS — I10 ESSENTIAL HYPERTENSION: ICD-10-CM

## 2022-03-17 LAB
LV EF: 77 %
LVEF MODALITY: NORMAL

## 2022-03-17 PROCEDURE — 3430000000 HC RX DIAGNOSTIC RADIOPHARMACEUTICAL: Performed by: NURSE PRACTITIONER

## 2022-03-17 PROCEDURE — 93017 CV STRESS TEST TRACING ONLY: CPT

## 2022-03-17 PROCEDURE — A9502 TC99M TETROFOSMIN: HCPCS | Performed by: NURSE PRACTITIONER

## 2022-03-17 PROCEDURE — 78452 HT MUSCLE IMAGE SPECT MULT: CPT

## 2022-03-17 PROCEDURE — 6360000002 HC RX W HCPCS: Performed by: NURSE PRACTITIONER

## 2022-03-17 RX ORDER — PROCHLORPERAZINE MALEATE 10 MG
TABLET ORAL
Qty: 30 TABLET | Refills: 4 | Status: SHIPPED | OUTPATIENT
Start: 2022-03-17 | End: 2022-07-12 | Stop reason: SDUPTHER

## 2022-03-17 RX ADMIN — TETROFOSMIN 10 MILLICURIE: 1.38 INJECTION, POWDER, LYOPHILIZED, FOR SOLUTION INTRAVENOUS at 09:13

## 2022-03-17 RX ADMIN — TETROFOSMIN 30 MILLICURIE: 1.38 INJECTION, POWDER, LYOPHILIZED, FOR SOLUTION INTRAVENOUS at 10:55

## 2022-03-17 RX ADMIN — REGADENOSON 0.4 MG: 0.08 INJECTION, SOLUTION INTRAVENOUS at 10:52

## 2022-03-17 NOTE — TELEPHONE ENCOUNTER
Following up on her request from 03/14/22.     prochlorperazine (COMPAZINE) 10 MG tablet    [facility] [address] [facility] [address] [facility] [address] [phone] Jaylan Barron [phone]    Please call and advise

## 2022-03-22 ENCOUNTER — HOSPITAL ENCOUNTER (OUTPATIENT)
Dept: INFUSION THERAPY | Age: 67
Setting detail: INFUSION SERIES
Discharge: HOME | End: 2022-03-22
Payer: MEDICARE

## 2022-03-22 VITALS
OXYGEN SATURATION: 98 % | BODY MASS INDEX: 26.33 KG/M2 | WEIGHT: 158 LBS | HEIGHT: 65 IN | HEART RATE: 61 BPM | DIASTOLIC BLOOD PRESSURE: 70 MMHG | RESPIRATION RATE: 18 BRPM | SYSTOLIC BLOOD PRESSURE: 107 MMHG | TEMPERATURE: 98 F

## 2022-03-22 DIAGNOSIS — M81.0 OSTEOPOROSIS WITHOUT CURRENT PATHOLOGICAL FRACTURE, UNSPECIFIED OSTEOPOROSIS TYPE: Primary | ICD-10-CM

## 2022-03-22 PROCEDURE — 96372 THER/PROPH/DIAG INJ SC/IM: CPT

## 2022-03-22 PROCEDURE — 6360000002 HC RX W HCPCS: Performed by: INTERNAL MEDICINE

## 2022-03-22 RX ORDER — DIPHENHYDRAMINE HYDROCHLORIDE 50 MG/ML
50 INJECTION INTRAMUSCULAR; INTRAVENOUS
Status: CANCELLED | OUTPATIENT
Start: 2022-09-20

## 2022-03-22 RX ORDER — ACETAMINOPHEN 325 MG/1
650 TABLET ORAL
Status: CANCELLED | OUTPATIENT
Start: 2022-09-20

## 2022-03-22 RX ORDER — ONDANSETRON 2 MG/ML
8 INJECTION INTRAMUSCULAR; INTRAVENOUS
Status: CANCELLED | OUTPATIENT
Start: 2022-09-20

## 2022-03-22 RX ORDER — SODIUM CHLORIDE 9 MG/ML
INJECTION, SOLUTION INTRAVENOUS CONTINUOUS
Status: CANCELLED | OUTPATIENT
Start: 2022-09-20

## 2022-03-22 RX ORDER — EPINEPHRINE 1 MG/ML
0.3 INJECTION, SOLUTION, CONCENTRATE INTRAVENOUS PRN
Status: CANCELLED | OUTPATIENT
Start: 2022-09-20

## 2022-03-22 RX ADMIN — DENOSUMAB 60 MG: 60 INJECTION SUBCUTANEOUS at 10:55

## 2022-03-22 NOTE — PROGRESS NOTES
Outpatient [id number] [facility]     Prolia Visit    NAME:  Antoine Pereira OF BIRTH:  1955  MEDICAL RECORD NUMBER:  [id number]  Episode Date:  3/22/2022    Patient arrived to [facility] [address]   [] per wheelchair   [x] ambulatory     Is this the patient's first Prolia Injection? No     Date of last Prolia Injection ? August 26, 2021. Did the patient experience any adverse reactions to Prolia Injection? No    Any recent oral or dental surgery? No    Any recent active fever, infections and/or illnesses? No    Patient has history of pathological fracture? No    Patient has had a recent fracture due to trauma or injury? Yes wrist 15 years ago from a fall    Patient has had a recent orthopedic surgery or procedure done? No    Approximate date of last Dexa scan? 7/1/21       /70   Pulse 61   Temp 98 °F (36.7 °C) (Oral)   Resp 18   Ht 5' 5\" (1.651 m)   Wt 158 lb (71.7 kg)   SpO2 98%   BMI 26.29 kg/m²     Current Lab Data:    Calcium:    Lab Results   Component Value Date    CALCIUM 9.0 01/06/2022       Patient Currently taking Calcium Supplements? No     Prolia administered: Yes    Prolia dosage: 60 mg was administered slowly subcutaneously into the left upper arm. Response to treatment:  Well tolerated by patient. Due for next dose of Prolia after September 22, 2022.      Electronically signed by Elodia Sharp RN on 3/22/2022 at 11:07 AM

## 2022-03-29 ENCOUNTER — TELEPHONE (OUTPATIENT)
Dept: CARDIOLOGY CLINIC | Age: 67
End: 2022-03-29

## 2022-03-29 NOTE — TELEPHONE ENCOUNTER
Spoke with pt and gave her the monitor results. Informed pt that CG is out of the office this week and that I would have her take a look at the monitor results to see if any medication changes need to be made and then get back to her next week.

## 2022-04-01 DIAGNOSIS — R55 SYNCOPE AND COLLAPSE: ICD-10-CM

## 2022-04-01 PROCEDURE — 93248 EXT ECG>7D<15D REV&INTERPJ: CPT | Performed by: INTERNAL MEDICINE

## 2022-04-06 ENCOUNTER — TELEPHONE (OUTPATIENT)
Dept: CARDIOLOGY | Age: 67
End: 2022-04-06

## 2022-04-06 NOTE — TELEPHONE ENCOUNTER
Please call Ms Jeannette Valverde and let her know I reviewed the monitor results. Overall rhythm is typically NSR. She does have episodes of tachycardia. Cont toprol    No A fib seen  No NSVT seen  No arrhythmia that would contribute to passing out last month. Stress test was negative for ischemia. No sign of blockages. Echo shows EF 60%. No new recommendations. Follow up as scheduled.      Rigo Haro

## 2022-04-26 DIAGNOSIS — N85.00 ENDOMETRIAL HYPERPLASIA: ICD-10-CM

## 2022-04-26 DIAGNOSIS — E78.2 MIXED HYPERLIPIDEMIA: ICD-10-CM

## 2022-04-26 DIAGNOSIS — I10 ESSENTIAL HYPERTENSION: ICD-10-CM

## 2022-04-26 DIAGNOSIS — R91.8 MASS OF LINGULA OF LUNG: ICD-10-CM

## 2022-04-26 DIAGNOSIS — E03.9 HYPOTHYROIDISM, UNSPECIFIED TYPE: ICD-10-CM

## 2022-04-26 DIAGNOSIS — E21.3 HYPERPARATHYROIDISM (HCC): ICD-10-CM

## 2022-04-26 DIAGNOSIS — N20.0 NEPHROLITHIASIS: ICD-10-CM

## 2022-04-26 DIAGNOSIS — F41.9 ANXIETY: ICD-10-CM

## 2022-04-26 LAB
A/G RATIO: 1.4 (ref 1.1–2.2)
ALBUMIN SERPL-MCNC: 3.8 G/DL (ref 3.4–5)
ALP BLD-CCNC: 87 U/L (ref 40–129)
ALT SERPL-CCNC: 29 U/L (ref 10–40)
ANION GAP SERPL CALCULATED.3IONS-SCNC: 11 MMOL/L (ref 3–16)
AST SERPL-CCNC: 29 U/L (ref 15–37)
BASOPHILS ABSOLUTE: 0 K/UL (ref 0–0.2)
BASOPHILS RELATIVE PERCENT: 1 %
BILIRUB SERPL-MCNC: 0.3 MG/DL (ref 0–1)
BUN BLDV-MCNC: 12 MG/DL (ref 7–20)
CALCIUM SERPL-MCNC: 9.8 MG/DL (ref 8.3–10.6)
CHLORIDE BLD-SCNC: 96 MMOL/L (ref 99–110)
CHOLESTEROL, TOTAL: 138 MG/DL (ref 0–199)
CO2: 26 MMOL/L (ref 21–32)
CREAT SERPL-MCNC: 0.8 MG/DL (ref 0.6–1.2)
EOSINOPHILS ABSOLUTE: 0.1 K/UL (ref 0–0.6)
EOSINOPHILS RELATIVE PERCENT: 1.2 %
GFR AFRICAN AMERICAN: >60
GFR NON-AFRICAN AMERICAN: >60
GLUCOSE BLD-MCNC: 104 MG/DL (ref 70–99)
HCT VFR BLD CALC: 40.1 % (ref 36–48)
HDLC SERPL-MCNC: 58 MG/DL (ref 40–60)
HEMOGLOBIN: 13.9 G/DL (ref 12–16)
LDL CHOLESTEROL CALCULATED: 67 MG/DL
LYMPHOCYTES ABSOLUTE: 1.4 K/UL (ref 1–5.1)
LYMPHOCYTES RELATIVE PERCENT: 34 %
MCH RBC QN AUTO: 29.8 PG (ref 26–34)
MCHC RBC AUTO-ENTMCNC: 34.6 G/DL (ref 31–36)
MCV RBC AUTO: 86.3 FL (ref 80–100)
MONOCYTES ABSOLUTE: 0.4 K/UL (ref 0–1.3)
MONOCYTES RELATIVE PERCENT: 9 %
NEUTROPHILS ABSOLUTE: 2.3 K/UL (ref 1.7–7.7)
NEUTROPHILS RELATIVE PERCENT: 54.8 %
PARATHYROID HORMONE INTACT: 77.5 PG/ML (ref 14–72)
PDW BLD-RTO: 12.6 % (ref 12.4–15.4)
PLATELET # BLD: 279 K/UL (ref 135–450)
PMV BLD AUTO: 7.6 FL (ref 5–10.5)
POTASSIUM SERPL-SCNC: 4.5 MMOL/L (ref 3.5–5.1)
RBC # BLD: 4.64 M/UL (ref 4–5.2)
SODIUM BLD-SCNC: 133 MMOL/L (ref 136–145)
T3 TOTAL: 1.08 NG/ML (ref 0.8–2)
T4 FREE: 1.5 NG/DL (ref 0.9–1.8)
TOTAL PROTEIN: 6.5 G/DL (ref 6.4–8.2)
TRIGL SERPL-MCNC: 66 MG/DL (ref 0–150)
TSH REFLEX: 0.07 UIU/ML (ref 0.27–4.2)
VITAMIN D 25-HYDROXY: 34.4 NG/ML
VLDLC SERPL CALC-MCNC: 13 MG/DL
WBC # BLD: 4.2 K/UL (ref 4–11)

## 2022-05-03 ENCOUNTER — OFFICE VISIT (OUTPATIENT)
Dept: INTERNAL MEDICINE CLINIC | Age: 67
End: 2022-05-03
Payer: MEDICARE

## 2022-05-03 VITALS
OXYGEN SATURATION: 98 % | HEIGHT: 65 IN | BODY MASS INDEX: 28.32 KG/M2 | SYSTOLIC BLOOD PRESSURE: 122 MMHG | DIASTOLIC BLOOD PRESSURE: 72 MMHG | HEART RATE: 62 BPM | WEIGHT: 170 LBS

## 2022-05-03 DIAGNOSIS — F41.9 ANXIETY: ICD-10-CM

## 2022-05-03 DIAGNOSIS — M81.0 OSTEOPOROSIS WITHOUT CURRENT PATHOLOGICAL FRACTURE, UNSPECIFIED OSTEOPOROSIS TYPE: ICD-10-CM

## 2022-05-03 DIAGNOSIS — E87.1 HYPONATREMIA: ICD-10-CM

## 2022-05-03 DIAGNOSIS — E21.3 HYPERPARATHYROIDISM (HCC): ICD-10-CM

## 2022-05-03 DIAGNOSIS — K58.9 IRRITABLE BOWEL SYNDROME, UNSPECIFIED TYPE: ICD-10-CM

## 2022-05-03 DIAGNOSIS — E03.9 HYPOTHYROIDISM, UNSPECIFIED TYPE: ICD-10-CM

## 2022-05-03 DIAGNOSIS — Z00.00 MEDICARE ANNUAL WELLNESS VISIT, SUBSEQUENT: Primary | ICD-10-CM

## 2022-05-03 DIAGNOSIS — Z87.891 PERSONAL HISTORY OF TOBACCO USE: ICD-10-CM

## 2022-05-03 DIAGNOSIS — M79.89 LEFT LEG SWELLING: ICD-10-CM

## 2022-05-03 PROCEDURE — G0296 VISIT TO DETERM LDCT ELIG: HCPCS | Performed by: INTERNAL MEDICINE

## 2022-05-03 PROCEDURE — G0439 PPPS, SUBSEQ VISIT: HCPCS | Performed by: INTERNAL MEDICINE

## 2022-05-03 PROCEDURE — 1123F ACP DISCUSS/DSCN MKR DOCD: CPT | Performed by: INTERNAL MEDICINE

## 2022-05-03 PROCEDURE — 3017F COLORECTAL CA SCREEN DOC REV: CPT | Performed by: INTERNAL MEDICINE

## 2022-05-03 PROCEDURE — 4040F PNEUMOC VAC/ADMIN/RCVD: CPT | Performed by: INTERNAL MEDICINE

## 2022-05-03 RX ORDER — AMITRIPTYLINE HYDROCHLORIDE 25 MG/1
25 TABLET, FILM COATED ORAL NIGHTLY
Qty: 90 TABLET | Refills: 1 | Status: SHIPPED | OUTPATIENT
Start: 2022-05-03 | End: 2022-08-29 | Stop reason: SDUPTHER

## 2022-05-03 RX ORDER — DULOXETIN HYDROCHLORIDE 20 MG/1
20 CAPSULE, DELAYED RELEASE ORAL DAILY
Qty: 90 CAPSULE | Refills: 0 | Status: SHIPPED | OUTPATIENT
Start: 2022-05-03 | End: 2022-08-29

## 2022-05-03 RX ORDER — FAMOTIDINE 40 MG/1
40 TABLET, FILM COATED ORAL DAILY
Qty: 90 TABLET | Refills: 3 | Status: SHIPPED | OUTPATIENT
Start: 2022-05-03 | End: 2022-08-09 | Stop reason: SDUPTHER

## 2022-05-03 RX ORDER — DIAZEPAM 5 MG/1
TABLET ORAL
Qty: 45 TABLET | Refills: 2 | Status: SHIPPED | OUTPATIENT
Start: 2022-05-03 | End: 2022-08-03

## 2022-05-03 ASSESSMENT — PATIENT HEALTH QUESTIONNAIRE - PHQ9
SUM OF ALL RESPONSES TO PHQ QUESTIONS 1-9: 1
SUM OF ALL RESPONSES TO PHQ9 QUESTIONS 1 & 2: 1
1. LITTLE INTEREST OR PLEASURE IN DOING THINGS: 1
2. FEELING DOWN, DEPRESSED OR HOPELESS: 0

## 2022-05-03 NOTE — PATIENT INSTRUCTIONS
Personalized Preventive Plan for Carmina Hollis - 5/3/2022  Medicare offers a range of preventive health benefits. Some of the tests and screenings are paid in full while other may be subject to a deductible, co-insurance, and/or copay. Some of these benefits include a comprehensive review of your medical history including lifestyle, illnesses that may run in your family, and various assessments and screenings as appropriate. After reviewing your medical record and screening and assessments performed today your provider may have ordered immunizations, labs, imaging, and/or referrals for you. A list of these orders (if applicable) as well as your Preventive Care list are included within your After Visit Summary for your review. Other Preventive Recommendations:    · A preventive eye exam performed by an eye specialist is recommended every 1-2 years to screen for glaucoma; cataracts, macular degeneration, and other eye disorders. · A preventive dental visit is recommended every 6 months. · Try to get at least 150 minutes of exercise per week or 10,000 steps per day on a pedometer . · Order or download the FREE \"Exercise & Physical Activity: Your Everyday Guide\" from [employer]. Call 6-911.936.2270 or search [employer] online. · You need 0491-7971 mg of calcium and 9424-2371 IU of vitamin D per day. It is possible to meet your calcium requirement with diet alone, but a vitamin D supplement is usually necessary to meet this goal.  · When exposed to the sun, use a sunscreen that protects against both UVA and UVB radiation with an SPF of 30 or greater. Reapply every 2 to 3 hours or after sweating, drying off with a towel, or swimming. · Always wear a seat belt when traveling in a car. Always wear a helmet when riding a bicycle or motorcycle. What is lung cancer screening?   Lung cancer screening is a way in which doctors check the lungs for early signs of cancer in people who have no symptoms of lung cancer. A low-dose CT scan uses much less radiation than a normal CT scan and shows a more detailed image of the lungs than a standard X-ray. The goal of lung cancer screening is to find cancer early, before it has a chance to grow, spread, or cause problems. One large study found that smokers who were screened with low-dose CT scans were less likely to die of lung cancer than those who were screened with standard X-ray. Below is a summary of the things you need to know regarding screening for lung cancer with low-dose computed tomography (LDCT). This is a screening program that involves routine annual screening with LDCT studies of the lung. The LDCTs are done using low-dose radiation that is not thought to increase your cancer risk. If you have other serious medical conditions (other cancers, congestive heart failure) that limit your life expectancy to less than 10 years, you should not undergo lung cancer screening with LDCT. The chance is 20%-60% that the LDCT result will show abnormalities. This would require additional testing which could include repeat imaging or even invasive procedures. Most (about 95%) of \"abnormal\" LDCT results are false in the sense that no lung cancer is ultimately found. Additionally, some (about 10%) of the cancers found would not affect your life expectancy, even if undetected and untreated. If you are still smoking, the single most important thing that you can do to reduce your risk of dying of lung cancer is to quit. For this screening to be covered by Medicare and most other insurers, strict criteria must be met. If you do not meet these criteria, but still wish to undergo LDCT testing, you will be required to sign a waiver indicating your willingness to pay for the scan.

## 2022-05-03 NOTE — ADDENDUM NOTE
Encounter addended by: Federica Kitchen MA on: 5/3/2022 3:48 PM   Actions taken: Document created, Document edited

## 2022-05-03 NOTE — PROGRESS NOTES
Medicare Annual Wellness Visit    Kellie King is here for Medicare AWV    Assessment & Plan   Medicare annual wellness visit, subsequent  Hyperparathyroidism ([facility] [address]   -Stable, calcium normal, remain off of calcium and vitamin D supplementation  Left leg swelling  -She still has some persistent swelling in the left lower extremity, rule out DVT with Doppler  -     VL DUP LOWER EXTREMITY VENOUS LEFT; Future  Hypothyroidism, unspecified type  -TSH mildly suppressed, recheck in 3 months and if persistently low then would decrease the levothyroxine dose. For now continue levothyroxine 125 mcg daily  -     TSH with Reflex; Future  Irritable bowel syndrome, unspecified type  -     diazePAM (VALIUM) 5 MG tablet; Take daily as needed for IBS symptoms. May take second dose if first dose does not provide relief. Do not take with alprazolam, Disp-45 tablet, R-2Normal  Anxiety   - adjusting medication as below, stable  Hyponatremia  -Suspected medication related, continue taper off of Cymbalta. Decrease to 20 mg daily, continue amitriptyline 25 mg nightly  -     Basic Metabolic Panel; Future  Personal history of tobacco use  -     NM VISIT TO DISCUSS LUNG CA SCREEN W LDCT  -     CT Lung Screen (Annual); Future  Osteoporosis without current pathological fracture, unspecified osteoporosis type   - on prolia, next DEXA in 2023      Recommendations for Preventive Services Due: see orders and patient instructions/AVS.  Recommended screening schedule for the next 5-10 years is provided to the patient in written form: see Patient Instructions/AVS.     Return in 3 months (on 8/3/2022) for sodium, thyroid, IBS/anxiety. Subjective   The following acute and/or chronic problems were also addressed today:    She has overall been doing well. Using the diazepam intermittently for IBS, has had some more symptoms recently.     She went to [place] recently, got back on Sunday, and had leg swelling after each each flight which then resolved within a day. She has had more pain in the legs over the last couple of months. She does have varicose veins. Patient's complete Health Risk Assessment and screening values have been reviewed and are found in Flowsheets. The following problems were reviewed today and where indicated follow up appointments were made and/or referrals ordered. Positive Risk Factor Screenings with Interventions:    Fall Risk:  Do you feel unsteady or are you worried about falling? : no  2 or more falls in past year?: (!) yes  Fall with injury in past year?: (!) yes                 General Health and ACP:  General  In general, how would you say your health is?: Good  In the past 7 days, have you experienced any of the following: New or Increased Pain, New or Increased Fatigue, Loneliness, Social Isolation, Stress or Anger?: No  Do you get the social and emotional support that you need?: Yes  Do you have a Living Will?: Yes    Advance Directives     Power of  Living Will ACP-Advance Directive ACP-Power of     Not on File Not on File Not on File Not on File      General Health Risk Interventions:  · No Living Will: ACP documents already completed- patient asked to provide copy to the office              Objective   Vitals:    05/03/22 1118   BP: 122/72   Pulse: 62   SpO2: 98%   Weight: 170 lb (77.1 kg)   Height: 5' 5\" (1.651 m)      Body mass index is 28.29 kg/m². Physical Exam  Vitals reviewed. Constitutional:       General: She is not in acute distress. Appearance: Normal appearance. She is well-developed. HENT:      Head: Normocephalic and atraumatic. Right Ear: Tympanic membrane, ear canal and external ear normal.      Left Ear: There is impacted cerumen. Eyes:      General: No scleral icterus. Conjunctiva/sclera: Conjunctivae normal.   Neck:      Thyroid: No thyroid mass, thyromegaly or thyroid tenderness. Vascular: No carotid bruit.    Cardiovascular:      Rate and Rhythm: Normal rate and regular rhythm. Heart sounds: Normal heart sounds. Pulmonary:      Effort: Pulmonary effort is normal. No respiratory distress. Breath sounds: Normal breath sounds. Abdominal:      General: Abdomen is flat. Bowel sounds are normal. There is no distension. Palpations: Abdomen is soft. There is no mass. Tenderness: There is abdominal tenderness (luq). There is no guarding or rebound. Hernia: No hernia is present. Musculoskeletal:      Cervical back: Neck supple. Right lower leg: No edema. Left lower leg: Edema (1+ pitting) present. Lymphadenopathy:      Cervical: No cervical adenopathy. Skin:     General: Skin is warm and dry. Neurological:      General: No focal deficit present. Mental Status: She is alert and oriented to person, place, and time. Psychiatric:         Mood and Affect: Mood normal.         Behavior: Behavior normal.         Thought Content: Thought content normal.         Judgment: Judgment normal.               Allergies   Allergen Reactions    Tolectin [Tolmetin] Swelling     Face & Lips     Prior to Visit Medications    Medication Sig Taking? Authorizing Provider   amitriptyline (ELAVIL) 25 MG tablet Take 1 tablet by mouth nightly Yes Keri Montano MD   DULoxetine (CYMBALTA) 20 MG extended release capsule Take 1 capsule by mouth daily Yes Keri Montano MD   famotidine (PEPCID) 40 MG tablet Take 1 tablet by mouth daily Yes Keri Montano MD   diazePAM (VALIUM) 5 MG tablet Take daily as needed for IBS symptoms. May take second dose if first dose does not provide relief.  Do not take with alprazolam Yes Keri Montano MD   prochlorperazine (COMPAZINE) 10 MG tablet TAKE ONE TABLET BY MOUTH EVERY 6 HOURS AS NEEDED Yes Keri Montano MD   levothyroxine (SYNTHROID) 125 MCG tablet TAKE ONE TABLET BY MOUTH DAILY Yes Keri Montano MD   denosumab (PROLIA) 60 MG/ML SOSY SC injection Inject 1 mL into the skin once for 1 dose Yes Keri Montano MD hydroCHLOROthiazide (HYDRODIURIL) 50 MG tablet Take 1 tablet by mouth daily Yes TABITHA Gandhi CNP   ZOLMitriptan (ZOMIG) 5 MG tablet Take 5 mg by mouth as needed for Migraine Yes Historical Provider, MD   dicyclomine (BENTYL) 20 MG tablet Take one tablet by mouth four times a day as needed Yes Renato Banks MD   ALPRAZolam Cordova Evy) 0.5 MG tablet Take 1 tablet by mouth daily as needed for Anxiety.  Yes Renato Banks MD   omeprazole (PRILOSEC) 40 MG delayed release capsule Take 1 capsule by mouth daily Yes Renato Banks MD   atorvastatin (LIPITOR) 80 MG tablet TAKE ONE TABLET BY MOUTH EVERY EVENING Yes TABITHA Gandhi CNP   lisinopril (PRINIVIL;ZESTRIL) 2.5 MG tablet TAKE ONE TABLET BY MOUTH DAILY WITH LUNCH Yes TABITHA Gandhi CNP   loratadine (CLARITIN) 10 MG tablet Take 10 mg by mouth daily Yes Historical Provider, MD   Coenzyme Q10 (CO Q-10) 200 MG CAPS Take 200 mg by mouth Yes Historical Provider, MD   Ascorbic Acid (VITAMIN C) 500 MG CAPS Take 500 mg by mouth Yes Historical Provider, MD   metoprolol succinate (TOPROL XL) 100 MG extended release tablet Take 1 tablet by mouth daily Yes Sam Cordon MD   aspirin 81 MG EC tablet Take 81 mg by mouth daily Yes Historical Provider, MD   docusate sodium (COLACE) 100 MG capsule Take 100 mg by mouth 2 times daily as needed for Constipation Yes Historical Provider, MD   Multiple Vitamins-Minerals (CENTRUM SILVER PO) Take 1 tablet by mouth daily Yes Historical Provider, MD   Omega-3 Fatty Acids (FISH OIL) 1200 MG CAPS Take 1 capsule by mouth 2 times daily Yes Historical Provider, MD   Flaxseed, Linseed, (FLAXSEED OIL PO) Take 1,300 mg by mouth daily Yes Historical Provider, MD   FIBER PO Take 1 capsule by mouth daily  Yes Historical Provider, MD   Calcium Carbonate-Vitamin D (CALTRATE 600+D PO) Take 1 tablet by mouth 2 times daily ON HOLD 8/26/21  Patient not taking: Reported on 5/3/2022  Historical Provider, MD   vitamin D3 (CHOLECALCIFEROL) 10 MCG (400 UNIT) TABS tablet Take 400 Units by mouth daily ON HOLD 8/26/21  Patient not taking: Reported on 5/3/2022  Historical Provider, MD Reyna (Including outside providers/suppliers regularly involved in providing care):   Patient Care Team:  Corinna Devries MD as PCP - General (Internal Medicine)  Corinna Devries MD as PCP - [facility] Empaneled Provider  Paula Chung MD as Consulting Physician (Pulmonology)    Reviewed and updated this visit:  Tobacco  Allergies  Meds  Med Hx  Surg Hx  Soc Hx  Fam Hx                 Low Dose CT (LDCT) Lung Screening criteria met:     Age 50-77(Medicare) or 50-80 ([facility])   Pack year smoking >20   Still smoking or less than 15 year since quit   No sign or symptoms of lung cancer   > 11 months since last LDCT     Risks and benefits of lung cancer screening with LDCT scans discussed:    Significance of positive screen - False-positive LDCT results often occur. 95% of all positive results do not lead to a diagnosis of cancer. Usually further imaging can resolve most false-positive results; however, some patients may require invasive procedures. Over diagnosis risk - 10% to 12% of screen-detected lung cancer cases are over diagnosed--that is, the cancer would not have been detected in the patient's lifetime without the screening. Need for follow up screens annually to continue lung cancer screening effectiveness     Risks associated with radiation from annual LDCT- Radiation exposure is about the same as for a mammogram, which is about 1/3 of the annual background radiation exposure from everyday life. Starting screening at age 54 is not likely to increase cancer risk from radiation exposure. Patients with comorbidities resulting in life expectancy of < 10 years, or that would preclude treatment of an abnormality identified on CT, should not be screened due to lack of benefit.     To obtain maximal benefit from this screening, smoking cessation and long-term abstinence from smoking is critical

## 2022-05-05 ENCOUNTER — HOSPITAL ENCOUNTER (OUTPATIENT)
Dept: VASCULAR LAB | Age: 67
Discharge: HOME | End: 2022-05-05
Payer: MEDICARE

## 2022-05-05 DIAGNOSIS — M79.89 LEFT LEG SWELLING: ICD-10-CM

## 2022-05-05 PROCEDURE — 93971 EXTREMITY STUDY: CPT

## 2022-05-10 ENCOUNTER — HOSPITAL ENCOUNTER (OUTPATIENT)
Dept: CT IMAGING | Age: 67
Discharge: HOME | End: 2022-05-10
Payer: MEDICARE

## 2022-05-10 DIAGNOSIS — Z87.891 PERSONAL HISTORY OF TOBACCO USE: ICD-10-CM

## 2022-05-10 PROCEDURE — 71271 CT THORAX LUNG CANCER SCR C-: CPT

## 2022-05-12 ENCOUNTER — TELEPHONE (OUTPATIENT)
Dept: CASE MANAGEMENT | Age: 67
End: 2022-05-12

## 2022-05-12 NOTE — TELEPHONE ENCOUNTER
CT Lung Cancer Screening completed on 5/10/2022, ordering provider, Dr Bean Luis, reviewed radiology results with recommendations & office notified patient of results with recommendations. Smoking history reviewed.

## 2022-05-20 ENCOUNTER — TELEPHONE (OUTPATIENT)
Dept: CARDIOLOGY CLINIC | Age: 67
End: 2022-05-20

## 2022-05-20 RX ORDER — METOPROLOL SUCCINATE 100 MG/1
100 TABLET, EXTENDED RELEASE ORAL DAILY
Qty: 60 TABLET | Refills: 5 | Status: SHIPPED | OUTPATIENT
Start: 2022-05-20 | End: 2022-08-09

## 2022-05-20 NOTE — TELEPHONE ENCOUNTER
[facility] Medication Refills:    Medication: metoprolol 100 mg     Dosage: 1 tablet by mouth daily    Number:30    Refills: 5    Last Office Visit: 03/2022    Next Office Visit: 09/06/2022    Last Labs: 04/26/2022

## 2022-07-12 RX ORDER — PROCHLORPERAZINE MALEATE 10 MG
TABLET ORAL
Qty: 30 TABLET | Refills: 4 | Status: SHIPPED | OUTPATIENT
Start: 2022-07-12 | End: 2022-10-13

## 2022-07-12 RX ORDER — DICYCLOMINE HCL 20 MG
TABLET ORAL
Qty: 120 TABLET | Refills: 4 | Status: SHIPPED | OUTPATIENT
Start: 2022-07-12

## 2022-07-12 NOTE — TELEPHONE ENCOUNTER
Pt needs refills for the following medication listed below      prochlorperazine (COMPAZINE) 10 MG tablet     dicyclomine (BENTYL) 20 MG tablet         [facility] [id number] - [address] [place], [address] [facility] [address] - P [phone] - F [phone]             Please advise.

## 2022-08-02 DIAGNOSIS — E87.1 HYPONATREMIA: ICD-10-CM

## 2022-08-02 DIAGNOSIS — E03.9 HYPOTHYROIDISM, UNSPECIFIED TYPE: ICD-10-CM

## 2022-08-02 LAB
ANION GAP SERPL CALCULATED.3IONS-SCNC: 10 MMOL/L (ref 3–16)
BUN BLDV-MCNC: 12 MG/DL (ref 7–20)
CALCIUM SERPL-MCNC: 10.1 MG/DL (ref 8.3–10.6)
CHLORIDE BLD-SCNC: 99 MMOL/L (ref 99–110)
CO2: 28 MMOL/L (ref 21–32)
CREAT SERPL-MCNC: 0.7 MG/DL (ref 0.6–1.2)
GFR AFRICAN AMERICAN: >60
GFR NON-AFRICAN AMERICAN: >60
GLUCOSE BLD-MCNC: 96 MG/DL (ref 70–99)
POTASSIUM SERPL-SCNC: 4.2 MMOL/L (ref 3.5–5.1)
SODIUM BLD-SCNC: 137 MMOL/L (ref 136–145)
T3 TOTAL: 0.83 NG/ML (ref 0.8–2)
T4 FREE: 1.5 NG/DL (ref 0.9–1.8)
TSH REFLEX: 0.22 UIU/ML (ref 0.27–4.2)

## 2022-08-09 ENCOUNTER — OFFICE VISIT (OUTPATIENT)
Dept: INTERNAL MEDICINE CLINIC | Age: 67
End: 2022-08-09
Payer: MEDICARE

## 2022-08-09 VITALS
HEART RATE: 67 BPM | WEIGHT: 166.4 LBS | OXYGEN SATURATION: 99 % | BODY MASS INDEX: 27.72 KG/M2 | TEMPERATURE: 96.6 F | HEIGHT: 65 IN | DIASTOLIC BLOOD PRESSURE: 78 MMHG | SYSTOLIC BLOOD PRESSURE: 126 MMHG

## 2022-08-09 DIAGNOSIS — F41.9 ANXIETY: ICD-10-CM

## 2022-08-09 DIAGNOSIS — K58.9 IRRITABLE BOWEL SYNDROME, UNSPECIFIED TYPE: Primary | ICD-10-CM

## 2022-08-09 DIAGNOSIS — G25.81 RESTLESS LEG: ICD-10-CM

## 2022-08-09 DIAGNOSIS — E21.3 HYPERPARATHYROIDISM (HCC): ICD-10-CM

## 2022-08-09 DIAGNOSIS — E03.9 HYPOTHYROIDISM, UNSPECIFIED TYPE: Chronic | ICD-10-CM

## 2022-08-09 PROCEDURE — 1036F TOBACCO NON-USER: CPT | Performed by: INTERNAL MEDICINE

## 2022-08-09 PROCEDURE — 1090F PRES/ABSN URINE INCON ASSESS: CPT | Performed by: INTERNAL MEDICINE

## 2022-08-09 PROCEDURE — G8427 DOCREV CUR MEDS BY ELIG CLIN: HCPCS | Performed by: INTERNAL MEDICINE

## 2022-08-09 PROCEDURE — G8417 CALC BMI ABV UP PARAM F/U: HCPCS | Performed by: INTERNAL MEDICINE

## 2022-08-09 PROCEDURE — 99214 OFFICE O/P EST MOD 30 MIN: CPT | Performed by: INTERNAL MEDICINE

## 2022-08-09 PROCEDURE — 1123F ACP DISCUSS/DSCN MKR DOCD: CPT | Performed by: INTERNAL MEDICINE

## 2022-08-09 PROCEDURE — G8399 PT W/DXA RESULTS DOCUMENT: HCPCS | Performed by: INTERNAL MEDICINE

## 2022-08-09 PROCEDURE — 3017F COLORECTAL CA SCREEN DOC REV: CPT | Performed by: INTERNAL MEDICINE

## 2022-08-09 RX ORDER — ROPINIROLE 0.5 MG/1
0.5 TABLET, FILM COATED ORAL NIGHTLY
Qty: 90 TABLET | Refills: 1 | Status: SHIPPED | OUTPATIENT
Start: 2022-08-09

## 2022-08-09 RX ORDER — OMEPRAZOLE 40 MG/1
40 CAPSULE, DELAYED RELEASE ORAL DAILY
Qty: 90 CAPSULE | Refills: 3 | Status: SHIPPED | OUTPATIENT
Start: 2022-08-09

## 2022-08-09 RX ORDER — DIAZEPAM 5 MG/1
TABLET ORAL
Qty: 45 TABLET | Refills: 2 | Status: SHIPPED | OUTPATIENT
Start: 2022-08-09 | End: 2022-11-07

## 2022-08-09 RX ORDER — DIAZEPAM 5 MG/1
5 TABLET ORAL EVERY 6 HOURS PRN
COMMUNITY

## 2022-08-09 RX ORDER — MEMANTINE HYDROCHLORIDE 5 MG/1
5 TABLET ORAL 2 TIMES DAILY
COMMUNITY

## 2022-08-09 RX ORDER — FAMOTIDINE 40 MG/1
40 TABLET, FILM COATED ORAL DAILY
Qty: 90 TABLET | Refills: 3 | Status: SHIPPED | OUTPATIENT
Start: 2022-08-09

## 2022-08-09 SDOH — ECONOMIC STABILITY: FOOD INSECURITY: WITHIN THE PAST 12 MONTHS, THE FOOD YOU BOUGHT JUST DIDN'T LAST AND YOU DIDN'T HAVE MONEY TO GET MORE.: NEVER TRUE

## 2022-08-09 SDOH — ECONOMIC STABILITY: FOOD INSECURITY: WITHIN THE PAST 12 MONTHS, YOU WORRIED THAT YOUR FOOD WOULD RUN OUT BEFORE YOU GOT MONEY TO BUY MORE.: NEVER TRUE

## 2022-08-09 ASSESSMENT — PATIENT HEALTH QUESTIONNAIRE - PHQ9
2. FEELING DOWN, DEPRESSED OR HOPELESS: 0
SUM OF ALL RESPONSES TO PHQ QUESTIONS 1-9: 0
SUM OF ALL RESPONSES TO PHQ QUESTIONS 1-9: 0
1. LITTLE INTEREST OR PLEASURE IN DOING THINGS: 0
SUM OF ALL RESPONSES TO PHQ QUESTIONS 1-9: 0
SUM OF ALL RESPONSES TO PHQ QUESTIONS 1-9: 0
SUM OF ALL RESPONSES TO PHQ9 QUESTIONS 1 & 2: 0

## 2022-08-09 ASSESSMENT — SOCIAL DETERMINANTS OF HEALTH (SDOH): HOW HARD IS IT FOR YOU TO PAY FOR THE VERY BASICS LIKE FOOD, HOUSING, MEDICAL CARE, AND HEATING?: NOT HARD AT ALL

## 2022-08-09 NOTE — PATIENT INSTRUCTIONS
[facility] Laboratory Locations - No appointment necessary. @ indicates the location is open Saturdays in addition to Monday through Friday. Call your preferred location for test preparation, business hours and other information you need. [employer] accepts [employer]. [facility]    @ [place] [facility] Sv[facility] 3 [facility] Karrie Ibrahim. [place], [address]   Ph: [phone] [facility] Lab Svcs. [address]   Ph: [phone]  @ [place] [address] Lab Svcs. [address]   Ph: [phone]    [facility] Lab Svcs. [address] [facility] [place], [address]   Ph: [phone] @ [place] Lab Svcs. [address]  Ph: [phone] @ [facility] Lab Svcs. [address] [facility] [address]   Ph: [phone]     [facility] Svcs. [address]  Ph: [phone]    [place]   @ [place] Lab Svcs. [address] [place], [place] [address]   Ph: [phone] [facility] Med. Office [address] [facility], [address] [place] [address]  Ph: [phone] [facility] [address], [address]   [facility] [address] Lab Svcs[address] 54 [facility]   Ph: [phone] [facility]. Lab Svcs.   [address] [facility], [address] [place] [address]   Ph: [phone]

## 2022-08-09 NOTE — PROGRESS NOTES
Bernard Gaston (:  1955) is a 75 y.o. female,Established patient, here for evaluation of the following chief complaint(s):  Follow-up, Medication Refill, and Ankle Pain (Left )         ASSESSMENT/PLAN:  1. Irritable bowel syndrome, unspecified type  - stable  - OARRS reviewed, no concerns  -     diazePAM (VALIUM) 5 MG tablet; Take daily as needed for IBS symptoms. May take second dose if first dose does not provide relief. Do not take with alprazolam, Disp-45 tablet, R-2Normal  2. Restless leg   -Start ropinirole 0.5 mg nightly. It is possible amitriptyline could be playing a role, but since the hyponatremia has resolved would hold off on making any changes there. 3. Anxiety   -Hyponatremia has fully resolved, was thought to be SIADH related to Cymbalta. Cymbalta dose is down to 20 mg, will try discontinuing this and continue the amitriptyline at 25 mg daily. If needed we can increase the amitriptyline to 50 mg, we will have to see how the anxiety changes with the change in medication. 4. Hypothyroidism, unspecified type   -TSH is improved though very slightly low, will hold off on making any changes since it is so close to normal, continue levothyroxine 125 mcg daily  5. Hyperparathyroidism ([address]   -Calcium normalized, monitor off of calcium and vitamin D    Return in about 3 months (around 2022) for IBS, anxiety, restless legs. Subjective   SUBJECTIVE/OBJECTIVE:  HPI    Review of symptoms are stable. She uses diazepam 1-2 times a day if needed for more severe symptoms. No significant change. She has been experiencing more restless leg syndrome symptoms recently. She had at some point in the past and took medication but then it went away and she has not been taking anything for years. At nighttime she will feel like her legs are restless in the ankles, does not seem to extend up into the calves. No significant change in anxiety.   She is taking the duloxetine 20 mg daily and amitriptyline 25 mg nightly. No morning fatigue. She is taking levothyroxine every day. No palpitations or weight loss. She is off of calcium and vitamin D supplements. Review of Systems       Objective   Physical Exam  Vitals reviewed. Constitutional:       General: She is not in acute distress. Appearance: Normal appearance. She is well-developed. HENT:      Head: Normocephalic and atraumatic. Cardiovascular:      Rate and Rhythm: Normal rate and regular rhythm. Heart sounds: Normal heart sounds. Pulmonary:      Effort: Pulmonary effort is normal. No respiratory distress. Breath sounds: Normal breath sounds. Skin:     General: Skin is warm and dry. Neurological:      Mental Status: She is alert. Psychiatric:         Mood and Affect: Mood is anxious. Behavior: Behavior normal.         Thought Content: Thought content normal.         Judgment: Judgment normal.                An electronic signature was used to authenticate this note.     --Simin Fountain MD

## 2022-08-10 PROBLEM — N30.00 ACUTE CYSTITIS WITHOUT HEMATURIA: Status: RESOLVED | Noted: 2018-08-07 | Resolved: 2022-08-10

## 2022-08-29 ENCOUNTER — TELEPHONE (OUTPATIENT)
Dept: INTERNAL MEDICINE CLINIC | Age: 67
End: 2022-08-29

## 2022-08-29 RX ORDER — AMITRIPTYLINE HYDROCHLORIDE 50 MG/1
50 TABLET, FILM COATED ORAL NIGHTLY
Qty: 90 TABLET | Refills: 1 | Status: SHIPPED | OUTPATIENT
Start: 2022-08-29

## 2022-08-29 NOTE — TELEPHONE ENCOUNTER
Pt would like to know if  can up the dose for the following medication because she has been off of her Cymbalta for 2 weeks and she is more irritable and emotional .        amitriptyline (ELAVIL) 25 MG tablet        [facility] [id number] - [facility], [facility] [address] [address] [facility] [address], [place] [address]             Please call and advise.

## 2022-09-16 ENCOUNTER — TELEPHONE (OUTPATIENT)
Dept: INTERNAL MEDICINE CLINIC | Age: 67
End: 2022-09-16

## 2022-09-16 NOTE — TELEPHONE ENCOUNTER
Pt would like her order for denosumab (PROLIA) 60 MG/ML SOSY SC injection to get sent over to [address]    Phone- [phone]     Didn't provide a fax      Please advise

## 2022-09-19 RX ORDER — SODIUM CHLORIDE 9 MG/ML
INJECTION, SOLUTION INTRAVENOUS CONTINUOUS
Status: CANCELLED | OUTPATIENT
Start: 2022-09-19

## 2022-09-19 RX ORDER — ONDANSETRON 2 MG/ML
8 INJECTION INTRAMUSCULAR; INTRAVENOUS
Status: CANCELLED | OUTPATIENT
Start: 2022-09-19

## 2022-09-19 RX ORDER — DIPHENHYDRAMINE HYDROCHLORIDE 50 MG/ML
50 INJECTION INTRAMUSCULAR; INTRAVENOUS
Status: CANCELLED | OUTPATIENT
Start: 2022-09-19

## 2022-09-19 RX ORDER — ACETAMINOPHEN 325 MG/1
650 TABLET ORAL
Status: CANCELLED | OUTPATIENT
Start: 2022-09-19

## 2022-09-19 RX ORDER — EPINEPHRINE 1 MG/ML
0.3 INJECTION, SOLUTION, CONCENTRATE INTRAVENOUS PRN
Status: CANCELLED | OUTPATIENT
Start: 2022-09-19

## 2022-09-19 RX ORDER — ALBUTEROL SULFATE 90 UG/1
4 AEROSOL, METERED RESPIRATORY (INHALATION) PRN
Status: CANCELLED | OUTPATIENT
Start: 2022-09-19

## 2022-10-04 ENCOUNTER — HOSPITAL ENCOUNTER (OUTPATIENT)
Dept: INFUSION THERAPY | Age: 67
Setting detail: INFUSION SERIES
Discharge: HOME | End: 2022-10-04
Payer: MEDICARE

## 2022-10-04 VITALS
HEART RATE: 73 BPM | BODY MASS INDEX: 28.16 KG/M2 | TEMPERATURE: 98.2 F | DIASTOLIC BLOOD PRESSURE: 52 MMHG | SYSTOLIC BLOOD PRESSURE: 110 MMHG | OXYGEN SATURATION: 98 % | HEIGHT: 65 IN | WEIGHT: 169 LBS | RESPIRATION RATE: 17 BRPM

## 2022-10-04 DIAGNOSIS — M81.0 OSTEOPOROSIS WITHOUT CURRENT PATHOLOGICAL FRACTURE, UNSPECIFIED OSTEOPOROSIS TYPE: Primary | ICD-10-CM

## 2022-10-04 PROCEDURE — 96372 THER/PROPH/DIAG INJ SC/IM: CPT

## 2022-10-04 PROCEDURE — 6360000002 HC RX W HCPCS: Performed by: INTERNAL MEDICINE

## 2022-10-04 RX ORDER — DIPHENHYDRAMINE HYDROCHLORIDE 50 MG/ML
50 INJECTION INTRAMUSCULAR; INTRAVENOUS
Status: CANCELLED | OUTPATIENT
Start: 2023-04-04

## 2022-10-04 RX ORDER — ONDANSETRON 2 MG/ML
8 INJECTION INTRAMUSCULAR; INTRAVENOUS
Status: CANCELLED | OUTPATIENT
Start: 2023-04-04

## 2022-10-04 RX ORDER — ALBUTEROL SULFATE 90 UG/1
4 AEROSOL, METERED RESPIRATORY (INHALATION) PRN
Status: CANCELLED | OUTPATIENT
Start: 2023-04-04

## 2022-10-04 RX ORDER — EPINEPHRINE 1 MG/ML
0.3 INJECTION, SOLUTION, CONCENTRATE INTRAVENOUS PRN
Status: CANCELLED | OUTPATIENT
Start: 2023-04-04

## 2022-10-04 RX ORDER — SODIUM CHLORIDE 9 MG/ML
INJECTION, SOLUTION INTRAVENOUS CONTINUOUS
Status: CANCELLED | OUTPATIENT
Start: 2023-04-04

## 2022-10-04 RX ORDER — ACETAMINOPHEN 325 MG/1
650 TABLET ORAL
Status: CANCELLED | OUTPATIENT
Start: 2023-04-04

## 2022-10-04 RX ADMIN — DENOSUMAB 60 MG: 60 INJECTION SUBCUTANEOUS at 10:47

## 2022-10-04 NOTE — PROGRESS NOTES
[address] [facility]     Prolia Visit    NAME:  Corinthia Brittle OF BIRTH:  1955  MEDICAL RECORD NUMBER:  [id number]  Episode Date:  10/4/2022    Patient arrived to [facility]   [] per wheelchair   [x] ambulatory     Is this the patient's first Prolia Injection? No     Date of last Prolia Injection ? March 22, 2022. Did the patient experience any adverse reactions to Prolia Injection? No    Any recent oral or dental surgery? No    Any recent active fever, infections and/or illnesses? No    Patient has history of pathological fracture? No    Patient has had a recent fracture due to trauma or injury? No    Patient has had a recent orthopedic surgery or procedure done? No    Approximate date of last Dexa scan? 7/1/21       BP (!) 110/52   Pulse 73   Temp 98.2 °F (36.8 °C) (Oral)   Resp 17   Ht 5' 5\" (1.651 m)   Wt 169 lb (76.7 kg)   SpO2 98%   BMI 28.12 kg/m²     Current Lab Data:    Calcium:    Lab Results   Component Value Date/Time    CALCIUM 10.1 08/02/2022 07:01 AM       Patient Currently taking Calcium Supplements? Yes      Prolia administered: Yes    Prolia dosage: 60 mg was administered slowly subcutaneously into the left upper arm. Response to treatment:  Well tolerated by patient. Due for next dose of Prolia after March 5, 2023.      Electronically signed by Pam Chase RN on 10/4/2022 at 10:43 AM

## 2022-10-13 ENCOUNTER — OFFICE VISIT (OUTPATIENT)
Dept: PULMONOLOGY | Age: 67
End: 2022-10-13
Payer: MEDICARE

## 2022-10-13 VITALS
HEART RATE: 73 BPM | DIASTOLIC BLOOD PRESSURE: 56 MMHG | WEIGHT: 173 LBS | BODY MASS INDEX: 28.82 KG/M2 | HEIGHT: 65 IN | OXYGEN SATURATION: 97 % | SYSTOLIC BLOOD PRESSURE: 91 MMHG

## 2022-10-13 DIAGNOSIS — Z87.891 HISTORY OF SMOKING 30 OR MORE PACK YEARS: ICD-10-CM

## 2022-10-13 DIAGNOSIS — J45.20 MILD INTERMITTENT ASTHMA WITHOUT COMPLICATION: Primary | ICD-10-CM

## 2022-10-13 PROCEDURE — G8484 FLU IMMUNIZE NO ADMIN: HCPCS | Performed by: INTERNAL MEDICINE

## 2022-10-13 PROCEDURE — 1123F ACP DISCUSS/DSCN MKR DOCD: CPT | Performed by: INTERNAL MEDICINE

## 2022-10-13 PROCEDURE — 3017F COLORECTAL CA SCREEN DOC REV: CPT | Performed by: INTERNAL MEDICINE

## 2022-10-13 PROCEDURE — G8399 PT W/DXA RESULTS DOCUMENT: HCPCS | Performed by: INTERNAL MEDICINE

## 2022-10-13 PROCEDURE — G8417 CALC BMI ABV UP PARAM F/U: HCPCS | Performed by: INTERNAL MEDICINE

## 2022-10-13 PROCEDURE — 99214 OFFICE O/P EST MOD 30 MIN: CPT | Performed by: INTERNAL MEDICINE

## 2022-10-13 PROCEDURE — 1036F TOBACCO NON-USER: CPT | Performed by: INTERNAL MEDICINE

## 2022-10-13 PROCEDURE — 1090F PRES/ABSN URINE INCON ASSESS: CPT | Performed by: INTERNAL MEDICINE

## 2022-10-13 PROCEDURE — G8427 DOCREV CUR MEDS BY ELIG CLIN: HCPCS | Performed by: INTERNAL MEDICINE

## 2022-10-13 RX ORDER — PROCHLORPERAZINE MALEATE 10 MG
TABLET ORAL
Qty: 30 TABLET | Refills: 4 | Status: SHIPPED | OUTPATIENT
Start: 2022-10-13

## 2022-10-13 NOTE — PROGRESS NOTES
Sara Keller (:  1955) is a 75 y.o. female,Established patient, here for evaluation of the following chief complaint(s):  1 Year Follow Up (Asthma/)         ASSESSMENT/PLAN:  1. Mild intermittent asthma without complication  2. History of smoking 30 or more pack years  Asthma has not been causing symptoms in the past year. She has not required bronchodilators or inhaled glucocorticoid. Cough resolved after myomectomy and mitral valve replacement. She had screening CT scan in May 2022, showing no change from prior CT chest in 2021. She will continue screening CT because of smoking history. 15-year monitoring interval should end in . She is encouraged to develop a regular program of exercise. As her sedentary lifestyle becomes increasingly sedentary, she misses the opportunity to be aware of changes in cardiac function that may be important. It is unlikely that her pulmonary function will ever become significantly worse that it would impact exercise tolerance in any meaningful way. As needed treatment is appropriate for her mild degree of airways disease      Return if symptoms worsen or fail to improve. Subjective   SUBJECTIVE/OBJECTIVE:  LATRICIA Dennis returns for regular follow-up for mild asthma and history of smoking. She has been doing very well from a respiratory standpoint in the past year. She had an episode of cough a few months ago that was self-limiting, otherwise no complaints. She does not have exertional dyspnea, chest tightness, wheezing, nocturnal chest symptoms. She has not required use of inhaled bronchodilators. She was walking on a somewhat regular basis but began having problems with her back and so she stopped, states that the only thing that made her back feeling better. She has a fairly sedentary lifestyle. She has not had issues with respiratory infections. We discussed vaccinations.   She has had initial COVID vaccine and 2 boosters, all from [employer]. She is getting a flu vaccine annually. She was not sure about getting another COVID booster, for the omicron. After we discussed vaccinations, she recognized value in keeping up with COVID booster vaccines. She is holding up for [employer], as she has not had any reactions to prior injections. Objective   Physical Exam  Vitals reviewed. Constitutional:       Appearance: She is well-developed and overweight. HENT:      Head: Normocephalic and atraumatic. Mouth/Throat:      Mouth: Mucous membranes are moist.      Pharynx: Oropharynx is clear. No oropharyngeal exudate. Eyes:      General: No scleral icterus. Right eye: No discharge. Left eye: No discharge. Neck:      Thyroid: No thyromegaly. Trachea: No tracheal deviation. Cardiovascular:      Rate and Rhythm: Normal rate and regular rhythm. Pulses:           Radial pulses are 2+ on the right side and 2+ on the left side. Heart sounds: S1 normal and S2 normal. Murmur heard. Crescendo systolic murmur is present with a grade of 2/6. Pulmonary:      Effort: Pulmonary effort is normal. No respiratory distress. Breath sounds: Normal breath sounds. No stridor. No wheezing, rhonchi or rales. Chest:      Chest wall: No tenderness. Musculoskeletal:      Right lower leg: No edema. Left lower leg: No edema. Lymphadenopathy:      Cervical: No cervical adenopathy. Skin:     General: Skin is warm and dry. Neurological:      Mental Status: She is alert and oriented to person, place, and time. Psychiatric:         Mood and Affect: Mood normal.         Behavior: Behavior normal.         Thought Content: Thought content normal.         Judgment: Judgment normal.     CT chest on 5/10/2022 is reviewed. Mild emphysema present. Atelectatic changes seen in dependent lung zones, and lingula. Few 2 mm nodule seen. No change from prior CT November 2021.        On this date 10/13/2022 I have spent 30 minutes reviewing previous notes, test results and face to face with the patient discussing the diagnosis and importance of compliance with the treatment plan as well as documenting on the day of the visit. An electronic signature was used to authenticate this note.     --Viviana Motley MD

## 2022-10-14 ENCOUNTER — OFFICE VISIT (OUTPATIENT)
Dept: CARDIOLOGY CLINIC | Age: 67
End: 2022-10-14
Payer: MEDICARE

## 2022-10-14 VITALS
SYSTOLIC BLOOD PRESSURE: 100 MMHG | HEART RATE: 72 BPM | BODY MASS INDEX: 28.92 KG/M2 | WEIGHT: 173.8 LBS | DIASTOLIC BLOOD PRESSURE: 60 MMHG

## 2022-10-14 DIAGNOSIS — I42.1 HYPERTROPHIC OBSTRUCTIVE CARDIOMYOPATHY (HCC): ICD-10-CM

## 2022-10-14 DIAGNOSIS — Z95.2 H/O MITRAL VALVE REPLACEMENT: Primary | ICD-10-CM

## 2022-10-14 DIAGNOSIS — Z98.890 S/P VENTRICULAR SEPTAL MYECTOMY: ICD-10-CM

## 2022-10-14 PROCEDURE — 99204 OFFICE O/P NEW MOD 45 MIN: CPT | Performed by: INTERNAL MEDICINE

## 2022-10-14 PROCEDURE — G8484 FLU IMMUNIZE NO ADMIN: HCPCS | Performed by: INTERNAL MEDICINE

## 2022-10-14 PROCEDURE — G8417 CALC BMI ABV UP PARAM F/U: HCPCS | Performed by: INTERNAL MEDICINE

## 2022-10-14 PROCEDURE — 1090F PRES/ABSN URINE INCON ASSESS: CPT | Performed by: INTERNAL MEDICINE

## 2022-10-14 PROCEDURE — G8427 DOCREV CUR MEDS BY ELIG CLIN: HCPCS | Performed by: INTERNAL MEDICINE

## 2022-10-14 RX ORDER — METOPROLOL SUCCINATE 100 MG/1
100 TABLET, EXTENDED RELEASE ORAL DAILY
Qty: 90 TABLET | Refills: 3 | Status: SHIPPED | OUTPATIENT
Start: 2022-10-14

## 2022-10-25 RX ORDER — ATORVASTATIN CALCIUM 80 MG/1
TABLET, FILM COATED ORAL
Qty: 90 TABLET | Refills: 3 | Status: SHIPPED | OUTPATIENT
Start: 2022-10-25

## 2022-10-31 ENCOUNTER — TELEPHONE (OUTPATIENT)
Dept: CARDIOLOGY CLINIC | Age: 67
End: 2022-10-31

## 2022-11-01 NOTE — TELEPHONE ENCOUNTER
Spoke with pt she reports the following BP and HR's:    10/15 111/68 HR 69    10/16 94/57 HR 76    10/17 100/66 HR 80    10/18 134/73  HR 76    10/19 106/68 HR 70    10/20 84/55 HR 80    10/21 10/62 HR 73    10/22 96/62  HR 77    10/23 102/64 HR 68    10/24 124/69 HR 73    10/25 100/59 HR 66    10/26 124/77 HR 70    10/27 103/70 HR 64    10/28 126/74 HR 75    10/29 121/75 HR 72    10/30 136/79 HR 69    The BP and HR's were all taken between 1-3 pm. Pt reports no sx's even on 10/20 when BP was 84/55. Pt said she has been feeling good. Advised pt to call our office with any changes and will consult with GEB when he returns next week. Pt verbalized understanding.

## 2022-11-07 ENCOUNTER — TELEPHONE (OUTPATIENT)
Dept: INTERNAL MEDICINE CLINIC | Age: 67
End: 2022-11-07

## 2022-11-07 NOTE — TELEPHONE ENCOUNTER
Patient tested positive for covid yesterday. Symptoms started Friday with cold, achy, fever yesterday 100.4, fatigues, runny nose, congestion. Would like to get a prescription for Paxlovid.     [facility] [id number] [facility], [facility] [address] [facility] 860 - f [phone]    Patient would like a call when this is done, she is not being notified when prescriptions are sent or ready!!

## 2022-11-08 NOTE — TELEPHONE ENCOUNTER
LM informing pt GEB reviewed BP and HR readings and would like pt to continue current medication. Advised pt to call the office and ask for [facility].

## 2022-11-09 NOTE — TELEPHONE ENCOUNTER
Pt called back to verify she received the message GEB reviewed BP and HR readings and would like pt to continue current medication. Pt verbalized understanding.

## 2022-11-29 ENCOUNTER — OFFICE VISIT (OUTPATIENT)
Dept: INTERNAL MEDICINE CLINIC | Age: 67
End: 2022-11-29
Payer: MEDICARE

## 2022-11-29 VITALS
BODY MASS INDEX: 29.32 KG/M2 | HEIGHT: 65 IN | SYSTOLIC BLOOD PRESSURE: 128 MMHG | WEIGHT: 176 LBS | DIASTOLIC BLOOD PRESSURE: 70 MMHG

## 2022-11-29 DIAGNOSIS — E21.3 HYPERPARATHYROIDISM (HCC): ICD-10-CM

## 2022-11-29 DIAGNOSIS — G25.81 RESTLESS LEG SYNDROME: ICD-10-CM

## 2022-11-29 DIAGNOSIS — I10 PRIMARY HYPERTENSION: ICD-10-CM

## 2022-11-29 DIAGNOSIS — E03.9 HYPOTHYROIDISM, UNSPECIFIED TYPE: ICD-10-CM

## 2022-11-29 DIAGNOSIS — F41.9 ANXIETY: Primary | ICD-10-CM

## 2022-11-29 PROCEDURE — 99214 OFFICE O/P EST MOD 30 MIN: CPT | Performed by: INTERNAL MEDICINE

## 2022-11-29 PROCEDURE — G8484 FLU IMMUNIZE NO ADMIN: HCPCS | Performed by: INTERNAL MEDICINE

## 2022-11-29 PROCEDURE — 1090F PRES/ABSN URINE INCON ASSESS: CPT | Performed by: INTERNAL MEDICINE

## 2022-11-29 PROCEDURE — 3017F COLORECTAL CA SCREEN DOC REV: CPT | Performed by: INTERNAL MEDICINE

## 2022-11-29 PROCEDURE — 3074F SYST BP LT 130 MM HG: CPT | Performed by: INTERNAL MEDICINE

## 2022-11-29 PROCEDURE — 1123F ACP DISCUSS/DSCN MKR DOCD: CPT | Performed by: INTERNAL MEDICINE

## 2022-11-29 PROCEDURE — G8399 PT W/DXA RESULTS DOCUMENT: HCPCS | Performed by: INTERNAL MEDICINE

## 2022-11-29 PROCEDURE — 3078F DIAST BP <80 MM HG: CPT | Performed by: INTERNAL MEDICINE

## 2022-11-29 PROCEDURE — 1036F TOBACCO NON-USER: CPT | Performed by: INTERNAL MEDICINE

## 2022-11-29 PROCEDURE — G8417 CALC BMI ABV UP PARAM F/U: HCPCS | Performed by: INTERNAL MEDICINE

## 2022-11-29 PROCEDURE — G8427 DOCREV CUR MEDS BY ELIG CLIN: HCPCS | Performed by: INTERNAL MEDICINE

## 2022-11-29 RX ORDER — BUSPIRONE HYDROCHLORIDE 5 MG/1
5 TABLET ORAL 3 TIMES DAILY
Qty: 90 TABLET | Refills: 2 | Status: SHIPPED | OUTPATIENT
Start: 2022-11-29

## 2022-11-29 RX ORDER — ALPRAZOLAM 0.5 MG/1
0.5 TABLET ORAL DAILY PRN
Qty: 30 TABLET | Refills: 2 | Status: SHIPPED | OUTPATIENT
Start: 2022-11-29 | End: 2023-11-29

## 2022-11-29 RX ORDER — ROPINIROLE 0.5 MG/1
0.5 TABLET, FILM COATED ORAL 3 TIMES DAILY
Qty: 90 TABLET | Refills: 2 | Status: SHIPPED | OUTPATIENT
Start: 2022-11-29

## 2022-11-29 NOTE — PATIENT INSTRUCTIONS
Start buspirone at twice a day - if there is wear off in the middle of the day, add the third dose  If the buspirone helps the anxiety after 2 weeks, cut the amitriptyline in half. none

## 2022-11-29 NOTE — PROGRESS NOTES
Geraldine Greene (:  1955) is a 75 y.o. female,Established patient, here for evaluation of the following chief complaint(s): Anxiety         ASSESSMENT/PLAN:  1. Anxiety  -Start buspirone 5 mg twice daily, can increase to 3 times daily if tolerating well. If this medication is helping, then cut the amitriptyline down to 25 mg daily. Would increase buspirone further, may be able to stop amitriptyline altogether. Continue alprazolam as needed, OARRS reviewed, consistent with use. -     ALPRAZolam (XANAX) 0.5 MG tablet; Take 1 tablet by mouth daily as needed for Anxiety. , Disp-30 tablet, R-2Normal  2. Restless leg syndrome   -Increase ropinirole to 0.5 mg 2-3 times daily. It is possible that the amitriptyline has exacerbated her symptoms, if we are able to transition back off of that medication then this may improve. 3. Primary hypertension  -Blood work ordered for next visit  -     Comprehensive Metabolic Panel; Future  -     Lipid Panel; Future  -     CBC with Auto Differential; Future  4. Hyperparathyroidism ([facility] [address]  -     Vitamin D 25 Hydroxy; Future  -     PTH, Intact; Future  5. Hypothyroidism, unspecified type  -     TSH with Reflex; Future      Return in about 2 months (around 2023) for anxiety. Subjective   SUBJECTIVE/OBJECTIVE:  HPI    She is taking the 50 mg of amitriptyline. She actually thinks that it is harder for her to fall asleep with the amitriptyline at 50 mg than at the 25 mg dose. She has been needing the alprazolam for anxiety symptoms. Started to notice restless leg syndrome symptoms in the morning and sometimes in the middle of the day. Ropinirole is still helpful at night. Review of Systems       Objective   Physical Exam  Vitals reviewed. Constitutional:       General: She is not in acute distress. Appearance: Normal appearance. She is well-developed. HENT:      Head: Normocephalic and atraumatic.    Cardiovascular:      Rate and Rhythm: Normal rate and regular rhythm. Heart sounds: Normal heart sounds. Pulmonary:      Effort: Pulmonary effort is normal. No respiratory distress. Breath sounds: Normal breath sounds. Skin:     General: Skin is warm and dry. Neurological:      Mental Status: She is alert. Psychiatric:         Behavior: Behavior normal.         Thought Content: Thought content normal.         Judgment: Judgment normal.                An electronic signature was used to authenticate this note.     --Amelie Reed MD

## 2022-12-01 ENCOUNTER — TELEPHONE (OUTPATIENT)
Dept: INTERNAL MEDICINE CLINIC | Age: 67
End: 2022-12-01

## 2022-12-01 ENCOUNTER — OFFICE VISIT (OUTPATIENT)
Dept: ENT CLINIC | Age: 67
End: 2022-12-01
Payer: MEDICARE

## 2022-12-01 VITALS
BODY MASS INDEX: 29.16 KG/M2 | DIASTOLIC BLOOD PRESSURE: 75 MMHG | WEIGHT: 175 LBS | HEIGHT: 65 IN | SYSTOLIC BLOOD PRESSURE: 136 MMHG

## 2022-12-01 DIAGNOSIS — K14.8 TONGUE LESION: Primary | ICD-10-CM

## 2022-12-01 PROCEDURE — G8417 CALC BMI ABV UP PARAM F/U: HCPCS | Performed by: OTOLARYNGOLOGY

## 2022-12-01 PROCEDURE — 1036F TOBACCO NON-USER: CPT | Performed by: OTOLARYNGOLOGY

## 2022-12-01 PROCEDURE — G8484 FLU IMMUNIZE NO ADMIN: HCPCS | Performed by: OTOLARYNGOLOGY

## 2022-12-01 PROCEDURE — G8399 PT W/DXA RESULTS DOCUMENT: HCPCS | Performed by: OTOLARYNGOLOGY

## 2022-12-01 PROCEDURE — 1123F ACP DISCUSS/DSCN MKR DOCD: CPT | Performed by: OTOLARYNGOLOGY

## 2022-12-01 PROCEDURE — 1090F PRES/ABSN URINE INCON ASSESS: CPT | Performed by: OTOLARYNGOLOGY

## 2022-12-01 PROCEDURE — 99212 OFFICE O/P EST SF 10 MIN: CPT | Performed by: OTOLARYNGOLOGY

## 2022-12-01 PROCEDURE — 3017F COLORECTAL CA SCREEN DOC REV: CPT | Performed by: OTOLARYNGOLOGY

## 2022-12-01 PROCEDURE — G8427 DOCREV CUR MEDS BY ELIG CLIN: HCPCS | Performed by: OTOLARYNGOLOGY

## 2022-12-01 PROCEDURE — 3078F DIAST BP <80 MM HG: CPT | Performed by: OTOLARYNGOLOGY

## 2022-12-01 PROCEDURE — 3074F SYST BP LT 130 MM HG: CPT | Performed by: OTOLARYNGOLOGY

## 2022-12-01 ASSESSMENT — ENCOUNTER SYMPTOMS
EYE REDNESS: 0
SINUS PAIN: 0
FACIAL SWELLING: 0
SINUS PRESSURE: 0
DIARRHEA: 0
SHORTNESS OF BREATH: 0
EYE ITCHING: 0
NAUSEA: 0
CHOKING: 0
COUGH: 0
SORE THROAT: 0
EYE PAIN: 0
RHINORRHEA: 0
TROUBLE SWALLOWING: 0
VOICE CHANGE: 0

## 2022-12-01 NOTE — PROGRESS NOTES
Subjective:      Patient ID: Lila García is a 75 y.o. female. HPI  Chief Complaint   Patient presents with    bump on tongue       History of Present Illness  Head/Neck    Julio Mondragon is a(n) 75 y.o. female who presents with a a one year history of a small bump on the tongue. Former smoker. No pain. No bleeding. Does not bite it. Does not effect eating or talking.      Patient Active Problem List   Diagnosis    Hypertension    Microscopic colitis    GERD (gastroesophageal reflux disease)    Migraine    Osteoporosis    Anxiety    Neck arthritis C6/C7    Nephrolithiasis    Hypothyroidism    Endometrial hyperplasia    Chronic nausea    Mild intermittent asthma without complication    Fatty liver    Multiple thyroid nodules    Severe mitral regurgitation    Hypertrophic obstructive cardiomyopathy (HCC)    Mixed hyperlipidemia    S/P ventricular septal myectomy    H/O mitral valve replacement    Hyperparathyroidism (HCC)    Pulmonary nodule 1 cm or greater in diameter    History of smoking 30 or more pack years    Restless leg syndrome     Past Surgical History:   Procedure Laterality Date    CARDIAC CATHETERIZATION  02/28/2020    CARDIOVASCULAR STRESS TEST  2016    normal    CHOLECYSTECTOMY, LAPAROSCOPIC N/A 3/27/2019    w/cholangiogram w/C-arm, performed by Samaria Weir MD at [place] [address]  7/13    COLONOSCOPY  03/29/2018    Afshan (random bxs)-microscopic colitis    KNEE SURGERY Right 1975    synovectomy    MITRAL VALVE REPAIR N/A 6/29/2020    [facility]E; TCPB; resection of hypertrophic muscle from septal LVOT; miital valve replacement w/ 25mm Medtronic Mosaic bovine pericardial bioprosthetic valve; TCPB; ICNB x 5 levels bilatereally performed by Bri Fitzgerald MD at [facility] [address] Left     Partial    TRANSESOPHAGEAL ECHOCARDIOGRAM  03/11/2020    Dr. Tate [place] ENDOSCOPY  03/2018    Normal    WRIST FRACTURE SURGERY Left 2009     Family History   Problem Relation Age of Onset    Cancer Mother         leukemia    Hypertension Mother     Other Father         CRF    Heart Surgery Father         CABG    Blindness Father     Hypertension Father     Hearing Loss Father      Social History     Socioeconomic History    Marital status:      Spouse name: Not on file    Number of children: Not on file    Years of education: Not on file    Highest education level: Not on file   Occupational History    Not on file   Tobacco Use    Smoking status: Former     Packs/day: 1.00     Years: 38.00     Pack years: 38.00     Types: Cigarettes     Start date: 1972     Quit date: 2010     Years since quittin.5    Smokeless tobacco: Never   Vaping Use    Vaping Use: Never used   Substance and Sexual Activity    Alcohol use: Not Currently     Alcohol/week: 0.0 standard drinks    Drug use: No    Sexual activity: Not on file   Other Topics Concern    Not on file   Social History Narrative    Not on file     Social Determinants of Health     Financial Resource Strain: Low Risk     Difficulty of Paying Living Expenses: Not hard at all   Food Insecurity: No Food Insecurity    Worried About Running Out of Food in the Last Year: Never true    [address] in the Last Year: Never true   Transportation Needs: Not on file   Physical Activity: Insufficiently Active    Days of Exercise per Week: 1 day    Minutes of Exercise per Session: 10 min   Stress: Not on file   Social Connections: Not on file   Intimate Partner Violence: Not on file   Housing Stability: Not on file       DRUG/FOOD ALLERGIES: Tolectin [tolmetin]    CURRENT MEDICATIONS  Prior to Admission medications    Medication Sig Start Date End Date Taking? Authorizing Provider   ALPRAZolam Brittney Thomas) 0.5 MG tablet Take 1 tablet by mouth daily as needed for Anxiety.  22 Yes Madyson Bucio MD   atorvastatin (LIPITOR) 80 MG tablet TAKE ONE TABLET BY MOUTH EVERY EVENING 10/25/22  Yes TABITHA Ramos CNP   amitriptyline (ELAVIL) 50 MG tablet Take 1 tablet by mouth nightly 8/29/22  Yes Farley Runner, MD   Ascorbic Acid (VITAMIN C) 500 MG CAPS Take 500 mg by mouth   Yes Historical Provider, MD   aspirin 81 MG EC tablet Take 81 mg by mouth daily   Yes Historical MD Flaquito   rOPINIRole (REQUIP) 0.5 MG tablet Take 1 tablet by mouth 3 times daily 11/29/22   Farley Runner, MD   busPIRone (BUSPAR) 5 MG tablet Take 1 tablet by mouth 3 times daily 11/29/22   Farley Runner, MD   metoprolol succinate (TOPROL XL) 100 MG extended release tablet Take 1 tablet by mouth daily 10/14/22   Mary Jane Patricia MD   prochlorperazine (COMPAZINE) 10 MG tablet TAKE ONE TABLET BY MOUTH EVERY 6 HOURS AS NEEDED 10/13/22   Farley Runner, MD   diazePAM (VALIUM) 5 MG tablet Take 5 mg by mouth every 6 hours as needed for Anxiety. Historical Provider, MD   memantine (NAMENDA) 5 MG tablet Take 5 mg by mouth in the morning and 5 mg before bedtime. Historical Provider, MD   omeprazole (PRILOSEC) 40 MG delayed release capsule Take 1 capsule by mouth in the morning. 8/9/22   Farley Runner, MD   famotidine (PEPCID) 40 MG tablet Take 1 tablet by mouth in the morning.  8/9/22   Farley Runner, MD   dicyclomine (BENTYL) 20 MG tablet TAKE ONE TABLET BY MOUTH FOUR TIMES A DAY AS NEEDED 7/12/22   Farley Runner, MD   levothyroxine (SYNTHROID) 125 MCG tablet TAKE ONE TABLET BY MOUTH DAILY 3/14/22   Farley Runner, MD   denosumab (PROLIA) 60 MG/ML SOSY SC injection Inject 1 mL into the skin once for 1 dose 3/3/22 11/29/22  Farley Runner, MD   hydroCHLOROthiazide (HYDRODIURIL) 50 MG tablet Take 1 tablet by mouth daily 3/2/22   TABITHA Sexton CNP   ZOLMitriptan (ZOMIG) 5 MG tablet Take 5 mg by mouth as needed for Migraine    Historical Provider, MD   loratadine (CLARITIN) 10 MG tablet Take 10 mg by mouth daily    Historical Provider, MD   Coenzyme Q10 (CO Q-10) 200 MG CAPS Take 200 mg by mouth    Historical Provider, MD   docusate sodium (COLACE) 100 MG capsule Take 100 mg by mouth 2 times daily as needed for Constipation    Historical Provider, MD   Multiple Vitamins-Minerals (CENTRUM SILVER PO) Take 1 tablet by mouth daily    Historical Provider, MD   Omega-3 Fatty Acids (FISH OIL) 1200 MG CAPS Take 1 capsule by mouth 2 times daily    Historical Provider, MD   Flaxseed, Linseed, (FLAXSEED OIL PO) Take 1,300 mg by mouth daily    Historical Provider, MD   FIBER PO Take 1 capsule by mouth daily     Historical Provider, MD       Lab Studies:  Lab Results   Component Value Date    WBC 4.2 04/26/2022    HGB 13.9 04/26/2022    HCT 40.1 04/26/2022    MCV 86.3 04/26/2022     04/26/2022     Lab Results   Component Value Date    GLUCOSE 96 08/02/2022    BUN 12 08/02/2022    CREATININE 0.7 08/02/2022    K 4.2 08/02/2022     08/02/2022    CL 99 08/02/2022    CALCIUM 10.1 08/02/2022     Lab Results   Component Value Date    MG 1.90 07/03/2020     No results found for: PHOS  Lab Results   Component Value Date    ALKPHOS 87 04/26/2022    ALT 29 04/26/2022    AST 29 04/26/2022    BILITOT 0.3 04/26/2022    PROT 6.5 04/26/2022        Review of Systems   Constitutional:  Negative for activity change, appetite change, chills, fatigue and fever. HENT:  Negative for congestion, ear discharge, ear pain, facial swelling, hearing loss, nosebleeds, postnasal drip, rhinorrhea, sinus pressure, sinus pain, sneezing, sore throat, tinnitus, trouble swallowing and voice change. Eyes:  Negative for pain, redness, itching and visual disturbance. Respiratory:  Negative for cough, choking and shortness of breath. Gastrointestinal:  Negative for diarrhea and nausea. Endocrine: Negative for cold intolerance and heat intolerance. Objective:   Physical Exam  Constitutional:       General: She is not in acute distress. Appearance: She is well-developed. HENT:      Head: Not macrocephalic and not microcephalic. No abrasion or contusion. Mouth/Throat:      Lips: No lesions.       Mouth: No oral lesions. Dentition: Normal dentition. Tongue: No lesions. Palate: No mass. Pharynx: No oropharyngeal exudate, posterior oropharyngeal erythema or uvula swelling. Tonsils: No tonsillar abscesses. Neck:      Thyroid: No thyroid mass or thyromegaly. Trachea: No tracheal tenderness or tracheal deviation. Cardiovascular:      Rate and Rhythm: Normal rate and regular rhythm. Pulmonary:      Breath sounds: No stridor. Musculoskeletal:      Cervical back: Normal range of motion and neck supple. No edema or erythema. No muscular tenderness. Lymphadenopathy:      Head:      Right side of head: No submental, submandibular, tonsillar, preauricular, posterior auricular or occipital adenopathy. Left side of head: No submental, submandibular, tonsillar, preauricular or occipital adenopathy. Cervical: No cervical adenopathy. Right cervical: No superficial, deep or posterior cervical adenopathy. Left cervical: No superficial, deep or posterior cervical adenopathy. Skin:     General: Skin is warm and dry. Findings: No lesion. Neurological:      Mental Status: She is alert and oriented to person, place, and time. Psychiatric:         Mood and Affect: Mood is not anxious or depressed. Assessment:       Diagnosis Orders   1. Tongue lesion                Plan:      Small mucosal tag of right anterior tongue. Will defer on biopsy. Follow up in March for thyroid ultrasound.            Elham Sanchez MD

## 2022-12-01 NOTE — TELEPHONE ENCOUNTER
Pt asking if she has a IBS attack can she take a diazePAM (VALIUM) 5 MG tablet   With the busPIRone (BUSPAR) 5 MG tablet     She knows she cant take it with the Xanax     Please advise

## 2022-12-30 NOTE — ADDENDUM NOTE
Encounter addended by: Kanu Gonsales MA on: 12/30/2022 4:58 PM   Actions taken: Document created, Document edited

## 2023-01-04 ENCOUNTER — HOSPITAL ENCOUNTER (OUTPATIENT)
Dept: WOMENS IMAGING | Age: 68
Discharge: HOME | End: 2023-01-04
Payer: MEDICARE

## 2023-01-04 DIAGNOSIS — Z12.31 VISIT FOR SCREENING MAMMOGRAM: ICD-10-CM

## 2023-01-04 PROCEDURE — 77067 SCR MAMMO BI INCL CAD: CPT

## 2023-01-10 ENCOUNTER — TELEPHONE (OUTPATIENT)
Dept: CARDIOLOGY CLINIC | Age: 68
End: 2023-01-10

## 2023-01-11 NOTE — TELEPHONE ENCOUNTER
Pt scheduled for cataract surgery in February and will need a Cardiac Clearance. Surgeons office will be faxing a form for the Cardiac Clearance. Provided fax number for our office. Pt verbalized understanding.

## 2023-01-19 DIAGNOSIS — I10 PRIMARY HYPERTENSION: ICD-10-CM

## 2023-01-19 DIAGNOSIS — E03.9 HYPOTHYROIDISM, UNSPECIFIED TYPE: ICD-10-CM

## 2023-01-19 DIAGNOSIS — E21.3 HYPERPARATHYROIDISM (HCC): ICD-10-CM

## 2023-01-19 LAB
A/G RATIO: 1.4 (ref 1.1–2.2)
ALBUMIN SERPL-MCNC: 4 G/DL (ref 3.4–5)
ALP BLD-CCNC: 75 U/L (ref 40–129)
ALT SERPL-CCNC: 32 U/L (ref 10–40)
ANION GAP SERPL CALCULATED.3IONS-SCNC: 9 MMOL/L (ref 3–16)
AST SERPL-CCNC: 36 U/L (ref 15–37)
BASOPHILS ABSOLUTE: 0 K/UL (ref 0–0.2)
BASOPHILS RELATIVE PERCENT: 0.8 %
BILIRUB SERPL-MCNC: 0.3 MG/DL (ref 0–1)
BUN BLDV-MCNC: 9 MG/DL (ref 7–20)
CALCIUM SERPL-MCNC: 10.1 MG/DL (ref 8.3–10.6)
CHLORIDE BLD-SCNC: 100 MMOL/L (ref 99–110)
CHOLESTEROL, TOTAL: 151 MG/DL (ref 0–199)
CO2: 28 MMOL/L (ref 21–32)
CREAT SERPL-MCNC: 0.6 MG/DL (ref 0.6–1.2)
EOSINOPHILS ABSOLUTE: 0.1 K/UL (ref 0–0.6)
EOSINOPHILS RELATIVE PERCENT: 1.2 %
GFR SERPL CREATININE-BSD FRML MDRD: >60 ML/MIN/{1.73_M2}
GLUCOSE BLD-MCNC: 107 MG/DL (ref 70–99)
HCT VFR BLD CALC: 43.8 % (ref 36–48)
HDLC SERPL-MCNC: 63 MG/DL (ref 40–60)
HEMOGLOBIN: 14.6 G/DL (ref 12–16)
LDL CHOLESTEROL CALCULATED: 74 MG/DL
LYMPHOCYTES ABSOLUTE: 1.4 K/UL (ref 1–5.1)
LYMPHOCYTES RELATIVE PERCENT: 23.6 %
MCH RBC QN AUTO: 29.4 PG (ref 26–34)
MCHC RBC AUTO-ENTMCNC: 33.2 G/DL (ref 31–36)
MCV RBC AUTO: 88.6 FL (ref 80–100)
MONOCYTES ABSOLUTE: 0.5 K/UL (ref 0–1.3)
MONOCYTES RELATIVE PERCENT: 7.9 %
NEUTROPHILS ABSOLUTE: 3.9 K/UL (ref 1.7–7.7)
NEUTROPHILS RELATIVE PERCENT: 66.5 %
PARATHYROID HORMONE INTACT: 55.9 PG/ML (ref 14–72)
PDW BLD-RTO: 12.5 % (ref 12.4–15.4)
PLATELET # BLD: 262 K/UL (ref 135–450)
PMV BLD AUTO: 7.9 FL (ref 5–10.5)
POTASSIUM SERPL-SCNC: 3.7 MMOL/L (ref 3.5–5.1)
RBC # BLD: 4.95 M/UL (ref 4–5.2)
SODIUM BLD-SCNC: 137 MMOL/L (ref 136–145)
T3 TOTAL: 1.23 NG/ML (ref 0.8–2)
T4 FREE: 1.5 NG/DL (ref 0.9–1.8)
TOTAL PROTEIN: 6.8 G/DL (ref 6.4–8.2)
TRIGL SERPL-MCNC: 72 MG/DL (ref 0–150)
TSH REFLEX: 0.2 UIU/ML (ref 0.27–4.2)
VITAMIN D 25-HYDROXY: 32 NG/ML
VLDLC SERPL CALC-MCNC: 14 MG/DL
WBC # BLD: 5.8 K/UL (ref 4–11)

## 2023-01-25 RX ORDER — DICYCLOMINE HCL 20 MG
TABLET ORAL
Qty: 120 TABLET | Refills: 4 | Status: SHIPPED | OUTPATIENT
Start: 2023-01-25

## 2023-01-25 NOTE — TELEPHONE ENCOUNTER
Refill-     dicyclomine (BENTYL) 20 MG tablet      [facility] [id number] - [place], [phone] [facility]    Would like a call when ordered       Please advise

## 2023-01-31 ENCOUNTER — OFFICE VISIT (OUTPATIENT)
Dept: INTERNAL MEDICINE CLINIC | Age: 68
End: 2023-01-31
Payer: MEDICARE

## 2023-01-31 VITALS
OXYGEN SATURATION: 99 % | TEMPERATURE: 97.9 F | HEART RATE: 70 BPM | DIASTOLIC BLOOD PRESSURE: 82 MMHG | HEIGHT: 65 IN | WEIGHT: 174 LBS | BODY MASS INDEX: 28.99 KG/M2 | SYSTOLIC BLOOD PRESSURE: 130 MMHG

## 2023-01-31 DIAGNOSIS — F41.9 ANXIETY: Primary | ICD-10-CM

## 2023-01-31 DIAGNOSIS — R53.82 CHRONIC FATIGUE: ICD-10-CM

## 2023-01-31 DIAGNOSIS — G25.81 RESTLESS LEG SYNDROME: ICD-10-CM

## 2023-01-31 DIAGNOSIS — E78.2 MIXED HYPERLIPIDEMIA: ICD-10-CM

## 2023-01-31 DIAGNOSIS — E03.9 HYPOTHYROIDISM, UNSPECIFIED TYPE: Chronic | ICD-10-CM

## 2023-01-31 DIAGNOSIS — I10 PRIMARY HYPERTENSION: ICD-10-CM

## 2023-01-31 PROCEDURE — 3078F DIAST BP <80 MM HG: CPT | Performed by: INTERNAL MEDICINE

## 2023-01-31 PROCEDURE — 99214 OFFICE O/P EST MOD 30 MIN: CPT | Performed by: INTERNAL MEDICINE

## 2023-01-31 PROCEDURE — 3017F COLORECTAL CA SCREEN DOC REV: CPT | Performed by: INTERNAL MEDICINE

## 2023-01-31 PROCEDURE — 1123F ACP DISCUSS/DSCN MKR DOCD: CPT | Performed by: INTERNAL MEDICINE

## 2023-01-31 PROCEDURE — 3074F SYST BP LT 130 MM HG: CPT | Performed by: INTERNAL MEDICINE

## 2023-01-31 PROCEDURE — 1090F PRES/ABSN URINE INCON ASSESS: CPT | Performed by: INTERNAL MEDICINE

## 2023-01-31 PROCEDURE — G8484 FLU IMMUNIZE NO ADMIN: HCPCS | Performed by: INTERNAL MEDICINE

## 2023-01-31 PROCEDURE — G8417 CALC BMI ABV UP PARAM F/U: HCPCS | Performed by: INTERNAL MEDICINE

## 2023-01-31 PROCEDURE — G8399 PT W/DXA RESULTS DOCUMENT: HCPCS | Performed by: INTERNAL MEDICINE

## 2023-01-31 PROCEDURE — 1036F TOBACCO NON-USER: CPT | Performed by: INTERNAL MEDICINE

## 2023-01-31 PROCEDURE — G8427 DOCREV CUR MEDS BY ELIG CLIN: HCPCS | Performed by: INTERNAL MEDICINE

## 2023-01-31 RX ORDER — BUSPIRONE HYDROCHLORIDE 10 MG/1
10 TABLET ORAL 3 TIMES DAILY
Qty: 90 TABLET | Refills: 2 | Status: SHIPPED | OUTPATIENT
Start: 2023-01-31

## 2023-01-31 RX ORDER — LEVOTHYROXINE SODIUM 0.12 MG/1
TABLET ORAL
Qty: 90 TABLET | Refills: 3 | Status: SHIPPED | OUTPATIENT
Start: 2023-01-31

## 2023-01-31 RX ORDER — ALPRAZOLAM 0.5 MG/1
0.5 TABLET ORAL DAILY PRN
Qty: 30 TABLET | Refills: 2 | Status: SHIPPED | OUTPATIENT
Start: 2023-02-22 | End: 2024-02-22

## 2023-01-31 RX ORDER — PROCHLORPERAZINE MALEATE 10 MG
TABLET ORAL
Qty: 30 TABLET | Refills: 4 | Status: SHIPPED | OUTPATIENT
Start: 2023-01-31

## 2023-01-31 RX ORDER — AMITRIPTYLINE HYDROCHLORIDE 25 MG/1
25 TABLET, FILM COATED ORAL NIGHTLY
COMMUNITY

## 2023-01-31 ASSESSMENT — PATIENT HEALTH QUESTIONNAIRE - PHQ9
SUM OF ALL RESPONSES TO PHQ9 QUESTIONS 1 & 2: 0
SUM OF ALL RESPONSES TO PHQ QUESTIONS 1-9: 0
2. FEELING DOWN, DEPRESSED OR HOPELESS: 0
1. LITTLE INTEREST OR PLEASURE IN DOING THINGS: 0
SUM OF ALL RESPONSES TO PHQ QUESTIONS 1-9: 0

## 2023-01-31 NOTE — PROGRESS NOTES
James Mayo (:  1955) is a 70 y.o. female,Established patient, here for evaluation of the following chief complaint(s):  Diabetes, Hypertension, and Cholesterol Problem         ASSESSMENT/PLAN:  1. Anxiety  -She has had some increase in anxiety with the decreased dose of amitriptyline. Would like to have it improved before trying to wean the amitriptyline further. Increase buspirone to 10 mg 3 times daily, continue amitriptyline 25 mg nightly. Once anxiety has improved, then can try cutting back on the amitriptyline again.  -Continue alprazolam, reviewed, no concerns  -     ALPRAZolam (XANAX) 0.5 MG tablet; Take 1 tablet by mouth daily as needed for Anxiety. , Disp-30 tablet, R-2Normal  2. Restless leg syndrome   -Some improvement with the decrease in amitriptyline dosage. Continue ropinirole 0.5 mg 2-3 times a day  3. Hypothyroidism, unspecified type   -TSH is still borderline, will hold off on any further adjustments, continue levothyroxine 125 mcg daily  4. Chronic fatigue   -Could possibly be medication related, no signs of any anemia on blood work but she is on a higher dose PPI so at risk for B12 deficiency, reassess next visit  5. Primary hypertension   -Controlled, continue HCTZ 50 mg daily, metoprolol  mg daily  6. Mixed hyperlipidemia   -Controlled, continue atorvastatin 80 mg daily    Return in about 3 months (around 2023) for anxiety. Subjective   SUBJECTIVE/OBJECTIVE:  HPI    Anxiety is increased with the decrease in the amitriptyline. She feels like the BuSpar is helping but is not quite enough. She is using the alprazolam consistently with the heightened anxiety. The restless leg symptoms is better, she is usually taking the ropinirole twice a day because she will forget the third dose, has not had a lot of issues with it, had more symptoms last night. Sleep is overall good, she is still sleeping well. She is having more fatigue.   Taking the thyroid medication as prescribed. Wondering if she is deficient in something. Metoprolol is not new, she has been on that for a long time. Review of Systems       Objective   Physical Exam  Vitals reviewed. Constitutional:       General: She is not in acute distress. Appearance: Normal appearance. She is well-developed. HENT:      Head: Normocephalic and atraumatic. Cardiovascular:      Rate and Rhythm: Normal rate and regular rhythm. Heart sounds: Normal heart sounds. Pulmonary:      Effort: Pulmonary effort is normal. No respiratory distress. Breath sounds: Normal breath sounds. Skin:     General: Skin is warm and dry. Neurological:      Mental Status: She is alert. Psychiatric:         Mood and Affect: Mood is anxious. Behavior: Behavior normal.         Thought Content: Thought content normal.         Judgment: Judgment normal.                An electronic signature was used to authenticate this note.     --Terell Corbett MD

## 2023-02-09 ENCOUNTER — OFFICE VISIT (OUTPATIENT)
Dept: INTERNAL MEDICINE CLINIC | Age: 68
End: 2023-02-09
Payer: MEDICARE

## 2023-02-09 VITALS
HEART RATE: 77 BPM | TEMPERATURE: 97.7 F | WEIGHT: 174.8 LBS | HEIGHT: 64 IN | SYSTOLIC BLOOD PRESSURE: 124 MMHG | OXYGEN SATURATION: 96 % | BODY MASS INDEX: 29.84 KG/M2 | DIASTOLIC BLOOD PRESSURE: 70 MMHG

## 2023-02-09 DIAGNOSIS — I10 PRIMARY HYPERTENSION: ICD-10-CM

## 2023-02-09 DIAGNOSIS — G25.81 RESTLESS LEG SYNDROME: ICD-10-CM

## 2023-02-09 DIAGNOSIS — H25.9 AGE-RELATED CATARACT OF BOTH EYES, UNSPECIFIED AGE-RELATED CATARACT TYPE: ICD-10-CM

## 2023-02-09 DIAGNOSIS — Z98.890 S/P VENTRICULAR SEPTAL MYECTOMY: ICD-10-CM

## 2023-02-09 DIAGNOSIS — I42.1 HYPERTROPHIC OBSTRUCTIVE CARDIOMYOPATHY (HCC): ICD-10-CM

## 2023-02-09 DIAGNOSIS — F41.9 ANXIETY: ICD-10-CM

## 2023-02-09 DIAGNOSIS — Z01.818 PREOP EXAMINATION: Primary | ICD-10-CM

## 2023-02-09 DIAGNOSIS — Z95.2 H/O MITRAL VALVE REPLACEMENT: ICD-10-CM

## 2023-02-09 PROCEDURE — 1090F PRES/ABSN URINE INCON ASSESS: CPT | Performed by: INTERNAL MEDICINE

## 2023-02-09 PROCEDURE — G8417 CALC BMI ABV UP PARAM F/U: HCPCS | Performed by: INTERNAL MEDICINE

## 2023-02-09 PROCEDURE — 99213 OFFICE O/P EST LOW 20 MIN: CPT | Performed by: INTERNAL MEDICINE

## 2023-02-09 PROCEDURE — 3078F DIAST BP <80 MM HG: CPT | Performed by: INTERNAL MEDICINE

## 2023-02-09 PROCEDURE — 3074F SYST BP LT 130 MM HG: CPT | Performed by: INTERNAL MEDICINE

## 2023-02-09 PROCEDURE — G8484 FLU IMMUNIZE NO ADMIN: HCPCS | Performed by: INTERNAL MEDICINE

## 2023-02-09 PROCEDURE — 3017F COLORECTAL CA SCREEN DOC REV: CPT | Performed by: INTERNAL MEDICINE

## 2023-02-09 PROCEDURE — G8399 PT W/DXA RESULTS DOCUMENT: HCPCS | Performed by: INTERNAL MEDICINE

## 2023-02-09 PROCEDURE — G8427 DOCREV CUR MEDS BY ELIG CLIN: HCPCS | Performed by: INTERNAL MEDICINE

## 2023-02-09 PROCEDURE — 1036F TOBACCO NON-USER: CPT | Performed by: INTERNAL MEDICINE

## 2023-02-09 PROCEDURE — 1123F ACP DISCUSS/DSCN MKR DOCD: CPT | Performed by: INTERNAL MEDICINE

## 2023-02-09 RX ORDER — DIAZEPAM 5 MG/1
5 TABLET ORAL EVERY 6 HOURS PRN
COMMUNITY

## 2023-02-09 RX ORDER — BUSPIRONE HYDROCHLORIDE 15 MG/1
15 TABLET ORAL 3 TIMES DAILY
Qty: 90 TABLET | Refills: 2 | Status: SHIPPED | OUTPATIENT
Start: 2023-02-09

## 2023-02-09 RX ORDER — LANOLIN ALCOHOL/MO/W.PET/CERES
1000 CREAM (GRAM) TOPICAL DAILY
COMMUNITY

## 2023-02-09 SDOH — ECONOMIC STABILITY: INCOME INSECURITY: HOW HARD IS IT FOR YOU TO PAY FOR THE VERY BASICS LIKE FOOD, HOUSING, MEDICAL CARE, AND HEATING?: NOT HARD AT ALL

## 2023-02-09 SDOH — ECONOMIC STABILITY: FOOD INSECURITY: WITHIN THE PAST 12 MONTHS, YOU WORRIED THAT YOUR FOOD WOULD RUN OUT BEFORE YOU GOT MONEY TO BUY MORE.: NEVER TRUE

## 2023-02-09 SDOH — ECONOMIC STABILITY: HOUSING INSECURITY
IN THE LAST 12 MONTHS, WAS THERE A TIME WHEN YOU DID NOT HAVE A STEADY PLACE TO SLEEP OR SLEPT IN A SHELTER (INCLUDING NOW)?: NO

## 2023-02-09 SDOH — ECONOMIC STABILITY: FOOD INSECURITY: WITHIN THE PAST 12 MONTHS, THE FOOD YOU BOUGHT JUST DIDN'T LAST AND YOU DIDN'T HAVE MONEY TO GET MORE.: NEVER TRUE

## 2023-02-09 NOTE — PROGRESS NOTES
Preoperative Consultation      Lynn Malloy  YOB: 1955    Date of Service:  2/9/2023    Vitals:    02/09/23 0821   BP: 124/70   Site: Left Upper Arm   Position: Sitting   Cuff Size: Large Adult   Pulse: 77   Temp: 97.7 °F (36.5 °C)   TempSrc: Temporal   SpO2: 96%   Weight: 174 lb 12.8 oz (79.3 kg)   Height: 5' 4\" (1.626 m)      Wt Readings from Last 2 Encounters:   02/09/23 174 lb 12.8 oz (79.3 kg)   01/31/23 174 lb (78.9 kg)     BP Readings from Last 3 Encounters:   02/09/23 124/70   01/31/23 130/82   12/01/22 136/75        Chief Complaint   Patient presents with    Pre-op Exam     Cataract surgery for left eye 2/21/23 & 3/7/23 Right Eye Surgery by Cristel Parra at [facility]      Allergies   Allergen Reactions    Tolectin [Tolmetin] Anaphylaxis     Face & Lips     Outpatient Medications Marked as Taking for the 2/9/23 encounter (Office Visit) with Carrillo Verduzco MD   Medication Sig Dispense Refill    diazePAM (VALIUM) 5 MG tablet Take 5 mg by mouth every 6 hours as needed (IBS attacks). OnabotulinumtoxinA (BOTOX IJ) Inject as directed Every 3 months      vitamin B-12 (CYANOCOBALAMIN) 1000 MCG tablet Take 1,000 mcg by mouth daily      busPIRone (BUSPAR) 15 MG tablet Take 15 mg by mouth 3 times daily 90 tablet 2    CRANBERRY PO Take by mouth Two tabs in the morning      amitriptyline (ELAVIL) 25 MG tablet Take 25 mg by mouth nightly      prochlorperazine (COMPAZINE) 10 MG tablet TAKE ONE TABLET BY MOUTH EVERY 6 HOURS AS NEEDED 30 tablet 4    levothyroxine (SYNTHROID) 125 MCG tablet TAKE ONE TABLET BY MOUTH DAILY 90 tablet 3    [START ON 2/22/2023] ALPRAZolam (XANAX) 0.5 MG tablet Take 1 tablet by mouth daily as needed for Anxiety.  30 tablet 2    dicyclomine (BENTYL) 20 MG tablet Take one tablet by mouth four times a day as needed 120 tablet 4    rOPINIRole (REQUIP) 0.5 MG tablet Take 1 tablet by mouth 3 times daily 90 tablet 2    atorvastatin (LIPITOR) 80 MG tablet TAKE ONE TABLET BY MOUTH EVERY EVENING 90 tablet 3    metoprolol succinate (TOPROL XL) 100 MG extended release tablet Take 1 tablet by mouth daily 90 tablet 3    memantine (NAMENDA) 5 MG tablet Take 5 mg by mouth in the morning and 5 mg before bedtime. omeprazole (PRILOSEC) 40 MG delayed release capsule Take 1 capsule by mouth in the morning. 90 capsule 3    famotidine (PEPCID) 40 MG tablet Take 1 tablet by mouth in the morning. 90 tablet 3    denosumab (PROLIA) 60 MG/ML SOSY SC injection Inject 1 mL into the skin once for 1 dose 1 mL 1    hydroCHLOROthiazide (HYDRODIURIL) 50 MG tablet Take 1 tablet by mouth daily 90 tablet 3    ZOLMitriptan (ZOMIG) 5 MG tablet Take 5 mg by mouth as needed for Migraine      loratadine (CLARITIN) 10 MG tablet Take 10 mg by mouth daily      Coenzyme Q10 (CO Q-10) 200 MG CAPS Take 200 mg by mouth      Ascorbic Acid (VITAMIN C) 500 MG CAPS Take 500 mg by mouth      aspirin 81 MG EC tablet Take 81 mg by mouth daily      Multiple Vitamins-Minerals (CENTRUM SILVER PO) Take 1 tablet by mouth daily      Omega-3 Fatty Acids (FISH OIL) 1200 MG CAPS Take 1 capsule by mouth 2 times daily      Flaxseed, Linseed, (FLAXSEED OIL PO) Take 1,300 mg by mouth daily      FIBER PO Take 2 capsules by mouth daily         This patient presents to the office today for a preoperative consultation at the request of surgeon, Dr. Brandon Temple, who plans on performing left cataract surgery on 2/21 and right cataract surgery on 3/7. She has had progressive vision changes related to the cataracts. She has a history of mitral valve replacement due to mitral valve insufficiency and ventricular septal myomectomy for HOCM. She has had no problems with the mitral valve prosthesis. Denies chest pain or shortness of breath with exertion. She sees a cardiologist for regular follow-up. She does have a history of significant anxiety and has severe anxiety when laying flat.     Planned anesthesia: Local and IV sedation   Known anesthesia problems: None   Bleeding risk: No recent or remote history of abnormal bleeding    Patient Active Problem List   Diagnosis    Hypertension    Microscopic colitis    GERD (gastroesophageal reflux disease)    Migraine    Osteoporosis    Anxiety    Neck arthritis C6/C7    Nephrolithiasis    Hypothyroidism    Endometrial hyperplasia    Chronic nausea    Mild intermittent asthma without complication    Fatty liver    Multiple thyroid nodules    Severe mitral regurgitation    Hypertrophic obstructive cardiomyopathy (HCC)    Mixed hyperlipidemia    S/P ventricular septal myectomy    H/O mitral valve replacement    Hyperparathyroidism (HCC)    Pulmonary nodule 1 cm or greater in diameter    History of smoking 30 or more pack years    Restless leg syndrome       Past Medical History:   Diagnosis Date    Adrenal adenoma     left 8 mm - stable - CT 8/13    Anxiety     Arthritis     Asthma     Chronic nausea     Environmental allergies     GERD (gastroesophageal reflux disease)     HOCM (hypertrophic obstructive cardiomyopathy) (HCC)     HTN (hypertension)     Irritable bowel syndrome     Lung disease     Microscopic colitis     Migraine     Mitral regurgitation     Nephrolithiasis 1/21/15    R side -s/p lithotripsy    Nosebleed     Osteoporosis     DEXA 8/8/13 - scotty lumbar spine    Rash     Restless leg syndrome     TMJ dysfunction     Vitreous detachment     Wrist fracture, bilateral      Past Surgical History:   Procedure Laterality Date    CARDIAC CATHETERIZATION  02/28/2020    CARDIOVASCULAR STRESS TEST  2016    normal    CHOLECYSTECTOMY, LAPAROSCOPIC N/A 3/27/2019    w/cholangiogram w/C-arm, performed by Wali Sullivan MD at [address] [facility],[address]  7/13    COLONOSCOPY  03/29/2018    Afshan (random bxs)-microscopic colitis    KNEE SURGERY Right 1975    synovectomy    MITRAL VALVE REPAIR N/A 6/29/2020    [facility]RIE; TCPB; resection of hypertrophic muscle from septal LVOT; miital valve replacement w/ 25mm Medtronic Mosaic bovine pericardial bioprosthetic valve; TCPB; ICNB x 5 levels bilatereally performed by Rich Jolley MD at [facility] 1634 Left     Partial    TRANSESOPHAGEAL ECHOCARDIOGRAM  2020    Dr. Javier Costa  2018    Normal    WRIST FRACTURE SURGERY Left 2009     Family History   Problem Relation Age of Onset    Cancer Mother         leukemia    Hypertension Mother     Other Father         CRF    Heart Surgery Father         CABG    Blindness Father     Hypertension Father     Hearing Loss Father      Social History     Socioeconomic History    Marital status:      Spouse name: Not on file    Number of children: Not on file    Years of education: Not on file    Highest education level: Not on file   Occupational History    Not on file   Tobacco Use    Smoking status: Former     Packs/day: 1.00     Years: 38.00     Pack years: 38.00     Types: Cigarettes     Start date: 1972     Quit date: 2010     Years since quittin.7    Smokeless tobacco: Never   Vaping Use    Vaping Use: Never used   Substance and Sexual Activity    Alcohol use: Not Currently     Alcohol/week: 0.0 standard drinks    Drug use: No    Sexual activity: Not on file   Other Topics Concern    Not on file   Social History Narrative    Not on file     Social Determinants of Health     Financial Resource Strain: Low Risk     Difficulty of Paying Living Expenses: Not hard at all   Food Insecurity: No Food Insecurity    Worried About Running Out of Food in the Last Year: Never true    [address] in the Last Year: Never true   Transportation Needs: Unknown    Lack of Transportation (Medical): Not on file    Lack of Transportation (Non-Medical):  No   Physical Activity: Insufficiently Active    Days of Exercise per Week: 1 day    Minutes of Exercise per Session: 10 min   Stress: Not on file   Social Connections: Not on file   Intimate Partner Violence: Not on file   Housing Stability: Unknown    Unable to Pay for Housing in the Last Year: Not on file    Number of Places Lived in the Last Year: Not on file    Unstable Housing in the Last Year: No       Review of Systems  A comprehensive review of systems was negative except for what was noted in the HPI. Physical Exam   Constitutional: She is oriented to person, place, and time. She appears well-developed and well-nourished. No distress. HENT:   Head: Normocephalic and atraumatic. Eyes: Conjunctivae and EOM are normal. Pupils are equal, round. Neck: Trachea normal and normal range of motion. Neck supple. No JVD present. Carotid bruit is not present. No mass and no thyromegaly present. Cardiovascular: Normal rate, regular rhythm, normal heart sounds and intact distal pulses. Exam reveals no gallop and no friction rub. 2/6 systolic murmur heard. Pulmonary/Chest: Effort normal and breath sounds normal. No respiratory distress. She has no wheezes. She has no rales. Abdominal: Soft. Normal aorta and bowel sounds are normal. She exhibits no distension and no mass. There is no hepatosplenomegaly. No tenderness. Musculoskeletal: She exhibits no edema and no tenderness. Neurological: She is alert and oriented to person, place, and time. She has normal strength. No cranial nerve deficit or sensory deficit. Coordination and gait normal.   Skin: Skin is warm and dry. No rash noted. No erythema. Psychiatric: She has a normal mood and affect. Her behavior is normal.     Lab Review not applicable        Assessment:       70 y.o. patient with planned surgery as above. Known risk factors for perioperative complications: Hypertension  Current medications which may produce withdrawal symptoms if withheld perioperatively: none      Plan:     1.  Preop examination  - Preoperative workup as follows: none  - Change in medication regimen before surgery: Take medications on morning of surgery with sip of water, can hold supplements until after surgery  - Prophylaxis for cardiac events with perioperative beta-blockers: Currently taking  metoprolol  - No contraindications to planned surgery    2. Age-related cataract of both eyes, unspecified age-related cataract type  -Progressive symptoms, will have addressed surgically    3. Hypertrophic obstructive cardiomyopathy (HCC)  -Post ventricular septal myectomy, resolved    4. Primary hypertension  -Controlled on HCTZ 50 mg daily, metoprolol  mg daily    5. H/O mitral valve replacement  -Normal function, no symptoms    6. S/P ventricular septal myectomy  -No current issues    7. Anxiety  -Standing significant anxiety, increase buspirone to 15 mg 3 times daily. She uses alprazolam as needed as well. Hold on further adjustments to the amitriptyline until anxiety is better    8.   Restless leg syndrome  -On ropinirole 0.5 mg 3 times daily

## 2023-02-20 ENCOUNTER — TELEPHONE (OUTPATIENT)
Dept: INTERNAL MEDICINE CLINIC | Age: 68
End: 2023-02-20

## 2023-02-20 NOTE — TELEPHONE ENCOUNTER
Patient called in wanting to know how she can ween herself off of amitriptyline. Patient is experiencing weight gain. Patient has been dieting for 2 weeks and has gained a pound and a half. Pls call and advise.

## 2023-03-27 DIAGNOSIS — M81.0 AGE-RELATED OSTEOPOROSIS WITHOUT CURRENT PATHOLOGICAL FRACTURE: Primary | ICD-10-CM

## 2023-03-27 RX ORDER — DIPHENHYDRAMINE HYDROCHLORIDE 50 MG/ML
50 INJECTION INTRAMUSCULAR; INTRAVENOUS
Status: CANCELLED | OUTPATIENT
Start: 2023-03-27

## 2023-03-27 RX ORDER — EPINEPHRINE 1 MG/ML
0.3 INJECTION, SOLUTION, CONCENTRATE INTRAVENOUS PRN
Status: CANCELLED | OUTPATIENT
Start: 2023-03-27

## 2023-03-27 RX ORDER — ONDANSETRON 2 MG/ML
8 INJECTION INTRAMUSCULAR; INTRAVENOUS
Status: CANCELLED | OUTPATIENT
Start: 2023-03-27

## 2023-03-27 RX ORDER — ALBUTEROL SULFATE 90 UG/1
4 AEROSOL, METERED RESPIRATORY (INHALATION) PRN
Status: CANCELLED | OUTPATIENT
Start: 2023-03-27

## 2023-03-27 RX ORDER — SODIUM CHLORIDE 9 MG/ML
INJECTION, SOLUTION INTRAVENOUS CONTINUOUS
Status: CANCELLED | OUTPATIENT
Start: 2023-03-27

## 2023-03-27 RX ORDER — ACETAMINOPHEN 325 MG/1
650 TABLET ORAL
Status: CANCELLED | OUTPATIENT
Start: 2023-03-27

## 2023-03-31 RX ORDER — ROPINIROLE 0.5 MG/1
TABLET, FILM COATED ORAL
Qty: 90 TABLET | Refills: 2 | Status: SHIPPED | OUTPATIENT
Start: 2023-03-31

## 2023-04-03 DIAGNOSIS — M81.0 AGE-RELATED OSTEOPOROSIS WITHOUT CURRENT PATHOLOGICAL FRACTURE: ICD-10-CM

## 2023-04-03 LAB — CALCIUM SERPL-MCNC: 9.9 MG/DL (ref 8.3–10.6)

## 2023-04-12 PROBLEM — F51.04 PSYCHOPHYSIOLOGICAL INSOMNIA: Status: ACTIVE | Noted: 2023-04-12

## 2023-04-18 ENCOUNTER — HOSPITAL ENCOUNTER (OUTPATIENT)
Dept: INFUSION THERAPY | Age: 68
Setting detail: INFUSION SERIES
Discharge: HOME | End: 2023-04-18
Payer: MEDICARE

## 2023-04-18 VITALS
HEART RATE: 64 BPM | RESPIRATION RATE: 18 BRPM | SYSTOLIC BLOOD PRESSURE: 125 MMHG | TEMPERATURE: 97.9 F | DIASTOLIC BLOOD PRESSURE: 76 MMHG

## 2023-04-18 DIAGNOSIS — M81.0 OSTEOPOROSIS WITHOUT CURRENT PATHOLOGICAL FRACTURE, UNSPECIFIED OSTEOPOROSIS TYPE: Primary | ICD-10-CM

## 2023-04-18 PROCEDURE — 6360000002 HC RX W HCPCS: Performed by: INTERNAL MEDICINE

## 2023-04-18 PROCEDURE — 96372 THER/PROPH/DIAG INJ SC/IM: CPT

## 2023-04-18 RX ORDER — ONDANSETRON 2 MG/ML
8 INJECTION INTRAMUSCULAR; INTRAVENOUS
Status: CANCELLED | OUTPATIENT
Start: 2023-10-17

## 2023-04-18 RX ORDER — SODIUM CHLORIDE 9 MG/ML
INJECTION, SOLUTION INTRAVENOUS CONTINUOUS
Status: CANCELLED | OUTPATIENT
Start: 2023-10-17

## 2023-04-18 RX ORDER — ACETAMINOPHEN 325 MG/1
650 TABLET ORAL
Status: CANCELLED | OUTPATIENT
Start: 2023-10-17

## 2023-04-18 RX ORDER — EPINEPHRINE 1 MG/ML
0.3 INJECTION, SOLUTION, CONCENTRATE INTRAVENOUS PRN
Status: CANCELLED | OUTPATIENT
Start: 2023-10-17

## 2023-04-18 RX ORDER — ALBUTEROL SULFATE 90 UG/1
4 AEROSOL, METERED RESPIRATORY (INHALATION) PRN
Status: CANCELLED | OUTPATIENT
Start: 2023-10-17

## 2023-04-18 RX ORDER — DIPHENHYDRAMINE HYDROCHLORIDE 50 MG/ML
50 INJECTION INTRAMUSCULAR; INTRAVENOUS
Status: CANCELLED | OUTPATIENT
Start: 2023-10-17

## 2023-04-18 RX ADMIN — DENOSUMAB 60 MG: 60 INJECTION SUBCUTANEOUS at 09:32

## 2023-04-18 ASSESSMENT — PAIN DESCRIPTION - DESCRIPTORS: DESCRIPTORS: ACHING

## 2023-04-18 ASSESSMENT — PAIN SCALES - GENERAL: PAINLEVEL_OUTOF10: 3

## 2023-04-18 ASSESSMENT — PAIN DESCRIPTION - PAIN TYPE: TYPE: CHRONIC PAIN

## 2023-04-18 ASSESSMENT — PAIN DESCRIPTION - LOCATION: LOCATION: BACK

## 2023-04-18 ASSESSMENT — PAIN DESCRIPTION - ORIENTATION: ORIENTATION: LOWER

## 2023-04-18 ASSESSMENT — PAIN DESCRIPTION - FREQUENCY: FREQUENCY: CONTINUOUS

## 2023-04-18 NOTE — PROGRESS NOTES
Outpatient [address] [facility]     Prolia Visit    NAME:  Arnette Pallas OF BIRTH:  1955  MEDICAL RECORD NUMBER:  [id number]  Episode Date:  4/18/2023    Patient arrived to [facility]   [] per wheelchair   [x] ambulatory     Is this the patient's first Prolia Injection? No     Date of last Prolia Injection ? October 4, 2022. Did the patient experience any adverse reactions to Prolia Injection? No    Any recent oral or dental surgery? No    Any recent active fever, infections and/or illnesses? No    Patient has history of pathological fracture? No    Patient has had a recent fracture due to trauma or injury? No    Patient has had a recent orthopedic surgery or procedure done? No    Approximate date of last Dexa scan? 7/1/2021       /76   Pulse 64   Temp 97.9 °F (36.6 °C) (Oral)   Resp 18     Current Lab Data:    Calcium:    Lab Results   Component Value Date/Time    CALCIUM 9.9 04/03/2023 07:34 AM       Patient Currently taking Calcium Supplements? No, patient taken off by physician. Prolia administered: Yes    Prolia dosage: 60 mg was administered slowly subcutaneously into the left upper arm. Response to treatment:  Well tolerated by patient. Due for next dose of Prolia after October 18, 2023.      Electronically signed by Rosa Isela Lopez RN on 4/18/2023 at 9:19 AM

## 2023-04-18 NOTE — DISCHARGE INSTRUCTIONS
Outpatient Infusion Discharge Instructions  [place] [address] [facility] [address] [address]  Telephone: [phone]    NAME:  Les Jeffery OF BIRTH:  1955  MEDICAL RECORD NUMBER:  [id number]  DATE:  4/18/23    Reason for Outpatient Infusion Visit: Prolia    If you develop any these symptoms please contact you Doctor    [x] Nausea and/or vomiting not relieved with medication   [x] Swelling, redness, and/or bleeding at injection or IV site    [] Fever or chills  [x] Rash or itching   [x] Shortness of breath  [x] Please review After Visit Summary (AVS) information on    [] Other      Outpatient [address]: Should you experience any significant changes in your health or have questions about your care please contact the [address] at [address] 8:00 am - 4:00 pm.  If you need help outside these hours and cannot wait until we are again available, contact your Primary Care Physician or go to the hospital emergency room.        Electronically signed by Edward Sparks RN on 4/18/2023 at 9:37 AM

## 2023-04-19 ENCOUNTER — OFFICE VISIT (OUTPATIENT)
Dept: ENT CLINIC | Age: 68
End: 2023-04-19
Payer: MEDICARE

## 2023-04-19 ENCOUNTER — TELEPHONE (OUTPATIENT)
Dept: CASE MANAGEMENT | Age: 68
End: 2023-04-19

## 2023-04-19 VITALS
BODY MASS INDEX: 29.37 KG/M2 | HEART RATE: 60 BPM | WEIGHT: 172 LBS | DIASTOLIC BLOOD PRESSURE: 73 MMHG | HEIGHT: 64 IN | SYSTOLIC BLOOD PRESSURE: 122 MMHG | TEMPERATURE: 97.8 F

## 2023-04-19 DIAGNOSIS — E04.2 MULTIPLE THYROID NODULES: Primary | ICD-10-CM

## 2023-04-19 PROCEDURE — 76536 US EXAM OF HEAD AND NECK: CPT | Performed by: OTOLARYNGOLOGY

## 2023-04-19 NOTE — PROGRESS NOTES
Patient here for surveillance ultrasound. Last ultrasound in . One left and right dominant nodule. FNA benign. NECK ULTRASOUND    Pre-op Diagnosis: thyroid nodules  Anesthesia: None  Complications: none  Estimated Blood Loss: none  Indications: Surveillance  Procedure: neck US    The patient was placed in a semi-recumbent position with mild neck extension. Real time B-mode ultrasound on the neck was performed in transverse/ axial and longitudinal/ sagittal planes. Findings:   Right  lobe: 3.0x1.9x1.1cm  left Lobe: 2.9x1.7x1.4  Isthmus: 4mm    Nodules/Cysts: Right 0.8x0.56x0.7 stable Left 1.05x0. 6x1.45     Ultrasound guided fine needle aspiration was not performed. The patient tolerated the procedure well and without side effects. I attest that I was present for and did the entire procedure myself.

## 2023-04-21 ENCOUNTER — ANESTHESIA EVENT (OUTPATIENT)
Dept: ENDOSCOPY | Age: 68
End: 2023-04-21
Payer: MEDICARE

## 2023-04-24 ENCOUNTER — ANESTHESIA (OUTPATIENT)
Dept: ENDOSCOPY | Age: 68
End: 2023-04-24
Payer: MEDICARE

## 2023-04-24 ENCOUNTER — HOSPITAL ENCOUNTER (OUTPATIENT)
Age: 68
Setting detail: OUTPATIENT SURGERY
Discharge: HOME | End: 2023-04-24
Attending: INTERNAL MEDICINE | Admitting: INTERNAL MEDICINE
Payer: MEDICARE

## 2023-04-24 VITALS
TEMPERATURE: 97.4 F | HEIGHT: 64 IN | RESPIRATION RATE: 18 BRPM | SYSTOLIC BLOOD PRESSURE: 132 MMHG | HEART RATE: 84 BPM | WEIGHT: 165.9 LBS | DIASTOLIC BLOOD PRESSURE: 68 MMHG | BODY MASS INDEX: 28.32 KG/M2 | OXYGEN SATURATION: 99 %

## 2023-04-24 DIAGNOSIS — Z12.11 COLON CANCER SCREENING: ICD-10-CM

## 2023-04-24 PROCEDURE — 3700000001 HC ADD 15 MINUTES (ANESTHESIA): Performed by: INTERNAL MEDICINE

## 2023-04-24 PROCEDURE — 2580000003 HC RX 258: Performed by: NURSE ANESTHETIST, CERTIFIED REGISTERED

## 2023-04-24 PROCEDURE — 3700000000 HC ANESTHESIA ATTENDED CARE: Performed by: INTERNAL MEDICINE

## 2023-04-24 PROCEDURE — 2500000003 HC RX 250 WO HCPCS: Performed by: NURSE ANESTHETIST, CERTIFIED REGISTERED

## 2023-04-24 PROCEDURE — 2709999900 HC NON-CHARGEABLE SUPPLY: Performed by: INTERNAL MEDICINE

## 2023-04-24 PROCEDURE — 7100000000 HC PACU RECOVERY - FIRST 15 MIN: Performed by: INTERNAL MEDICINE

## 2023-04-24 PROCEDURE — 6360000002 HC RX W HCPCS: Performed by: NURSE ANESTHETIST, CERTIFIED REGISTERED

## 2023-04-24 PROCEDURE — 7100000010 HC PHASE II RECOVERY - FIRST 15 MIN: Performed by: INTERNAL MEDICINE

## 2023-04-24 PROCEDURE — 3609010300 HC COLONOSCOPY W/BIOPSY SINGLE/MULTIPLE: Performed by: INTERNAL MEDICINE

## 2023-04-24 PROCEDURE — 2580000003 HC RX 258: Performed by: ANESTHESIOLOGY

## 2023-04-24 PROCEDURE — 7100000011 HC PHASE II RECOVERY - ADDTL 15 MIN: Performed by: INTERNAL MEDICINE

## 2023-04-24 PROCEDURE — 88305 TISSUE EXAM BY PATHOLOGIST: CPT

## 2023-04-24 RX ORDER — SODIUM CHLORIDE 9 MG/ML
INJECTION, SOLUTION INTRAVENOUS CONTINUOUS PRN
Status: DISCONTINUED | OUTPATIENT
Start: 2023-04-24 | End: 2023-04-24 | Stop reason: SDUPTHER

## 2023-04-24 RX ORDER — DIPHENHYDRAMINE HYDROCHLORIDE 50 MG/ML
12.5 INJECTION INTRAMUSCULAR; INTRAVENOUS
Status: DISCONTINUED | OUTPATIENT
Start: 2023-04-24 | End: 2023-04-24 | Stop reason: HOSPADM

## 2023-04-24 RX ORDER — SODIUM CHLORIDE 0.9 % (FLUSH) 0.9 %
5-40 SYRINGE (ML) INJECTION PRN
Status: DISCONTINUED | OUTPATIENT
Start: 2023-04-24 | End: 2023-04-24 | Stop reason: HOSPADM

## 2023-04-24 RX ORDER — SODIUM CHLORIDE 9 MG/ML
INJECTION, SOLUTION INTRAVENOUS PRN
Status: DISCONTINUED | OUTPATIENT
Start: 2023-04-24 | End: 2023-04-24 | Stop reason: HOSPADM

## 2023-04-24 RX ORDER — SODIUM CHLORIDE 0.9 % (FLUSH) 0.9 %
5-40 SYRINGE (ML) INJECTION EVERY 12 HOURS SCHEDULED
Status: DISCONTINUED | OUTPATIENT
Start: 2023-04-24 | End: 2023-04-24 | Stop reason: HOSPADM

## 2023-04-24 RX ORDER — PROPOFOL 10 MG/ML
INJECTION, EMULSION INTRAVENOUS CONTINUOUS PRN
Status: DISCONTINUED | OUTPATIENT
Start: 2023-04-24 | End: 2023-04-24 | Stop reason: SDUPTHER

## 2023-04-24 RX ORDER — PROPOFOL 10 MG/ML
INJECTION, EMULSION INTRAVENOUS PRN
Status: DISCONTINUED | OUTPATIENT
Start: 2023-04-24 | End: 2023-04-24 | Stop reason: SDUPTHER

## 2023-04-24 RX ORDER — ONDANSETRON 2 MG/ML
4 INJECTION INTRAMUSCULAR; INTRAVENOUS
Status: DISCONTINUED | OUTPATIENT
Start: 2023-04-24 | End: 2023-04-24 | Stop reason: HOSPADM

## 2023-04-24 RX ORDER — LIDOCAINE HYDROCHLORIDE 20 MG/ML
INJECTION, SOLUTION EPIDURAL; INFILTRATION; INTRACAUDAL; PERINEURAL PRN
Status: DISCONTINUED | OUTPATIENT
Start: 2023-04-24 | End: 2023-04-24 | Stop reason: SDUPTHER

## 2023-04-24 RX ADMIN — SODIUM CHLORIDE: 9 INJECTION, SOLUTION INTRAVENOUS at 08:12

## 2023-04-24 RX ADMIN — PROPOFOL 80 MG: 10 INJECTION, EMULSION INTRAVENOUS at 09:13

## 2023-04-24 RX ADMIN — PROPOFOL 180 MCG/KG/MIN: 10 INJECTION, EMULSION INTRAVENOUS at 09:13

## 2023-04-24 RX ADMIN — LIDOCAINE HYDROCHLORIDE 80 MG: 20 INJECTION, SOLUTION EPIDURAL; INFILTRATION; INTRACAUDAL; PERINEURAL at 09:13

## 2023-04-24 RX ADMIN — SODIUM CHLORIDE: 9 INJECTION, SOLUTION INTRAVENOUS at 09:07

## 2023-04-24 ASSESSMENT — PAIN - FUNCTIONAL ASSESSMENT: PAIN_FUNCTIONAL_ASSESSMENT: 0-10

## 2023-04-24 ASSESSMENT — LIFESTYLE VARIABLES: SMOKING_STATUS: 0

## 2023-04-24 NOTE — DISCHARGE INSTRUCTIONS
Recommendations:    Await pathology results   2. Random biopsies obtained to evaluate recurrent microscopic colitis. If biopsies show evidence of recurrent microscopic colitis will get started on treatment. 3. Recommend repeat colonoscopy in in 5 years for surveillance purposes with prior history of polyps. 4. Results will be posted to the patient portal in 7-10 days. Discharge Instructions for Colonoscopy     Colonoscopy is a visual exam of the lining of the large intestine, also called the bowel or colon, with a colonoscope. A colonoscope is a flexible tube with a light and a viewing device. It allows the doctor to view the inside of the colon through a tiny video camera. Colonoscopy is performed for many reasons: unexplained anemia , pain, diarrhea , bloody stools, cancer screening, among many other reasons. Complications from a colonoscopy are rare. Some possible serious complications include perforated bowel (which might require surgery) and bleeding (which could require blood transfusion ). Minor complications include bloating, gas, and cramping that can last for 1-2 days after the procedure. Because air is put into your colon during the procedure, it is normal to pass large amounts of air from your rectum. You may not have a bowel movement for 1-3 days after the procedure. What You Will Need:  Someone to drive you home after the procedure     Steps to Take:  [address] when you get home. Because the sedative will make you drowsy, don't drive, operate machinery, or make important decisions the day of the procedure. Feelings of bloating, gas, or cramping may persist for 24 hours. Diet -  Try sips of water first. If tolerated, resume bland food (scrambled eggs, toast, soup) first.  If tolerated, resume regular diet or the diet recommended by your physician. Do not drink alcohol for 24 hours. Physical Activity -  Ask your doctor when you will be able to return to work.    Do

## 2023-04-24 NOTE — H&P
Pre-operative History and Physical    Patient: Herberth Brandon  : 1955  Acct#:     Intended Procedure:  Colonoscopy    HISTORY OF PRESENT ILLNESS:  The patient is a 70 y.o. female  who presents for/due to Surveillance      Past Medical History:        Diagnosis Date    Adrenal adenoma     left 8 mm - stable - CT     Anxiety     Arthritis     Asthma     Chronic nausea     Environmental allergies     GERD (gastroesophageal reflux disease)     HOCM (hypertrophic obstructive cardiomyopathy) (HCC)     HTN (hypertension)     Hypothyroid     Irritable bowel syndrome     Microscopic colitis     Migraine     Mitral regurgitation     Nephrolithiasis 2015    R side -s/p lithotripsy    Nosebleed     Osteoporosis     DEXA 13 - scotty lumbar spine    Rash     Restless leg syndrome     TMJ dysfunction     Vitreous detachment     Wrist fracture, bilateral      Past Surgical History:        Procedure Laterality Date    CARDIAC CATHETERIZATION  2020    CARDIOVASCULAR STRESS TEST      normal    CHOLECYSTECTOMY, LAPAROSCOPIC N/A 3/27/2019    w/cholangiogram w/C-arm, performed by Elsy Doherty MD at [address] [facility]      COLONOSCOPY  2018    Afshan (random bxs)-microscopic colitis    KNEE SURGERY Right     synovectomy    MITRAL VALVE REPAIR N/A 2020    [facility]RIE; TCPB; resection of hypertrophic muscle from septal LVOT; miital valve replacement w/ 25mm Medtronic Mosaic bovine pericardial bioprosthetic valve; TCPB; ICNB x 5 levels bilatereally performed by Grace Sales MD at [facility] [address] Left     Partial    TRANSESOPHAGEAL ECHOCARDIOGRAM  2020    Dr. Ayana Ca ENDOSCOPY  2018    Normal    WRIST FRACTURE SURGERY Left      Medications Prior to Admission:   Prior to Admission medications    Medication Sig Start Date End Date Taking?  Authorizing Provider   busPIRone (BUSPAR) 30 MG tablet Take 30 mg by mouth in the morning

## 2023-04-24 NOTE — ANESTHESIA POSTPROCEDURE EVALUATION
Department of Anesthesiology  Postprocedure Note    Patient: Guerrero Linares  MRN: [id number]  YOB: 1955  Date of evaluation: 4/24/2023      Procedure Summary     Date: 04/24/23 Room / Location: [address] [place] [address]    Anesthesia Start: Savita Dsouza Anesthesia Stop: 6247    Procedure: COLONOSCOPY WITH BIOPSY Diagnosis:       Colon cancer screening      (COLON CANCER SCREENING)    Surgeons: Shantell Rucker MD Responsible Provider: Eudelia Mohs, MD    Anesthesia Type: MAC ASA Status: 3          Anesthesia Type: No value filed.     Eleno Phase I: Eleno Score: 10    Eleno Phase II: Eleno Score: 10      Anesthesia Post Evaluation    Patient location during evaluation: bedside  Patient participation: complete - patient participated  Level of consciousness: awake and alert  Pain score: 0  Nausea & Vomiting: no nausea  Complications: no  Cardiovascular status: hemodynamically stable  Respiratory status: acceptable  Hydration status: stable

## 2023-04-24 NOTE — OP NOTE
Colonoscopy Procedure Note      Patient: Beau Garcia  : 1955  Acct#:     Procedure: Colonoscopy with biopsy    Date:  2023    Surgeon:  Brice Harrison MD    Referring Physician:  Joanie Sims MD    Previous Colonoscopy: YES  Date:  3/18  Greater than 3 years: YES    Preoperative Diagnosis:  1. Surveillance    Postoperative Diagnosis:  1. Normal Colonoscopy-Bx for Microscopic colitis. 2. Internal hemorrhoids    Consent:  The patient or their legal guardian has signed a consent, and is aware of the potential risks, benefits, alternatives, and potential complications of this procedure. These include, but are not limited to hemorrhage, bleeding, post procedural pain, perforation, phlebitis, aspiration, hypotension, hypoxia, cardiovascular events such as arryhthmia, and possibly death. Additionally, the possibility of missed colonic polyps and interval colon cancer was discussed in the consent. Anesthesia:  The patient was administered IV propofol per anesthesiology team.  Please see their operative records for full details. Procedure: An informed consent was obtained from the patient after explanation of indications, benefits, possible risks and complications of the procedure. The patient was then taken to the endoscopy suite, placed in the left lateral decubitus position, and the above IV anesthesia was administered. A digital rectal examination was performed and revealed negative without mass, lesions or tenderness. The Olympus video colonoscope was placed in the patient's rectum under digital direction and advanced to the cecum. The cecum was identified by characteristic anatomy and ballottment. The preparation was excellent. The ileocecal valve was identified. The scope was then withdrawn back through the cecum, ascending, transverse, descending, sigmoid colon, and rectum.   Careful circumferential examination of the mucosa in these areas

## 2023-04-24 NOTE — ANESTHESIA PRE PROCEDURE
Department of Anesthesiology  Preprocedure Note       Name:  Madhuri Calle   Age:  70 y.o.  :  1955                                          MRN:  [id number]         Date:  2023      Surgeon: John Marin):  Cam Mcintosh MD    Procedure: Procedure(s):  COLONOSCOPY    Medications prior to admission:   Prior to Admission medications    Medication Sig Start Date End Date Taking? Authorizing Provider   busPIRone (BUSPAR) 30 MG tablet Take 30 mg by mouth in the morning and at bedtime 23   Shahab Guerra MD   rOPINIRole (REQUIP) 0.5 MG tablet TAKE ONE TABLET BY MOUTH THREE TIMES A DAY 3/31/23   Jazlyn Dowling MD   diazePAM (VALIUM) 5 MG tablet Take 1 tablet by mouth every 6 hours as needed (IBS attacks). Historical Provider, MD   OnabotulinumtoxinA (BOTOX IJ) Inject as directed Every 3 months    Historical Provider, MD   vitamin B-12 (CYANOCOBALAMIN) 1000 MCG tablet Take 1 tablet by mouth daily    Historical Provider, MD   CRANBERRY PO Take by mouth Two tabs in the morning    Historical Provider, MD   prochlorperazine (COMPAZINE) 10 MG tablet TAKE ONE TABLET BY MOUTH EVERY 6 HOURS AS NEEDED 23   Shahab Guerra MD   levothyroxine (SYNTHROID) 125 MCG tablet TAKE ONE TABLET BY MOUTH DAILY 23   Shahab Guerra MD   ALPRAZolam [employer]) 0.5 MG tablet Take 1 tablet by mouth daily as needed for Anxiety. 23  Shahab Guerra MD   dicyclomine (BENTYL) 20 MG tablet Take one tablet by mouth four times a day as needed 23   Shahab Guerra MD   atorvastatin (LIPITOR) 80 MG tablet TAKE ONE TABLET BY MOUTH EVERY EVENING 10/25/22   Randall Carr, APRN - CNP   metoprolol succinate (TOPROL XL) 100 MG extended release tablet Take 1 tablet by mouth daily 10/14/22   Michelle Villasenor MD   memantine (NAMENDA) 5 MG tablet Take 1 tablet by mouth 2 times daily    Historical Provider, MD   omeprazole (PRILOSEC) 40 MG delayed release capsule Take 1 capsule by mouth in the morning.  22   Shahab Guerra MD

## 2023-05-04 ENCOUNTER — TELEPHONE (OUTPATIENT)
Dept: INTERNAL MEDICINE CLINIC | Age: 68
End: 2023-05-04

## 2023-05-04 NOTE — TELEPHONE ENCOUNTER
----- Message from [address] sent at 5/4/2023 10:53 AM EDT -----  Subject: Message to Provider    QUESTIONS  Information for Provider? Patient states current anxiety medication is not   working and she has not been able to sleep well. Request appointment   earlier than 6/5 if possible.   ---------------------------------------------------------------------------  --------------  Amber GARZA  [phone]; OK to leave message on voicemail  ---------------------------------------------------------------------------  --------------  SCRIPT ANSWERS  Relationship to Patient?  Self

## 2023-05-12 RX ORDER — METOPROLOL SUCCINATE 100 MG/1
TABLET, EXTENDED RELEASE ORAL
Qty: 90 TABLET | Refills: 1 | Status: SHIPPED | OUTPATIENT
Start: 2023-05-12

## 2023-05-16 ENCOUNTER — OFFICE VISIT (OUTPATIENT)
Dept: INTERNAL MEDICINE CLINIC | Age: 68
End: 2023-05-16

## 2023-05-16 VITALS
DIASTOLIC BLOOD PRESSURE: 70 MMHG | BODY MASS INDEX: 28.34 KG/M2 | SYSTOLIC BLOOD PRESSURE: 132 MMHG | HEIGHT: 64 IN | WEIGHT: 166 LBS

## 2023-05-16 DIAGNOSIS — F41.9 ANXIETY: Primary | ICD-10-CM

## 2023-05-16 DIAGNOSIS — F51.04 PSYCHOPHYSIOLOGICAL INSOMNIA: ICD-10-CM

## 2023-05-16 RX ORDER — BUDESONIDE 3 MG/1
6 CAPSULE, COATED PELLETS ORAL EVERY MORNING
COMMUNITY

## 2023-05-16 RX ORDER — TRAZODONE HYDROCHLORIDE 50 MG/1
TABLET ORAL
Qty: 60 TABLET | Refills: 1 | Status: SHIPPED | OUTPATIENT
Start: 2023-05-16

## 2023-05-16 NOTE — PROGRESS NOTES
Lawrence Bautista (:  1955) is a 70 y.o. female,Established patient, here for evaluation of the following chief complaint(s): Insomnia (Buspar is not working, ongoing a month sleeps 5 hours a night )         ASSESSMENT/PLAN:  1. Anxiety   -She is not noticed any improvement with the buspirone, we will taper off of this. Can continue the alprazolam at current dose. 2. Psychophysiological insomnia   -Start trazodone 50 mg nightly as needed, can increase to 2-3 tabs if 1 tablet is not adequate. Will reassess anxiety medication in 6 weeks    Return for AWV. Subjective   SUBJECTIVE/OBJECTIVE:  LATRICIA Retana is not currently working for the anxiety and insomnia. She increase the dose to 30 mg twice a day, that she tried doing 15, 15 and 30, and still found she was having significant anxiety symptoms. She is having trouble sleeping, waking up in the middle of the night. Alprazolam is not helping when she is waking up. She still takes it half tab in the morning and then later in the day. Review of Systems       Objective   Physical Exam  Vitals reviewed. Constitutional:       General: She is not in acute distress. Appearance: Normal appearance. She is well-developed. HENT:      Head: Normocephalic and atraumatic. Pulmonary:      Effort: Pulmonary effort is normal. No respiratory distress. Breath sounds: Normal breath sounds. Skin:     General: Skin is warm and dry. Neurological:      Mental Status: She is alert. Psychiatric:         Mood and Affect: Mood is anxious. Behavior: Behavior normal.         Thought Content: Thought content normal.         Judgment: Judgment normal.                An electronic signature was used to authenticate this note.     --Julia Sanchez MD

## 2023-05-18 DIAGNOSIS — F41.9 ANXIETY: ICD-10-CM

## 2023-05-18 RX ORDER — ALPRAZOLAM 0.5 MG/1
TABLET ORAL
Qty: 30 TABLET | Refills: 0 | Status: SHIPPED | OUTPATIENT
Start: 2023-05-18 | End: 2023-06-17

## 2023-06-26 RX ORDER — TRAZODONE HYDROCHLORIDE 50 MG/1
TABLET ORAL
Qty: 60 TABLET | Refills: 0 | Status: SHIPPED | OUTPATIENT
Start: 2023-06-26

## 2023-07-03 DIAGNOSIS — R73.9 HYPERGLYCEMIA: ICD-10-CM

## 2023-07-03 DIAGNOSIS — E03.9 HYPOTHYROIDISM, UNSPECIFIED TYPE: Chronic | ICD-10-CM

## 2023-07-03 DIAGNOSIS — E21.3 HYPERPARATHYROIDISM (HCC): ICD-10-CM

## 2023-07-03 LAB
25(OH)D3 SERPL-MCNC: 38.3 NG/ML
ALBUMIN SERPL-MCNC: 4 G/DL (ref 3.4–5)
ALBUMIN/GLOB SERPL: 1.6 {RATIO} (ref 1.1–2.2)
ALP SERPL-CCNC: 48 U/L (ref 40–129)
ALT SERPL-CCNC: 29 U/L (ref 10–40)
ANION GAP SERPL CALCULATED.3IONS-SCNC: 9 MMOL/L (ref 3–16)
AST SERPL-CCNC: 30 U/L (ref 15–37)
BASOPHILS # BLD: 0.1 K/UL (ref 0–0.2)
BASOPHILS NFR BLD: 1.2 %
BILIRUB SERPL-MCNC: 0.3 MG/DL (ref 0–1)
BUN SERPL-MCNC: 8 MG/DL (ref 7–20)
CALCIUM SERPL-MCNC: 10 MG/DL (ref 8.3–10.6)
CHLORIDE SERPL-SCNC: 96 MMOL/L (ref 99–110)
CHOLEST SERPL-MCNC: 141 MG/DL (ref 0–199)
CO2 SERPL-SCNC: 31 MMOL/L (ref 21–32)
CREAT SERPL-MCNC: 0.7 MG/DL (ref 0.6–1.2)
DEPRECATED RDW RBC AUTO: 13.5 % (ref 12.4–15.4)
EOSINOPHIL # BLD: 0.2 K/UL (ref 0–0.6)
EOSINOPHIL NFR BLD: 2.7 %
GFR SERPLBLD CREATININE-BSD FMLA CKD-EPI: >60 ML/MIN/{1.73_M2}
GLUCOSE SERPL-MCNC: 97 MG/DL (ref 70–99)
HCT VFR BLD AUTO: 40 % (ref 36–48)
HDLC SERPL-MCNC: 70 MG/DL (ref 40–60)
HGB BLD-MCNC: 14.2 G/DL (ref 12–16)
LDLC SERPL CALC-MCNC: 59 MG/DL
LYMPHOCYTES # BLD: 2.1 K/UL (ref 1–5.1)
LYMPHOCYTES NFR BLD: 29.2 %
MCH RBC QN AUTO: 32.1 PG (ref 26–34)
MCHC RBC AUTO-ENTMCNC: 35.6 G/DL (ref 31–36)
MCV RBC AUTO: 90.2 FL (ref 80–100)
MONOCYTES # BLD: 0.5 K/UL (ref 0–1.3)
MONOCYTES NFR BLD: 6.7 %
NEUTROPHILS # BLD: 4.4 K/UL (ref 1.7–7.7)
NEUTROPHILS NFR BLD: 60.2 %
PLATELET # BLD AUTO: 197 K/UL (ref 135–450)
PMV BLD AUTO: 8.1 FL (ref 5–10.5)
POTASSIUM SERPL-SCNC: 3.4 MMOL/L (ref 3.5–5.1)
PROT SERPL-MCNC: 6.5 G/DL (ref 6.4–8.2)
PTH-INTACT SERPL-MCNC: 53.4 PG/ML (ref 14–72)
RBC # BLD AUTO: 4.44 M/UL (ref 4–5.2)
SODIUM SERPL-SCNC: 136 MMOL/L (ref 136–145)
T3FREE SERPL-MCNC: 2.3 PG/ML (ref 2.3–4.2)
T4 FREE SERPL-MCNC: 1.8 NG/DL (ref 0.9–1.8)
TRIGL SERPL-MCNC: 60 MG/DL (ref 0–150)
TSH SERPL DL<=0.005 MIU/L-ACNC: 0.53 UIU/ML (ref 0.27–4.2)
VLDLC SERPL CALC-MCNC: 12 MG/DL
WBC # BLD AUTO: 7.2 K/UL (ref 4–11)

## 2023-07-04 LAB
EST. AVERAGE GLUCOSE BLD GHB EST-MCNC: 114 MG/DL
HBA1C MFR BLD: 5.6 %

## 2023-07-10 SDOH — HEALTH STABILITY: PHYSICAL HEALTH: ON AVERAGE, HOW MANY MINUTES DO YOU ENGAGE IN EXERCISE AT THIS LEVEL?: 30 MIN

## 2023-07-10 SDOH — HEALTH STABILITY: PHYSICAL HEALTH: ON AVERAGE, HOW MANY DAYS PER WEEK DO YOU ENGAGE IN MODERATE TO STRENUOUS EXERCISE (LIKE A BRISK WALK)?: 7 DAYS

## 2023-07-10 ASSESSMENT — LIFESTYLE VARIABLES
HOW OFTEN DO YOU HAVE SIX OR MORE DRINKS ON ONE OCCASION: 1
HOW MANY STANDARD DRINKS CONTAINING ALCOHOL DO YOU HAVE ON A TYPICAL DAY: 1
HOW OFTEN DO YOU HAVE A DRINK CONTAINING ALCOHOL: 2-3 TIMES A WEEK
HOW MANY STANDARD DRINKS CONTAINING ALCOHOL DO YOU HAVE ON A TYPICAL DAY: 1 OR 2
HOW OFTEN DO YOU HAVE A DRINK CONTAINING ALCOHOL: 4

## 2023-07-10 ASSESSMENT — PATIENT HEALTH QUESTIONNAIRE - PHQ9
1. LITTLE INTEREST OR PLEASURE IN DOING THINGS: 0
SUM OF ALL RESPONSES TO PHQ QUESTIONS 1-9: 0
2. FEELING DOWN, DEPRESSED OR HOPELESS: 0
SUM OF ALL RESPONSES TO PHQ9 QUESTIONS 1 & 2: 0

## 2023-07-13 ENCOUNTER — HOSPITAL ENCOUNTER (OUTPATIENT)
Dept: GENERAL RADIOLOGY | Age: 68
Discharge: HOME | End: 2023-07-13
Payer: MEDICARE

## 2023-07-13 ENCOUNTER — OFFICE VISIT (OUTPATIENT)
Dept: INTERNAL MEDICINE CLINIC | Age: 68
End: 2023-07-13

## 2023-07-13 VITALS
HEIGHT: 64 IN | BODY MASS INDEX: 26.29 KG/M2 | SYSTOLIC BLOOD PRESSURE: 130 MMHG | WEIGHT: 154 LBS | DIASTOLIC BLOOD PRESSURE: 70 MMHG

## 2023-07-13 DIAGNOSIS — Z87.891 PERSONAL HISTORY OF TOBACCO USE: ICD-10-CM

## 2023-07-13 DIAGNOSIS — M54.50 ACUTE LEFT-SIDED LOW BACK PAIN WITHOUT SCIATICA: ICD-10-CM

## 2023-07-13 DIAGNOSIS — F41.9 ANXIETY: ICD-10-CM

## 2023-07-13 DIAGNOSIS — E03.9 HYPOTHYROIDISM, UNSPECIFIED TYPE: Chronic | ICD-10-CM

## 2023-07-13 DIAGNOSIS — E78.2 MIXED HYPERLIPIDEMIA: ICD-10-CM

## 2023-07-13 DIAGNOSIS — K58.9 IRRITABLE BOWEL SYNDROME, UNSPECIFIED TYPE: ICD-10-CM

## 2023-07-13 DIAGNOSIS — K21.9 GASTROESOPHAGEAL REFLUX DISEASE, UNSPECIFIED WHETHER ESOPHAGITIS PRESENT: ICD-10-CM

## 2023-07-13 DIAGNOSIS — R10.32 LLQ PAIN: ICD-10-CM

## 2023-07-13 DIAGNOSIS — F51.04 PSYCHOPHYSIOLOGICAL INSOMNIA: ICD-10-CM

## 2023-07-13 DIAGNOSIS — Z78.0 POSTMENOPAUSAL: ICD-10-CM

## 2023-07-13 DIAGNOSIS — E21.3 HYPERPARATHYROIDISM (HCC): ICD-10-CM

## 2023-07-13 DIAGNOSIS — Z00.00 MEDICARE ANNUAL WELLNESS VISIT, SUBSEQUENT: Primary | ICD-10-CM

## 2023-07-13 DIAGNOSIS — G25.81 RESTLESS LEG SYNDROME: ICD-10-CM

## 2023-07-13 PROCEDURE — 72100 X-RAY EXAM L-S SPINE 2/3 VWS: CPT

## 2023-07-13 RX ORDER — TRAZODONE HYDROCHLORIDE 50 MG/1
TABLET ORAL
Qty: 90 TABLET | Refills: 5 | Status: SHIPPED | OUTPATIENT
Start: 2023-07-13

## 2023-07-13 RX ORDER — ALPRAZOLAM 0.5 MG/1
0.5 TABLET ORAL 2 TIMES DAILY PRN
Qty: 35 TABLET | Refills: 2 | Status: SHIPPED | OUTPATIENT
Start: 2023-07-13 | End: 2023-10-11

## 2023-07-13 RX ORDER — FAMOTIDINE 40 MG/1
40 TABLET, FILM COATED ORAL DAILY
Qty: 90 TABLET | Refills: 3 | Status: SHIPPED | OUTPATIENT
Start: 2023-07-13

## 2023-07-13 RX ORDER — PROCHLORPERAZINE MALEATE 10 MG
TABLET ORAL
Qty: 30 TABLET | Refills: 4 | Status: SHIPPED | OUTPATIENT
Start: 2023-07-13

## 2023-07-13 RX ORDER — DIAZEPAM 5 MG/1
5 TABLET ORAL EVERY 6 HOURS PRN
Qty: 45 TABLET | Refills: 2 | Status: SHIPPED | OUTPATIENT
Start: 2023-07-13 | End: 2024-01-09

## 2023-07-13 RX ORDER — ROPINIROLE 0.5 MG/1
0.5 TABLET, FILM COATED ORAL 3 TIMES DAILY
Qty: 90 TABLET | Refills: 5 | Status: SHIPPED | OUTPATIENT
Start: 2023-07-13

## 2023-07-13 RX ORDER — DICYCLOMINE HCL 20 MG
TABLET ORAL
Qty: 120 TABLET | Refills: 5 | Status: SHIPPED | OUTPATIENT
Start: 2023-07-13

## 2023-07-13 RX ORDER — POTASSIUM CHLORIDE 750 MG/1
10 CAPSULE, EXTENDED RELEASE ORAL 2 TIMES DAILY
COMMUNITY

## 2023-07-13 RX ORDER — ZOLMITRIPTAN 5 MG/1
5 TABLET, FILM COATED ORAL PRN
Qty: 12 TABLET | Refills: 5 | Status: SHIPPED | OUTPATIENT
Start: 2023-07-13

## 2023-07-13 NOTE — PROGRESS NOTES
Medicare Annual Wellness Visit    Mauro Keller is here for Medicare AWV    Assessment & Plan   Medicare annual wellness visit, subsequent  Anxiety  -Stable. No change off of the BuSpar. Will monitor for now, continue alprazolam.  OAR[place] reviewed, no concerns.  -     ALPRAZolam (XANAX) 0.5 MG tablet; Take 1 tablet by mouth 2 times daily as needed for Anxiety for up to 90 days. Max Daily Amount: 1 mg, Disp-35 tablet, R-2Normal  Mixed hyperlipidemia  -Controlled, continue atorvastatin 80 mg daily  Psychophysiological insomnia  -Continue trazodone 150 mg daily  Gastroesophageal reflux disease, unspecified whether esophagitis present  -Controlled, continue famotidine 40 mg in the morning, omeprazole 40 mg in the evening  Hypothyroidism, unspecified type  -Controlled, continue levothyroxine 125 mcg daily  Hyperparathyroidism (HCC)  -Stable, monitor  Restless leg syndrome  -Stable, continue ropinirole 0.5 mg 3 times daily  Irritable bowel syndrome, unspecified type  -Stable, continue dicyclomine, uses diazepam intermittently  -     diazePAM (VALIUM) 5 MG tablet; Take 1 tablet by mouth every 6 hours as needed (IBS attacks) for up to 180 days. Do not take with alprazolam Max Daily Amount: 20 mg, Disp-45 tablet, R-2Normal  Personal history of tobacco use  -     CT Lung Screen (Annual/Baseline); Future  Postmenopausal  -     DEXA BONE DENSITY AXIAL SKELETON; Future  LLQ pain  -Suspect this is more likely back related, but will check ultrasound of the pelvis. Does not seem GI in nature. If ultrasound is normal, may need further work-up if not improving over the next several weeks, would consider PT  -     US PELVIS COMPLETE; Future  Acute left-sided low back pain without sciatica  -     XR LUMBAR SPINE (2-3 VIEWS);  Future    Recommendations for Preventive Services Due: see orders and patient instructions/AVS.  Recommended screening schedule for the next 5-10 years is provided to the patient in written form: see Patient

## 2023-07-13 NOTE — PATIENT INSTRUCTIONS
are or were. To figure out your pack years, multiply how many packs a day on average (assuming 20 cigarettes per pack) you have smoked by how many years you have smoked. For example: If you smoked 1 pack a day for 20 years, that's 1 times 20. So you have a smoking history of 20 pack years. If you smoked 2 packs a day for 10 years, that's 2 times 10. So you have a smoking history of 20 pack years. Experts agree that screening is for people who have a high risk of lung cancer. But experts don't agree on what high risk means. Some say people age 48 or older with at least a 20-pack-year smoking history are high risk. Others say it's people age 54 or older with a 30-pack-year history. To see if you could benefit from screening, first find out if you are at high risk for lung cancer. Your doctor can help you decide your lung cancer risk. What are the risks of screening? CT screening for lung cancer isn't perfect. It can show an abnormal result when it turns out there wasn't any cancer. This is called a false-positive result. This means you may need more tests to make sure you don't have cancer. These tests can be harmful and cause a lot of worry. These tests may include more CT scans and invasive testing like a lung biopsy. In a biopsy, the doctor takes a sample of tissue from inside your lung so it can be looked at under a microscope. A biopsy is the only way to tell if you have lung cancer. If the biopsy finds cancer, you and your doctor will have to decide how or whether to treat it. Some lung cancers found on CT scans are harmless and would not have caused a problem if they had not been found through screening. But because doctors can't tell which ones will turn out to be harmless, most will be treated. This means that you may get treatment--including surgery, radiation, or chemotherapy--that you don't need.   There is a risk of damage to cells or tissue from being exposed to radiation, including the small

## 2023-08-14 ENCOUNTER — HOSPITAL ENCOUNTER (OUTPATIENT)
Dept: CT IMAGING | Age: 68
Discharge: HOME | End: 2023-08-14
Payer: MEDICARE

## 2023-08-14 ENCOUNTER — HOSPITAL ENCOUNTER (OUTPATIENT)
Dept: ULTRASOUND IMAGING | Age: 68
Discharge: HOME | End: 2023-08-14
Payer: MEDICARE

## 2023-08-14 ENCOUNTER — HOSPITAL ENCOUNTER (OUTPATIENT)
Dept: WOMENS IMAGING | Age: 68
Discharge: HOME | End: 2023-08-14
Payer: MEDICARE

## 2023-08-14 DIAGNOSIS — R10.32 LLQ PAIN: ICD-10-CM

## 2023-08-14 DIAGNOSIS — Z78.0 POSTMENOPAUSAL: ICD-10-CM

## 2023-08-14 DIAGNOSIS — Z87.891 PERSONAL HISTORY OF TOBACCO USE: ICD-10-CM

## 2023-08-14 PROCEDURE — 77080 DXA BONE DENSITY AXIAL: CPT

## 2023-08-14 PROCEDURE — 71271 CT THORAX LUNG CANCER SCR C-: CPT

## 2023-08-14 PROCEDURE — 76856 US EXAM PELVIC COMPLETE: CPT

## 2023-08-14 PROCEDURE — 76830 TRANSVAGINAL US NON-OB: CPT

## 2023-09-16 ENCOUNTER — HOSPITAL ENCOUNTER (EMERGENCY)
Age: 68
Discharge: HOME | End: 2023-09-16
Attending: EMERGENCY MEDICINE
Payer: MEDICARE

## 2023-09-16 ENCOUNTER — APPOINTMENT (OUTPATIENT)
Dept: CT IMAGING | Age: 68
End: 2023-09-16
Payer: MEDICARE

## 2023-09-16 ENCOUNTER — APPOINTMENT (OUTPATIENT)
Dept: GENERAL RADIOLOGY | Age: 68
End: 2023-09-16
Payer: MEDICARE

## 2023-09-16 VITALS
HEART RATE: 70 BPM | OXYGEN SATURATION: 95 % | RESPIRATION RATE: 15 BRPM | SYSTOLIC BLOOD PRESSURE: 108 MMHG | HEIGHT: 64 IN | WEIGHT: 157.85 LBS | BODY MASS INDEX: 26.95 KG/M2 | TEMPERATURE: 98 F | DIASTOLIC BLOOD PRESSURE: 58 MMHG

## 2023-09-16 DIAGNOSIS — I48.91 ATRIAL FIBRILLATION, NEW ONSET (HCC): Primary | ICD-10-CM

## 2023-09-16 DIAGNOSIS — Z95.2 HX OF PROSTHETIC MITRAL VALVE: ICD-10-CM

## 2023-09-16 DIAGNOSIS — I42.2 HYPERTROPHIC CARDIOMYOPATHY (HCC): ICD-10-CM

## 2023-09-16 DIAGNOSIS — Z78.9 ALCOHOL USE: ICD-10-CM

## 2023-09-16 LAB
ALBUMIN SERPL-MCNC: 3.9 G/DL (ref 3.4–5)
ALBUMIN/GLOB SERPL: 1.4 {RATIO} (ref 1.1–2.2)
ALP SERPL-CCNC: 57 U/L (ref 40–129)
ALT SERPL-CCNC: 33 U/L (ref 10–40)
ANION GAP SERPL CALCULATED.3IONS-SCNC: 14 MMOL/L (ref 3–16)
AST SERPL-CCNC: 39 U/L (ref 15–37)
BASOPHILS # BLD: 0.1 K/UL (ref 0–0.2)
BASOPHILS NFR BLD: 0.7 %
BILIRUB SERPL-MCNC: 0.4 MG/DL (ref 0–1)
BUN SERPL-MCNC: 8 MG/DL (ref 7–20)
CALCIUM SERPL-MCNC: 10 MG/DL (ref 8.3–10.6)
CHLORIDE SERPL-SCNC: 98 MMOL/L (ref 99–110)
CO2 SERPL-SCNC: 22 MMOL/L (ref 21–32)
CREAT SERPL-MCNC: 0.6 MG/DL (ref 0.6–1.2)
DEPRECATED RDW RBC AUTO: 12.3 % (ref 12.4–15.4)
EKG ATRIAL RATE: 72 BPM
EKG DIAGNOSIS: NORMAL
EKG DIAGNOSIS: NORMAL
EKG P AXIS: 58 DEGREES
EKG P-R INTERVAL: 208 MS
EKG Q-T INTERVAL: 334 MS
EKG Q-T INTERVAL: 440 MS
EKG QRS DURATION: 138 MS
EKG QRS DURATION: 148 MS
EKG QTC CALCULATION (BAZETT): 481 MS
EKG QTC CALCULATION (BAZETT): 489 MS
EKG R AXIS: 6 DEGREES
EKG R AXIS: 6 DEGREES
EKG T AXIS: 171 DEGREES
EKG T AXIS: 180 DEGREES
EKG VENTRICULAR RATE: 129 BPM
EKG VENTRICULAR RATE: 72 BPM
EOSINOPHIL # BLD: 0.1 K/UL (ref 0–0.6)
EOSINOPHIL NFR BLD: 1.6 %
GFR SERPLBLD CREATININE-BSD FMLA CKD-EPI: >60 ML/MIN/{1.73_M2}
GLUCOSE SERPL-MCNC: 134 MG/DL (ref 70–99)
HCT VFR BLD AUTO: 43.1 % (ref 36–48)
HGB BLD-MCNC: 15.1 G/DL (ref 12–16)
INR PPP: 1 (ref 0.84–1.16)
LYMPHOCYTES # BLD: 1.8 K/UL (ref 1–5.1)
LYMPHOCYTES NFR BLD: 20.9 %
MCH RBC QN AUTO: 31.3 PG (ref 26–34)
MCHC RBC AUTO-ENTMCNC: 35 G/DL (ref 31–36)
MCV RBC AUTO: 89.5 FL (ref 80–100)
MONOCYTES # BLD: 0.8 K/UL (ref 0–1.3)
MONOCYTES NFR BLD: 8.6 %
NEUTROPHILS # BLD: 6.1 K/UL (ref 1.7–7.7)
NEUTROPHILS NFR BLD: 68.2 %
NT-PROBNP SERPL-MCNC: 1132 PG/ML (ref 0–124)
PLATELET # BLD AUTO: 201 K/UL (ref 135–450)
PMV BLD AUTO: 8.3 FL (ref 5–10.5)
POTASSIUM SERPL-SCNC: 3.8 MMOL/L (ref 3.5–5.1)
PROT SERPL-MCNC: 6.6 G/DL (ref 6.4–8.2)
PROTHROMBIN TIME: 13.2 SEC (ref 11.5–14.8)
RBC # BLD AUTO: 4.82 M/UL (ref 4–5.2)
SODIUM SERPL-SCNC: 134 MMOL/L (ref 136–145)
TROPONIN, HIGH SENSITIVITY: 16 NG/L (ref 0–14)
TROPONIN, HIGH SENSITIVITY: 19 NG/L (ref 0–14)
TSH SERPL DL<=0.005 MIU/L-ACNC: 0.27 UIU/ML (ref 0.27–4.2)
WBC # BLD AUTO: 8.9 K/UL (ref 4–11)

## 2023-09-16 PROCEDURE — 74177 CT ABD & PELVIS W/CONTRAST: CPT

## 2023-09-16 PROCEDURE — 93005 ELECTROCARDIOGRAM TRACING: CPT | Performed by: PHYSICIAN ASSISTANT

## 2023-09-16 PROCEDURE — 93010 ELECTROCARDIOGRAM REPORT: CPT | Performed by: INTERNAL MEDICINE

## 2023-09-16 PROCEDURE — 2580000003 HC RX 258: Performed by: PHYSICIAN ASSISTANT

## 2023-09-16 PROCEDURE — 85610 PROTHROMBIN TIME: CPT

## 2023-09-16 PROCEDURE — 83880 ASSAY OF NATRIURETIC PEPTIDE: CPT

## 2023-09-16 PROCEDURE — 93005 ELECTROCARDIOGRAM TRACING: CPT | Performed by: EMERGENCY MEDICINE

## 2023-09-16 PROCEDURE — 71045 X-RAY EXAM CHEST 1 VIEW: CPT

## 2023-09-16 PROCEDURE — 96360 HYDRATION IV INFUSION INIT: CPT | Performed by: EMERGENCY MEDICINE

## 2023-09-16 PROCEDURE — 99285 EMERGENCY DEPT VISIT HI MDM: CPT | Performed by: EMERGENCY MEDICINE

## 2023-09-16 PROCEDURE — 84484 ASSAY OF TROPONIN QUANT: CPT

## 2023-09-16 PROCEDURE — 84443 ASSAY THYROID STIM HORMONE: CPT

## 2023-09-16 PROCEDURE — 6360000004 HC RX CONTRAST MEDICATION: Performed by: PHYSICIAN ASSISTANT

## 2023-09-16 PROCEDURE — 85025 COMPLETE CBC W/AUTO DIFF WBC: CPT

## 2023-09-16 PROCEDURE — 80053 COMPREHEN METABOLIC PANEL: CPT

## 2023-09-16 RX ORDER — 0.9 % SODIUM CHLORIDE 0.9 %
1000 INTRAVENOUS SOLUTION INTRAVENOUS ONCE
Status: COMPLETED | OUTPATIENT
Start: 2023-09-16 | End: 2023-09-16

## 2023-09-16 RX ADMIN — SODIUM CHLORIDE 1000 ML: 9 INJECTION, SOLUTION INTRAVENOUS at 09:17

## 2023-09-16 RX ADMIN — IOPAMIDOL 75 ML: 755 INJECTION, SOLUTION INTRAVENOUS at 09:50

## 2023-09-16 ASSESSMENT — PAIN SCALES - GENERAL
PAINLEVEL_OUTOF10: 0
PAINLEVEL_OUTOF10: 3
PAINLEVEL_OUTOF10: 3
PAINLEVEL_OUTOF10: 5

## 2023-09-16 ASSESSMENT — PAIN DESCRIPTION - LOCATION: LOCATION: CHEST

## 2023-09-16 ASSESSMENT — LIFESTYLE VARIABLES
HOW MANY STANDARD DRINKS CONTAINING ALCOHOL DO YOU HAVE ON A TYPICAL DAY: PATIENT DOES NOT DRINK
HOW OFTEN DO YOU HAVE A DRINK CONTAINING ALCOHOL: NEVER

## 2023-09-16 ASSESSMENT — PAIN DESCRIPTION - FREQUENCY: FREQUENCY: CONTINUOUS

## 2023-09-16 ASSESSMENT — PAIN DESCRIPTION - PAIN TYPE: TYPE: ACUTE PAIN

## 2023-09-16 ASSESSMENT — PAIN - FUNCTIONAL ASSESSMENT: PAIN_FUNCTIONAL_ASSESSMENT: ACTIVITIES ARE NOT PREVENTED

## 2023-09-16 ASSESSMENT — PAIN DESCRIPTION - ORIENTATION: ORIENTATION: MID

## 2023-09-16 ASSESSMENT — PAIN DESCRIPTION - ONSET: ONSET: ON-GOING

## 2023-09-16 ASSESSMENT — PAIN DESCRIPTION - DESCRIPTORS: DESCRIPTORS: PRESSURE

## 2023-09-16 NOTE — ED NOTES
Pt transferred to ED rm 15 from triage at this time. Pt to ED with c/o heart palpitations and chest pressure that started upon waking approx 1 hr prior to ED arrival.  Pt reports hx of cardiomyopathy. Pt alert and oriented x4, tachycardic with irregular hr in the 120's-150's and hypotensive. EKG obtained in trage and read by MD.  Pt on cardiac monitor, IV in place, ordered labs obtained and sent to lab. Call light in reach, will continue to monitor closely.      Alex Ram RN  09/16/23 1546

## 2023-09-16 NOTE — ED NOTES
Pt remains NSR on the monitor with hr in the 60's, b/p 108/58. Pt denies any chest pain or sob. No s/s or c/o pain or distress noted or reported. ED MD Sarika Jefferson at bedside speaking with pt and pt's .       Sonido Slater RN  09/16/23 1037

## 2023-09-16 NOTE — ED NOTES
Discharge and education instructions reviewed. Patient verbalized understanding, teach-back successful. Patient denied questions at this time. No acute distress noted. Patient instructed to follow-up as noted - return to emergency department if symptoms worsen. Patient verbalized understanding. Discharged per EDMD with discharge instructions.        Jude Salazar RN  09/16/23 9899

## 2023-09-16 NOTE — ED NOTES
HR regular in the 60's on cardiac monitor. NATHANIEL Gonzalez updated. Repeat EKG being obtained.       Obdulio Gupta RN  09/16/23 5824

## 2023-09-16 NOTE — DISCHARGE INSTRUCTIONS
Home in stable condition to stay well-hydrated, do moderate rest, avoid further alcohol use, and call Dr. Noni Granados with cardiology on Monday morning to arrange close outpatient follow-up and further care and treatment. Return to the emergency department if any recurrence of your racing heart, pain in the chest, difficulty breathing, or other emergency worsening or concern.

## 2023-09-16 NOTE — ED PROVIDER NOTES
[address] [facility] [address] [address]        Pt Name: Martha Lucio  MRN: [id number]  [address] [facility] [place] 1955  Date of evaluation: 9/16/2023  Provider: Cesar Bond PA-C  PCP: Burgess Cosme MD  Note Started: 8:49 AM EDT 9/16/23       I have seen and evaluated this patient with my supervising physician Connie Vogt MD.      [address] [facility]       Chief Complaint   Patient presents with    Irregular Heart Beat     Pt c/o heart palpitations x 1 hour. Hx cardiomyopathy. Hypotension & Tachycardia in triage       HISTORY OF PRESENT ILLNESS: 1 or more Elements     History From: Patient            Chief Complaint: 1 hour of heart racing    Martha Lucio is a 70 y.o. female who presents stating that from time to time she will have a racing heart for a little while but usually goes away in a couple of minutes or so. She states that she did have a few beers last night. She got up and was watching the replay of a sports game and suddenly started noticing that her heart was racing. She feels a little short of breath. She feels a bit weak. Because of these things and her symptoms lasting for about an hour she finally decided to come to the ER. Patient denying any pain in the chest but states that the racing heart is uncomfortable, maybe a 5 or 6 out of 10. No recent cough or congestion fevers or chills. No abdominal pain or vomiting. She is on an aspirin a day which she takes in the evening. Patient has a history of obstructive cardiomyopathy that she had a surgery for as well as a leaky mitral valve that she had replaced both of these in 2020. Nursing Notes were all reviewed and agreed with or any disagreements were addressed in the HPI. REVIEW OF SYSTEMS :      Review of Systems  Positive symptoms as above, no acute fall contusion or twisting injury headache or vision change. No neck pain or stiffness.   No abdominal pain or vomiting dizziness or confusion edema. Left lower leg: No edema. Lymphadenopathy:      Cervical: No cervical adenopathy. Skin:     General: Skin is warm and dry. Capillary Refill: Capillary refill takes less than 2 seconds. Findings: No rash. Neurological:      Mental Status: She is alert and oriented to person, place, and time. Mental status is at baseline. Psychiatric:         Mood and Affect: Mood normal.         Behavior: Behavior normal.           DIAGNOSTIC RESULTS   LABS:    Labs Reviewed   TROPONIN - Abnormal; Notable for the following components:       Result Value    Troponin, High Sensitivity 16 (*)     All other components within normal limits   CBC WITH AUTO DIFFERENTIAL - Abnormal; Notable for the following components:    RDW 12.3 (*)     All other components within normal limits   COMPREHENSIVE METABOLIC PANEL W/ REFLEX TO MG FOR LOW K - Abnormal; Notable for the following components:    Sodium 134 (*)     Chloride 98 (*)     Glucose 134 (*)     AST 39 (*)     All other components within normal limits   BRAIN NATRIURETIC PEPTIDE - Abnormal; Notable for the following components:    Pro-BNP 1,132 (*)     All other components within normal limits   TROPONIN - Abnormal; Notable for the following components:    Troponin, High Sensitivity 19 (*)     All other components within normal limits   PROTIME-INR   TSH WITH REFLEX       When ordered only abnormal lab results are displayed. All other labs were within normal range or not returned as of this dictation. EKG: When ordered, EKG's are interpreted by the Emergency Department Physician in the absence of a cardiologist.  Please see their note for interpretation of EKG.     RADIOLOGY:   Non-plain film images such as CT, Ultrasound and MRI are read by the radiologist. Plain radiographic images are visualized and preliminarily interpreted by the ED Provider with the below findings:      Interpretation per the Radiologist below, if available at the time of this

## 2023-09-29 RX ORDER — ONDANSETRON 2 MG/ML
8 INJECTION INTRAMUSCULAR; INTRAVENOUS
Status: CANCELLED | OUTPATIENT
Start: 2023-09-29

## 2023-09-29 RX ORDER — SODIUM CHLORIDE 9 MG/ML
INJECTION, SOLUTION INTRAVENOUS CONTINUOUS
Status: CANCELLED | OUTPATIENT
Start: 2023-09-29

## 2023-09-29 RX ORDER — ALBUTEROL SULFATE 90 UG/1
4 AEROSOL, METERED RESPIRATORY (INHALATION) PRN
Status: CANCELLED | OUTPATIENT
Start: 2023-09-29

## 2023-09-29 RX ORDER — ACETAMINOPHEN 325 MG/1
650 TABLET ORAL
Status: CANCELLED | OUTPATIENT
Start: 2023-09-29

## 2023-09-29 RX ORDER — EPINEPHRINE 1 MG/ML
0.3 INJECTION, SOLUTION, CONCENTRATE INTRAVENOUS PRN
Status: CANCELLED | OUTPATIENT
Start: 2023-09-29

## 2023-09-29 RX ORDER — DIPHENHYDRAMINE HYDROCHLORIDE 50 MG/ML
50 INJECTION INTRAMUSCULAR; INTRAVENOUS
Status: CANCELLED | OUTPATIENT
Start: 2023-09-29

## 2023-09-29 NOTE — PROGRESS NOTES
Cardiac Electrophysiology Consultation   Date: 10/2/2023  Reason for Consultation: New onset atrial fibrillation  Consult Requesting Physician: Kobe Zaragoza MD  Primary Care Physician: Kobe Zaragoza MD    Chief Complaint:   Chief Complaint   Patient presents with    New Patient     Referred from ED for Atrial fibrillation        HPI: Lee Sanchez is a 70 y.o. patient with a history of alcohol use, adrenal adenoma, essential hypertension,  severe mitral regurgitation, s/p MDT Mosaic bioprosthetic MVR (25 mm) basal septal myomectomy by Dr. Gloria Mckinney on June 29, 2020, hypothyroidism, hypertrophic cardiomyopathy and new onset atrial fibrillation (09/16/2023). She presented to the [facility] ED on 09/16/2023 reporting symptoms of heart racing, weakness and mild shortness of breath. She reports that she had similar symptoms prior but they lasted only a few minutes but symptoms on that particular day were persisting. Patient spontaneously converted to NSR before CV could be performed. Today, she presents to office for evaluation of new onset atrial fibrillation. She endorsed symptoms of palpitations when in atrial fibrillation. Patient does not endorse any identifiable exacerbating or alleviating factors for the atrial fibrillation. She is compliant with her medications and tolerating them well. She denies chest pain/pressure, tightness, edema, shortness of breath, lightheadedness, dizziness, syncope, presyncope,  PND or orthopnea. She endorses symptoms of fatigue and generalized weakness.        Past Medical History:   Diagnosis Date    Adrenal adenoma     left 8 mm - stable - CT 8/13    Anxiety     Arthritis     Asthma     Chronic nausea     Environmental allergies     GERD (gastroesophageal reflux disease)     HOCM (hypertrophic obstructive cardiomyopathy) (HCC)     HTN (hypertension)     Hypothyroid     Irritable bowel syndrome     Microscopic colitis     Migraine     Mitral regurgitation     Nephrolithiasis

## 2023-10-02 ENCOUNTER — TELEPHONE (OUTPATIENT)
Dept: CARDIOLOGY CLINIC | Age: 68
End: 2023-10-02

## 2023-10-02 ENCOUNTER — OFFICE VISIT (OUTPATIENT)
Dept: CARDIOLOGY CLINIC | Age: 68
End: 2023-10-02
Payer: MEDICARE

## 2023-10-02 VITALS
OXYGEN SATURATION: 100 % | SYSTOLIC BLOOD PRESSURE: 108 MMHG | HEIGHT: 64 IN | HEART RATE: 59 BPM | DIASTOLIC BLOOD PRESSURE: 64 MMHG | WEIGHT: 152 LBS | BODY MASS INDEX: 25.95 KG/M2

## 2023-10-02 DIAGNOSIS — I42.1 HOCM (HYPERTROPHIC OBSTRUCTIVE CARDIOMYOPATHY) (HCC): ICD-10-CM

## 2023-10-02 DIAGNOSIS — I34.0 SEVERE MITRAL REGURGITATION: ICD-10-CM

## 2023-10-02 DIAGNOSIS — Z95.2 S/P MVR (MITRAL VALVE REPLACEMENT): ICD-10-CM

## 2023-10-02 DIAGNOSIS — I10 ESSENTIAL HYPERTENSION: ICD-10-CM

## 2023-10-02 DIAGNOSIS — I48.91 NEW ONSET ATRIAL FIBRILLATION (HCC): Primary | ICD-10-CM

## 2023-10-02 PROCEDURE — G8399 PT W/DXA RESULTS DOCUMENT: HCPCS | Performed by: INTERNAL MEDICINE

## 2023-10-02 PROCEDURE — 1090F PRES/ABSN URINE INCON ASSESS: CPT | Performed by: INTERNAL MEDICINE

## 2023-10-02 PROCEDURE — G8484 FLU IMMUNIZE NO ADMIN: HCPCS | Performed by: INTERNAL MEDICINE

## 2023-10-02 PROCEDURE — G8427 DOCREV CUR MEDS BY ELIG CLIN: HCPCS | Performed by: INTERNAL MEDICINE

## 2023-10-02 PROCEDURE — 93000 ELECTROCARDIOGRAM COMPLETE: CPT | Performed by: INTERNAL MEDICINE

## 2023-10-02 PROCEDURE — 3078F DIAST BP <80 MM HG: CPT | Performed by: INTERNAL MEDICINE

## 2023-10-02 PROCEDURE — 1123F ACP DISCUSS/DSCN MKR DOCD: CPT | Performed by: INTERNAL MEDICINE

## 2023-10-02 PROCEDURE — 3017F COLORECTAL CA SCREEN DOC REV: CPT | Performed by: INTERNAL MEDICINE

## 2023-10-02 PROCEDURE — G8417 CALC BMI ABV UP PARAM F/U: HCPCS | Performed by: INTERNAL MEDICINE

## 2023-10-02 PROCEDURE — 3074F SYST BP LT 130 MM HG: CPT | Performed by: INTERNAL MEDICINE

## 2023-10-02 PROCEDURE — 1036F TOBACCO NON-USER: CPT | Performed by: INTERNAL MEDICINE

## 2023-10-02 PROCEDURE — 99205 OFFICE O/P NEW HI 60 MIN: CPT | Performed by: INTERNAL MEDICINE

## 2023-10-02 NOTE — PATIENT INSTRUCTIONS
If you have any question regarding your ablation or would like to proceed with scheduling please contact Dr. Ana Lilia Barajas Nurse Darylene Starks at [phone]. CTA Pre procedure instructions      As part of your pre procedure work up you will need to get a CT scan of your heart. This scan is completed in order to give Dr. Shahab Amin a map of the atrium (chamber of your heart) where he will be doing the ablation. Date:_________________________________    Time:_________________________________    Any Grabill 20 minutes prior to scheduled testing time  -Nothing to eat or drink for at least 4 hours prior to test    Atrial Fibrillation Ablation Pre procedure Instructions    Date:______________________________    Arrive at:__________________________    Procedure time:____________________    The morning of your procedure you will park in the hospital parking lot and report directly to the cath lab to check in. At the information desk stay right and go all the way to the end of the rush, this will take you directly to your check in desk for the cath lab. Pre-Procedure Instructions   You will need to fast (nothing to eat or drink) after midnight the day of your procedure. Do NOT chew gum or eat mints the day of your procedure. You will need to hold your Eliquis for 12 hours prior to the procedure. All other medications may be taken with a sip of water at your regularly scheduled times. Do not use any lotions, creams or perfume the morning of procedure. You will need to complete pre-procedure lab work 5-7 days prior to your procedure. Please have a responsible adult to drive you home after procedure, you should go home same day, but there is always a possibility of an overnight stay.   Cath lab will provide you with all post procedure instructions  A 3 month follow up will be scheduled with Dr. Ana Lilia Barajas Nurse practitioner Codi Burks CNP post procedure                                        [facility]

## 2023-10-02 NOTE — TELEPHONE ENCOUNTER
Pt was in the office today to see Dr Janelle Garza. He put her on ELIQUIS and while reading to the warning signs it says NOT to take if you have had an artifical heart valve. She said she did and it was a cow valve. Would like to be called asap to see if she is safe to take this medication.     Valentine Cantor # [phone]

## 2023-10-02 NOTE — TELEPHONE ENCOUNTER
Please reach out to patient and schedule CORIRE /  atrial fibrillation ablation with Dr. Puma Cross. Also schedule CTA for pulmonary vein mapping. Anesthesia is needed   Echo tech is NOT needed for CORRIE. Include Carto Rep in email [email or web address]      CTA Pre procedure instructions       As part of your pre procedure work up you will need to get a CT scan of your heart. This scan is completed in order to give Dr. Puma Cross a map of the atrium (chamber of your heart) where he will be doing the ablation. Date:_________________________________     Time:_________________________________     Devere Chasten 20 minutes prior to scheduled testing time  -Nothing to eat or drink for at least 4 hours prior to test     Atrial Fibrillation Ablation Pre procedure Instructions     Date:______________________________     Arrive at:__________________________     Procedure time:____________________     The morning of your procedure you will park in the hospital parking lot and report directly to the cath lab to check in. At the information desk stay right and go all the way to the end of the rush, this will take you directly to your check in desk for the cath lab. Pre-Procedure Instructions   You will need to fast (nothing to eat or drink) after midnight the day of your procedure. Do NOT chew gum or eat mints the day of your procedure. You will need to hold your Eliquis for 12 hours prior to the procedure. All other medications may be taken with a sip of water at your regularly scheduled times. Do not use any lotions, creams or perfume the morning of procedure. You will need to complete pre-procedure lab work 5-7 days prior to your procedure. Please have a responsible adult to drive you home after procedure, you should go home same day, but there is always a possibility of an overnight stay.   Cath lab will provide you with all post procedure instructions  A 3 month follow up will be scheduled with Dr. Corby Lopez Nurse practitioner

## 2023-10-02 NOTE — TELEPHONE ENCOUNTER
She is safe to take the Eliquis the warning is for a mechanical valve, as patient with mechanical valves must be on warfarin this is not the case for a bioprosthetic valve. She is safe to take Eliquis. Patient notified.

## 2023-10-03 NOTE — TELEPHONE ENCOUNTER
Spoke with the patient and got her scheduled for procedures. We went over instructions below and she verbalized understanding. CTA Pre procedure instructions  (check in at information desk main hosp)     As part of your pre procedure work up you will need to get a CT scan of your heart. This scan is completed in order to give Dr. Matti Parisi a map of the atrium (chamber of your heart) where he will be doing the ablation. Date: 10/25/2023     Time: 8:20 am, arrive by 8:05 am - do blood work prior or after either one     -Arrive 20 minutes prior to scheduled testing time  -Nothing to eat or drink for at least 4 hours prior to test  -No Caffeine 12 hour prior      Atrial Fibrillation Ablation Pre procedure Instructions     Date: 11/1/2023     Arrive at: 6:30 am   Procedure time: 7:30 am      The morning of your procedure you will park in the hospital parking lot and report directly to the cath lab to check in. At the information desk stay right and go all the way to the end of the rush, this will take you directly to your check in desk for the cath lab. Pre-Procedure Instructions   You will need to fast (nothing to eat or drink) after midnight the day of your procedure. Do NOT chew gum or eat mints the day of your procedure. You will need to hold your Eliquis for 12 hours prior to the procedure. All other medications may be taken with a sip of water at your regularly scheduled times. Do not use any lotions, creams or perfume the morning of procedure. You will need to complete pre-procedure lab work 5-7 days prior to your procedure. Please have a responsible adult to drive you home after procedure, you should go home same day, but there is always a possibility of an overnight stay.   Cath lab will provide you with all post procedure instructions  A 3 month follow up will be scheduled with Dr. Dereck Chacko Nurse practitioner Cheryl Khan CNP post procedure                                          [facility]

## 2023-10-12 ENCOUNTER — OFFICE VISIT (OUTPATIENT)
Dept: INTERNAL MEDICINE CLINIC | Age: 68
End: 2023-10-12

## 2023-10-12 VITALS
HEIGHT: 64 IN | WEIGHT: 149 LBS | SYSTOLIC BLOOD PRESSURE: 124 MMHG | DIASTOLIC BLOOD PRESSURE: 64 MMHG | BODY MASS INDEX: 25.44 KG/M2

## 2023-10-12 DIAGNOSIS — I48.91 NEW ONSET A-FIB (HCC): Primary | ICD-10-CM

## 2023-10-12 DIAGNOSIS — E03.9 HYPOTHYROIDISM, UNSPECIFIED TYPE: Chronic | ICD-10-CM

## 2023-10-12 DIAGNOSIS — R29.898 LEG HEAVINESS: ICD-10-CM

## 2023-10-12 DIAGNOSIS — F41.9 ANXIETY: ICD-10-CM

## 2023-10-12 RX ORDER — OMEPRAZOLE 40 MG/1
40 CAPSULE, DELAYED RELEASE ORAL DAILY
Qty: 90 CAPSULE | Refills: 3 | Status: SHIPPED | OUTPATIENT
Start: 2023-10-12

## 2023-10-12 RX ORDER — ALPRAZOLAM 0.5 MG/1
0.5 TABLET ORAL 2 TIMES DAILY PRN
Qty: 35 TABLET | Refills: 2 | Status: SHIPPED | OUTPATIENT
Start: 2023-10-12 | End: 2024-01-10

## 2023-10-12 RX ORDER — LEVOTHYROXINE SODIUM 112 UG/1
112 TABLET ORAL DAILY
Qty: 90 TABLET | Refills: 1 | Status: SHIPPED | OUTPATIENT
Start: 2023-10-12

## 2023-10-12 RX ORDER — PROCHLORPERAZINE MALEATE 10 MG
TABLET ORAL
Qty: 30 TABLET | Refills: 4 | Status: SHIPPED | OUTPATIENT
Start: 2023-10-12

## 2023-10-12 NOTE — PROGRESS NOTES
Javier Valentino (:  1955) is a 70 y.o. female,Established patient, here for evaluation of the following chief complaint(s): Anxiety         ASSESSMENT/PLAN:  1. New onset a-fib [facility])   -Seeing cardiology, will undergo ablation. Now on Eliquis. 2. Anxiety  -Stable. Was reviewed, no concerns  -     ALPRAZolam (XANAX) 0.5 MG tablet; Take 1 tablet by mouth 2 times daily as needed for Anxiety for up to 90 days. Max Daily Amount: 1 mg, Disp-35 tablet, R-2Normal  3. Hypothyroidism, unspecified type  -Borderline over replaced, since she has new A-fib will drop the levothyroxine dose to 112 mcg daily and reassess the TSH in about 6 weeks  -     TSH with Reflex; Future  4. Leg heaviness   -Less likely PVD since she has no exertional symptoms. Could be a late onset side effect of the statin, though not entirely typical.  Can try adding magnesium as well. Return in about 3 months (around 2024) for anxiety. Subjective   SUBJECTIVE/OBJECTIVE:  HPI    New a fib diagnosis - wasn't feeling well so she went to the ED and was diagnosed with A-fib. She has gone in and out of it briefly, saw cardiology who recommended ablation. Having pain/aching in legs, feels like there are weights on them. This started before the A-fib. It is the same when she is moving around as when she is sitting. Anxiety is stable. She takes medications as prescribed. No sedation. Hypothyroidism -she is taking levothyroxine as prescribed. Her TSH was checked in the ED, it was borderline low so the doctor told her to discuss whether to continue the current dose or change it. She otherwise feels fine. Review of Systems       Objective   Physical Exam  Vitals reviewed. Constitutional:       General: She is not in acute distress. Appearance: Normal appearance. She is well-developed. HENT:      Head: Normocephalic and atraumatic. Cardiovascular:      Rate and Rhythm: Normal rate and regular rhythm.       Heart

## 2023-10-17 ENCOUNTER — OFFICE VISIT (OUTPATIENT)
Dept: CARDIOLOGY CLINIC | Age: 68
End: 2023-10-17
Payer: MEDICARE

## 2023-10-17 VITALS
HEART RATE: 56 BPM | DIASTOLIC BLOOD PRESSURE: 60 MMHG | SYSTOLIC BLOOD PRESSURE: 100 MMHG | WEIGHT: 156 LBS | BODY MASS INDEX: 26.78 KG/M2

## 2023-10-17 DIAGNOSIS — F41.9 ANXIETY: ICD-10-CM

## 2023-10-17 DIAGNOSIS — I42.1 HYPERTROPHIC OBSTRUCTIVE CARDIOMYOPATHY (HCC): Primary | ICD-10-CM

## 2023-10-17 PROCEDURE — 99214 OFFICE O/P EST MOD 30 MIN: CPT | Performed by: INTERNAL MEDICINE

## 2023-10-17 PROCEDURE — G8417 CALC BMI ABV UP PARAM F/U: HCPCS | Performed by: INTERNAL MEDICINE

## 2023-10-17 PROCEDURE — G8428 CUR MEDS NOT DOCUMENT: HCPCS | Performed by: INTERNAL MEDICINE

## 2023-10-17 PROCEDURE — 3017F COLORECTAL CA SCREEN DOC REV: CPT | Performed by: INTERNAL MEDICINE

## 2023-10-17 PROCEDURE — 1036F TOBACCO NON-USER: CPT | Performed by: INTERNAL MEDICINE

## 2023-10-17 PROCEDURE — 3074F SYST BP LT 130 MM HG: CPT | Performed by: INTERNAL MEDICINE

## 2023-10-17 PROCEDURE — G8399 PT W/DXA RESULTS DOCUMENT: HCPCS | Performed by: INTERNAL MEDICINE

## 2023-10-17 PROCEDURE — 3078F DIAST BP <80 MM HG: CPT | Performed by: INTERNAL MEDICINE

## 2023-10-17 PROCEDURE — 1090F PRES/ABSN URINE INCON ASSESS: CPT | Performed by: INTERNAL MEDICINE

## 2023-10-17 PROCEDURE — G8484 FLU IMMUNIZE NO ADMIN: HCPCS | Performed by: INTERNAL MEDICINE

## 2023-10-17 PROCEDURE — 1123F ACP DISCUSS/DSCN MKR DOCD: CPT | Performed by: INTERNAL MEDICINE

## 2023-10-17 RX ORDER — METOPROLOL SUCCINATE 100 MG/1
100 TABLET, EXTENDED RELEASE ORAL DAILY
Qty: 90 TABLET | Refills: 3 | Status: SHIPPED | OUTPATIENT
Start: 2023-10-17

## 2023-10-17 RX ORDER — ATORVASTATIN CALCIUM 80 MG/1
80 TABLET, FILM COATED ORAL NIGHTLY
Qty: 90 TABLET | Refills: 3 | Status: SHIPPED | OUTPATIENT
Start: 2023-10-17

## 2023-10-17 NOTE — PROGRESS NOTES
Sex: Female      Is Non- : No      Diabetic: No      Tobacco smoker: No      Systolic Blood Pressure: 750 mmHg      Is BP treated: Yes      HDL Cholesterol: 70 mg/dL      Total Cholesterol: 141 mg/dL      Assessment / Plan:      Diagnosis Orders   1. Hypertrophic obstructive cardiomyopathy ([address])        2. Anxiety             1. HOCM s/p myectomy:   Patient is now asymptomatic and hemo stable.     -Cw Toprol 100 daily    2. Severe MR s/p bioprosthetic MVR:   Normal functioning mitral prosthesis with mildly elevated gradients.     -Cw BB and maintain euvolemia. 3. Systemic HTN:   BP is controlled. - Continue with hydrochlorothiazide 50 mg daily and beta-blocker. 4. PAF. Patient is currently in sinus rhythm. - Continue with Toprol and Eliquis. - Patient scheduled for A-fib ablation in 11/2023. We will schedule a follow up visit in 6 months      I have spent 35 minutes of face to face time with the patient with more than 50% spent counseling and coordinating care. I have personally reviewed the reports and images of labs, radiological studies, cardiac studies including ECG's and telemetry, current and old medical records. The note was completed using EMR and Dragon dictation system. Every effort was made to ensure accuracy; however, inadvertent computerized transcription errors may be present. All questions and concerns were addressed to the patient/family. Alternatives to my treatment were discussed. I would like to thank you for providing me the opportunity to participate in the care of your patient. If you have any questions, please do not hesitate to contact me.      Jose Manuel Navarrete MD, [facility], [address] [place] [address] Office Phone: [phone]  Fax: [phone]

## 2023-10-24 ENCOUNTER — HOSPITAL ENCOUNTER (OUTPATIENT)
Dept: INFUSION THERAPY | Age: 68
Setting detail: INFUSION SERIES
Discharge: HOME | End: 2023-10-24
Payer: MEDICARE

## 2023-10-24 VITALS
HEART RATE: 56 BPM | RESPIRATION RATE: 16 BRPM | SYSTOLIC BLOOD PRESSURE: 119 MMHG | TEMPERATURE: 98.1 F | DIASTOLIC BLOOD PRESSURE: 52 MMHG | OXYGEN SATURATION: 98 %

## 2023-10-24 DIAGNOSIS — M81.0 OSTEOPOROSIS WITHOUT CURRENT PATHOLOGICAL FRACTURE, UNSPECIFIED OSTEOPOROSIS TYPE: Primary | ICD-10-CM

## 2023-10-24 PROCEDURE — 96372 THER/PROPH/DIAG INJ SC/IM: CPT

## 2023-10-24 PROCEDURE — 6360000002 HC RX W HCPCS: Performed by: INTERNAL MEDICINE

## 2023-10-24 RX ORDER — DIPHENHYDRAMINE HYDROCHLORIDE 50 MG/ML
50 INJECTION INTRAMUSCULAR; INTRAVENOUS
Status: CANCELLED | OUTPATIENT
Start: 2024-04-23

## 2023-10-24 RX ORDER — ONDANSETRON 2 MG/ML
8 INJECTION INTRAMUSCULAR; INTRAVENOUS
Status: CANCELLED | OUTPATIENT
Start: 2024-04-23

## 2023-10-24 RX ORDER — ALBUTEROL SULFATE 90 UG/1
4 AEROSOL, METERED RESPIRATORY (INHALATION) PRN
Status: CANCELLED | OUTPATIENT
Start: 2024-04-23

## 2023-10-24 RX ORDER — EPINEPHRINE 1 MG/ML
0.3 INJECTION, SOLUTION, CONCENTRATE INTRAVENOUS PRN
Status: CANCELLED | OUTPATIENT
Start: 2024-04-23

## 2023-10-24 RX ORDER — ACETAMINOPHEN 325 MG/1
650 TABLET ORAL
Status: CANCELLED | OUTPATIENT
Start: 2024-04-23

## 2023-10-24 RX ORDER — SODIUM CHLORIDE 9 MG/ML
INJECTION, SOLUTION INTRAVENOUS CONTINUOUS
Status: CANCELLED | OUTPATIENT
Start: 2024-04-23

## 2023-10-24 RX ADMIN — DENOSUMAB 60 MG: 60 INJECTION SUBCUTANEOUS at 10:22

## 2023-10-24 ASSESSMENT — PAIN DESCRIPTION - FREQUENCY: FREQUENCY: INTERMITTENT

## 2023-10-24 ASSESSMENT — PAIN SCALES - GENERAL: PAINLEVEL_OUTOF10: 0

## 2023-10-24 NOTE — DISCHARGE INSTRUCTIONS
Outpatient Infusion Discharge Instructions  [facility]  [address], [address] [place] [address] Drive  Telephone: [phone]    NAME:  Denice Pires OF BIRTH:  1955  MEDICAL RECORD NUMBER:  [id number]  DATE:  10/24/23    Reason for Outpatient Infusion Visit: Prolia    If you develop any these symptoms please contact you Doctor    [x] Nausea and/or vomiting not relieved with medication   [x] Swelling, redness, and/or bleeding at injection or IV site    [x] Fever or chills  [x] Rash or itching   [x] Shortness of breath  [x] Please review After Visit Summary (AVS) information on    [] Other      Outpatient [address] [facility]: Should you experience any significant changes in your health or have questions about your care please contact the [address] [place] [address] at [address] [place] [address] 8:00 am - 4:00 pm.  If you need help outside these hours and cannot wait until we are again available, contact your Primary Care Physician or go to the hospital emergency room.        Electronically signed by Jodie Ferraro RN on 10/24/2023 at 10:13 AM

## 2023-10-25 ENCOUNTER — HOSPITAL ENCOUNTER (OUTPATIENT)
Dept: CT IMAGING | Age: 68
Discharge: HOME | End: 2023-10-25
Attending: INTERNAL MEDICINE
Payer: MEDICARE

## 2023-10-25 DIAGNOSIS — Z95.2 S/P MVR (MITRAL VALVE REPLACEMENT): ICD-10-CM

## 2023-10-25 DIAGNOSIS — I48.91 NEW ONSET ATRIAL FIBRILLATION (HCC): ICD-10-CM

## 2023-10-25 DIAGNOSIS — I42.1 HOCM (HYPERTROPHIC OBSTRUCTIVE CARDIOMYOPATHY) (HCC): ICD-10-CM

## 2023-10-25 DIAGNOSIS — I34.0 SEVERE MITRAL REGURGITATION: ICD-10-CM

## 2023-10-25 LAB
ABO + RH BLD: NORMAL
ANION GAP SERPL CALCULATED.3IONS-SCNC: 7 MMOL/L (ref 3–16)
BLD GP AB SCN SERPL QL: NORMAL
BUN SERPL-MCNC: 14 MG/DL (ref 7–20)
CALCIUM SERPL-MCNC: 10.3 MG/DL (ref 8.3–10.6)
CHLORIDE SERPL-SCNC: 104 MMOL/L (ref 99–110)
CO2 SERPL-SCNC: 32 MMOL/L (ref 21–32)
CREAT SERPL-MCNC: 0.7 MG/DL (ref 0.6–1.2)
DEPRECATED RDW RBC AUTO: 12.3 % (ref 12.4–15.4)
GFR SERPLBLD CREATININE-BSD FMLA CKD-EPI: >60 ML/MIN/{1.73_M2}
GLUCOSE SERPL-MCNC: 109 MG/DL (ref 70–99)
HCT VFR BLD AUTO: 41.3 % (ref 36–48)
HGB BLD-MCNC: 14.5 G/DL (ref 12–16)
MCH RBC QN AUTO: 31.4 PG (ref 26–34)
MCHC RBC AUTO-ENTMCNC: 35.1 G/DL (ref 31–36)
MCV RBC AUTO: 89.6 FL (ref 80–100)
PLATELET # BLD AUTO: 210 K/UL (ref 135–450)
PMV BLD AUTO: 8.7 FL (ref 5–10.5)
POTASSIUM SERPL-SCNC: 3.5 MMOL/L (ref 3.5–5.1)
RBC # BLD AUTO: 4.61 M/UL (ref 4–5.2)
SODIUM SERPL-SCNC: 143 MMOL/L (ref 136–145)
WBC # BLD AUTO: 7.9 K/UL (ref 4–11)

## 2023-10-25 PROCEDURE — 75574 CT ANGIO HRT W/3D IMAGE: CPT

## 2023-10-25 PROCEDURE — 6360000004 HC RX CONTRAST MEDICATION: Performed by: INTERNAL MEDICINE

## 2023-10-25 RX ADMIN — IOPAMIDOL 75 ML: 755 INJECTION, SOLUTION INTRAVENOUS at 07:34

## 2023-11-01 ENCOUNTER — HOSPITAL ENCOUNTER (OUTPATIENT)
Dept: CARDIAC CATH/INVASIVE PROCEDURES | Age: 68
Discharge: HOME | End: 2023-11-01
Payer: MEDICARE

## 2023-11-01 ENCOUNTER — ANESTHESIA EVENT (OUTPATIENT)
Dept: CARDIAC CATH/INVASIVE PROCEDURES | Age: 68
End: 2023-11-01
Payer: MEDICARE

## 2023-11-01 ENCOUNTER — ANESTHESIA (OUTPATIENT)
Dept: CARDIAC CATH/INVASIVE PROCEDURES | Age: 68
End: 2023-11-01
Payer: MEDICARE

## 2023-11-01 VITALS
HEIGHT: 64 IN | HEART RATE: 78 BPM | RESPIRATION RATE: 16 BRPM | BODY MASS INDEX: 24.75 KG/M2 | OXYGEN SATURATION: 96 % | DIASTOLIC BLOOD PRESSURE: 49 MMHG | SYSTOLIC BLOOD PRESSURE: 101 MMHG | WEIGHT: 145 LBS | TEMPERATURE: 97.4 F

## 2023-11-01 LAB
ABO + RH BLD: NORMAL
BLD GP AB SCN SERPL QL: NORMAL
EKG ATRIAL RATE: 75 BPM
EKG DIAGNOSIS: NORMAL
EKG P AXIS: 77 DEGREES
EKG P-R INTERVAL: 190 MS
EKG Q-T INTERVAL: 462 MS
EKG QRS DURATION: 154 MS
EKG QTC CALCULATION (BAZETT): 515 MS
EKG R AXIS: 0 DEGREES
EKG T AXIS: 135 DEGREES
EKG VENTRICULAR RATE: 75 BPM
POC ACT LR: 340 SEC
POC ACT LR: 343 SEC

## 2023-11-01 PROCEDURE — 93657 TX L/R ATRIAL FIB ADDL: CPT

## 2023-11-01 PROCEDURE — 86900 BLOOD TYPING SEROLOGIC ABO: CPT

## 2023-11-01 PROCEDURE — 93656 COMPRE EP EVAL ABLTJ ATR FIB: CPT | Performed by: INTERNAL MEDICINE

## 2023-11-01 PROCEDURE — 93623 PRGRMD STIMJ&PACG IV RX NFS: CPT | Performed by: INTERNAL MEDICINE

## 2023-11-01 PROCEDURE — C1732 CATH, EP, DIAG/ABL, 3D/VECT: HCPCS | Performed by: INTERNAL MEDICINE

## 2023-11-01 PROCEDURE — A4216 STERILE WATER/SALINE, 10 ML: HCPCS

## 2023-11-01 PROCEDURE — 2580000003 HC RX 258: Performed by: INTERNAL MEDICINE

## 2023-11-01 PROCEDURE — 2500000003 HC RX 250 WO HCPCS

## 2023-11-01 PROCEDURE — 93656 COMPRE EP EVAL ABLTJ ATR FIB: CPT

## 2023-11-01 PROCEDURE — 85347 COAGULATION TIME ACTIVATED: CPT

## 2023-11-01 PROCEDURE — 3700000000 HC ANESTHESIA ATTENDED CARE: Performed by: ANESTHESIOLOGY

## 2023-11-01 PROCEDURE — 6360000002 HC RX W HCPCS

## 2023-11-01 PROCEDURE — C1730 CATH, EP, 19 OR FEW ELECT: HCPCS | Performed by: INTERNAL MEDICINE

## 2023-11-01 PROCEDURE — C1894 INTRO/SHEATH, NON-LASER: HCPCS | Performed by: INTERNAL MEDICINE

## 2023-11-01 PROCEDURE — 93622 COMP EP EVAL L VENTR PAC&REC: CPT | Performed by: INTERNAL MEDICINE

## 2023-11-01 PROCEDURE — 93623 PRGRMD STIMJ&PACG IV RX NFS: CPT

## 2023-11-01 PROCEDURE — 3700000001 HC ADD 15 MINUTES (ANESTHESIA): Performed by: ANESTHESIOLOGY

## 2023-11-01 PROCEDURE — C1893 INTRO/SHEATH, FIXED,NON-PEEL: HCPCS | Performed by: INTERNAL MEDICINE

## 2023-11-01 PROCEDURE — 93655 ICAR CATH ABLTJ DSCRT ARRHYT: CPT | Performed by: INTERNAL MEDICINE

## 2023-11-01 PROCEDURE — 2580000003 HC RX 258

## 2023-11-01 PROCEDURE — 6370000000 HC RX 637 (ALT 250 FOR IP): Performed by: ANESTHESIOLOGY

## 2023-11-01 PROCEDURE — 2709999900 HC NON-CHARGEABLE SUPPLY: Performed by: INTERNAL MEDICINE

## 2023-11-01 PROCEDURE — C1759 CATH, INTRA ECHOCARDIOGRAPHY: HCPCS | Performed by: INTERNAL MEDICINE

## 2023-11-01 PROCEDURE — 86850 RBC ANTIBODY SCREEN: CPT

## 2023-11-01 PROCEDURE — 7100000001 HC PACU RECOVERY - ADDTL 15 MIN

## 2023-11-01 PROCEDURE — 93657 TX L/R ATRIAL FIB ADDL: CPT | Performed by: INTERNAL MEDICINE

## 2023-11-01 PROCEDURE — 7100000000 HC PACU RECOVERY - FIRST 15 MIN

## 2023-11-01 PROCEDURE — 93655 ICAR CATH ABLTJ DSCRT ARRHYT: CPT

## 2023-11-01 PROCEDURE — 93622 COMP EP EVAL L VENTR PAC&REC: CPT

## 2023-11-01 PROCEDURE — 86901 BLOOD TYPING SEROLOGIC RH(D): CPT

## 2023-11-01 PROCEDURE — 93005 ELECTROCARDIOGRAM TRACING: CPT | Performed by: INTERNAL MEDICINE

## 2023-11-01 RX ORDER — FENTANYL CITRATE 50 UG/ML
INJECTION, SOLUTION INTRAMUSCULAR; INTRAVENOUS PRN
Status: DISCONTINUED | OUTPATIENT
Start: 2023-11-01 | End: 2023-11-01 | Stop reason: SDUPTHER

## 2023-11-01 RX ORDER — PROTAMINE SULFATE 10 MG/ML
30 INJECTION, SOLUTION INTRAVENOUS
Status: DISCONTINUED | OUTPATIENT
Start: 2023-11-01 | End: 2023-11-02 | Stop reason: HOSPADM

## 2023-11-01 RX ORDER — OMEPRAZOLE 20 MG/1
40 CAPSULE, DELAYED RELEASE ORAL DAILY
Status: DISCONTINUED | OUTPATIENT
Start: 2023-11-01 | End: 2023-11-02 | Stop reason: HOSPADM

## 2023-11-01 RX ORDER — MIDAZOLAM HYDROCHLORIDE 1 MG/ML
INJECTION INTRAMUSCULAR; INTRAVENOUS PRN
Status: DISCONTINUED | OUTPATIENT
Start: 2023-11-01 | End: 2023-11-01 | Stop reason: SDUPTHER

## 2023-11-01 RX ORDER — SUCCINYLCHOLINE/SOD CL,ISO/PF 200MG/10ML
SYRINGE (ML) INTRAVENOUS PRN
Status: DISCONTINUED | OUTPATIENT
Start: 2023-11-01 | End: 2023-11-01 | Stop reason: SDUPTHER

## 2023-11-01 RX ORDER — HYDROCHLOROTHIAZIDE 25 MG/1
50 TABLET ORAL DAILY
Status: DISCONTINUED | OUTPATIENT
Start: 2023-11-01 | End: 2023-11-02 | Stop reason: HOSPADM

## 2023-11-01 RX ORDER — PROPOFOL 10 MG/ML
INJECTION, EMULSION INTRAVENOUS PRN
Status: DISCONTINUED | OUTPATIENT
Start: 2023-11-01 | End: 2023-11-01 | Stop reason: SDUPTHER

## 2023-11-01 RX ORDER — FUROSEMIDE 10 MG/ML
INJECTION INTRAMUSCULAR; INTRAVENOUS PRN
Status: DISCONTINUED | OUTPATIENT
Start: 2023-11-01 | End: 2023-11-01 | Stop reason: SDUPTHER

## 2023-11-01 RX ORDER — ATORVASTATIN CALCIUM 80 MG/1
80 TABLET, FILM COATED ORAL NIGHTLY
Status: DISCONTINUED | OUTPATIENT
Start: 2023-11-01 | End: 2023-11-02 | Stop reason: HOSPADM

## 2023-11-01 RX ORDER — SODIUM CHLORIDE 9 MG/ML
INJECTION, SOLUTION INTRAVENOUS PRN
Status: DISCONTINUED | OUTPATIENT
Start: 2023-11-01 | End: 2023-11-02 | Stop reason: HOSPADM

## 2023-11-01 RX ORDER — OXYCODONE HYDROCHLORIDE 5 MG/1
5 TABLET ORAL PRN
Status: DISCONTINUED | OUTPATIENT
Start: 2023-11-01 | End: 2023-11-01

## 2023-11-01 RX ORDER — DEXAMETHASONE SODIUM PHOSPHATE 4 MG/ML
INJECTION, SOLUTION INTRA-ARTICULAR; INTRALESIONAL; INTRAMUSCULAR; INTRAVENOUS; SOFT TISSUE PRN
Status: DISCONTINUED | OUTPATIENT
Start: 2023-11-01 | End: 2023-11-01 | Stop reason: SDUPTHER

## 2023-11-01 RX ORDER — LIDOCAINE HYDROCHLORIDE 20 MG/ML
INJECTION, SOLUTION EPIDURAL; INFILTRATION; INTRACAUDAL; PERINEURAL PRN
Status: DISCONTINUED | OUTPATIENT
Start: 2023-11-01 | End: 2023-11-01 | Stop reason: SDUPTHER

## 2023-11-01 RX ORDER — HEPARIN SODIUM 10000 [USP'U]/100ML
INJECTION, SOLUTION INTRAVENOUS CONTINUOUS PRN
Status: DISCONTINUED | OUTPATIENT
Start: 2023-11-01 | End: 2023-11-01 | Stop reason: SDUPTHER

## 2023-11-01 RX ORDER — DIPHENHYDRAMINE HYDROCHLORIDE 50 MG/ML
INJECTION INTRAMUSCULAR; INTRAVENOUS PRN
Status: DISCONTINUED | OUTPATIENT
Start: 2023-11-01 | End: 2023-11-01 | Stop reason: SDUPTHER

## 2023-11-01 RX ORDER — SODIUM CHLORIDE 0.9 % (FLUSH) 0.9 %
5-40 SYRINGE (ML) INJECTION PRN
Status: DISCONTINUED | OUTPATIENT
Start: 2023-11-01 | End: 2023-11-01

## 2023-11-01 RX ORDER — SODIUM CHLORIDE 0.9 % (FLUSH) 0.9 %
5-40 SYRINGE (ML) INJECTION EVERY 12 HOURS SCHEDULED
Status: DISCONTINUED | OUTPATIENT
Start: 2023-11-01 | End: 2023-11-02 | Stop reason: HOSPADM

## 2023-11-01 RX ORDER — 0.9 % SODIUM CHLORIDE 0.9 %
1000 INTRAVENOUS SOLUTION INTRAVENOUS
Status: DISCONTINUED | OUTPATIENT
Start: 2023-11-01 | End: 2023-11-02 | Stop reason: HOSPADM

## 2023-11-01 RX ORDER — LIDOCAINE HYDROCHLORIDE AND EPINEPHRINE BITARTRATE 20; .01 MG/ML; MG/ML
15 INJECTION, SOLUTION SUBCUTANEOUS SEE ADMIN INSTRUCTIONS
Status: DISCONTINUED | OUTPATIENT
Start: 2023-11-01 | End: 2023-11-02 | Stop reason: HOSPADM

## 2023-11-01 RX ORDER — PROTAMINE SULFATE 10 MG/ML
INJECTION, SOLUTION INTRAVENOUS PRN
Status: DISCONTINUED | OUTPATIENT
Start: 2023-11-01 | End: 2023-11-01 | Stop reason: SDUPTHER

## 2023-11-01 RX ORDER — SODIUM CHLORIDE 0.9 % (FLUSH) 0.9 %
5-40 SYRINGE (ML) INJECTION PRN
Status: DISCONTINUED | OUTPATIENT
Start: 2023-11-01 | End: 2023-11-02 | Stop reason: HOSPADM

## 2023-11-01 RX ORDER — VECURONIUM BROMIDE 1 MG/ML
INJECTION, POWDER, LYOPHILIZED, FOR SOLUTION INTRAVENOUS PRN
Status: DISCONTINUED | OUTPATIENT
Start: 2023-11-01 | End: 2023-11-01 | Stop reason: SDUPTHER

## 2023-11-01 RX ORDER — PHENYLEPHRINE HCL IN 0.9% NACL 1 MG/10 ML
SYRINGE (ML) INTRAVENOUS PRN
Status: DISCONTINUED | OUTPATIENT
Start: 2023-11-01 | End: 2023-11-01 | Stop reason: SDUPTHER

## 2023-11-01 RX ORDER — ONDANSETRON 2 MG/ML
4 INJECTION INTRAMUSCULAR; INTRAVENOUS
Status: DISCONTINUED | OUTPATIENT
Start: 2023-11-01 | End: 2023-11-01

## 2023-11-01 RX ORDER — ACETAMINOPHEN 325 MG/1
650 TABLET ORAL EVERY 4 HOURS PRN
Status: DISCONTINUED | OUTPATIENT
Start: 2023-11-01 | End: 2023-11-02 | Stop reason: HOSPADM

## 2023-11-01 RX ORDER — OXYCODONE HYDROCHLORIDE 10 MG/1
10 TABLET ORAL PRN
Status: DISCONTINUED | OUTPATIENT
Start: 2023-11-01 | End: 2023-11-01

## 2023-11-01 RX ORDER — FLECAINIDE ACETATE 50 MG/1
25 TABLET ORAL 2 TIMES DAILY
Qty: 60 TABLET | Refills: 3 | Status: SHIPPED | OUTPATIENT
Start: 2023-11-01

## 2023-11-01 RX ORDER — FLECAINIDE ACETATE 50 MG/1
25 TABLET ORAL 2 TIMES DAILY
Status: DISCONTINUED | OUTPATIENT
Start: 2023-11-01 | End: 2023-11-02 | Stop reason: HOSPADM

## 2023-11-01 RX ORDER — LEVOTHYROXINE SODIUM 112 UG/1
112 TABLET ORAL DAILY
Status: DISCONTINUED | OUTPATIENT
Start: 2023-11-01 | End: 2023-11-02 | Stop reason: HOSPADM

## 2023-11-01 RX ORDER — SODIUM CHLORIDE 0.9 % (FLUSH) 0.9 %
5-40 SYRINGE (ML) INJECTION EVERY 12 HOURS SCHEDULED
Status: DISCONTINUED | OUTPATIENT
Start: 2023-11-01 | End: 2023-11-01

## 2023-11-01 RX ORDER — SODIUM CHLORIDE 9 MG/ML
INJECTION, SOLUTION INTRAVENOUS PRN
Status: DISCONTINUED | OUTPATIENT
Start: 2023-11-01 | End: 2023-11-01

## 2023-11-01 RX ORDER — DOPAMINE HYDROCHLORIDE 160 MG/100ML
INJECTION, SOLUTION INTRAVENOUS CONTINUOUS PRN
Status: DISCONTINUED | OUTPATIENT
Start: 2023-11-01 | End: 2023-11-01 | Stop reason: SDUPTHER

## 2023-11-01 RX ORDER — ACETAMINOPHEN 325 MG/1
650 TABLET ORAL
Status: COMPLETED | OUTPATIENT
Start: 2023-11-01 | End: 2023-11-01

## 2023-11-01 RX ORDER — CETIRIZINE HYDROCHLORIDE 10 MG/1
10 TABLET ORAL DAILY
Status: DISCONTINUED | OUTPATIENT
Start: 2023-11-01 | End: 2023-11-02 | Stop reason: HOSPADM

## 2023-11-01 RX ORDER — METOPROLOL SUCCINATE 50 MG/1
100 TABLET, EXTENDED RELEASE ORAL DAILY
Status: DISCONTINUED | OUTPATIENT
Start: 2023-11-01 | End: 2023-11-02 | Stop reason: HOSPADM

## 2023-11-01 RX ORDER — ONDANSETRON 2 MG/ML
INJECTION INTRAMUSCULAR; INTRAVENOUS PRN
Status: DISCONTINUED | OUTPATIENT
Start: 2023-11-01 | End: 2023-11-01 | Stop reason: SDUPTHER

## 2023-11-01 RX ORDER — FENTANYL CITRATE 50 UG/ML
50 INJECTION, SOLUTION INTRAMUSCULAR; INTRAVENOUS EVERY 5 MIN PRN
Status: DISCONTINUED | OUTPATIENT
Start: 2023-11-01 | End: 2023-11-01

## 2023-11-01 RX ORDER — GLYCOPYRROLATE 0.2 MG/ML
INJECTION INTRAMUSCULAR; INTRAVENOUS PRN
Status: DISCONTINUED | OUTPATIENT
Start: 2023-11-01 | End: 2023-11-01 | Stop reason: SDUPTHER

## 2023-11-01 RX ORDER — EPHEDRINE SULFATE/0.9% NACL/PF 50 MG/5 ML
SYRINGE (ML) INTRAVENOUS PRN
Status: DISCONTINUED | OUTPATIENT
Start: 2023-11-01 | End: 2023-11-01 | Stop reason: SDUPTHER

## 2023-11-01 RX ORDER — HEPARIN SODIUM 1000 [USP'U]/ML
INJECTION, SOLUTION INTRAVENOUS; SUBCUTANEOUS PRN
Status: DISCONTINUED | OUTPATIENT
Start: 2023-11-01 | End: 2023-11-01 | Stop reason: SDUPTHER

## 2023-11-01 RX ADMIN — FENTANYL CITRATE 50 MCG: 50 INJECTION INTRAMUSCULAR; INTRAVENOUS at 10:29

## 2023-11-01 RX ADMIN — PROPOFOL 120 MG: 10 INJECTION, EMULSION INTRAVENOUS at 08:11

## 2023-11-01 RX ADMIN — Medication 10 MG: at 08:18

## 2023-11-01 RX ADMIN — Medication 100 MCG: at 10:00

## 2023-11-01 RX ADMIN — PROTAMINE SULFATE 125 MG: 10 INJECTION, SOLUTION INTRAVENOUS at 10:21

## 2023-11-01 RX ADMIN — FUROSEMIDE 40 MG: 10 INJECTION, SOLUTION INTRAMUSCULAR; INTRAVENOUS at 10:21

## 2023-11-01 RX ADMIN — Medication 120 MG: at 08:11

## 2023-11-01 RX ADMIN — HEPARIN SODIUM 4000 UNITS: 1000 INJECTION INTRAVENOUS; SUBCUTANEOUS at 09:04

## 2023-11-01 RX ADMIN — MIDAZOLAM 2 MG: 1 INJECTION INTRAMUSCULAR; INTRAVENOUS at 08:06

## 2023-11-01 RX ADMIN — ONDANSETRON 4 MG: 2 INJECTION INTRAMUSCULAR; INTRAVENOUS at 08:19

## 2023-11-01 RX ADMIN — DOPAMINE HYDROCHLORIDE 10 MCG/KG/MIN: 160 INJECTION, SOLUTION INTRAVENOUS at 10:09

## 2023-11-01 RX ADMIN — Medication 100 MCG: at 08:16

## 2023-11-01 RX ADMIN — VECURONIUM BROMIDE 2 MG: 1 INJECTION, POWDER, LYOPHILIZED, FOR SOLUTION INTRAVENOUS at 09:01

## 2023-11-01 RX ADMIN — HEPARIN SODIUM 7000 UNITS/HR: 10000 INJECTION, SOLUTION INTRAVENOUS at 09:26

## 2023-11-01 RX ADMIN — FENTANYL CITRATE 50 MCG: 50 INJECTION INTRAMUSCULAR; INTRAVENOUS at 08:08

## 2023-11-01 RX ADMIN — Medication 100 MCG: at 08:32

## 2023-11-01 RX ADMIN — SODIUM CHLORIDE: 9 INJECTION, SOLUTION INTRAVENOUS at 06:55

## 2023-11-01 RX ADMIN — DIPHENHYDRAMINE HYDROCHLORIDE 25 MG: 50 INJECTION INTRAMUSCULAR; INTRAVENOUS at 10:24

## 2023-11-01 RX ADMIN — VECURONIUM BROMIDE 6 MG: 1 INJECTION, POWDER, LYOPHILIZED, FOR SOLUTION INTRAVENOUS at 08:19

## 2023-11-01 RX ADMIN — DEXAMETHASONE SODIUM PHOSPHATE 8 MG: 4 INJECTION, SOLUTION INTRAMUSCULAR; INTRAVENOUS at 08:19

## 2023-11-01 RX ADMIN — Medication 200 MCG: at 09:14

## 2023-11-01 RX ADMIN — VECURONIUM BROMIDE 2 MG: 1 INJECTION, POWDER, LYOPHILIZED, FOR SOLUTION INTRAVENOUS at 09:53

## 2023-11-01 RX ADMIN — Medication 200 MCG: at 08:18

## 2023-11-01 RX ADMIN — VECURONIUM BROMIDE 2 MG: 1 INJECTION, POWDER, LYOPHILIZED, FOR SOLUTION INTRAVENOUS at 08:11

## 2023-11-01 RX ADMIN — VECURONIUM BROMIDE 2 MG: 1 INJECTION, POWDER, LYOPHILIZED, FOR SOLUTION INTRAVENOUS at 09:24

## 2023-11-01 RX ADMIN — Medication 10 MG: at 08:20

## 2023-11-01 RX ADMIN — ACETAMINOPHEN 650 MG: 325 TABLET ORAL at 10:54

## 2023-11-01 RX ADMIN — HEPARIN SODIUM 3500 UNITS: 1000 INJECTION INTRAVENOUS; SUBCUTANEOUS at 08:59

## 2023-11-01 RX ADMIN — Medication 100 MCG: at 08:53

## 2023-11-01 RX ADMIN — SUGAMMADEX 200 MG: 100 INJECTION, SOLUTION INTRAVENOUS at 10:22

## 2023-11-01 RX ADMIN — GLYCOPYRROLATE 0.2 MG: 0.2 INJECTION, SOLUTION INTRAMUSCULAR; INTRAVENOUS at 08:06

## 2023-11-01 RX ADMIN — LIDOCAINE HYDROCHLORIDE 60 MG: 20 INJECTION, SOLUTION EPIDURAL; INFILTRATION; INTRACAUDAL; PERINEURAL at 08:11

## 2023-11-01 ASSESSMENT — ENCOUNTER SYMPTOMS: SHORTNESS OF BREATH: 0

## 2023-11-01 ASSESSMENT — LIFESTYLE VARIABLES: SMOKING_STATUS: 0

## 2023-11-01 ASSESSMENT — PAIN SCALES - GENERAL: PAINLEVEL_OUTOF10: 3

## 2023-11-01 NOTE — ANESTHESIA POSTPROCEDURE EVALUATION
Department of Anesthesiology  Postprocedure Note    Patient: Hernandez Monge  MRN: [id number]  YOB: 1955  Date of evaluation: 11/1/2023      Procedure Summary       Date: 11/01/23 Room / Location: [facility] Cath Lab    Anesthesia Start: 0806 Anesthesia Stop: 1033    Procedure: WST CORRIE AFIB ABLATION W ANES Diagnosis:     Scheduled Providers:  Responsible Provider: Jody Hollis MD    Anesthesia Type: General ASA Status: 3            Anesthesia Type: General    Eleno Phase I: Eleno Score: 10    Eleno Phase II:        Anesthesia Post Evaluation    Patient location during evaluation: bedside  Patient participation: complete - patient participated  Level of consciousness: awake and alert  Pain score: 2  Airway patency: patent  Nausea & Vomiting: no vomiting  Complications: no  Cardiovascular status: blood pressure returned to baseline  Respiratory status: acceptable  Hydration status: euvolemic  Pain management: adequate

## 2023-11-01 NOTE — ANESTHESIA PRE PROCEDURE
(-) COPD, asthma, shortness of breath, recent URI and not a current smoker                           Cardiovascular:  Exercise tolerance: good (>4 METS)  (+) hypertension:, valvular problems/murmurs (s/p mitral valve replacement):, dysrhythmias: atrial fibrillation, hyperlipidemia    (-) past MI, CABG/stent,  angina and  CHF       Beta Blocker:  Dose within 24 Hrs      ROS comment: severe MR and HOCM s/p MDT Mosaic bioprosthetic MVR (25 mm) and basal septal myectomy by Dr Chris Calix on 6/29/2020, PAF 09/2023.         2. Echo: 02/02/2022  Summary   There is moderate concentric left ventricular hypertrophy. Ejection fraction is visually estimated to be >60%. No regional wall motion abnormalities are noted. Indeterminate diastolic function. A bioprosthetic mitral valve is well seated with peak velocity of 210m/s and a max/mean gradient of 18/9 mmHg. Moderate mitral stenosis. Mildly dilated right ventricle. Right ventricular systolic function is normal     3. Stress Test:  03/17/2022  Summary   Normal myocardial perfusion study. Normal myocardial perfusion. Normal LV size and systolic function. Neuro/Psych:   (+) psychiatric history: stable with treatmentdepression/anxiety    (-) seizures and CVA           GI/Hepatic/Renal:   (+) GERD: well controlled, liver disease (SAMANIEGO):, bowel prep          Endo/Other:    (+) hypothyroidism::..    (-) diabetes mellitus               Abdominal:             Vascular: negative vascular ROS. Other Findings:           Anesthesia Plan      general     ASA 3       Induction: intravenous. MIPS: Postoperative opioids intended, Prophylactic antiemetics administered and Postoperative trial extubation. Anesthetic plan and risks discussed with patient. Plan discussed with CRNA.     Attending anesthesiologist reviewed and agrees with Preprocedure content          This pre-anesthesia assessment may be used as a history and physical.    DOS STAFF

## 2023-11-01 NOTE — PROGRESS NOTES
Patient returned from Recovery. Report received from [place], [address] [place] [address]  Patient received post procedure in stable condition. Post-cath handoff/site assessment performed to Right gron   Pt is alert and oriented x4, No distress noted. VSS-see flowsheet. Post-cath restrictions/instructions provided  Patient provided with ice chips at this time  No further needs at this time, call light within reach. MD to talk to patient and family soon.    Pilar Gaxiola RN, CCRN-CSC

## 2023-11-01 NOTE — PROGRESS NOTES
Patient getting dressed with assist of . Taken to discharge bridge via wheelchair. Patient discharged to home with family via private car.        Mikayla Thompson RN, CCRN-CSC

## 2023-11-01 NOTE — PROGRESS NOTES
Discharge instructions reviewed with patient and family member. Patient and family verbalized understanding. New medications have been reviewed, questions answered and patient voiced understanding. All medication side effects reviewed and patient and family verbalized understanding. Follow up appointment(s) reviewed with patient and family. Patient given discharge instructions, and appointment times.     See AVS discharge  Aylin Blunt RN, CCRN-CSC

## 2023-11-01 NOTE — H&P
max/mean gradient of 18/9 mmHg. Moderate mitral stenosis. Mildly dilated right ventricle. Right ventricular systolic function is normal     3. Stress Test:  03/17/2022  Summary   Normal myocardial perfusion study. Normal myocardial perfusion. Normal LV size and systolic function. Resting ECG: Normal sinus rhythm. LBBB. Resting HR:61 bpm  Resting BP:116/54 mmHg   Pre-stress physical exam: No complaints  of discomfort. Heart murmur heard, lung sounds unremarkable. O2 saturation 96% on  room air. Adult Plus BP cuff used. To  proceed with [facility] given. Stress Protocol:Pharmacologic - Lexiscan's      Peak HR:93 bpm                   HR/BP product:[id number]   Peak BP:115/49 mmHg   Predicted HR: 152 bpm % of predicted HR: 61   Test duration: 1 min and 40 sec   Reason for termination:Completed   ECG Findings :LBBB  Sinus tachycardia. Arrhythmias: None      4. Cath: 02/2020:   normal coronaries, LVOT gradient of about 100 mmhg (222 mmhg down to 120 mmhg from apex to LVOT). I independently reviewed and interpreted the ECG, MCOT, echocardiogram, stress test, and coronary angiography/PCI results and used them for my plan of care. Procedures:  1. No previous EP procedures. Assessment/Plan:      New onset atrial fibrillation  - First noted on EKG 09/13/2023  - Symptomatic with fatigue, shortness of breath and palpitations. - Patient has a [id number]-[id number] Score 3 (HTN, AGE, GENDER)  - According to the AHA/ACC/HRS guidelines, with a QSB5TZ9-IIPk score of 3, the patient is indicated for [address] at this time. - I discussed oral anticoagulation to decrease the risk of thromboembolic events including stroke. Benefits and alternatives were discussed with patient. Risk of bleeding was discussed. Patient verbalized understanding. Different forms of anticoagulants including warfarin (Coumadin), Dabigatran (Pradaxa), Rivaroxaban (Xarelto), Apixaban (Eliquis), and Edoxaban [facility]) were discussed.

## 2023-11-01 NOTE — PROGRESS NOTES
Patient's , Bhakti Gambino called to let him know his wife is in recovery  Patient will be here in 15 minutes.   Regine Cruz RN, CCRN-CSC

## 2023-11-01 NOTE — DISCHARGE INSTRUCTIONS
CARDIAC ABLATION    Care of your puncture site:  Remove bandage 24 hours after the procedure. 2023 12:00N  May shower in 24 hours but do not sit in a bathtub/pool of water for 5 days or until the wound is healed. Gently clean groin using soap and water. Dry thoroughly and apply a Band-Aid that covers the entire site. Use Band-Aid until skin heals over in about 3-5 days. Do not apply powder or lotion. Limit walking and stair climbing today. Normal Observations:  Soreness or tenderness which may last one week. Mild oozing from the incision site. Possible bruising that could last 2 weeks. (Atrial Fib Ablations Only)  You may experience chest burning that will peak in 2 days of the procedure. The burning will begin to subside the following days. You may take Tylenol for the discomfort    Activity:  You may resume driving 24 hours following the procedure. Do not make important / legal decisions within 24 hours after procedure. Do not drink alcoholic beverages or take any sleeping pills for 24 hours. You may resume normal activity in 3 days or after the wound heals. Avoid lifting more than 10 pounds for 3 days or until the wound heals. Avoid strenuous exercise or activity for 1 week. .    Nutrition:  Regular diet or previous diet. Call your doctor immediately if your condition worsens, for any other concerns, for a follow-up appointment or if you experience any of the followin959 [phone]  Increased swelling on the groin or leg. Unusual pain, numbness, or tingling of the groin or down the leg. Any signs of infection such as: redness, yellow drainage at the site, swelling or pain.     IF GROIN STARTS BLEEDING SIGNIFICANTLY:   LAY FLAT, HOLD FIRM DIRECT PRESSURE, AND CALL 911

## 2023-11-01 NOTE — PROGRESS NOTES
Dr. Rodriguez Senters here speaking with patient and  regarding procedure results.   Neli Zhu RN, CCRN-CSC

## 2023-11-01 NOTE — PROGRESS NOTES
PT received from cath lab. pt placed on monitor, pt presents with sinus rhythm. Pt on 2L of oxygen via nasal cannula. Pt yenni score is 9 at the time of handoff. handoff preformed wit MATTHEW CAMILO.      Electronically signed by Fam Franz RN on 11/1/2023 at 10:35 AM

## 2023-11-01 NOTE — FLOWSHEET NOTE
Figure of 8 sutures x2 removed. Right groin remains soft, no hematoma. Quick clot and Tegaderm applied.   Patient tolerated well  Faina Velásquez RN, CCRN-CSC

## 2023-11-01 NOTE — PROCEDURES
an EP study and programmed stimulation using our CS catheter and ablation catheter to assess the cardiac conduction system and to attempt to induce atrial tachydysrhythmia. The ablation catheter were moved from the left atrium to the left ventricular apex and His bundle position. His bundle potentials was recorded and pacing and recordings were performed from right atrium, coronary sinus and LV apex with the following results:     Sinus cycle length was 901 msec  WY interval was 191 msec  QRS duration was 100 msec  QT interval was 421 msec  AH interval was 103 msec  HV interval was 50 msec  Pacing from left atrium, 1:1 conduction over AV node with (AV wenckebach) was 330  msec  Pacing from left atrium, AV cony ERP was 500/220 msec   Pacing from LV apex, 1:1 retrograde conduction over AV node (VA wenckebach) was attempted but showed VA dissociation. Then both the ablation, Pentarray, and CS catheters were removed from the body, and all 3 sheaths were removed from the right femoral vein with a figure-of-eight suture that was placed to provide hemostasis while awaiting the downtrending of the ACT. Protamine 150mg IV x 1 was administered to partially reverse the IV heparin that was administered during the atrial fibrillation ablation procedure. Under intracardiac ultrasound guidance, we evaluated for pericardial effusion, and there was no evidence of such. Specimen collected: none    Estimated blood loss: < 50 cc    The patient tolerated the procedure well and there were no complications. Patient was extubated and transferred to the recovery area in stable condition.      Conclusion:     - Pre- and post-procedure diagnoses were paroxysmal atrial fibrillation, roof-dependent left atrial flutter with  ms with straight up-down activation, anterior wall dependent left atrial flutter with  ms with proximal-distal activation, and mitral isthmus dependent left atrial flutter with  ms with

## 2023-11-01 NOTE — PROGRESS NOTES
PRE-PROCEDURE      DATE: 11/1/2023 ARRIVAL TO CATH LAB: 6:36 AM    ADMIT SOURCE: Outpatient    ID & ALLERGY BAND: On    CONSENT: Yes    NPO SINCE: Midnight    LABS: N/A  PREGNANCY TEST: NA      BLEEDING PROBLEMS: No  BLEEDING RISK: 1.5    Low Risk < 2%  Intermediate Risk >2% or < 6.5%  High Risk >6.5%        LAST DOSE (if applicable):  ASA: NA  M0R28 (Plavix, Effient, Brilinta): NA  Anticoagulants (Coumadin, Xarelto, Eliquis): 10/31/2023  Other Blood Thinners: NA      OTHER MEDICATIONS TAKEN AT HOME:      MEDICATION COMPLIANCE: Yes  Mayte Rivas RN, CCRN-CSC

## 2023-11-02 LAB — POC ACT LR: >400 SEC

## 2023-11-20 ENCOUNTER — TELEPHONE (OUTPATIENT)
Dept: INTERNAL MEDICINE CLINIC | Age: 68
End: 2023-11-20

## 2023-11-20 RX ORDER — NITROFURANTOIN 25; 75 MG/1; MG/1
100 CAPSULE ORAL 2 TIMES DAILY
Qty: 14 CAPSULE | Refills: 0 | Status: SHIPPED | OUTPATIENT
Start: 2023-11-20 | End: 2023-11-27

## 2023-11-20 NOTE — TELEPHONE ENCOUNTER
Pt states that she has a poss UTI. She has burning with urination and frequency.  X a few days     Would like something called in

## 2023-11-24 DIAGNOSIS — E03.9 HYPOTHYROIDISM, UNSPECIFIED TYPE: Chronic | ICD-10-CM

## 2023-11-24 LAB — TSH SERPL DL<=0.005 MIU/L-ACNC: 3.97 UIU/ML (ref 0.27–4.2)

## 2023-11-27 ENCOUNTER — TELEPHONE (OUTPATIENT)
Dept: INTERNAL MEDICINE CLINIC | Age: 68
End: 2023-11-27

## 2023-11-27 DIAGNOSIS — N39.0 URINARY TRACT INFECTION WITHOUT HEMATURIA, SITE UNSPECIFIED: Primary | ICD-10-CM

## 2023-11-27 DIAGNOSIS — N39.0 URINARY TRACT INFECTION WITHOUT HEMATURIA, SITE UNSPECIFIED: ICD-10-CM

## 2023-11-27 LAB
BACTERIA URNS QL MICRO: NORMAL /HPF
BILIRUB UR QL STRIP.AUTO: NEGATIVE
CLARITY UR: CLEAR
COLOR UR: YELLOW
EPI CELLS #/AREA URNS AUTO: 0 /HPF (ref 0–5)
GLUCOSE UR STRIP.AUTO-MCNC: NEGATIVE MG/DL
HGB UR QL STRIP.AUTO: NEGATIVE
HYALINE CASTS #/AREA URNS AUTO: 0 /LPF (ref 0–8)
KETONES UR STRIP.AUTO-MCNC: NEGATIVE MG/DL
LEUKOCYTE ESTERASE UR QL STRIP.AUTO: ABNORMAL
NITRITE UR QL STRIP.AUTO: NEGATIVE
PH UR STRIP.AUTO: 7.5 [PH] (ref 5–8)
PROT UR STRIP.AUTO-MCNC: NEGATIVE MG/DL
RBC CLUMPS #/AREA URNS AUTO: 1 /HPF (ref 0–4)
SP GR UR STRIP.AUTO: 1.01 (ref 1–1.03)
UA COMPLETE W REFLEX CULTURE PNL UR: ABNORMAL
UA DIPSTICK W REFLEX MICRO PNL UR: YES
URN SPEC COLLECT METH UR: ABNORMAL
UROBILINOGEN UR STRIP-ACNC: 0.2 E.U./DL
WBC #/AREA URNS AUTO: 4 /HPF (ref 0–5)

## 2023-11-27 NOTE — TELEPHONE ENCOUNTER
Pt Completed meds and wants Urine Screen to see if infection is gone. Please advise.    [phone]

## 2023-12-04 ENCOUNTER — TELEPHONE (OUTPATIENT)
Dept: CARDIOLOGY CLINIC | Age: 68
End: 2023-12-04

## 2023-12-04 NOTE — TELEPHONE ENCOUNTER
Dr. Dung Peralta,    Patient started on Flecainide 25 mg BID post ablation of atrial fibrillation on 11/01/2023. She endorses symptoms of fatigue, diarrhea, cough, dizziness, unsteady gait and hypotension reports SBP 90's and low 100's. She also endorsed hives on her legs which lasted for two days then cleared. She denies any of the above symptoms prior to the starting on Flecainide. She is also on Toprol  mg daily and has been since 01/05/2021 and tolerated it well denies fatigue associated with Toprol XL. Please advise if ok to stop flecainide.     Thanks,  Mena Cedillo RN FEES was limited due to poor tolerance of scope/visualization. 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 Per MBS on 9/28, pt presented with silent gross aspiration across consistencies. Recommended repeating MBS/VFSS to pt; however, pt declined further testing, despite education/encouragement. Recommending Palliative care consult to establish GOC re long-term feeding given severity/on-going dysphagia. Recommend referral to ENT given hx of oral CA and ?abnormal pharyngeal findings on exam.

## 2023-12-04 NOTE — TELEPHONE ENCOUNTER
Spoke with patient advised to stop flecainide per Dr. Armando Wilkinson. Patient verbalized understanding.

## 2023-12-04 NOTE — TELEPHONE ENCOUNTER
Maldonado called in this morning, she would like a return call from Heidi, she states she is having problems with the flecainide she was started on after her ablation. She can be reached at [phone].

## 2024-01-05 ENCOUNTER — HOSPITAL ENCOUNTER (OUTPATIENT)
Dept: WOMENS IMAGING | Age: 69
Discharge: HOME | End: 2024-01-05
Payer: MEDICARE

## 2024-01-05 VITALS — BODY MASS INDEX: 24.92 KG/M2 | HEIGHT: 64 IN | WEIGHT: 146 LBS

## 2024-01-05 DIAGNOSIS — Z12.31 SCREENING MAMMOGRAM FOR BREAST CANCER: ICD-10-CM

## 2024-01-05 PROCEDURE — 77067 SCR MAMMO BI INCL CAD: CPT

## 2024-01-16 ENCOUNTER — OFFICE VISIT (OUTPATIENT)
Dept: INTERNAL MEDICINE CLINIC | Age: 69
End: 2024-01-16
Payer: MEDICARE

## 2024-01-16 VITALS
DIASTOLIC BLOOD PRESSURE: 70 MMHG | BODY MASS INDEX: 24.92 KG/M2 | WEIGHT: 146 LBS | HEIGHT: 64 IN | SYSTOLIC BLOOD PRESSURE: 128 MMHG

## 2024-01-16 DIAGNOSIS — G25.81 RESTLESS LEG SYNDROME: ICD-10-CM

## 2024-01-16 DIAGNOSIS — I48.91 NEW ONSET ATRIAL FIBRILLATION (HCC): ICD-10-CM

## 2024-01-16 DIAGNOSIS — E21.3 HYPERPARATHYROIDISM (HCC): ICD-10-CM

## 2024-01-16 DIAGNOSIS — I42.1 HOCM (HYPERTROPHIC OBSTRUCTIVE CARDIOMYOPATHY) (HCC): ICD-10-CM

## 2024-01-16 DIAGNOSIS — I10 PRIMARY HYPERTENSION: Primary | ICD-10-CM

## 2024-01-16 DIAGNOSIS — F41.9 ANXIETY: ICD-10-CM

## 2024-01-16 DIAGNOSIS — E03.9 HYPOTHYROIDISM, UNSPECIFIED TYPE: Chronic | ICD-10-CM

## 2024-01-16 PROCEDURE — 1123F ACP DISCUSS/DSCN MKR DOCD: CPT | Performed by: INTERNAL MEDICINE

## 2024-01-16 PROCEDURE — 3017F COLORECTAL CA SCREEN DOC REV: CPT | Performed by: INTERNAL MEDICINE

## 2024-01-16 PROCEDURE — G8484 FLU IMMUNIZE NO ADMIN: HCPCS | Performed by: INTERNAL MEDICINE

## 2024-01-16 PROCEDURE — G8417 CALC BMI ABV UP PARAM F/U: HCPCS | Performed by: INTERNAL MEDICINE

## 2024-01-16 PROCEDURE — G8427 DOCREV CUR MEDS BY ELIG CLIN: HCPCS | Performed by: INTERNAL MEDICINE

## 2024-01-16 PROCEDURE — 99214 OFFICE O/P EST MOD 30 MIN: CPT | Performed by: INTERNAL MEDICINE

## 2024-01-16 PROCEDURE — 3074F SYST BP LT 130 MM HG: CPT | Performed by: INTERNAL MEDICINE

## 2024-01-16 PROCEDURE — 3078F DIAST BP <80 MM HG: CPT | Performed by: INTERNAL MEDICINE

## 2024-01-16 PROCEDURE — G8399 PT W/DXA RESULTS DOCUMENT: HCPCS | Performed by: INTERNAL MEDICINE

## 2024-01-16 PROCEDURE — 1090F PRES/ABSN URINE INCON ASSESS: CPT | Performed by: INTERNAL MEDICINE

## 2024-01-16 PROCEDURE — 1036F TOBACCO NON-USER: CPT | Performed by: INTERNAL MEDICINE

## 2024-01-16 RX ORDER — LEVOTHYROXINE SODIUM 112 UG/1
112 TABLET ORAL DAILY
Qty: 90 TABLET | Refills: 1 | Status: SHIPPED | OUTPATIENT
Start: 2024-01-16

## 2024-01-16 RX ORDER — TRAZODONE HYDROCHLORIDE 50 MG/1
TABLET ORAL
Qty: 90 TABLET | Refills: 5 | Status: SHIPPED | OUTPATIENT
Start: 2024-01-16

## 2024-01-16 RX ORDER — ROPINIROLE 0.5 MG/1
0.5 TABLET, FILM COATED ORAL 3 TIMES DAILY
Qty: 90 TABLET | Refills: 1 | Status: SHIPPED | OUTPATIENT
Start: 2024-01-16

## 2024-01-16 RX ORDER — ALPRAZOLAM 0.5 MG/1
0.5 TABLET ORAL DAILY PRN
Qty: 30 TABLET | Refills: 2 | Status: SHIPPED | OUTPATIENT
Start: 2024-01-16 | End: 2025-01-15

## 2024-01-16 ASSESSMENT — PATIENT HEALTH QUESTIONNAIRE - PHQ9
SUM OF ALL RESPONSES TO PHQ9 QUESTIONS 1 & 2: 0
SUM OF ALL RESPONSES TO PHQ QUESTIONS 1-9: 0
1. LITTLE INTEREST OR PLEASURE IN DOING THINGS: 0
SUM OF ALL RESPONSES TO PHQ QUESTIONS 1-9: 0
2. FEELING DOWN, DEPRESSED OR HOPELESS: 0
SUM OF ALL RESPONSES TO PHQ QUESTIONS 1-9: 0
SUM OF ALL RESPONSES TO PHQ QUESTIONS 1-9: 0

## 2024-01-16 NOTE — PROGRESS NOTES
occasional diazepam for ibs - doesn't quite need a refill yet.  She also has dicyclomine.    She is taking alprazolam once a day as needed for anxiety.  Has not noted any side effects.  Use has not changed.    Review of Systems       Objective   Physical Exam  Vitals reviewed.   Constitutional:       General: She is not in acute distress.     Appearance: Normal appearance. She is well-developed.   HENT:      Head: Normocephalic and atraumatic.   Cardiovascular:      Rate and Rhythm: Normal rate and regular rhythm.      Heart sounds: Murmur heard.      Systolic murmur is present with a grade of 2/6.   Pulmonary:      Effort: Pulmonary effort is normal. No respiratory distress.      Breath sounds: Normal breath sounds.   Skin:     General: Skin is warm and dry.   Neurological:      Mental Status: She is alert.   Psychiatric:         Behavior: Behavior normal.         Thought Content: Thought content normal.         Judgment: Judgment normal.                  An electronic signature was used to authenticate this note.    --Filomena Perez MD

## 2024-02-06 ENCOUNTER — TELEPHONE (OUTPATIENT)
Dept: CARDIOLOGY CLINIC | Age: 69
End: 2024-02-06

## 2024-02-06 ENCOUNTER — OFFICE VISIT (OUTPATIENT)
Dept: CARDIOLOGY CLINIC | Age: 69
End: 2024-02-06
Payer: MEDICARE

## 2024-02-06 VITALS
WEIGHT: 144.4 LBS | OXYGEN SATURATION: 100 % | DIASTOLIC BLOOD PRESSURE: 74 MMHG | SYSTOLIC BLOOD PRESSURE: 116 MMHG | HEIGHT: 64 IN | BODY MASS INDEX: 24.65 KG/M2 | HEART RATE: 61 BPM

## 2024-02-06 DIAGNOSIS — I48.0 PAF (PAROXYSMAL ATRIAL FIBRILLATION) (HCC): Primary | ICD-10-CM

## 2024-02-06 DIAGNOSIS — I34.0 SEVERE MITRAL REGURGITATION: ICD-10-CM

## 2024-02-06 DIAGNOSIS — I48.3 TYPICAL ATRIAL FLUTTER (HCC): ICD-10-CM

## 2024-02-06 DIAGNOSIS — I42.1 HOCM (HYPERTROPHIC OBSTRUCTIVE CARDIOMYOPATHY) (HCC): ICD-10-CM

## 2024-02-06 PROCEDURE — 1036F TOBACCO NON-USER: CPT | Performed by: INTERNAL MEDICINE

## 2024-02-06 PROCEDURE — 3078F DIAST BP <80 MM HG: CPT | Performed by: INTERNAL MEDICINE

## 2024-02-06 PROCEDURE — 3017F COLORECTAL CA SCREEN DOC REV: CPT | Performed by: INTERNAL MEDICINE

## 2024-02-06 PROCEDURE — G8399 PT W/DXA RESULTS DOCUMENT: HCPCS | Performed by: INTERNAL MEDICINE

## 2024-02-06 PROCEDURE — 1123F ACP DISCUSS/DSCN MKR DOCD: CPT | Performed by: INTERNAL MEDICINE

## 2024-02-06 PROCEDURE — G8427 DOCREV CUR MEDS BY ELIG CLIN: HCPCS | Performed by: INTERNAL MEDICINE

## 2024-02-06 PROCEDURE — G8420 CALC BMI NORM PARAMETERS: HCPCS | Performed by: INTERNAL MEDICINE

## 2024-02-06 PROCEDURE — 99214 OFFICE O/P EST MOD 30 MIN: CPT | Performed by: INTERNAL MEDICINE

## 2024-02-06 PROCEDURE — G8484 FLU IMMUNIZE NO ADMIN: HCPCS | Performed by: INTERNAL MEDICINE

## 2024-02-06 PROCEDURE — 3074F SYST BP LT 130 MM HG: CPT | Performed by: INTERNAL MEDICINE

## 2024-02-06 PROCEDURE — 1090F PRES/ABSN URINE INCON ASSESS: CPT | Performed by: INTERNAL MEDICINE

## 2024-02-06 PROCEDURE — 93000 ELECTROCARDIOGRAM COMPLETE: CPT | Performed by: INTERNAL MEDICINE

## 2024-02-06 RX ORDER — METOPROLOL SUCCINATE 100 MG/1
50 TABLET, EXTENDED RELEASE ORAL DAILY
Qty: 45 TABLET | Refills: 3
Start: 2024-02-06

## 2024-02-06 NOTE — PROGRESS NOTES
18/9 mmHg.   Moderate mitral stenosis.   Mildly dilated right ventricle.   Right ventricular systolic function is normal    3. Stress Test:  03/17/2022  Summary   Normal myocardial perfusion study.   Normal myocardial perfusion.   Normal LV size and systolic function.  Resting ECG: Normal sinus rhythm. LBBB.   Resting HR:61 bpm  Resting BP:116/54 mmHg   Pre-stress physical exam: No complaints  of discomfort. Heart murmur heard, lung sounds unremarkable. O2 saturation 96% on  room air. Adult Plus BP cuff used. To  proceed with Lexiscan Myoview given.     Stress Protocol:Pharmacologic - Lexiscan's     Peak HR:93 bpm                   HR/BP product:[id number]   Peak BP:115/49 mmHg   Predicted HR: 152 bpm % of predicted HR: 61   Test duration: 1 min and 40 sec   Reason for termination:Completed   ECG Findings :LBBB  Sinus tachycardia.   Arrhythmias: None     4. Cath: 02/2020:   normal coronaries, LVOT gradient of about 100 mmhg (222 mmhg down to 120 mmhg from apex to LVOT).     I independently reviewed and interpreted the ECG, MCOT, echocardiogram, stress test, and coronary angiography/PCI results and used them for my plan of care.      Procedures:  1. Electrophysiology study with radiofrequency ablation of atrial fibrillation and pulmonary vein isolation 11/01/2023    Assessment/Plan:     Paroxysmal atrial fibrillation / Atypical atrial flutter+  - First noted on EKG 09/13/2023  - Symptomatic with fatigue, shortness of breath and palpitations.  - Patient has a HFG4IN1-QQYw Score 3 (HTN, AGE, GENDER)  - Continue Eliquis 5 mg BID for thromboembolic risk reduction.  -Tolerating well no signs or symptoms of abnormal bruising or bleeding.  - s/p  radiofrequency ablation of atrial fibrillation, left atrial flutter and pulmonary vein isolation.    Decrease Toprol XL to 50 mg daily, due to reported symptoms of fatigue. Monitor blood pressure and if SBP runs consistently greater than 130/80 recommenced she follow up with her PCP

## 2024-02-06 NOTE — TELEPHONE ENCOUNTER
Monitor placed by [facility]   Monitor company [employer]   Length of monitor 30 days   Monitor ordered by Dr. Boyd   Serial number [id number]  Kit ID   Activation successful prior to pt leaving office? Yes      Placed on spreadsheet

## 2024-02-22 DIAGNOSIS — K58.9 IRRITABLE BOWEL SYNDROME, UNSPECIFIED TYPE: ICD-10-CM

## 2024-02-23 RX ORDER — DIAZEPAM 5 MG/1
TABLET ORAL
Qty: 45 TABLET | Refills: 0 | Status: SHIPPED | OUTPATIENT
Start: 2024-02-23 | End: 2024-03-24

## 2024-03-11 ENCOUNTER — TELEPHONE (OUTPATIENT)
Dept: CARDIOLOGY CLINIC | Age: 69
End: 2024-03-11

## 2024-03-11 NOTE — TELEPHONE ENCOUNTER
Aaron called in this morning, she would like a return call concerning her monitor results.      She can be reached at [phone].

## 2024-03-11 NOTE — TELEPHONE ENCOUNTER
Spoke with patient advised that her monitor results will be reviewed by Dr. Boyd tomorrow and we will call her with the results.  Verbalized understanding. She stated to call after 130 pm tomorrow.

## 2024-03-12 PROCEDURE — 93228 REMOTE 30 DAY ECG REV/REPORT: CPT | Performed by: INTERNAL MEDICINE

## 2024-03-14 DIAGNOSIS — I48.0 PAROXYSMAL ATRIAL FIBRILLATION (HCC): Primary | ICD-10-CM

## 2024-03-14 DIAGNOSIS — I48.0 PAF (PAROXYSMAL ATRIAL FIBRILLATION) (HCC): ICD-10-CM

## 2024-03-14 DIAGNOSIS — I48.3 TYPICAL ATRIAL FLUTTER (HCC): ICD-10-CM

## 2024-04-03 DIAGNOSIS — M81.0 AGE-RELATED OSTEOPOROSIS WITHOUT CURRENT PATHOLOGICAL FRACTURE: Primary | ICD-10-CM

## 2024-04-03 RX ORDER — EPINEPHRINE 1 MG/ML
0.3 INJECTION, SOLUTION, CONCENTRATE INTRAVENOUS PRN
OUTPATIENT
Start: 2024-04-03

## 2024-04-03 RX ORDER — ACETAMINOPHEN 325 MG/1
650 TABLET ORAL
OUTPATIENT
Start: 2024-04-03

## 2024-04-03 RX ORDER — ALBUTEROL SULFATE 90 UG/1
4 AEROSOL, METERED RESPIRATORY (INHALATION) PRN
OUTPATIENT
Start: 2024-04-03

## 2024-04-03 RX ORDER — DIPHENHYDRAMINE HYDROCHLORIDE 50 MG/ML
50 INJECTION INTRAMUSCULAR; INTRAVENOUS
OUTPATIENT
Start: 2024-04-03

## 2024-04-03 RX ORDER — SODIUM CHLORIDE 9 MG/ML
INJECTION, SOLUTION INTRAVENOUS CONTINUOUS
OUTPATIENT
Start: 2024-04-03

## 2024-04-03 RX ORDER — ONDANSETRON 2 MG/ML
8 INJECTION INTRAMUSCULAR; INTRAVENOUS
OUTPATIENT
Start: 2024-04-03

## 2024-04-10 DIAGNOSIS — I10 PRIMARY HYPERTENSION: ICD-10-CM

## 2024-04-10 LAB
ALBUMIN SERPL-MCNC: 4.1 G/DL (ref 3.4–5)
ALBUMIN/GLOB SERPL: 1.6 {RATIO} (ref 1.1–2.2)
ALP SERPL-CCNC: 51 U/L (ref 40–129)
ALT SERPL-CCNC: 27 U/L (ref 10–40)
ANION GAP SERPL CALCULATED.3IONS-SCNC: 10 MMOL/L (ref 3–16)
AST SERPL-CCNC: 29 U/L (ref 15–37)
BASOPHILS # BLD: 0 K/UL (ref 0–0.2)
BASOPHILS NFR BLD: 0.7 %
BILIRUB SERPL-MCNC: 0.3 MG/DL (ref 0–1)
BUN SERPL-MCNC: 8 MG/DL (ref 7–20)
CALCIUM SERPL-MCNC: 9.8 MG/DL (ref 8.3–10.6)
CHLORIDE SERPL-SCNC: 102 MMOL/L (ref 99–110)
CHOLEST SERPL-MCNC: 156 MG/DL (ref 0–199)
CO2 SERPL-SCNC: 29 MMOL/L (ref 21–32)
CREAT SERPL-MCNC: 0.6 MG/DL (ref 0.6–1.2)
DEPRECATED RDW RBC AUTO: 13.8 % (ref 12.4–15.4)
EOSINOPHIL # BLD: 0.1 K/UL (ref 0–0.6)
EOSINOPHIL NFR BLD: 0.9 %
GFR SERPLBLD CREATININE-BSD FMLA CKD-EPI: >90 ML/MIN/{1.73_M2}
GLUCOSE SERPL-MCNC: 95 MG/DL (ref 70–99)
HCT VFR BLD AUTO: 41.3 % (ref 36–48)
HDLC SERPL-MCNC: 70 MG/DL (ref 40–60)
HGB BLD-MCNC: 14.3 G/DL (ref 12–16)
LDLC SERPL CALC-MCNC: 69 MG/DL
LYMPHOCYTES # BLD: 1.5 K/UL (ref 1–5.1)
LYMPHOCYTES NFR BLD: 26.1 %
MCH RBC QN AUTO: 31.3 PG (ref 26–34)
MCHC RBC AUTO-ENTMCNC: 34.5 G/DL (ref 31–36)
MCV RBC AUTO: 90.7 FL (ref 80–100)
MONOCYTES # BLD: 0.4 K/UL (ref 0–1.3)
MONOCYTES NFR BLD: 6 %
NEUTROPHILS # BLD: 3.9 K/UL (ref 1.7–7.7)
NEUTROPHILS NFR BLD: 66.3 %
PLATELET # BLD AUTO: 199 K/UL (ref 135–450)
PMV BLD AUTO: 7.7 FL (ref 5–10.5)
POTASSIUM SERPL-SCNC: 3.5 MMOL/L (ref 3.5–5.1)
PROT SERPL-MCNC: 6.6 G/DL (ref 6.4–8.2)
RBC # BLD AUTO: 4.56 M/UL (ref 4–5.2)
SODIUM SERPL-SCNC: 141 MMOL/L (ref 136–145)
TRIGL SERPL-MCNC: 83 MG/DL (ref 0–150)
TSH SERPL DL<=0.005 MIU/L-ACNC: 1.97 UIU/ML (ref 0.27–4.2)
VLDLC SERPL CALC-MCNC: 17 MG/DL
WBC # BLD AUTO: 5.8 K/UL (ref 4–11)

## 2024-04-15 SDOH — ECONOMIC STABILITY: FOOD INSECURITY: WITHIN THE PAST 12 MONTHS, THE FOOD YOU BOUGHT JUST DIDN'T LAST AND YOU DIDN'T HAVE MONEY TO GET MORE.: NEVER TRUE

## 2024-04-15 SDOH — ECONOMIC STABILITY: FOOD INSECURITY: WITHIN THE PAST 12 MONTHS, YOU WORRIED THAT YOUR FOOD WOULD RUN OUT BEFORE YOU GOT MONEY TO BUY MORE.: NEVER TRUE

## 2024-04-15 SDOH — ECONOMIC STABILITY: INCOME INSECURITY: HOW HARD IS IT FOR YOU TO PAY FOR THE VERY BASICS LIKE FOOD, HOUSING, MEDICAL CARE, AND HEATING?: NOT HARD AT ALL

## 2024-04-18 ENCOUNTER — OFFICE VISIT (OUTPATIENT)
Dept: INTERNAL MEDICINE CLINIC | Age: 69
End: 2024-04-18

## 2024-04-18 VITALS — SYSTOLIC BLOOD PRESSURE: 132 MMHG | DIASTOLIC BLOOD PRESSURE: 70 MMHG | WEIGHT: 143 LBS | BODY MASS INDEX: 24.55 KG/M2

## 2024-04-18 DIAGNOSIS — F41.9 ANXIETY: Primary | ICD-10-CM

## 2024-04-18 DIAGNOSIS — K58.9 IRRITABLE BOWEL SYNDROME, UNSPECIFIED TYPE: ICD-10-CM

## 2024-04-18 DIAGNOSIS — K52.839 MICROSCOPIC COLITIS, UNSPECIFIED MICROSCOPIC COLITIS TYPE: ICD-10-CM

## 2024-04-18 RX ORDER — DICYCLOMINE HCL 20 MG
TABLET ORAL
Qty: 120 TABLET | Refills: 5 | Status: SHIPPED | OUTPATIENT
Start: 2024-04-18

## 2024-04-18 RX ORDER — DIAZEPAM 5 MG/1
TABLET ORAL
Qty: 45 TABLET | Refills: 0 | Status: SHIPPED | OUTPATIENT
Start: 2024-04-18 | End: 2024-05-18

## 2024-04-18 RX ORDER — BUSPIRONE HYDROCHLORIDE 5 MG/1
5 TABLET ORAL 3 TIMES DAILY
Qty: 270 TABLET | Refills: 0 | Status: SHIPPED | OUTPATIENT
Start: 2024-04-18

## 2024-04-18 RX ORDER — PROCHLORPERAZINE MALEATE 10 MG
TABLET ORAL
Qty: 30 TABLET | Refills: 4 | Status: SHIPPED | OUTPATIENT
Start: 2024-04-18

## 2024-04-18 RX ORDER — ALPRAZOLAM 0.5 MG/1
TABLET ORAL
Qty: 60 TABLET | Refills: 2 | Status: SHIPPED | OUTPATIENT
Start: 2024-04-18 | End: 2024-07-17

## 2024-04-18 RX ORDER — BUDESONIDE 3 MG/1
3 CAPSULE, COATED PELLETS ORAL EVERY MORNING
COMMUNITY

## 2024-04-18 RX ORDER — ROPINIROLE 0.5 MG/1
0.5 TABLET, FILM COATED ORAL 3 TIMES DAILY
Qty: 90 TABLET | Refills: 1 | Status: SHIPPED | OUTPATIENT
Start: 2024-04-18

## 2024-04-18 ASSESSMENT — PATIENT HEALTH QUESTIONNAIRE - PHQ9
SUM OF ALL RESPONSES TO PHQ QUESTIONS 1-9: 0
SUM OF ALL RESPONSES TO PHQ9 QUESTIONS 1 & 2: 0
2. FEELING DOWN, DEPRESSED OR HOPELESS: NOT AT ALL
SUM OF ALL RESPONSES TO PHQ QUESTIONS 1-9: 0

## 2024-04-18 NOTE — PROGRESS NOTES
Exam  Vitals reviewed.   Constitutional:       General: She is not in acute distress.     Appearance: Normal appearance. She is well-developed.   HENT:      Head: Normocephalic and atraumatic.   Cardiovascular:      Rate and Rhythm: Normal rate and regular rhythm.      Heart sounds: Normal heart sounds.   Pulmonary:      Effort: Pulmonary effort is normal. No respiratory distress.      Breath sounds: Normal breath sounds.   Skin:     General: Skin is warm and dry.   Neurological:      Mental Status: She is alert.   Psychiatric:         Behavior: Behavior normal.         Thought Content: Thought content normal.         Judgment: Judgment normal.                  An electronic signature was used to authenticate this note.    --Filomena Perez MD

## 2024-04-18 NOTE — PATIENT INSTRUCTIONS
Take 1 buspirone tablet 3 times per day. After 3 days, if it is not helping and you are not having a side effect of the medication, you can start increasing the medication. Start with going to 2 tablets three times per day. You can increase the dose all the way up to 3 tablets 3 times per day.

## 2024-04-30 ENCOUNTER — HOSPITAL ENCOUNTER (OUTPATIENT)
Dept: INFUSION THERAPY | Age: 69
Setting detail: INFUSION SERIES
Discharge: HOME | End: 2024-04-30
Payer: MEDICARE

## 2024-04-30 VITALS
SYSTOLIC BLOOD PRESSURE: 109 MMHG | OXYGEN SATURATION: 97 % | DIASTOLIC BLOOD PRESSURE: 65 MMHG | TEMPERATURE: 98.1 F | RESPIRATION RATE: 16 BRPM | HEART RATE: 56 BPM

## 2024-04-30 DIAGNOSIS — M81.0 OSTEOPOROSIS WITHOUT CURRENT PATHOLOGICAL FRACTURE, UNSPECIFIED OSTEOPOROSIS TYPE: Primary | ICD-10-CM

## 2024-04-30 PROCEDURE — 96372 THER/PROPH/DIAG INJ SC/IM: CPT

## 2024-04-30 PROCEDURE — 6360000002 HC RX W HCPCS: Performed by: INTERNAL MEDICINE

## 2024-04-30 RX ORDER — DIPHENHYDRAMINE HYDROCHLORIDE 50 MG/ML
50 INJECTION INTRAMUSCULAR; INTRAVENOUS
Status: CANCELLED | OUTPATIENT
Start: 2024-10-29

## 2024-04-30 RX ORDER — SODIUM CHLORIDE 9 MG/ML
INJECTION, SOLUTION INTRAVENOUS CONTINUOUS
Status: CANCELLED | OUTPATIENT
Start: 2024-10-29

## 2024-04-30 RX ORDER — ALBUTEROL SULFATE 90 UG/1
4 AEROSOL, METERED RESPIRATORY (INHALATION) PRN
Status: CANCELLED | OUTPATIENT
Start: 2024-10-29

## 2024-04-30 RX ORDER — ONDANSETRON 2 MG/ML
8 INJECTION INTRAMUSCULAR; INTRAVENOUS
Status: CANCELLED | OUTPATIENT
Start: 2024-10-29

## 2024-04-30 RX ORDER — ACETAMINOPHEN 325 MG/1
650 TABLET ORAL
Status: CANCELLED | OUTPATIENT
Start: 2024-10-29

## 2024-04-30 RX ORDER — EPINEPHRINE 1 MG/ML
0.3 INJECTION, SOLUTION, CONCENTRATE INTRAVENOUS PRN
Status: CANCELLED | OUTPATIENT
Start: 2024-10-29

## 2024-04-30 RX ADMIN — DENOSUMAB 60 MG: 60 INJECTION SUBCUTANEOUS at 09:52

## 2024-04-30 ASSESSMENT — PAIN SCALES - GENERAL: PAINLEVEL_OUTOF10: 0

## 2024-04-30 NOTE — PROGRESS NOTES
Outpatient Infusion Center  [facility]     Prolia Visit    NAME:  Mindy Bryan  YOB: 1955  MEDICAL RECORD NUMBER:  [id number]  Episode Date:  4/30/2024    Patient arrived to Outpatient Infusion Center   [] per wheelchair   [x] ambulatory     Is this the patient's first Prolia Injection? No     Date of last Prolia Injection ? October 24, 2023.     Did the patient experience any adverse reactions to Prolia Injection? No    Any recent oral or dental surgery? No    Any recent active fever, infections and/or illnesses? No    Patient has history of pathological fracture? No    Patient has had a recent fracture due to trauma or injury? No    Patient has had a recent orthopedic surgery or procedure done? No    Approximate date of last Dexa scan? 8/14/23       /65   Pulse 56   Temp 98.1 °F (36.7 °C) (Oral)   Resp 16   SpO2 97%     Current Lab Data:    Calcium:    Lab Results   Component Value Date/Time    CALCIUM 9.8 04/10/2024 06:48 AM       Patient Currently taking Calcium Supplements? No     Prolia administered: Yes    Prolia dosage: 60 mg was administered slowly subcutaneously into the left upper arm.      Response to treatment:  Well tolerated by patient.     Due for next dose of Prolia after October 31, 2024.     Electronically signed by Franny Stratton RN on 4/30/2024 at 9:57 AM

## 2024-04-30 NOTE — DISCHARGE INSTRUCTIONS
Outpatient Infusion Discharge Instructions  [facility]  [address] [place], [place] [address]  Telephone: [phone]      FAX [phone]    NAME:  Mindy Bryan  YOB: 1955  MEDICAL RECORD NUMBER:  [id number]  DATE:  4/30/24    Reason for Outpatient Infusion Visit: prolia    If you develop any these symptoms please contact you Doctor    [x] Nausea and/or vomiting not relieved with medication   [x] Swelling, redness, and/or bleeding at injection or IV site    [x] Fever or chills  [x] Rash or itching   [x] Shortness of breath  [x] Please review After Visit Summary (AVS) information on    [] Other      Outpatient Infusion Center Information: Should you experience any significant changes in your health or have questions about your care please contact the [facility] at [phone] MONDAY - FRIDAY 8:00 am - 4:00 pm.  If you need help outside these hours and cannot wait until we are again available, contact your Primary Care Physician or go to the hospital emergency room.       Electronically signed by Franny Stratton RN on 4/30/2024 at 9:52 AM

## 2024-05-01 ENCOUNTER — NURSE ONLY (OUTPATIENT)
Dept: CARDIOLOGY CLINIC | Age: 69
End: 2024-05-01
Payer: MEDICARE

## 2024-05-01 ENCOUNTER — TELEPHONE (OUTPATIENT)
Dept: CARDIOLOGY CLINIC | Age: 69
End: 2024-05-01

## 2024-05-01 VITALS
DIASTOLIC BLOOD PRESSURE: 62 MMHG | BODY MASS INDEX: 24.86 KG/M2 | WEIGHT: 145.6 LBS | SYSTOLIC BLOOD PRESSURE: 106 MMHG | HEIGHT: 64 IN | OXYGEN SATURATION: 96 % | HEART RATE: 57 BPM

## 2024-05-01 DIAGNOSIS — Z95.2 H/O MITRAL VALVE REPLACEMENT: ICD-10-CM

## 2024-05-01 DIAGNOSIS — I48.0 PAF (PAROXYSMAL ATRIAL FIBRILLATION) (HCC): Primary | ICD-10-CM

## 2024-05-01 DIAGNOSIS — F41.9 ANXIETY: ICD-10-CM

## 2024-05-01 DIAGNOSIS — I10 ESSENTIAL HYPERTENSION: ICD-10-CM

## 2024-05-01 DIAGNOSIS — I48.3 TYPICAL ATRIAL FLUTTER (HCC): ICD-10-CM

## 2024-05-01 DIAGNOSIS — Z95.2 S/P MVR (MITRAL VALVE REPLACEMENT): ICD-10-CM

## 2024-05-01 DIAGNOSIS — Z98.890 S/P VENTRICULAR SEPTAL MYECTOMY: ICD-10-CM

## 2024-05-01 PROCEDURE — 93000 ELECTROCARDIOGRAM COMPLETE: CPT | Performed by: INTERNAL MEDICINE

## 2024-05-01 NOTE — PROGRESS NOTES
EKG reviewed by Ace Alvarez MD sinus rhythm spoke with patient advised of results.  She stated that she was recently started on Wellbutrin instructed to follow up with PCP, to discuss if her symptoms may be related to medication.

## 2024-05-01 NOTE — TELEPHONE ENCOUNTER
Patient called and states that she is feeling very jittery and just overall not feeling right. She feels like she may be in atrial fibrillation but is unsure. She is taking her Eliquis as prescribed but she just does not feel right. Offered for her to come in and get an EKG today. She says that she only lives about 5 minutes away and is agreeable to come in. I have her on the schedule and made MA aware

## 2024-05-01 NOTE — TELEPHONE ENCOUNTER
Aaron called in thinking she was in possible AFIB. She would like to know what do you all advise her next steps to be?     Please Advise: [phone]

## 2024-05-02 ENCOUNTER — TELEPHONE (OUTPATIENT)
Dept: INTERNAL MEDICINE CLINIC | Age: 69
End: 2024-05-02

## 2024-05-23 ENCOUNTER — OFFICE VISIT (OUTPATIENT)
Dept: INTERNAL MEDICINE CLINIC | Age: 69
End: 2024-05-23
Payer: MEDICARE

## 2024-05-23 VITALS
SYSTOLIC BLOOD PRESSURE: 124 MMHG | BODY MASS INDEX: 24.75 KG/M2 | HEIGHT: 64 IN | DIASTOLIC BLOOD PRESSURE: 62 MMHG | WEIGHT: 145 LBS

## 2024-05-23 DIAGNOSIS — F41.9 ANXIETY: Primary | ICD-10-CM

## 2024-05-23 PROCEDURE — 3017F COLORECTAL CA SCREEN DOC REV: CPT | Performed by: INTERNAL MEDICINE

## 2024-05-23 PROCEDURE — 3074F SYST BP LT 130 MM HG: CPT | Performed by: INTERNAL MEDICINE

## 2024-05-23 PROCEDURE — 1036F TOBACCO NON-USER: CPT | Performed by: INTERNAL MEDICINE

## 2024-05-23 PROCEDURE — G8420 CALC BMI NORM PARAMETERS: HCPCS | Performed by: INTERNAL MEDICINE

## 2024-05-23 PROCEDURE — 1123F ACP DISCUSS/DSCN MKR DOCD: CPT | Performed by: INTERNAL MEDICINE

## 2024-05-23 PROCEDURE — 99214 OFFICE O/P EST MOD 30 MIN: CPT | Performed by: INTERNAL MEDICINE

## 2024-05-23 PROCEDURE — G8399 PT W/DXA RESULTS DOCUMENT: HCPCS | Performed by: INTERNAL MEDICINE

## 2024-05-23 PROCEDURE — 3078F DIAST BP <80 MM HG: CPT | Performed by: INTERNAL MEDICINE

## 2024-05-23 PROCEDURE — 1090F PRES/ABSN URINE INCON ASSESS: CPT | Performed by: INTERNAL MEDICINE

## 2024-05-23 PROCEDURE — G8427 DOCREV CUR MEDS BY ELIG CLIN: HCPCS | Performed by: INTERNAL MEDICINE

## 2024-05-23 RX ORDER — BUPROPION HYDROCHLORIDE 75 MG/1
75 TABLET ORAL 2 TIMES DAILY
Qty: 60 TABLET | Refills: 1 | Status: SHIPPED | OUTPATIENT
Start: 2024-05-23

## 2024-05-23 NOTE — PROGRESS NOTES
Mindy Bryan (:  1955) is a 69 y.o. female,Established patient, here for evaluation of the following chief complaint(s):  Anxiety      Assessment & Plan   1. Anxiety  -This is more complex due to history of SIADH.  She is very concerned about weight gain so mirtazapine is not the best option but perhaps she could tolerate a very low-dose.  Discussed bupropion, typically does not help with anxiety though very occasionally it does, we will try this first since it is the most weight neutral of all of the options but if she has an increase in anxiety or any unwanted side effects and then would switch to mirtazapine 7.5 mg nightly.  I would be hesitant to increase the dose from there due to potential for weight gain as this is her big concern.  If she does not do well with that medication for any reason, then before switching to an alternative SSRI or SNRI, would change the hydrochlorothiazide to furosemide to decrease the risk of SIADH which she had previously with duloxetine.    Return in about 1 month (around 2024) for anxiety.       Subjective   HPI    She has been taking the buspirone, she was able to get up to 3 tablets 3 times a day but had no improvement in her anxiety.  Still taking alprazolam as well.  She is very concerned about waking with any of the alternative medications.    Review of Systems       Objective   Physical Exam  Vitals reviewed.   Constitutional:       General: She is not in acute distress.     Appearance: Normal appearance. She is well-developed.   HENT:      Head: Normocephalic and atraumatic.   Cardiovascular:      Rate and Rhythm: Normal rate and regular rhythm.      Heart sounds: Normal heart sounds.   Pulmonary:      Effort: Pulmonary effort is normal. No respiratory distress.      Breath sounds: Normal breath sounds.   Skin:     General: Skin is warm and dry.   Neurological:      Mental Status: She is alert.   Psychiatric:         Behavior: Behavior normal.

## 2024-06-19 RX ORDER — BUPROPION HYDROCHLORIDE 75 MG/1
75 TABLET ORAL 2 TIMES DAILY
Qty: 60 TABLET | Refills: 1 | Status: SHIPPED | OUTPATIENT
Start: 2024-06-19

## 2024-06-27 ENCOUNTER — OFFICE VISIT (OUTPATIENT)
Dept: INTERNAL MEDICINE CLINIC | Age: 69
End: 2024-06-27
Payer: MEDICARE

## 2024-06-27 VITALS
DIASTOLIC BLOOD PRESSURE: 78 MMHG | HEIGHT: 64 IN | BODY MASS INDEX: 24.59 KG/M2 | WEIGHT: 144 LBS | SYSTOLIC BLOOD PRESSURE: 122 MMHG

## 2024-06-27 DIAGNOSIS — F41.9 ANXIETY: Primary | ICD-10-CM

## 2024-06-27 DIAGNOSIS — G25.81 RESTLESS LEG SYNDROME: ICD-10-CM

## 2024-06-27 DIAGNOSIS — K58.9 IRRITABLE BOWEL SYNDROME, UNSPECIFIED TYPE: ICD-10-CM

## 2024-06-27 DIAGNOSIS — F51.04 PSYCHOPHYSIOLOGICAL INSOMNIA: ICD-10-CM

## 2024-06-27 PROCEDURE — 1090F PRES/ABSN URINE INCON ASSESS: CPT | Performed by: INTERNAL MEDICINE

## 2024-06-27 PROCEDURE — 99214 OFFICE O/P EST MOD 30 MIN: CPT | Performed by: INTERNAL MEDICINE

## 2024-06-27 PROCEDURE — G8399 PT W/DXA RESULTS DOCUMENT: HCPCS | Performed by: INTERNAL MEDICINE

## 2024-06-27 PROCEDURE — G8427 DOCREV CUR MEDS BY ELIG CLIN: HCPCS | Performed by: INTERNAL MEDICINE

## 2024-06-27 PROCEDURE — 3074F SYST BP LT 130 MM HG: CPT | Performed by: INTERNAL MEDICINE

## 2024-06-27 PROCEDURE — 1123F ACP DISCUSS/DSCN MKR DOCD: CPT | Performed by: INTERNAL MEDICINE

## 2024-06-27 PROCEDURE — 3017F COLORECTAL CA SCREEN DOC REV: CPT | Performed by: INTERNAL MEDICINE

## 2024-06-27 PROCEDURE — G8420 CALC BMI NORM PARAMETERS: HCPCS | Performed by: INTERNAL MEDICINE

## 2024-06-27 PROCEDURE — 1036F TOBACCO NON-USER: CPT | Performed by: INTERNAL MEDICINE

## 2024-06-27 PROCEDURE — 3078F DIAST BP <80 MM HG: CPT | Performed by: INTERNAL MEDICINE

## 2024-06-27 RX ORDER — DIAZEPAM 5 MG/1
TABLET ORAL
Qty: 45 TABLET | Refills: 0 | Status: SHIPPED | OUTPATIENT
Start: 2024-06-27 | End: 2024-07-27

## 2024-06-27 RX ORDER — BUPROPION HYDROCHLORIDE 75 MG/1
75 TABLET ORAL 2 TIMES DAILY
Qty: 60 TABLET | Refills: 2 | Status: SHIPPED | OUTPATIENT
Start: 2024-06-27

## 2024-06-27 RX ORDER — ROPINIROLE 0.5 MG/1
0.5 TABLET, FILM COATED ORAL 3 TIMES DAILY
Qty: 270 TABLET | Refills: 1 | Status: SHIPPED | OUTPATIENT
Start: 2024-06-27

## 2024-06-27 RX ORDER — LEVOTHYROXINE SODIUM 112 UG/1
112 TABLET ORAL DAILY
Qty: 90 TABLET | Refills: 1 | Status: SHIPPED | OUTPATIENT
Start: 2024-06-27

## 2024-06-27 RX ORDER — ALPRAZOLAM 0.5 MG/1
TABLET ORAL
Qty: 60 TABLET | Refills: 2 | Status: SHIPPED | OUTPATIENT
Start: 2024-07-14 | End: 2024-09-25

## 2024-06-27 NOTE — PROGRESS NOTES
Mindy Bryan (:  1955) is a 69 y.o. female,Established patient, here for evaluation of the following chief complaint(s):  1 Month Follow-Up      Assessment & Plan   1. Anxiety  -Improved.  Continue bupropion 75 mg, will will try changing to just once a day in the morning and see if this helps with the irritability or even with sleep, if she finds her mood declines with just the once a day then will go back to twice a day since she did not notice a significant change in her sleep.  -     ALPRAZolam (XANAX) 0.5 MG tablet; Take 1-2 tabs daily as needed for anxiety, Disp-60 tablet, R-2Normal  2. Restless leg syndrome   -Stable, continue ropinirole 0.5 mg 3 times daily  3. Psychophysiological insomnia   -Stable, continue trazodone 150 mg nightly as needed  4. Irritable bowel syndrome, unspecified type  -Stable.  OARRS reviewed, no concerns  -     diazePAM (VALIUM) 5 MG tablet; TAKE ONE TABLET BY MOUTH EVERY 6 HOURS AS NEEDED FOR IBS ATTACKS. DO NOT TAKE WITH ALPRAZOLAM, Disp-45 tablet, R-0Normal      No follow-ups on file.       Subjective   HPI    She has been taking Wellbutrin twice a day and feels like it is helping with mood.  Anxiety is a little bit less.  She has noticed a little bit of irritability, not sure if that is from the medication.  She is taking alprazolam at nighttime and occasionally during the day now.  She takes the sleep medication in the middle of the night when she wakes up.    Taking ropinirole at least 2x/day, sometimes 3 times when she can remember the dose in the middle of the day.    IBS is stable.  She is with the diazepam only occasionally.    For bloating, taking HCTZ now 1/2 tab 3x/week, not taking it every day.  This is prescribed through her gynecologist.    Review of Systems       Objective   Physical Exam  Vitals reviewed.   Constitutional:       General: She is not in acute distress.     Appearance: Normal appearance. She is well-developed.   HENT:      Head:

## 2024-07-21 ENCOUNTER — TELEPHONE (OUTPATIENT)
Dept: CASE MANAGEMENT | Age: 69
End: 2024-07-21

## 2024-07-30 ENCOUNTER — OFFICE VISIT (OUTPATIENT)
Dept: CARDIOLOGY CLINIC | Age: 69
End: 2024-07-30
Payer: MEDICARE

## 2024-07-30 VITALS
WEIGHT: 142.8 LBS | SYSTOLIC BLOOD PRESSURE: 100 MMHG | DIASTOLIC BLOOD PRESSURE: 58 MMHG | HEART RATE: 57 BPM | BODY MASS INDEX: 24.51 KG/M2

## 2024-07-30 DIAGNOSIS — I42.1 HYPERTROPHIC OBSTRUCTIVE CARDIOMYOPATHY (HCC): Primary | ICD-10-CM

## 2024-07-30 PROCEDURE — 1123F ACP DISCUSS/DSCN MKR DOCD: CPT | Performed by: INTERNAL MEDICINE

## 2024-07-30 PROCEDURE — 3074F SYST BP LT 130 MM HG: CPT | Performed by: INTERNAL MEDICINE

## 2024-07-30 PROCEDURE — 1090F PRES/ABSN URINE INCON ASSESS: CPT | Performed by: INTERNAL MEDICINE

## 2024-07-30 PROCEDURE — G8420 CALC BMI NORM PARAMETERS: HCPCS | Performed by: INTERNAL MEDICINE

## 2024-07-30 PROCEDURE — G8399 PT W/DXA RESULTS DOCUMENT: HCPCS | Performed by: INTERNAL MEDICINE

## 2024-07-30 PROCEDURE — G8428 CUR MEDS NOT DOCUMENT: HCPCS | Performed by: INTERNAL MEDICINE

## 2024-07-30 PROCEDURE — 1036F TOBACCO NON-USER: CPT | Performed by: INTERNAL MEDICINE

## 2024-07-30 PROCEDURE — 3078F DIAST BP <80 MM HG: CPT | Performed by: INTERNAL MEDICINE

## 2024-07-30 PROCEDURE — 3017F COLORECTAL CA SCREEN DOC REV: CPT | Performed by: INTERNAL MEDICINE

## 2024-07-30 PROCEDURE — 99215 OFFICE O/P EST HI 40 MIN: CPT | Performed by: INTERNAL MEDICINE

## 2024-07-30 NOTE — PROGRESS NOTES
Cc: HOCM s/p septal myectomy, sMR s/p bio MVR, PAFRVR s/p ablation    HPI:     Patient is a 68 yo woman with h/o mild asthma, HTN, HLP, smoker, severe MR and HOCM s/p MDT Mosaic bioprosthetic MVR (25 mm) and basal septal myectomy by Dr Madrid on 6/29/2020, PAF 09/2023 s/p AF/AFL ablation 11/2024.     Echo 02/2022: mod LVH, EF > 60%, indeterminate diastolic, normal bioprosthetic MVR with elevated gradients (peak/mean PG 18/9 mmhg), moderate MS, mild RVD with normal function.     [place] 03/2022: normal    LHC 02/2020: normal coronaries, LVOT gradient of about 100 mmhg (222 mmhg down to 120 mmhg from apex to LVOT).     [facility] 02/2020: severe MR, HOCM, + SITA, LGE.     Patient presented to [facility] emergency room on 9/16/2023 with palpitations.  She was found to atrial fibrillation with RVR.  She was released on Eliquis and follow-up with Dr. Boyd, MATTHEW, whom she saw on 10/2/23 and had AFL and AF ablation on 11/1/23.    Patient was seen by EP in 02/2024 and was complaining of significant fatigue.  In view of low BP and heart rate her Toprol was decreased from 100 mg daily to 50 mg daily.  30-day M cot consistent with baseline NSR, several episodes of WCT (less than 17 beats).  However patient misunderstood and has been taking Toprol 50 mg p.o. twice daily.    Patient came to the clinic today for follow-up.  She continues to have some fatigue.      Histories     Past Medical History:   has a past medical history of Adrenal adenoma, Anxiety, Arthritis, Asthma, Chronic nausea, Environmental allergies, GERD (gastroesophageal reflux disease), HOCM (hypertrophic obstructive cardiomyopathy) (HCC), HTN (hypertension), Hypothyroid, Irritable bowel syndrome, Microscopic colitis, Migraine, Mitral regurgitation, Nephrolithiasis, Nosebleed, Osteoporosis, Rash, Restless leg syndrome, TMJ dysfunction, Vitreous detachment, and Wrist fracture, bilateral.    Surgical History:   has a past surgical history that includes Wrist fracture

## 2024-08-05 SDOH — HEALTH STABILITY: PHYSICAL HEALTH: ON AVERAGE, HOW MANY DAYS PER WEEK DO YOU ENGAGE IN MODERATE TO STRENUOUS EXERCISE (LIKE A BRISK WALK)?: 7 DAYS

## 2024-08-05 SDOH — HEALTH STABILITY: PHYSICAL HEALTH: ON AVERAGE, HOW MANY MINUTES DO YOU ENGAGE IN EXERCISE AT THIS LEVEL?: 30 MIN

## 2024-08-08 ENCOUNTER — OFFICE VISIT (OUTPATIENT)
Dept: INTERNAL MEDICINE CLINIC | Age: 69
End: 2024-08-08

## 2024-08-08 VITALS
DIASTOLIC BLOOD PRESSURE: 82 MMHG | OXYGEN SATURATION: 99 % | TEMPERATURE: 97.5 F | HEIGHT: 63 IN | SYSTOLIC BLOOD PRESSURE: 124 MMHG | WEIGHT: 141.2 LBS | HEART RATE: 72 BPM | BODY MASS INDEX: 25.02 KG/M2

## 2024-08-08 DIAGNOSIS — G43.909 MIGRAINE WITHOUT STATUS MIGRAINOSUS, NOT INTRACTABLE, UNSPECIFIED MIGRAINE TYPE: ICD-10-CM

## 2024-08-08 DIAGNOSIS — Z00.00 MEDICARE ANNUAL WELLNESS VISIT, SUBSEQUENT: Primary | ICD-10-CM

## 2024-08-08 DIAGNOSIS — I10 PRIMARY HYPERTENSION: ICD-10-CM

## 2024-08-08 DIAGNOSIS — F51.04 PSYCHOPHYSIOLOGICAL INSOMNIA: ICD-10-CM

## 2024-08-08 DIAGNOSIS — K58.0 IRRITABLE BOWEL SYNDROME WITH DIARRHEA: ICD-10-CM

## 2024-08-08 DIAGNOSIS — H61.23 BILATERAL IMPACTED CERUMEN: ICD-10-CM

## 2024-08-08 DIAGNOSIS — E03.9 HYPOTHYROIDISM, UNSPECIFIED TYPE: Chronic | ICD-10-CM

## 2024-08-08 ASSESSMENT — PATIENT HEALTH QUESTIONNAIRE - PHQ9
SUM OF ALL RESPONSES TO PHQ QUESTIONS 1-9: 0
SUM OF ALL RESPONSES TO PHQ QUESTIONS 1-9: 0
SUM OF ALL RESPONSES TO PHQ9 QUESTIONS 1 & 2: 0
2. FEELING DOWN, DEPRESSED OR HOPELESS: NOT AT ALL
SUM OF ALL RESPONSES TO PHQ QUESTIONS 1-9: 0
1. LITTLE INTEREST OR PLEASURE IN DOING THINGS: NOT AT ALL
SUM OF ALL RESPONSES TO PHQ QUESTIONS 1-9: 0

## 2024-08-08 ASSESSMENT — LIFESTYLE VARIABLES
HOW OFTEN DO YOU HAVE A DRINK CONTAINING ALCOHOL: NEVER
HOW MANY STANDARD DRINKS CONTAINING ALCOHOL DO YOU HAVE ON A TYPICAL DAY: PATIENT DOES NOT DRINK

## 2024-08-08 NOTE — ASSESSMENT & PLAN NOTE
On Xanax nightly and one dose during the day if needed. But take Trazodone if she wakes up in the middle of the night.   - I discussed with the patient at length about benzodiazepine and its potential complications, side effects. Plans to wean her off, at least by half in 3 months at follow up.   - Will have patient take Trazodone by bedtime, can start taking it nightly. Starting 50 mg and go up but patient reports she usually needs 150 mg. If Trazodone does not works well, then will consider Mirtazapine vs Doxepin.   - Controlled medication patient agreement signed with me today

## 2024-08-08 NOTE — PROGRESS NOTES
memantine (NAMENDA) 5 MG tablet Take 1 tablet by mouth 2 times daily Yes Derrick Huddleston MD   hydroCHLOROthiazide (HYDRODIURIL) 50 MG tablet Take 1 tablet by mouth daily  Patient taking differently: Take 0.5 tablets by mouth daily Yes Kenia Blount APRN - CNP   loratadine (CLARITIN) 10 MG tablet Take 1 tablet by mouth daily Yes Derrick Huddleston MD   Coenzyme Q10 (CO Q-10) 200 MG CAPS Take 1 capsule by mouth Yes Derrick Huddleston MD   Ascorbic Acid (VITAMIN C) 500 MG CAPS Take 500 mg by mouth Yes ProviderDerrick MD   Multiple Vitamins-Minerals (CENTRUM SILVER PO) Take 1 tablet by mouth daily Yes Derrick Huddleston MD   Omega-3 Fatty Acids (FISH OIL) 1200 MG CAPS Take 1,200 mg by mouth 2 times daily Yes Derrick Huddleston MD   Flaxseed, Linseed, (FLAXSEED OIL PO) Take 1,300 mg by mouth daily Yes Derrick Huddleston MD   FIBER PO Take 1 capsule by mouth daily Yes Derrick Huddleston MD   denosumab (PROLIA) 60 MG/ML SOSY SC injection Inject 1 mL into the skin once for 1 dose  Patient taking differently: Inject 1 mL into the skin once Two injections yearly  Filomena Perez MD       [facility] (Including outside providers/suppliers regularly involved in providing care):   Patient Care Team:  Sarah Lau MD as PCP - General (Internal Medicine)  Sarah Lau MD as PCP - Empaneled Provider  Jun Liz MD as Consulting Physician (Pulmonology)  Bebe Molina RN as Nurse Navigator      Reviewed and updated this visit:  Tobacco  Allergies  Meds  Med Hx  Surg Hx  Soc Hx  Fam Hx

## 2024-08-09 NOTE — ASSESSMENT & PLAN NOTE
IBS with intermittent flare of severe abdominal cramping. She has been taking Diazepam prn for this. Saw GI for c/scope but have not yet address this problem for the past several years.   - I discussed with patient again about Benzo therapy. Will have patient see GI to better address her IBS symptoms. Would like to hold off on Diazepam if possible. Bentyl prn

## 2024-08-09 NOTE — ASSESSMENT & PLAN NOTE
Stable. Check lab at next visit.   Clinically euthyroid  Continue with current dose - Levothyroxine 112 mcg

## 2024-08-09 NOTE — ASSESSMENT & PLAN NOTE
Worst in right ear. Irrigated with water today.,   she reports feels like water in her ear affecting her hearing. There is a small area of some bleeding along the canal but likely heal on its own. Discussed w patient sx will improves. Monitor. If not by end of the day or tomorrow, will take another look.

## 2024-08-09 NOTE — ASSESSMENT & PLAN NOTE
Within normal limits for age- retired, no ADL issues,immunizations up to date, no depression ,no cognitive impairment  Colonoscopy up to date  Mammogram up to date    Eye exam up to date  Exercises as tolerated    No living will, ACP information provided   Findings and recommendations discussed with Pt

## 2024-08-13 ENCOUNTER — TELEPHONE (OUTPATIENT)
Dept: INTERNAL MEDICINE CLINIC | Age: 69
End: 2024-08-13

## 2024-08-13 DIAGNOSIS — Z87.891 HISTORY OF SMOKING 30 OR MORE PACK YEARS: Primary | ICD-10-CM

## 2024-08-13 NOTE — TELEPHONE ENCOUNTER
Pt is requesting an order for a CT scan for lung screening test. Pt thought order was already placed but is not in system when she called to schedule.    [phone]

## 2024-08-16 RX ORDER — TRAZODONE HYDROCHLORIDE 50 MG/1
TABLET ORAL
Qty: 270 TABLET | Refills: 1 | Status: SHIPPED | OUTPATIENT
Start: 2024-08-16

## 2024-08-16 NOTE — TELEPHONE ENCOUNTER
Pt is requesting a refill on medication below. States he is taking at the same time each night and it is helping.      traZODone (DESYREL) 50 MG tablet [[id number]]     [facility] [id number] - [address] - P [phone] - F [phone]

## 2024-08-19 ENCOUNTER — TELEPHONE (OUTPATIENT)
Dept: CARDIOLOGY CLINIC | Age: 69
End: 2024-08-19

## 2024-08-19 DIAGNOSIS — I34.0 SEVERE MITRAL REGURGITATION: ICD-10-CM

## 2024-08-19 DIAGNOSIS — Z95.2 S/P MVR (MITRAL VALVE REPLACEMENT): ICD-10-CM

## 2024-08-19 DIAGNOSIS — I42.1 HOCM (HYPERTROPHIC OBSTRUCTIVE CARDIOMYOPATHY) (HCC): ICD-10-CM

## 2024-08-19 NOTE — TELEPHONE ENCOUNTER
Call to let staff know that she has completed application for [employer] on line.    Call back number [phone]

## 2024-08-20 NOTE — TELEPHONE ENCOUNTER
Called spoke with patient states she contacted [facility] [phone].  Her ID #[id number]    Patient would like Eliquis 5 mg BID to be faxed [phone].    Patient asked if we have any samples of Eliquis to hold her over till she receives her RX.    RX pending to print and fax.

## 2024-08-23 ENCOUNTER — HOSPITAL ENCOUNTER (OUTPATIENT)
Dept: CT IMAGING | Age: 69
Discharge: HOME | End: 2024-08-23
Payer: MEDICARE

## 2024-08-23 ENCOUNTER — TELEPHONE (OUTPATIENT)
Dept: INTERNAL MEDICINE CLINIC | Age: 69
End: 2024-08-23

## 2024-08-23 DIAGNOSIS — Z87.891 HISTORY OF SMOKING 30 OR MORE PACK YEARS: ICD-10-CM

## 2024-08-23 PROCEDURE — 71271 CT THORAX LUNG CANCER SCR C-: CPT

## 2024-08-23 NOTE — TELEPHONE ENCOUNTER
Pt has been taking:     buPROPion (WELLBUTRIN) 75 MG tablet     For 5-6 weeks, 1 in am and 1 in pm.  She states that it is no longer working for her and she would like to know what else she can try for her anxiety.     Please advise pt at:  [phone]

## 2024-08-25 NOTE — TELEPHONE ENCOUNTER
This question has to be deferred to the patient's primary.  She should make an appointment with Dr. Lau to address

## 2024-08-26 NOTE — TELEPHONE ENCOUNTER
[phone] (home)   Spoke with patient this matter was taken care of. APPT. MADE WITH DR SINGH 8/29/24

## 2024-08-29 ENCOUNTER — OFFICE VISIT (OUTPATIENT)
Dept: INTERNAL MEDICINE CLINIC | Age: 69
End: 2024-08-29

## 2024-08-29 VITALS
WEIGHT: 143 LBS | BODY MASS INDEX: 25.34 KG/M2 | HEIGHT: 63 IN | DIASTOLIC BLOOD PRESSURE: 70 MMHG | SYSTOLIC BLOOD PRESSURE: 128 MMHG

## 2024-08-29 DIAGNOSIS — F41.9 ANXIETY: Primary | ICD-10-CM

## 2024-08-29 NOTE — PROGRESS NOTES
effort is normal.      Breath sounds: Normal breath sounds.   Abdominal:      General: There is no distension.      Palpations: Abdomen is soft.      Tenderness: There is no abdominal tenderness.   Musculoskeletal:         General: No swelling or tenderness.      Cervical back: Neck supple. No tenderness.   Lymphadenopathy:      Cervical: No cervical adenopathy.   Skin:     General: Skin is warm and dry.   Neurological:      Mental Status: She is oriented to person, place, and time. Mental status is at baseline.      Motor: No weakness.   Psychiatric:         Mood and Affect: Mood normal.         ASSESSMENT/PLAN:    Anxiety   This problem is uncontrolled. She is currently on Wellbutrin but it isn't helping. Hx of SIADH thought to be 2/2 Cymbalta.  She discontinued amitriptyline due to weight gain, mirtazapine is considered to be a safer alternative but she does not want to use this due to potential for weight gain.  She is concurrently using hydrochlorothiazide.  - referred to psychiatry given that the course of treatment for this problem has been complicated, and she does not tolerate/is unable to take numerous medications       Orders Placed This Encounter    Non [facility] - External Referral to Psychiatry     Referral Priority:   Routine     Referral Type:   Eval and Treat     Referral Reason:   Specialty Services Required     Requested Specialty:   Psychiatry     Number of Visits Requested:   1        No follow-ups on file.

## 2024-08-30 NOTE — ASSESSMENT & PLAN NOTE
This problem is uncontrolled. She is currently on Wellbutrin but it isn't helping. Hx of SIADH thought to be 2/2 Cymbalta.  She discontinued amitriptyline due to weight gain, mirtazapine is considered to be a safer alternative but she does not want to use this due to potential for weight gain.  She is concurrently using hydrochlorothiazide.  - referred to psychiatry given that the course of treatment for this problem has been complicated, and she does not tolerate/is unable to take numerous medications

## 2024-09-07 PROBLEM — Z00.00 MEDICARE ANNUAL WELLNESS VISIT, SUBSEQUENT: Status: RESOLVED | Noted: 2017-12-05 | Resolved: 2024-09-07

## 2024-09-19 RX ORDER — PROCHLORPERAZINE MALEATE 10 MG
TABLET ORAL
Qty: 30 TABLET | Refills: 4 | Status: SHIPPED | OUTPATIENT
Start: 2024-09-19

## 2024-09-23 DIAGNOSIS — E03.9 HYPOTHYROIDISM, UNSPECIFIED TYPE: Chronic | ICD-10-CM

## 2024-09-23 DIAGNOSIS — Z00.00 MEDICARE ANNUAL WELLNESS VISIT, SUBSEQUENT: ICD-10-CM

## 2024-09-23 DIAGNOSIS — I10 PRIMARY HYPERTENSION: ICD-10-CM

## 2024-09-23 DIAGNOSIS — M81.0 AGE-RELATED OSTEOPOROSIS WITHOUT CURRENT PATHOLOGICAL FRACTURE: Primary | ICD-10-CM

## 2024-09-23 LAB
ALBUMIN SERPL-MCNC: 4 G/DL (ref 3.4–5)
ALBUMIN/GLOB SERPL: 1.7 {RATIO} (ref 1.1–2.2)
ALP SERPL-CCNC: 94 U/L (ref 40–129)
ALT SERPL-CCNC: 234 U/L (ref 10–40)
ANION GAP SERPL CALCULATED.3IONS-SCNC: 9 MMOL/L (ref 3–16)
AST SERPL-CCNC: 207 U/L (ref 15–37)
BILIRUB SERPL-MCNC: 0.4 MG/DL (ref 0–1)
BUN SERPL-MCNC: 12 MG/DL (ref 7–20)
CALCIUM SERPL-MCNC: 10 MG/DL (ref 8.3–10.6)
CHLORIDE SERPL-SCNC: 105 MMOL/L (ref 99–110)
CHOLEST SERPL-MCNC: 159 MG/DL (ref 0–199)
CO2 SERPL-SCNC: 27 MMOL/L (ref 21–32)
CREAT SERPL-MCNC: 0.7 MG/DL (ref 0.6–1.2)
EST. AVERAGE GLUCOSE BLD GHB EST-MCNC: 93.9 MG/DL
GFR SERPLBLD CREATININE-BSD FMLA CKD-EPI: >90 ML/MIN/{1.73_M2}
GLUCOSE SERPL-MCNC: 95 MG/DL (ref 70–99)
HBA1C MFR BLD: 4.9 %
HDLC SERPL-MCNC: 72 MG/DL (ref 40–60)
LDL CHOLESTEROL: 69 MG/DL
POTASSIUM SERPL-SCNC: 4.2 MMOL/L (ref 3.5–5.1)
PROT SERPL-MCNC: 6.3 G/DL (ref 6.4–8.2)
SODIUM SERPL-SCNC: 141 MMOL/L (ref 136–145)
T3 SERPL-MCNC: 1.16 NG/ML (ref 0.8–2)
T4 FREE SERPL-MCNC: 1.6 NG/DL (ref 0.9–1.8)
TRIGL SERPL-MCNC: 91 MG/DL (ref 0–150)
TSH SERPL DL<=0.005 MIU/L-ACNC: 0.16 UIU/ML (ref 0.27–4.2)
VLDLC SERPL CALC-MCNC: 18 MG/DL

## 2024-09-23 RX ORDER — ALBUTEROL SULFATE 90 UG/1
4 INHALANT RESPIRATORY (INHALATION) PRN
OUTPATIENT
Start: 2024-09-23

## 2024-09-23 RX ORDER — ACETAMINOPHEN 325 MG/1
650 TABLET ORAL
OUTPATIENT
Start: 2024-09-23

## 2024-09-23 RX ORDER — SODIUM CHLORIDE 9 MG/ML
INJECTION, SOLUTION INTRAVENOUS CONTINUOUS
OUTPATIENT
Start: 2024-09-23

## 2024-09-23 RX ORDER — DIPHENHYDRAMINE HYDROCHLORIDE 50 MG/ML
50 INJECTION INTRAMUSCULAR; INTRAVENOUS
OUTPATIENT
Start: 2024-09-23

## 2024-09-23 RX ORDER — ONDANSETRON 2 MG/ML
8 INJECTION INTRAMUSCULAR; INTRAVENOUS
OUTPATIENT
Start: 2024-09-23

## 2024-09-23 RX ORDER — EPINEPHRINE 1 MG/ML
0.3 INJECTION, SOLUTION, CONCENTRATE INTRAVENOUS PRN
OUTPATIENT
Start: 2024-09-23

## 2024-09-26 DIAGNOSIS — E03.9 HYPOTHYROIDISM, UNSPECIFIED TYPE: Primary | Chronic | ICD-10-CM

## 2024-09-26 DIAGNOSIS — R79.89 ELEVATED LIVER FUNCTION TESTS: ICD-10-CM

## 2024-09-26 RX ORDER — LEVOTHYROXINE SODIUM 100 UG/1
100 TABLET ORAL DAILY
Qty: 30 TABLET | Refills: 3 | Status: SHIPPED | OUTPATIENT
Start: 2024-09-26

## 2024-10-01 NOTE — TELEPHONE ENCOUNTER
Pt is requesting a refill on medication           omeprazole (PRILOSEC) 40 MG delayed release capsule [[id number]]     [facility] [id number] [address] - P [phone] - F [phone]

## 2024-10-02 RX ORDER — OMEPRAZOLE 40 MG/1
40 CAPSULE, DELAYED RELEASE ORAL DAILY
Qty: 90 CAPSULE | Refills: 1 | Status: SHIPPED | OUTPATIENT
Start: 2024-10-02

## 2024-10-18 RX ORDER — ATORVASTATIN CALCIUM 80 MG/1
80 TABLET, FILM COATED ORAL NIGHTLY
Qty: 90 TABLET | Refills: 3 | Status: SHIPPED | OUTPATIENT
Start: 2024-10-18

## 2024-10-18 NOTE — TELEPHONE ENCOUNTER
Requested Prescriptions     Pending Prescriptions Disp Refills    atorvastatin (LIPITOR) 80 MG tablet [Pharmacy Med Name: ATORVASTATIN 80 MG TABLET] 90 tablet 3     Sig: TAKE ONE TABLET BY MOUTH ONCE NIGHTLY            Checked Correct Pharmacy: Yes    Any changes since last refill? No     Number: 90    Refills: 3    Last Office Visit: 7/30/2024     Next Office Visit: 1/28/2025     Last Refill: 10/17/2023    Last Labs: 09/23/2024

## 2024-10-25 DIAGNOSIS — M81.0 AGE-RELATED OSTEOPOROSIS WITHOUT CURRENT PATHOLOGICAL FRACTURE: ICD-10-CM

## 2024-10-25 DIAGNOSIS — E03.9 HYPOTHYROIDISM, UNSPECIFIED TYPE: Chronic | ICD-10-CM

## 2024-10-25 DIAGNOSIS — R79.89 ELEVATED LIVER FUNCTION TESTS: ICD-10-CM

## 2024-10-25 LAB
ALBUMIN SERPL-MCNC: 4.1 G/DL (ref 3.4–5)
ALP SERPL-CCNC: 56 U/L (ref 40–129)
ALT SERPL-CCNC: 28 U/L (ref 10–40)
AST SERPL-CCNC: 30 U/L (ref 15–37)
BILIRUB DIRECT SERPL-MCNC: 0.2 MG/DL (ref 0–0.3)
BILIRUB INDIRECT SERPL-MCNC: 0.1 MG/DL (ref 0–1)
BILIRUB SERPL-MCNC: 0.3 MG/DL (ref 0–1)
CALCIUM SERPL-MCNC: 10.3 MG/DL (ref 8.3–10.6)
PROT SERPL-MCNC: 6.5 G/DL (ref 6.4–8.2)
T3 SERPL-MCNC: 0.97 NG/ML (ref 0.8–2)
T4 FREE SERPL-MCNC: 1.6 NG/DL (ref 0.9–1.8)
TSH SERPL DL<=0.005 MIU/L-ACNC: 0.26 UIU/ML (ref 0.27–4.2)

## 2024-10-28 DIAGNOSIS — E03.9 ACQUIRED HYPOTHYROIDISM: Primary | Chronic | ICD-10-CM

## 2024-10-28 RX ORDER — LEVOTHYROXINE SODIUM 88 UG/1
88 TABLET ORAL DAILY
Qty: 30 TABLET | Refills: 3 | Status: SHIPPED | OUTPATIENT
Start: 2024-10-28

## 2024-10-29 RX ORDER — METOPROLOL SUCCINATE 50 MG/1
50 TABLET, EXTENDED RELEASE ORAL DAILY
Qty: 90 TABLET | Refills: 3 | Status: SHIPPED | OUTPATIENT
Start: 2024-10-29

## 2024-10-29 NOTE — TELEPHONE ENCOUNTER
Requested Prescriptions     Pending Prescriptions Disp Refills    metoprolol succinate (TOPROL XL) 50 MG extended release tablet 90 tablet 3     Sig: Take 1 tablet by mouth daily            Checked Correct Pharmacy: Yes    Any changes since last refill? Yes    Number: 90    Refills: 3    Last Office Visit: 7/30/2024     Next Office Visit: 1/28/2025       Last Labs: 10.25.2024

## 2024-10-29 NOTE — TELEPHONE ENCOUNTER
Pt calling for Rx refill request. She has been taking 0.5mg of 100mg Metoprolol but wants to know if it is possible to send Metoprolol 50mg 1 tab daily?     Medication  metoprolol succinate (TOPROL XL) 100 MG extended release tablet [[id number]]  metoprolol succinate (TOPROL XL) 100 MG extended release tablet [[id number]]    Order Details  Dose: 50 mg Route: Oral Frequency: DAILY   Dispense Quantity: 45 tablet Refills: 3          Sig: Take 0.5 tablets by mouth daily           Pharmacy    [facility] [id number] - [place], [place] - [address] [place] [address] - P [phone] - F [phone]  [address] [place] [address]  Phone: [phone]  Fax: [phone]

## 2024-11-05 ENCOUNTER — HOSPITAL ENCOUNTER (OUTPATIENT)
Dept: INFUSION THERAPY | Age: 69
Setting detail: INFUSION SERIES
Discharge: HOME | End: 2024-11-05
Payer: MEDICARE

## 2024-11-05 VITALS
TEMPERATURE: 97.6 F | OXYGEN SATURATION: 97 % | HEART RATE: 56 BPM | DIASTOLIC BLOOD PRESSURE: 46 MMHG | RESPIRATION RATE: 16 BRPM | SYSTOLIC BLOOD PRESSURE: 109 MMHG

## 2024-11-05 DIAGNOSIS — M81.0 OSTEOPOROSIS WITHOUT CURRENT PATHOLOGICAL FRACTURE, UNSPECIFIED OSTEOPOROSIS TYPE: Primary | ICD-10-CM

## 2024-11-05 PROCEDURE — 96372 THER/PROPH/DIAG INJ SC/IM: CPT

## 2024-11-05 PROCEDURE — 6360000002 HC RX W HCPCS: Performed by: STUDENT IN AN ORGANIZED HEALTH CARE EDUCATION/TRAINING PROGRAM

## 2024-11-05 RX ORDER — ALBUTEROL SULFATE 90 UG/1
4 INHALANT RESPIRATORY (INHALATION) PRN
Status: CANCELLED | OUTPATIENT
Start: 2025-05-06

## 2024-11-05 RX ORDER — ONDANSETRON 2 MG/ML
8 INJECTION INTRAMUSCULAR; INTRAVENOUS
Status: CANCELLED | OUTPATIENT
Start: 2025-05-06

## 2024-11-05 RX ORDER — DIPHENHYDRAMINE HYDROCHLORIDE 50 MG/ML
50 INJECTION INTRAMUSCULAR; INTRAVENOUS
Status: CANCELLED | OUTPATIENT
Start: 2025-05-06

## 2024-11-05 RX ORDER — PAROXETINE 10 MG/1
10 TABLET, FILM COATED ORAL NIGHTLY
COMMUNITY

## 2024-11-05 RX ORDER — ACETAMINOPHEN 325 MG/1
650 TABLET ORAL
Status: CANCELLED | OUTPATIENT
Start: 2025-05-06

## 2024-11-05 RX ORDER — SODIUM CHLORIDE 9 MG/ML
INJECTION, SOLUTION INTRAVENOUS CONTINUOUS
Status: CANCELLED | OUTPATIENT
Start: 2025-05-06

## 2024-11-05 RX ORDER — EPINEPHRINE 1 MG/ML
0.3 INJECTION, SOLUTION, CONCENTRATE INTRAVENOUS PRN
Status: CANCELLED | OUTPATIENT
Start: 2025-05-06

## 2024-11-05 RX ADMIN — DENOSUMAB 60 MG: 60 INJECTION SUBCUTANEOUS at 10:40

## 2024-11-05 NOTE — DISCHARGE INSTRUCTIONS
Outpatient Infusion Discharge Instructions  [facility]  [address] [address]  Telephone: [phone]      FAX [phone]    NAME:  Mindy Bryan  YOB: 1955  MEDICAL RECORD NUMBER:  [id number]  DATE:  11/5/24    Reason for Outpatient Infusion Visit: prolia    If you develop any these symptoms please contact you Doctor    [x] Nausea and/or vomiting not relieved with medication   [x] Swelling, redness, and/or bleeding at injection or IV site    [x] Fever or chills  [x] Rash or itching   [x] Shortness of breath  [x] Please review After Visit Summary (AVS) information on    [] Other      Outpatient Infusion Center Information: Should you experience any significant changes in your health or have questions about your care please contact the [facility] at [phone] MONDAY - FRIDAY 8:00 am - 4:00 pm.  If you need help outside these hours and cannot wait until we are again available, contact your Primary Care Physician or go to the hospital emergency room.       Electronically signed by Franny Stratton RN on 11/5/2024 at 10:36 AM

## 2024-11-05 NOTE — PROGRESS NOTES
Outpatient Infusion Center  [facility]     Prolia Visit    NAME:  Mindy Bryan  YOB: 1955  MEDICAL RECORD NUMBER:  [id number]  Episode Date:  11/5/2024    Patient arrived to Outpatient Infusion Center   [] per wheelchair   [x] ambulatory     Is this the patient's first Prolia Injection? No     Date of last Prolia Injection ? April 30, 2024.     Did the patient experience any adverse reactions to Prolia Injection? No    Any recent oral or dental surgery? No    Any recent active fever, infections and/or illnesses? No    Patient has history of pathological fracture? No    Patient has had a recent fracture due to trauma or injury? No    Patient has had a recent orthopedic surgery or procedure done? No    Approximate date of last Dexa scan? 8/14/23       BP (!) 109/46   Pulse 56   Temp 97.6 °F (36.4 °C) (Oral)   Resp 16   SpO2 97%     Current Lab Data:    Calcium:    Lab Results   Component Value Date/Time    CALCIUM 10.3 10/25/2024 08:15 AM       Patient Currently taking Calcium Supplements? No     Prolia administered: Yes    Prolia dosage: 60 mg was administered slowly subcutaneously into the left upper arm.      Response to treatment:  Well tolerated by patient.     Due for next dose of Prolia after May 6, 2025.     Electronically signed by Franny Stratton RN on 11/5/2024 at 11:00 AM

## 2024-11-19 ENCOUNTER — OFFICE VISIT (OUTPATIENT)
Dept: INTERNAL MEDICINE CLINIC | Age: 69
End: 2024-11-19

## 2024-11-19 VITALS
SYSTOLIC BLOOD PRESSURE: 138 MMHG | DIASTOLIC BLOOD PRESSURE: 72 MMHG | HEIGHT: 63 IN | BODY MASS INDEX: 26.05 KG/M2 | WEIGHT: 147 LBS | HEART RATE: 56 BPM | TEMPERATURE: 98.4 F | OXYGEN SATURATION: 98 %

## 2024-11-19 DIAGNOSIS — F41.9 ANXIETY: ICD-10-CM

## 2024-11-19 DIAGNOSIS — F51.04 PSYCHOPHYSIOLOGICAL INSOMNIA: Primary | ICD-10-CM

## 2024-11-19 DIAGNOSIS — M54.6 LOWER THORACIC BACK PAIN: ICD-10-CM

## 2024-11-19 RX ORDER — TRAZODONE HYDROCHLORIDE 50 MG/1
TABLET, FILM COATED ORAL
Qty: 270 TABLET | Refills: 1 | Status: CANCELLED | OUTPATIENT
Start: 2024-11-19

## 2024-11-19 RX ORDER — TRAZODONE HYDROCHLORIDE 150 MG/1
150 TABLET ORAL NIGHTLY
Qty: 90 TABLET | Refills: 1 | Status: SHIPPED | OUTPATIENT
Start: 2024-11-19

## 2024-11-19 RX ORDER — TRAZODONE HYDROCHLORIDE 150 MG/1
150 TABLET ORAL NIGHTLY
COMMUNITY
End: 2024-11-19 | Stop reason: SDUPTHER

## 2024-11-19 NOTE — PROGRESS NOTES
Mindy Bryan (:  1955) is a 69 y.o. female here for evaluation of the following chief complaint(s):  Medication Refill (Alprazolam concerns? )      Assessment & Plan   ASSESSMENT/PLAN:  1. Psychophysiological insomnia  Assessment & Plan:  On Xanax nightly and one dose during the day if needed. But take Trazodone if she wakes up in the middle of the night.   She is doing better with Trazodone. Continue with 150 mg   2. Anxiety  Assessment & Plan:  Chronic unstable   Now see psych   Did a trial of Lexapro but endorsed Nausea   Now on Paxil x 4 weeks but has dry mouth   She was advised by psych ok to continue with Xanax at this time. Will defer Xanax management to them  Pending f/u with them soon   3. Lower thoracic back pain  Assessment & Plan:  Going for many years. Saw chiropractor before in the past.   Worse with daily activity. Has been trying Tylenol but not much relief   Discussed PT but pt deferred  OK to try chiropractor vs stretch lab   Lidocaine, heating pad prn      Return in about 3 months (around 2025) for Routine follow-up.         Subjective   SUBJECTIVE/OBJECTIVE:  LATRICIA Walker is here for a follow up on chronic problems.   She now see a psychiatrist for anxiety. Currently on Paxil 10 mg for the past month. However does have dry mouth. Does not notice much difference yet.   She also tried Lexapro but had nausea.   She was advised by her psych OK to continue with xanax during this time until her dose of SSRI is stable.     She also complains of thoracic back pain. Bilaterally. No radiation of pain. Had it before with some scoliosis. Saw a chiropractor. Seems to be improved but now recurred. Worse with daily activity. Improves some with Tylenol       Review of Systems:   Constitutional:  Denies fever or chills   Eyes:  Denies change in visual acuity   HENT:  Denies nasal congestion or sore throat   Respiratory:  Denies cough or shortness of breath   Cardiovascular:  Denies chest pain or

## 2024-11-19 NOTE — ASSESSMENT & PLAN NOTE
On Xanax nightly and one dose during the day if needed. But take Trazodone if she wakes up in the middle of the night.   She is doing better with Trazodone. Continue with 150 mg

## 2024-11-19 NOTE — ASSESSMENT & PLAN NOTE
Going for many years. Saw chiropractor before in the past.   Worse with daily activity. Has been trying Tylenol but not much relief   Discussed PT but pt deferred  OK to try chiropractor vs stretch lab   Lidocaine, heating pad prn

## 2024-11-19 NOTE — ASSESSMENT & PLAN NOTE
Chronic unstable   Now see psych   Did a trial of Lexapro but endorsed Nausea   Now on Paxil x 4 weeks but has dry mouth   She was advised by psych ok to continue with Xanax at this time. Will defer Xanax management to them  Pending f/u with them soon

## 2024-12-03 NOTE — PROGRESS NOTES
Cardiac Electrophysiology Consultation   Date: 2024   Reason for Consultation: Atrial Fibrillation   Primary Cardiologist: Nolan Kennedy MD  Consult Requesting Physician: Sarah Lau MD     Chief Complaint:   Chief Complaint   Patient presents with    New Patient     PT establishing care. Former [facility].        HPI: Mindy Bryan is a 69 y.o. patient with a history of alcohol use, adrenal adenoma, essential hypertension,  severe mitral regurgitation, s/p MDT Mosaic bioprosthetic MVR (25 mm) basal septal myomectomy by Dr. Madrid on 2020, hypothyroidism, hypertrophic cardiomyopathy and new onset atrial fibrillation (2023).     Presented to ED on 2023 reporting symptoms of heart racing, weakness and mild shortness of breath. She reports that she had similar symptoms prior but they lasted only a few minutes but symptoms on that particular day were persisting. Found to be in atrial fibrillation. Patient spontaneously converted to NSR before CV could be performed.      She was seen in office on 10/02/2023 for evaluation of new onset atrial fibrillation. She endorsed symptoms of palpitations when in atrial fibrillation. Patient does not endorse any identifiable exacerbating or alleviating factors for the atrial fibrillation. Ablation was discussed as treatment option and she decided to proceed.     2023 underwent radiofrequency ablation of atrial fibrillation, left atrial flutter and pulmonary vein isolation.     Interval History:   Today, she presents to office for follow up for management of atrial fibrillation. She reports she has been doing well. She reports fatigue, which has been ongoing for months. She reports another provider  her metoprolol to see if it would help improve her fatigue and had a little improvement initially. She denies any symptomatic reoccurrence of atrial fibrillation. 2020 had myectomy and mitral valve repair.    Past Medical History:

## 2024-12-03 NOTE — PROGRESS NOTES
Cardiac Electrophysiology Consultation   Date: 12/3/2024   Reason for Consultation: Atrial Fibrillation   Consult Requesting Physician: Sarah Lau MD     Chief Complaint: No chief complaint on file.       HPI: Mindy Bryan is a 69 y.o. patient with a history of alcohol use, adrenal adenoma, essential hypertension,  severe mitral regurgitation, s/p MDT Mosaic bioprosthetic MVR (25 mm) basal septal myomectomy by Dr. Madrid on June 29, 2020, hypothyroidism, hypertrophic cardiomyopathy and new onset atrial fibrillation (09/16/2023).     Presented to ED on 09/16/2023 reporting symptoms of heart racing, weakness and mild shortness of breath. She reports that she had similar symptoms prior but they lasted only a few minutes but symptoms on that particular day were persisting. Found to be in atrial fibrillation. Patient spontaneously converted to NSR before CV could be performed.      She was seen in office on 10/02/2023 for evaluation of new onset atrial fibrillation. She endorsed symptoms of palpitations when in atrial fibrillation. Patient does not endorse any identifiable exacerbating or alleviating factors for the atrial fibrillation. Ablation was discussed as treatment option and she decided to proceed.     11/01/2023 underwent radiofrequency ablation of atrial fibrillation, left atrial flutter and pulmonary vein isolation.     Interval History:   Today, ***    Past Medical History:   Diagnosis Date    Adrenal adenoma     left 8 mm - stable - CT 8/13    Anxiety     Arthritis     Asthma     Chronic nausea     Environmental allergies     GERD (gastroesophageal reflux disease)     HOCM (hypertrophic obstructive cardiomyopathy) (HCC)     HTN (hypertension)     Hypothyroid     Irritable bowel syndrome     Microscopic colitis     Migraine     Mitral regurgitation     Nephrolithiasis 01/21/2015    R side -s/p lithotripsy    Nosebleed     Osteoporosis     DEXA 8/8/13 - scotty lumbar spine    Rash     Restless leg

## 2024-12-04 ENCOUNTER — OFFICE VISIT (OUTPATIENT)
Dept: CARDIOLOGY CLINIC | Age: 69
End: 2024-12-04
Payer: MEDICARE

## 2024-12-04 VITALS
HEART RATE: 58 BPM | SYSTOLIC BLOOD PRESSURE: 124 MMHG | OXYGEN SATURATION: 98 % | WEIGHT: 147 LBS | DIASTOLIC BLOOD PRESSURE: 64 MMHG | BODY MASS INDEX: 26.05 KG/M2 | HEIGHT: 63 IN

## 2024-12-04 DIAGNOSIS — I42.1 HOCM (HYPERTROPHIC OBSTRUCTIVE CARDIOMYOPATHY) (HCC): ICD-10-CM

## 2024-12-04 DIAGNOSIS — I44.7 LBBB (LEFT BUNDLE BRANCH BLOCK): ICD-10-CM

## 2024-12-04 DIAGNOSIS — Z95.2 S/P MVR (MITRAL VALVE REPLACEMENT): ICD-10-CM

## 2024-12-04 DIAGNOSIS — I34.0 SEVERE MITRAL REGURGITATION: ICD-10-CM

## 2024-12-04 DIAGNOSIS — I48.0 PAF (PAROXYSMAL ATRIAL FIBRILLATION) (HCC): Primary | ICD-10-CM

## 2024-12-04 PROCEDURE — 3017F COLORECTAL CA SCREEN DOC REV: CPT | Performed by: INTERNAL MEDICINE

## 2024-12-04 PROCEDURE — 93000 ELECTROCARDIOGRAM COMPLETE: CPT | Performed by: INTERNAL MEDICINE

## 2024-12-04 PROCEDURE — 3078F DIAST BP <80 MM HG: CPT | Performed by: INTERNAL MEDICINE

## 2024-12-04 PROCEDURE — G8399 PT W/DXA RESULTS DOCUMENT: HCPCS | Performed by: INTERNAL MEDICINE

## 2024-12-04 PROCEDURE — 99214 OFFICE O/P EST MOD 30 MIN: CPT | Performed by: INTERNAL MEDICINE

## 2024-12-04 PROCEDURE — 1036F TOBACCO NON-USER: CPT | Performed by: INTERNAL MEDICINE

## 2024-12-04 PROCEDURE — G8427 DOCREV CUR MEDS BY ELIG CLIN: HCPCS | Performed by: INTERNAL MEDICINE

## 2024-12-04 PROCEDURE — 1159F MED LIST DOCD IN RCRD: CPT | Performed by: INTERNAL MEDICINE

## 2024-12-04 PROCEDURE — 3074F SYST BP LT 130 MM HG: CPT | Performed by: INTERNAL MEDICINE

## 2024-12-04 PROCEDURE — G8484 FLU IMMUNIZE NO ADMIN: HCPCS | Performed by: INTERNAL MEDICINE

## 2024-12-04 PROCEDURE — G8417 CALC BMI ABV UP PARAM F/U: HCPCS | Performed by: INTERNAL MEDICINE

## 2024-12-04 PROCEDURE — 1090F PRES/ABSN URINE INCON ASSESS: CPT | Performed by: INTERNAL MEDICINE

## 2024-12-04 PROCEDURE — 1123F ACP DISCUSS/DSCN MKR DOCD: CPT | Performed by: INTERNAL MEDICINE

## 2024-12-04 NOTE — PATIENT INSTRUCTIONS
Parantez mobile device can be used to monitor your heart rhythm at home.     You can find out more information about the Parantez mobile device on their website [email or web address]              Left bundle branch block (LBBB) is a heart condition that affects the heart's electrical conduction system, causing the left ventricle to contract later than the right ventricle:

## 2024-12-06 DIAGNOSIS — E03.9 HYPOTHYROIDISM, UNSPECIFIED TYPE: Chronic | ICD-10-CM

## 2024-12-06 DIAGNOSIS — E03.9 ACQUIRED HYPOTHYROIDISM: Chronic | ICD-10-CM

## 2024-12-06 DIAGNOSIS — Z00.00 MEDICARE ANNUAL WELLNESS VISIT, SUBSEQUENT: ICD-10-CM

## 2024-12-06 DIAGNOSIS — I10 PRIMARY HYPERTENSION: ICD-10-CM

## 2024-12-06 LAB
BASOPHILS # BLD: 0.1 K/UL (ref 0–0.2)
BASOPHILS NFR BLD: 1.2 %
DEPRECATED RDW RBC AUTO: 12.8 % (ref 12.4–15.4)
EOSINOPHIL # BLD: 0 K/UL (ref 0–0.6)
EOSINOPHIL NFR BLD: 0.7 %
HCT VFR BLD AUTO: 40.6 % (ref 36–48)
HGB BLD-MCNC: 13.6 G/DL (ref 12–16)
LYMPHOCYTES # BLD: 1.6 K/UL (ref 1–5.1)
LYMPHOCYTES NFR BLD: 25.1 %
MCH RBC QN AUTO: 30.6 PG (ref 26–34)
MCHC RBC AUTO-ENTMCNC: 33.4 G/DL (ref 31–36)
MCV RBC AUTO: 91.5 FL (ref 80–100)
MONOCYTES # BLD: 0.4 K/UL (ref 0–1.3)
MONOCYTES NFR BLD: 5.7 %
NEUTROPHILS # BLD: 4.2 K/UL (ref 1.7–7.7)
NEUTROPHILS NFR BLD: 67.3 %
PLATELET # BLD AUTO: 201 K/UL (ref 135–450)
PMV BLD AUTO: 8.2 FL (ref 5–10.5)
RBC # BLD AUTO: 4.43 M/UL (ref 4–5.2)
TSH SERPL DL<=0.005 MIU/L-ACNC: 1.54 UIU/ML (ref 0.27–4.2)
WBC # BLD AUTO: 6.2 K/UL (ref 4–11)

## 2025-01-20 ENCOUNTER — HOSPITAL ENCOUNTER (OUTPATIENT)
Age: 70
Discharge: HOME | End: 2025-01-22
Attending: INTERNAL MEDICINE
Payer: MEDICARE

## 2025-01-20 VITALS — BODY MASS INDEX: 26.05 KG/M2 | HEIGHT: 63 IN | WEIGHT: 147 LBS

## 2025-01-20 DIAGNOSIS — I44.7 LBBB (LEFT BUNDLE BRANCH BLOCK): ICD-10-CM

## 2025-01-20 DIAGNOSIS — I34.0 SEVERE MITRAL REGURGITATION: ICD-10-CM

## 2025-01-20 DIAGNOSIS — I42.1 HOCM (HYPERTROPHIC OBSTRUCTIVE CARDIOMYOPATHY) (HCC): ICD-10-CM

## 2025-01-20 DIAGNOSIS — I48.0 PAF (PAROXYSMAL ATRIAL FIBRILLATION) (HCC): ICD-10-CM

## 2025-01-20 DIAGNOSIS — Z95.2 S/P MVR (MITRAL VALVE REPLACEMENT): ICD-10-CM

## 2025-01-20 LAB
ECHO AO ROOT DIAM: 2.7 CM
ECHO AO ROOT INDEX: 1.59 CM/M2
ECHO AV AREA PEAK VELOCITY: 1.5 CM2
ECHO AV AREA VTI: 1.5 CM2
ECHO AV AREA/BSA PEAK VELOCITY: 0.9 CM2/M2
ECHO AV AREA/BSA VTI: 0.9 CM2/M2
ECHO AV MEAN GRADIENT: 13 MMHG
ECHO AV MEAN VELOCITY: 1.7 M/S
ECHO AV PEAK GRADIENT: 26 MMHG
ECHO AV PEAK VELOCITY: 2.6 M/S
ECHO AV VELOCITY RATIO: 0.46
ECHO AV VTI: 60.9 CM
ECHO BSA: 1.72 M2
ECHO EST RA PRESSURE: 3 MMHG
ECHO LA AREA 2C: 28.2 CM2
ECHO LA AREA 4C: 26.5 CM2
ECHO LA MAJOR AXIS: 6.4 CM
ECHO LA MINOR AXIS: 6.5 CM
ECHO LA VOL BP: 94 ML (ref 22–52)
ECHO LA VOL MOD A2C: 72 ML (ref 22–52)
ECHO LA VOL MOD A4C: 75 ML (ref 22–52)
ECHO LA VOL/BSA BIPLANE: 55 ML/M2 (ref 16–34)
ECHO LA VOLUME INDEX MOD A2C: 42 ML/M2 (ref 16–34)
ECHO LA VOLUME INDEX MOD A4C: 44 ML/M2 (ref 16–34)
ECHO LV E' LATERAL VELOCITY: 4.19 CM/S
ECHO LV E' SEPTAL VELOCITY: 4.19 CM/S
ECHO LV EF PHYSICIAN: 60 %
ECHO LV FRACTIONAL SHORTENING: 44 % (ref 28–44)
ECHO LV INTERNAL DIMENSION DIASTOLE INDEX: 2.29 CM/M2
ECHO LV INTERNAL DIMENSION DIASTOLIC: 3.9 CM (ref 3.9–5.3)
ECHO LV INTERNAL DIMENSION SYSTOLIC INDEX: 1.29 CM/M2
ECHO LV INTERNAL DIMENSION SYSTOLIC: 2.2 CM
ECHO LV IVSD: 0.9 CM (ref 0.6–0.9)
ECHO LV MASS 2D: 105.3 G (ref 67–162)
ECHO LV MASS INDEX 2D: 62 G/M2 (ref 43–95)
ECHO LV POSTERIOR WALL DIASTOLIC: 0.9 CM (ref 0.6–0.9)
ECHO LV RELATIVE WALL THICKNESS RATIO: 0.46
ECHO LVOT AREA: 3.1 CM2
ECHO LVOT AV VTI INDEX: 0.49
ECHO LVOT DIAM: 2 CM
ECHO LVOT MEAN GRADIENT: 3 MMHG
ECHO LVOT PEAK GRADIENT: 6 MMHG
ECHO LVOT PEAK VELOCITY: 1.2 M/S
ECHO LVOT STROKE VOLUME INDEX: 54.7 ML/M2
ECHO LVOT SV: 92.9 ML
ECHO LVOT VTI: 29.6 CM
ECHO MV A VELOCITY: 1.26 M/S
ECHO MV AREA VTI: 1 CM2
ECHO MV E DECELERATION TIME (DT): 468 MS
ECHO MV E VELOCITY: 2.15 M/S
ECHO MV E/A RATIO: 1.71
ECHO MV E/E' LATERAL: 51.31
ECHO MV E/E' RATIO (AVERAGED): 51.31
ECHO MV E/E' SEPTAL: 51.31
ECHO MV LVOT VTI INDEX: 3.11
ECHO MV MAX VELOCITY: 2.6 M/S
ECHO MV MEAN GRADIENT: 10 MMHG
ECHO MV MEAN VELOCITY: 1.4 M/S
ECHO MV PEAK GRADIENT: 26 MMHG
ECHO MV VTI: 92.1 CM
ECHO PULMONARY ARTERY SYSTOLIC PRESSURE (PASP): 50 MMHG
ECHO PV MAX VELOCITY: 1 M/S
ECHO PV MEAN GRADIENT: 2 MMHG
ECHO PV MEAN VELOCITY: 0.6 M/S
ECHO PV PEAK GRADIENT: 4 MMHG
ECHO PV VTI: 22.3 CM
ECHO RA AREA 4C: 17.4 CM2
ECHO RA END SYSTOLIC VOLUME APICAL 4 CHAMBER INDEX BSA: 29 ML/M2
ECHO RA VOLUME: 49 ML
ECHO RIGHT VENTRICULAR SYSTOLIC PRESSURE (RVSP): 53 MMHG
ECHO RV BASAL DIMENSION: 4.1 CM
ECHO RV FREE WALL PEAK S': 11.3 CM/S
ECHO RV MID DIMENSION: 2.1 CM
ECHO RV TAPSE: 2.3 CM (ref 1.7–?)
ECHO TV E WAVE: 0.9 M/S
ECHO TV REGURGITANT MAX VELOCITY: 3.55 M/S
ECHO TV REGURGITANT PEAK GRADIENT: 50 MMHG

## 2025-01-20 PROCEDURE — 93306 TTE W/DOPPLER COMPLETE: CPT

## 2025-01-20 PROCEDURE — 93306 TTE W/DOPPLER COMPLETE: CPT | Performed by: INTERNAL MEDICINE

## 2025-01-21 ENCOUNTER — HOSPITAL ENCOUNTER (OUTPATIENT)
Dept: WOMENS IMAGING | Age: 70
Discharge: HOME | End: 2025-01-21
Payer: MEDICARE

## 2025-01-21 VITALS — WEIGHT: 147 LBS | BODY MASS INDEX: 26.05 KG/M2 | HEIGHT: 63 IN

## 2025-01-21 DIAGNOSIS — Z12.31 BREAST CANCER SCREENING BY MAMMOGRAM: ICD-10-CM

## 2025-01-21 PROCEDURE — 77063 BREAST TOMOSYNTHESIS BI: CPT

## 2025-01-22 ENCOUNTER — HOSPITAL ENCOUNTER (EMERGENCY)
Age: 70
Discharge: HOME | End: 2025-01-22
Payer: MEDICARE

## 2025-01-22 ENCOUNTER — APPOINTMENT (OUTPATIENT)
Dept: GENERAL RADIOLOGY | Age: 70
End: 2025-01-22
Payer: MEDICARE

## 2025-01-22 VITALS
OXYGEN SATURATION: 98 % | SYSTOLIC BLOOD PRESSURE: 108 MMHG | TEMPERATURE: 97.9 F | DIASTOLIC BLOOD PRESSURE: 61 MMHG | RESPIRATION RATE: 17 BRPM | HEART RATE: 64 BPM

## 2025-01-22 DIAGNOSIS — S32.010A CLOSED WEDGE COMPRESSION FRACTURE OF L1 VERTEBRA, INITIAL ENCOUNTER (HCC): Primary | ICD-10-CM

## 2025-01-22 PROCEDURE — 6370000000 HC RX 637 (ALT 250 FOR IP): Performed by: NURSE PRACTITIONER

## 2025-01-22 PROCEDURE — 72100 X-RAY EXAM L-S SPINE 2/3 VWS: CPT

## 2025-01-22 PROCEDURE — 99283 EMERGENCY DEPT VISIT LOW MDM: CPT

## 2025-01-22 RX ORDER — CYCLOBENZAPRINE HCL 10 MG
10 TABLET ORAL 3 TIMES DAILY PRN
Qty: 30 TABLET | Refills: 0 | Status: SHIPPED | OUTPATIENT
Start: 2025-01-22 | End: 2025-02-01

## 2025-01-22 RX ORDER — LIDOCAINE 50 MG/G
1 PATCH TOPICAL DAILY
Qty: 10 PATCH | Refills: 0 | Status: SHIPPED | OUTPATIENT
Start: 2025-01-22 | End: 2025-02-01

## 2025-01-22 RX ORDER — PREDNISONE 10 MG/1
10 TABLET ORAL DAILY
Qty: 5 TABLET | Refills: 0 | Status: SHIPPED | OUTPATIENT
Start: 2025-01-22 | End: 2025-01-27

## 2025-01-22 RX ORDER — OXYCODONE HYDROCHLORIDE 5 MG/1
5 TABLET ORAL ONCE
Status: COMPLETED | OUTPATIENT
Start: 2025-01-22 | End: 2025-01-22

## 2025-01-22 RX ORDER — METHOCARBAMOL 500 MG/1
750 TABLET, FILM COATED ORAL ONCE
Status: COMPLETED | OUTPATIENT
Start: 2025-01-22 | End: 2025-01-22

## 2025-01-22 RX ORDER — LIDOCAINE 4 G/G
2 PATCH TOPICAL ONCE
Status: DISCONTINUED | OUTPATIENT
Start: 2025-01-22 | End: 2025-01-22 | Stop reason: HOSPADM

## 2025-01-22 RX ORDER — OXYCODONE AND ACETAMINOPHEN 5; 325 MG/1; MG/1
1 TABLET ORAL EVERY 4 HOURS PRN
Qty: 10 TABLET | Refills: 0 | Status: SHIPPED | OUTPATIENT
Start: 2025-01-22 | End: 2025-01-25

## 2025-01-22 RX ADMIN — OXYCODONE 5 MG: 5 TABLET ORAL at 19:08

## 2025-01-22 RX ADMIN — METHOCARBAMOL 750 MG: 500 TABLET ORAL at 19:08

## 2025-01-22 ASSESSMENT — PAIN DESCRIPTION - LOCATION
LOCATION: BACK

## 2025-01-22 ASSESSMENT — PAIN - FUNCTIONAL ASSESSMENT: PAIN_FUNCTIONAL_ASSESSMENT: PREVENTS OR INTERFERES SOME ACTIVE ACTIVITIES AND ADLS

## 2025-01-22 ASSESSMENT — PAIN SCALES - GENERAL
PAINLEVEL_OUTOF10: 5
PAINLEVEL_OUTOF10: 6
PAINLEVEL_OUTOF10: 8

## 2025-01-22 ASSESSMENT — PAIN DESCRIPTION - PAIN TYPE: TYPE: ACUTE PAIN

## 2025-01-22 ASSESSMENT — PAIN DESCRIPTION - DESCRIPTORS: DESCRIPTORS: ACHING;DULL

## 2025-01-22 ASSESSMENT — PAIN DESCRIPTION - FREQUENCY: FREQUENCY: CONTINUOUS

## 2025-01-22 ASSESSMENT — PAIN DESCRIPTION - ORIENTATION
ORIENTATION: LOWER
ORIENTATION: LOWER

## 2025-01-22 NOTE — ED PROVIDER NOTES
[facility] EMERGENCY DEPARTMENT  eMERGENCY dEPARTMENT eNCOUnter    Pt Name: @@  MRN: [id number]  Birthdate1955  Date of evaluation: 1/22/2025  Provider: TABITHA Crooks CNP    Independently seen and evaluated by the advanced practice provider  CHIEF COMPLAINT       Chief Complaint   Patient presents with    Fall     Pt into ED by personal vehicle after a witnessed fall at [employer]. Pt c/o 8/10 lower back pain.          HISTORY OF PRESENT ILLNESS  (Location/Symptom, Timing/Onset, Context/Setting, Quality, Duration, Modifying Factors, Severity.)   Mindy Bryan is a 69 y.o. female who presents to the emergencydepartment PMHx: Reviewed and listed below    Lives at home with spouse  CODE STATUS full  Anticoagulation therapy: Eliquis listed and outpatient med  H POA: Spouse  Social determinants: Independent, no housing or food disparity    HPI provided by the patient    Patient presents ambulatory to the emergency department coming by her  complaining of bilateral low back pain.  More so on the right than the left.  Hurts to try to stand up and move, sit and move.  Patient reports that she was at the [employer] this afternoon.  Went to stand up out of the carbajal and did not quite have her footing and slipped and fell down onto the floor, leaning to the right.  She is taken Tylenol for pain.  She denies any numbness or tingling in the perineal region.  She is able to stand and able to ambulate.  She does have sensation.  But she has severe pain in the low back.  Pain spreads all the way across on both sides.  Reports that she does have some chronic back pain issues but it usually in the upper part of the spine.      Nursing Notes were reviewed and I agree.    REVIEW OF SYSTEMS    (2-9 systems for level 4, 10 or more for level 5)     Review of Systems    Except as noted above the remainder of the review of systems was reviewed and negative.       PAST MEDICAL HISTORY

## 2025-01-22 NOTE — ED TRIAGE NOTES
Pt into ED by personal vehicle after a witnessed fall at [employer] Pt c/o 8/10 lower back pain. Pt states her R leg became weak, causing her to fall onto R arm. Pt denies striking head, loss of consciousness or pain to R arm. Patient does take thinners daily. amy FORD/sofía. Denies neck pain.

## 2025-01-23 ENCOUNTER — CARE COORDINATION (OUTPATIENT)
Dept: CARE COORDINATION | Age: 70
End: 2025-01-23

## 2025-01-23 NOTE — CARE COORDINATION
Ambulatory Care Coordination  ED Follow up Call    Reason for ED visit:  fall and compression fracture    Status:     not changed    Did you call your PCP prior to going to the ED?  No      Did you receive a discharge instructions from the Emergency Room? Yes  Review of Instructions:     Understands what to report/when to return?:  Yes   Understands discharge instructions?:  Yes   Following discharge instructions?:  Yes   If not why? N/a    Are there any new complaints of pain? No  New Pain Meds? Yes    Constipation prophylaxis needed?  N/A    If you have a wound is the dressing clean, dry, and intact? N/A  Understands wound care regimen? N/A    Are there any other complaints/concerns that you wish to tell your provider?   Pt reports she is doing ok. Her  picked up her medication from the pharmacy this morning as it was closed when she left the ED last night. We reviewed d/c instructions and pt thanked [facility] as she did not see on the instructions where it said to call [facility]. She confirmed she found it on her d/c paperwork and stated she would call today. [facility] encouraged her to f/u with her PCP as well, even if just a My Chart message sent to her PCP. Pt verbalized understanding. She agreed to call [facility] back if she has any additional questions, concerns or needs. No additional f/u by [facility] at this time.     FU appts/Provider:    Future Appointments   Date Time Provider Department Center   1/28/2025 10:30 AM Nolan Kennedy MD [facility]   2/20/2025 11:30 AM Sarah Lau MD [address]   12/3/2025 10:15 AM Ace Alvarez MD [facility]           New Medications?:   Yes      Medication Reconciliation by phone - No  Understands Medications?  Yes  Taking Medications? She will take them this morning  Can you swallow your pills?  Not Applicable    Any further needs in the home i.e. Equipment?  Not Applicable    Link to services in community?:  N/A   Which services:  n/a

## 2025-01-23 NOTE — DISCHARGE INSTRUCTIONS
You have been prescribed narcotic pain medication for the next 2-3 days. Do not exceed use beyond what has been prescribed. Do not drive or operate machinery while taking this medication. This medication is only to be used for a short term, 2-3 days.  Do not take any other sedating substances or drink alcohol while using this medication.

## 2025-02-03 ENCOUNTER — TELEPHONE (OUTPATIENT)
Dept: CARDIOLOGY CLINIC | Age: 70
End: 2025-02-03

## 2025-02-03 NOTE — TELEPHONE ENCOUNTER
Mindy called in she would like the results of her echo done on 1/20/25.      She can be reached at [phone].

## 2025-02-03 NOTE — TELEPHONE ENCOUNTER
PT requesting results for echo from 1/20/25. I do not see any result notes to relay to PT. Please advise.

## 2025-02-03 NOTE — TELEPHONE ENCOUNTER
Dr. Alvarez,     Patient calling for Echo results from echo 1/20/25. Please route via reuslts notes or advise here what to tell the patient. Thanks.

## 2025-02-03 NOTE — TELEPHONE ENCOUNTER
Please call patient with echo results per Dr. Alvarez. Thanks.       Ace Alvarez MD  You9 minutes ago (3:18 PM)     LVEF is normal and the replaced valve is also working well.

## 2025-02-14 RX ORDER — LEVOTHYROXINE SODIUM 88 UG/1
88 TABLET ORAL DAILY
Qty: 90 TABLET | Refills: 1 | Status: SHIPPED | OUTPATIENT
Start: 2025-02-14

## 2025-02-17 SDOH — ECONOMIC STABILITY: FOOD INSECURITY: WITHIN THE PAST 12 MONTHS, YOU WORRIED THAT YOUR FOOD WOULD RUN OUT BEFORE YOU GOT MONEY TO BUY MORE.: NEVER TRUE

## 2025-02-17 SDOH — ECONOMIC STABILITY: FOOD INSECURITY: WITHIN THE PAST 12 MONTHS, THE FOOD YOU BOUGHT JUST DIDN'T LAST AND YOU DIDN'T HAVE MONEY TO GET MORE.: NEVER TRUE

## 2025-02-17 SDOH — ECONOMIC STABILITY: INCOME INSECURITY: IN THE LAST 12 MONTHS, WAS THERE A TIME WHEN YOU WERE NOT ABLE TO PAY THE MORTGAGE OR RENT ON TIME?: NO

## 2025-02-17 ASSESSMENT — PATIENT HEALTH QUESTIONNAIRE - PHQ9
SUM OF ALL RESPONSES TO PHQ QUESTIONS 1-9: 0
1. LITTLE INTEREST OR PLEASURE IN DOING THINGS: NOT AT ALL
2. FEELING DOWN, DEPRESSED OR HOPELESS: NOT AT ALL
1. LITTLE INTEREST OR PLEASURE IN DOING THINGS: NOT AT ALL
SUM OF ALL RESPONSES TO PHQ QUESTIONS 1-9: 0
2. FEELING DOWN, DEPRESSED OR HOPELESS: NOT AT ALL
SUM OF ALL RESPONSES TO PHQ9 QUESTIONS 1 & 2: 0
SUM OF ALL RESPONSES TO PHQ9 QUESTIONS 1 & 2: 0
SUM OF ALL RESPONSES TO PHQ QUESTIONS 1-9: 0
SUM OF ALL RESPONSES TO PHQ QUESTIONS 1-9: 0

## 2025-02-20 ENCOUNTER — OFFICE VISIT (OUTPATIENT)
Dept: INTERNAL MEDICINE CLINIC | Age: 70
End: 2025-02-20
Payer: MEDICARE

## 2025-02-20 VITALS
SYSTOLIC BLOOD PRESSURE: 122 MMHG | DIASTOLIC BLOOD PRESSURE: 64 MMHG | WEIGHT: 150 LBS | HEART RATE: 64 BPM | BODY MASS INDEX: 26.57 KG/M2

## 2025-02-20 DIAGNOSIS — S32.019A CLOSED FRACTURE OF FIRST LUMBAR VERTEBRA, UNSPECIFIED FRACTURE MORPHOLOGY, INITIAL ENCOUNTER (HCC): ICD-10-CM

## 2025-02-20 DIAGNOSIS — I10 PRIMARY HYPERTENSION: ICD-10-CM

## 2025-02-20 DIAGNOSIS — M81.0 OSTEOPOROSIS WITHOUT CURRENT PATHOLOGICAL FRACTURE, UNSPECIFIED OSTEOPOROSIS TYPE: Primary | ICD-10-CM

## 2025-02-20 DIAGNOSIS — K76.0 FATTY LIVER: ICD-10-CM

## 2025-02-20 DIAGNOSIS — E21.3 HYPERPARATHYROIDISM: ICD-10-CM

## 2025-02-20 PROCEDURE — 99214 OFFICE O/P EST MOD 30 MIN: CPT | Performed by: STUDENT IN AN ORGANIZED HEALTH CARE EDUCATION/TRAINING PROGRAM

## 2025-02-20 PROCEDURE — 1123F ACP DISCUSS/DSCN MKR DOCD: CPT | Performed by: STUDENT IN AN ORGANIZED HEALTH CARE EDUCATION/TRAINING PROGRAM

## 2025-02-20 PROCEDURE — 1159F MED LIST DOCD IN RCRD: CPT | Performed by: STUDENT IN AN ORGANIZED HEALTH CARE EDUCATION/TRAINING PROGRAM

## 2025-02-20 PROCEDURE — G8399 PT W/DXA RESULTS DOCUMENT: HCPCS | Performed by: STUDENT IN AN ORGANIZED HEALTH CARE EDUCATION/TRAINING PROGRAM

## 2025-02-20 PROCEDURE — 3074F SYST BP LT 130 MM HG: CPT | Performed by: STUDENT IN AN ORGANIZED HEALTH CARE EDUCATION/TRAINING PROGRAM

## 2025-02-20 PROCEDURE — 3017F COLORECTAL CA SCREEN DOC REV: CPT | Performed by: STUDENT IN AN ORGANIZED HEALTH CARE EDUCATION/TRAINING PROGRAM

## 2025-02-20 PROCEDURE — 1036F TOBACCO NON-USER: CPT | Performed by: STUDENT IN AN ORGANIZED HEALTH CARE EDUCATION/TRAINING PROGRAM

## 2025-02-20 PROCEDURE — G2211 COMPLEX E/M VISIT ADD ON: HCPCS | Performed by: STUDENT IN AN ORGANIZED HEALTH CARE EDUCATION/TRAINING PROGRAM

## 2025-02-20 PROCEDURE — G8417 CALC BMI ABV UP PARAM F/U: HCPCS | Performed by: STUDENT IN AN ORGANIZED HEALTH CARE EDUCATION/TRAINING PROGRAM

## 2025-02-20 PROCEDURE — G8427 DOCREV CUR MEDS BY ELIG CLIN: HCPCS | Performed by: STUDENT IN AN ORGANIZED HEALTH CARE EDUCATION/TRAINING PROGRAM

## 2025-02-20 PROCEDURE — 3078F DIAST BP <80 MM HG: CPT | Performed by: STUDENT IN AN ORGANIZED HEALTH CARE EDUCATION/TRAINING PROGRAM

## 2025-02-20 PROCEDURE — 1090F PRES/ABSN URINE INCON ASSESS: CPT | Performed by: STUDENT IN AN ORGANIZED HEALTH CARE EDUCATION/TRAINING PROGRAM

## 2025-02-20 NOTE — ASSESSMENT & PLAN NOTE
She is currently on Prolia.  However recently have a fall and fractured her L1.  Will have her see endocrinology for further evaluation  She also has a history of hyperparathyroidism, currently not on calcium or vitamin D

## 2025-02-20 NOTE — PROGRESS NOTES
Mindy Bryan (:  1955) is a 70 y.o. female here for evaluation of the following chief complaint(s):  3 Month Follow-Up (Fell in  and fractured her L1. She is in a brace and requesting a temporary handicap placard. )      Assessment & Plan   ASSESSMENT/PLAN:  1. Osteoporosis without current pathological fracture, unspecified osteoporosis type  Assessment & Plan:  She is currently on Prolia.  However recently have a fall and fractured her L1.  Will have her see endocrinology for further evaluation  She also has a history of hyperparathyroidism, currently not on calcium or vitamin D   Orders:  -     DEXA BONE DENSITY AXIAL SKELETON; Future  -     Cheo Owusu MD, Endocrinology, [facility]  -     Hemoglobin A1C; Future  -     Lipid, Fasting; Future  -     TSH reflex to FT4; Future  -     Comprehensive Metabolic Panel; Future  -     CBC with Auto Differential; Future  2. Closed fracture of first lumbar vertebra, unspecified fracture morphology, initial encounter ([facility])  Assessment & Plan:   Does have a history of osteoporosis, hyperparathyroidism but fractured her L1 after slipping on the floor.    Continue with back brace.  Activity as tolerated  Orders:  -     DEXA BONE DENSITY AXIAL SKELETON; Future  -     Cheo Owusu MD, Endocrinology, [facility]  -     Hemoglobin A1C; Future  -     Lipid, Fasting; Future  -     TSH reflex to FT4; Future  -     Comprehensive Metabolic Panel; Future  -     CBC with Auto Differential; Future  3. Fatty liver  -     Hemoglobin A1C; Future  -     Lipid, Fasting; Future  -     TSH reflex to FT4; Future  -     Comprehensive Metabolic Panel; Future  -     CBC with Auto Differential; Future  4. Hyperparathyroidism  Assessment & Plan:  Calcium has been stable   Does have osteoporosis   See plans for osteoporosis   Orders:  -     Hemoglobin A1C; Future  -     Lipid, Fasting; Future  -     TSH reflex to FT4; Future  -     Comprehensive Metabolic

## 2025-02-20 NOTE — ASSESSMENT & PLAN NOTE
Does have a history of osteoporosis, hyperparathyroidism but fractured her L1 after slipping on the floor.    Continue with back brace.  Activity as tolerated

## 2025-02-21 ENCOUNTER — TELEPHONE (OUTPATIENT)
Dept: ENDOCRINOLOGY | Age: 70
End: 2025-02-21

## 2025-02-24 ENCOUNTER — TELEPHONE (OUTPATIENT)
Dept: ENDOCRINOLOGY | Age: 70
End: 2025-02-24

## 2025-03-09 ENCOUNTER — TELEPHONE (OUTPATIENT)
Dept: ENDOCRINOLOGY | Age: 70
End: 2025-03-09

## 2025-03-10 NOTE — TELEPHONE ENCOUNTER
The most recent in the Epic system (which all the local systems have) is 8/14/2023 at [facility].  That's what she indicated in her questionnaire as the most recent.    Where did She have the one in August, 2024?   I need to have the printouts to do proper evaluation. If her last one was 2023, Medicare should cover one now.

## 2025-03-10 NOTE — TELEPHONE ENCOUNTER
Pt scheduled 4-3-25 at 10:30 pt refused to schedule dexa states she had one in August of 2024 reminded pt to bring all vitamins and supplements

## 2025-03-12 RX ORDER — HYOSCYAMINE SULFATE 0.12 MG/1
TABLET ORAL
COMMUNITY
Start: 2024-08-20

## 2025-03-12 RX ORDER — GABAPENTIN 300 MG/1
CAPSULE ORAL
COMMUNITY
Start: 2025-02-26

## 2025-03-13 NOTE — TELEPHONE ENCOUNTER
Dr. Kruger, patient stated her last dexa was 2023 at [facility]. Patient's PCP informed patient that her insurance will pay for dexa every 2 years.

## 2025-03-14 NOTE — TELEPHONE ENCOUNTER
Her PCP is wrong.  Medicare in [place] covers yearly testing.  Please book her for DXA before her appt 4/3/25.  Thanks.

## 2025-04-03 ENCOUNTER — HOSPITAL ENCOUNTER (OUTPATIENT)
Dept: GENERAL RADIOLOGY | Age: 70
Discharge: HOME | End: 2025-04-03
Payer: MEDICARE

## 2025-04-03 ENCOUNTER — OFFICE VISIT (OUTPATIENT)
Dept: ENDOCRINOLOGY | Age: 70
End: 2025-04-03
Payer: MEDICARE

## 2025-04-03 VITALS — HEIGHT: 63 IN | WEIGHT: 148 LBS | BODY MASS INDEX: 26.22 KG/M2

## 2025-04-03 DIAGNOSIS — N20.0 KIDNEY STONE: ICD-10-CM

## 2025-04-03 DIAGNOSIS — M81.0 OSTEOPOROSIS, POSTMENOPAUSAL: Primary | ICD-10-CM

## 2025-04-03 DIAGNOSIS — M81.0 OSTEOPOROSIS, POSTMENOPAUSAL: ICD-10-CM

## 2025-04-03 PROCEDURE — G8417 CALC BMI ABV UP PARAM F/U: HCPCS | Performed by: INTERNAL MEDICINE

## 2025-04-03 PROCEDURE — G8427 DOCREV CUR MEDS BY ELIG CLIN: HCPCS | Performed by: INTERNAL MEDICINE

## 2025-04-03 PROCEDURE — G8399 PT W/DXA RESULTS DOCUMENT: HCPCS | Performed by: INTERNAL MEDICINE

## 2025-04-03 PROCEDURE — 1090F PRES/ABSN URINE INCON ASSESS: CPT | Performed by: INTERNAL MEDICINE

## 2025-04-03 PROCEDURE — 77080 DXA BONE DENSITY AXIAL: CPT | Performed by: INTERNAL MEDICINE

## 2025-04-03 PROCEDURE — 1123F ACP DISCUSS/DSCN MKR DOCD: CPT | Performed by: INTERNAL MEDICINE

## 2025-04-03 PROCEDURE — G2211 COMPLEX E/M VISIT ADD ON: HCPCS | Performed by: INTERNAL MEDICINE

## 2025-04-03 PROCEDURE — 1036F TOBACCO NON-USER: CPT | Performed by: INTERNAL MEDICINE

## 2025-04-03 PROCEDURE — 77080 DXA BONE DENSITY AXIAL: CPT

## 2025-04-03 PROCEDURE — 1159F MED LIST DOCD IN RCRD: CPT | Performed by: INTERNAL MEDICINE

## 2025-04-03 PROCEDURE — 3017F COLORECTAL CA SCREEN DOC REV: CPT | Performed by: INTERNAL MEDICINE

## 2025-04-03 PROCEDURE — G2212 PROLONG OUTPT/OFFICE VIS: HCPCS | Performed by: INTERNAL MEDICINE

## 2025-04-03 PROCEDURE — 99205 OFFICE O/P NEW HI 60 MIN: CPT | Performed by: INTERNAL MEDICINE

## 2025-04-03 NOTE — PROGRESS NOTES
[facility]  [address] [place] [address]  Phone [phone]  Fax [phone]    NAME:  ROXANNE ARVIZU  :  1955  CONSULT DATE:    2025  MOST RECENT VISIT:  2025  TODAY'S DATE:  2025    Labs @ [facility] 2024    CONSULTATION REQUESTED BY: Sarah Lau MD    PROBLEMS.  Osteoporosis by DXA 2013, T-scores -3.3 in the spine, -2.0 in the right femoral neck    Family history of osteoporosis, mother; father fractured hip   BMD decreased 7475-4712, T-scores -3.0 in the spine, -2.6 in the right femoral neck    2016, age 54, wrist fracture (fell)   2025 L1 compression fx (fell) (not there 2023)  Natural menopause age 50  Kidney stone (left) found on imaging 2021, asymptomatic    CURRENT MANAGEMENT FOR BONE HEALTH/OSTEOPOROSIS.  Calcium, 300 mg from low calcium foods, 600 mg cheese  , 200 mg dk green vegs   diet MVI Ca+D other    Calcium 900 220   mg/d   Vitamin D  1000   IU/d     25-OH D 38 ng/mL 2023 (desirable is 30-60 ng/mL)  Exercise, before L1 fx, walked 20 min 3 x weekly  Pharmacologic therapy: Prolia 60 mg SQ twice yearly started 2021, most recent 2024    PREVIOUS BONE-ACTIVE MEDICATIONS.   Fosamax “years ago”    OTHER CURRENT MEDICATIONS (SELECTED): ropinirole, loratidine, metoprolol, thyroxine 88 mcg/d, omeprazole, apaziban, atorvastatin, paroxetine, memantine, gabapentin  OTC MEDICATIONS (SELECTED): fish oil, flaxseed oil, Metamucil, potassium, vitamin C, CoQ10, cranberry    CHIEF COMPLAINT.  “I fell and fractured my back”    HISTORY OF PRESENT ILLNESS: See problem list for chronic/inactive conditions.  Ms. Arvizu is a 70-year-old woman who was found to have osteoporosis by DXA in 2013. Sudden severe back pain when she fell getting up from a restaurant carbajal but it's lower down (and much better now).     Hyperparathyroidism is on her problem list but only one high calcium from 2021, 10.9

## 2025-04-04 DIAGNOSIS — M81.0 OSTEOPOROSIS, POSTMENOPAUSAL: ICD-10-CM

## 2025-04-04 RX ORDER — OMEPRAZOLE 40 MG/1
40 CAPSULE, DELAYED RELEASE ORAL DAILY
Qty: 90 CAPSULE | Refills: 1 | Status: SHIPPED | OUTPATIENT
Start: 2025-04-04

## 2025-04-05 ENCOUNTER — RESULTS FOLLOW-UP (OUTPATIENT)
Dept: ENDOCRINOLOGY | Age: 70
End: 2025-04-05

## 2025-04-05 DIAGNOSIS — N20.0 KIDNEY STONE: ICD-10-CM

## 2025-04-05 DIAGNOSIS — M81.0 OSTEOPOROSIS, POSTMENOPAUSAL: Primary | ICD-10-CM

## 2025-04-05 LAB
CALCIUM 24H UR-MRATE: 143 MG/24 HR (ref 42–353)
CREAT 24H UR-MRATE: 0.7 G/24HR (ref 0.6–1.5)
SODIUM 24H UR-SRATE: 112 MMOL/24 HR (ref 40–220)
SPECIMEN VOL 24H UR: 2800 ML
URATE 24H UR-MRATE: 274 MG/24 HR (ref 150–990)

## 2025-04-06 LAB — COLLAGEN CTX SERPL-MCNC: 89 PG/ML

## 2025-04-08 ENCOUNTER — OFFICE VISIT (OUTPATIENT)
Dept: CARDIOLOGY CLINIC | Age: 70
End: 2025-04-08
Payer: MEDICARE

## 2025-04-08 VITALS
SYSTOLIC BLOOD PRESSURE: 110 MMHG | HEART RATE: 60 BPM | BODY MASS INDEX: 25.51 KG/M2 | WEIGHT: 145.4 LBS | DIASTOLIC BLOOD PRESSURE: 60 MMHG

## 2025-04-08 DIAGNOSIS — I42.1 HYPERTROPHIC OBSTRUCTIVE CARDIOMYOPATHY (HCC): Primary | ICD-10-CM

## 2025-04-08 LAB
CITRATE 24H UR-MCNC: 152 MG/L
CITRATE 24H UR-MRATE: 426 MG/D (ref 320–1240)
CITRATE/CREAT UR: 585 MG/G
COLLECT DURATION TIME SPEC: 2400 H
CREAT 24H UR-MCNC: 26 MG/DL
CREAT 24H UR-MRATE: 728 MG/D (ref 500–1400)
OXALATE 24H UR-MCNC: 6 MG/L
OXALATE 24H UR-MRATE: 17 MG/D (ref 13–40)
SPECIMEN VOL ?TM UR: 2800 ML

## 2025-04-08 PROCEDURE — 3074F SYST BP LT 130 MM HG: CPT | Performed by: INTERNAL MEDICINE

## 2025-04-08 PROCEDURE — 1123F ACP DISCUSS/DSCN MKR DOCD: CPT | Performed by: INTERNAL MEDICINE

## 2025-04-08 PROCEDURE — 3078F DIAST BP <80 MM HG: CPT | Performed by: INTERNAL MEDICINE

## 2025-04-08 PROCEDURE — 99214 OFFICE O/P EST MOD 30 MIN: CPT | Performed by: INTERNAL MEDICINE

## 2025-04-08 PROCEDURE — 1036F TOBACCO NON-USER: CPT | Performed by: INTERNAL MEDICINE

## 2025-04-08 PROCEDURE — G8428 CUR MEDS NOT DOCUMENT: HCPCS | Performed by: INTERNAL MEDICINE

## 2025-04-08 PROCEDURE — 3017F COLORECTAL CA SCREEN DOC REV: CPT | Performed by: INTERNAL MEDICINE

## 2025-04-08 PROCEDURE — G2211 COMPLEX E/M VISIT ADD ON: HCPCS | Performed by: INTERNAL MEDICINE

## 2025-04-08 PROCEDURE — 1090F PRES/ABSN URINE INCON ASSESS: CPT | Performed by: INTERNAL MEDICINE

## 2025-04-08 PROCEDURE — G8399 PT W/DXA RESULTS DOCUMENT: HCPCS | Performed by: INTERNAL MEDICINE

## 2025-04-08 PROCEDURE — G8417 CALC BMI ABV UP PARAM F/U: HCPCS | Performed by: INTERNAL MEDICINE

## 2025-04-08 NOTE — PROGRESS NOTES
Cc: HOCM s/p septal myectomy, sMR s/p bio MVR, PAFRVR s/p ablation    HPI:     Patient is a 68 yo woman with h/o mild asthma, HTN, HLP, smoker, severe MR and HOCM s/p MDT Mosaic bioprosthetic MVR (25 mm) and basal septal myectomy by Dr Madrid on 6/29/2020, PAF 09/2023 s/p AF/AFL ablation 11/2024.     LHC 02/2020: normal coronaries, LVOT gradient of about 100 mmhg (222 mmhg down to 120 mmhg from apex to LVOT).     [facility] C[facility] 02/2020: severe MR, HOCM, + SITA, LGE.     Echo 02/2022: mod LVH, EF > 60%, indeterminate diastolic, normal bioprosthetic MVR with elevated gradients (peak/mean PG 18/9 mmhg), moderate MS, mild RVD with normal function.     Ir[place]a 03/2022: normal    Patient presented to [facility] emergency room on 9/16/2023 with palpitations.  She was found to atrial fibrillation with RVR.  She was released on Eliquis and follow-up with Dr. Boyd, EP, whom she saw on 10/2/23 and had AFL and AF ablation on 11/1/23.    Patient was seen by EP in 02/2024 and was complaining of significant fatigue.  In view of low BP and heart rate her Toprol was decreased from 100 mg daily to 50 mg daily.  30-day M cot consistent with baseline NSR, several episodes of WCT (less than 17 beats).  However patient misunderstood and has been taking Toprol 50 mg p.o. twice daily.    Lipid profile 09/2024: , HDL 72, LDL 69, TG 83 on lipitor 80    Patient came to the clinic today for follow-up.  Patient reports no complaints.      Histories     Past Medical History:   has a past medical history of Adrenal adenoma, Anxiety, Arthritis, Asthma, Chronic nausea, Environmental allergies, GERD (gastroesophageal reflux disease), HOCM (hypertrophic obstructive cardiomyopathy) (HCC), HTN (hypertension), Hypothyroid, Irritable bowel syndrome, Microscopic colitis, Migraine, Mitral regurgitation, Nephrolithiasis, Nosebleed, Osteoporosis, Rash, Restless leg syndrome, TMJ dysfunction, Vitreous detachment, and Wrist fracture, bilateral.    Surgical

## 2025-04-22 ENCOUNTER — TELEPHONE (OUTPATIENT)
Dept: ENDOCRINOLOGY | Age: 70
End: 2025-04-22

## 2025-05-05 RX ORDER — TRAZODONE HYDROCHLORIDE 150 MG/1
150 TABLET ORAL NIGHTLY
Qty: 90 TABLET | Refills: 1 | Status: SHIPPED | OUTPATIENT
Start: 2025-05-05

## 2025-05-05 NOTE — TELEPHONE ENCOUNTER
Last appointment: 2/20/2025  Next appointment: 8/19/2025        Last refill: (11/19/2024) by Sarah Lau MD

## 2025-05-06 RX ORDER — ROPINIROLE 0.5 MG/1
0.5 TABLET, FILM COATED ORAL 3 TIMES DAILY
Qty: 270 TABLET | Refills: 1 | Status: SHIPPED | OUTPATIENT
Start: 2025-05-06

## 2025-05-06 NOTE — TELEPHONE ENCOUNTER
Pt req med refill    rOPINIRole (REQUIP) 0.5 MG tablet [[id number]]     Pharm- [facility] [id number] - [address] - P [phone] - F [phone]

## 2025-05-08 ENCOUNTER — TELEPHONE (OUTPATIENT)
Dept: INTERNAL MEDICINE CLINIC | Age: 70
End: 2025-05-08

## 2025-05-08 DIAGNOSIS — M81.0 OSTEOPOROSIS WITHOUT CURRENT PATHOLOGICAL FRACTURE, UNSPECIFIED OSTEOPOROSIS TYPE: Primary | ICD-10-CM

## 2025-05-08 NOTE — TELEPHONE ENCOUNTER
Order requested from [facility] infusion for Prolia. Please include an order for calcium level as this is required within 60 days of injection.   New order has been faxed to [phone]

## 2025-05-14 DIAGNOSIS — M81.0 AGE-RELATED OSTEOPOROSIS WITHOUT CURRENT PATHOLOGICAL FRACTURE: Primary | ICD-10-CM

## 2025-05-14 RX ORDER — ONDANSETRON 2 MG/ML
8 INJECTION INTRAMUSCULAR; INTRAVENOUS
OUTPATIENT
Start: 2025-05-14

## 2025-05-14 RX ORDER — ACETAMINOPHEN 325 MG/1
650 TABLET ORAL
OUTPATIENT
Start: 2025-05-14

## 2025-05-14 RX ORDER — HYDROCORTISONE SODIUM SUCCINATE 100 MG/2ML
100 INJECTION INTRAMUSCULAR; INTRAVENOUS
OUTPATIENT
Start: 2025-05-14

## 2025-05-14 RX ORDER — ALBUTEROL SULFATE 90 UG/1
4 INHALANT RESPIRATORY (INHALATION) PRN
OUTPATIENT
Start: 2025-05-14

## 2025-05-14 RX ORDER — EPINEPHRINE 1 MG/ML
0.3 INJECTION, SOLUTION, CONCENTRATE INTRAVENOUS PRN
OUTPATIENT
Start: 2025-05-14

## 2025-05-14 RX ORDER — SODIUM CHLORIDE 9 MG/ML
INJECTION, SOLUTION INTRAVENOUS CONTINUOUS
OUTPATIENT
Start: 2025-05-14

## 2025-05-14 RX ORDER — DIPHENHYDRAMINE HYDROCHLORIDE 50 MG/ML
50 INJECTION, SOLUTION INTRAMUSCULAR; INTRAVENOUS
OUTPATIENT
Start: 2025-05-14

## 2025-05-15 ENCOUNTER — TRANSCRIBE ORDERS (OUTPATIENT)
Dept: ADMINISTRATIVE | Age: 70
End: 2025-05-15

## 2025-05-15 DIAGNOSIS — S32.009S UNSPECIFIED FRACTURE OF UNSPECIFIED LUMBAR VERTEBRA, SEQUELA: Primary | ICD-10-CM

## 2025-05-22 ENCOUNTER — HOSPITAL ENCOUNTER (OUTPATIENT)
Dept: MRI IMAGING | Age: 70
Discharge: HOME | End: 2025-05-22
Payer: MEDICARE

## 2025-05-22 DIAGNOSIS — S32.009S UNSPECIFIED FRACTURE OF UNSPECIFIED LUMBAR VERTEBRA, SEQUELA: ICD-10-CM

## 2025-05-22 PROCEDURE — 72148 MRI LUMBAR SPINE W/O DYE: CPT

## 2025-05-23 ENCOUNTER — APPOINTMENT (OUTPATIENT)
Dept: GENERAL RADIOLOGY | Age: 70
DRG: 309 | End: 2025-05-23
Payer: MEDICARE

## 2025-05-23 ENCOUNTER — APPOINTMENT (OUTPATIENT)
Age: 70
DRG: 309 | End: 2025-05-23
Payer: MEDICARE

## 2025-05-23 ENCOUNTER — LAB (OUTPATIENT)
Dept: CARDIOLOGY CLINIC | Age: 70
End: 2025-05-23
Payer: MEDICARE

## 2025-05-23 ENCOUNTER — HOSPITAL ENCOUNTER (INPATIENT)
Age: 70
LOS: 3 days | Discharge: HOME | DRG: 309 | End: 2025-05-27
Attending: STUDENT IN AN ORGANIZED HEALTH CARE EDUCATION/TRAINING PROGRAM | Admitting: INTERNAL MEDICINE
Payer: MEDICARE

## 2025-05-23 VITALS
DIASTOLIC BLOOD PRESSURE: 64 MMHG | OXYGEN SATURATION: 94 % | BODY MASS INDEX: 25.76 KG/M2 | HEIGHT: 63 IN | SYSTOLIC BLOOD PRESSURE: 92 MMHG | HEART RATE: 133 BPM

## 2025-05-23 DIAGNOSIS — I48.0 PAF (PAROXYSMAL ATRIAL FIBRILLATION) (HCC): Primary | ICD-10-CM

## 2025-05-23 DIAGNOSIS — I48.0 PAROXYSMAL ATRIAL FIBRILLATION (HCC): Primary | ICD-10-CM

## 2025-05-23 DIAGNOSIS — I42.9 CARDIOMYOPATHY, UNSPECIFIED TYPE (HCC): ICD-10-CM

## 2025-05-23 DIAGNOSIS — I48.19 PERSISTENT ATRIAL FIBRILLATION (HCC): ICD-10-CM

## 2025-05-23 PROBLEM — I48.91 ATRIAL FIBRILLATION (HCC): Status: ACTIVE | Noted: 2025-05-23

## 2025-05-23 LAB
ALBUMIN SERPL-MCNC: 4 G/DL (ref 3.4–5)
ALBUMIN/GLOB SERPL: 1.4 {RATIO} (ref 1.1–2.2)
ALP SERPL-CCNC: 58 U/L (ref 40–129)
ALT SERPL-CCNC: 27 U/L (ref 10–40)
ANION GAP SERPL CALCULATED.3IONS-SCNC: 12 MMOL/L (ref 3–16)
AST SERPL-CCNC: 35 U/L (ref 15–37)
BASOPHILS # BLD: 0 K/UL (ref 0–0.2)
BASOPHILS NFR BLD: 0.3 %
BILIRUB SERPL-MCNC: 0.4 MG/DL (ref 0–1)
BUN SERPL-MCNC: 16 MG/DL (ref 7–20)
CALCIUM SERPL-MCNC: 10.6 MG/DL (ref 8.3–10.6)
CHLORIDE SERPL-SCNC: 102 MMOL/L (ref 99–110)
CO2 SERPL-SCNC: 22 MMOL/L (ref 21–32)
CREAT SERPL-MCNC: 0.7 MG/DL (ref 0.6–1.2)
DEPRECATED RDW RBC AUTO: 13.3 % (ref 12.4–15.4)
ECHO AO ROOT DIAM: 2.4 CM
ECHO AV AREA PEAK VELOCITY: 1.3 CM2
ECHO AV AREA VTI: 0.8 CM2
ECHO AV MEAN GRADIENT: 32 MMHG
ECHO AV MEAN VELOCITY: 2.6 M/S
ECHO AV PEAK GRADIENT: 56 MMHG
ECHO AV PEAK VELOCITY: 3.7 M/S
ECHO AV VELOCITY RATIO: 0.46
ECHO AV VTI: 55.5 CM
ECHO BSA: 1.7 M2
ECHO LA DIAMETER: 5.6 CM
ECHO LA TO AORTIC ROOT RATIO: 2.33
ECHO LV EF PHYSICIAN: 70 %
ECHO LV FRACTIONAL SHORTENING: 32 % (ref 28–44)
ECHO LV INTERNAL DIMENSION DIASTOLIC: 3.8 CM (ref 3.9–5.3)
ECHO LV INTERNAL DIMENSION SYSTOLIC: 2.6 CM
ECHO LV IVSD: 1.8 CM (ref 0.6–0.9)
ECHO LV MASS 2D: 252.7 G (ref 67–162)
ECHO LV POSTERIOR WALL DIASTOLIC: 1.5 CM (ref 0.6–0.9)
ECHO LV RELATIVE WALL THICKNESS RATIO: 0.79
ECHO LVOT AREA: 2.8 CM2
ECHO LVOT AV VTI INDEX: 0.29
ECHO LVOT DIAM: 1.9 CM
ECHO LVOT MEAN GRADIENT: 5 MMHG
ECHO LVOT PEAK GRADIENT: 11 MMHG
ECHO LVOT PEAK VELOCITY: 1.7 M/S
ECHO LVOT SV: 45.9 ML
ECHO LVOT VTI: 16.2 CM
ECHO MV AREA VTI: 1 CM2
ECHO MV E DECELERATION TIME (DT): 95 MS
ECHO MV E VELOCITY: 2.04 M/S
ECHO MV LVOT VTI INDEX: 2.71
ECHO MV MAX VELOCITY: 2.3 M/S
ECHO MV MEAN GRADIENT: 16 MMHG
ECHO MV MEAN VELOCITY: 2 M/S
ECHO MV PEAK GRADIENT: 20 MMHG
ECHO MV VTI: 43.9 CM
ECHO RV BASAL DIMENSION: 3 CM
ECHO RV FREE WALL PEAK S': 8.7 CM/S
ECHO RV INTERNAL DIMENSION: 3.7 CM
ECHO RV TAPSE: 0.9 CM (ref 1.7–?)
ECHO TV REGURGITANT MAX VELOCITY: 3.27 M/S
ECHO TV REGURGITANT PEAK GRADIENT: 43 MMHG
EOSINOPHIL # BLD: 0 K/UL (ref 0–0.6)
EOSINOPHIL NFR BLD: 0.4 %
GFR SERPLBLD CREATININE-BSD FMLA CKD-EPI: >90 ML/MIN/{1.73_M2}
GLUCOSE SERPL-MCNC: 118 MG/DL (ref 70–99)
HCT VFR BLD AUTO: 40 % (ref 36–48)
HGB BLD-MCNC: 13.9 G/DL (ref 12–16)
LYMPHOCYTES # BLD: 1.6 K/UL (ref 1–5.1)
LYMPHOCYTES NFR BLD: 13.3 %
MCH RBC QN AUTO: 30.1 PG (ref 26–34)
MCHC RBC AUTO-ENTMCNC: 34.7 G/DL (ref 31–36)
MCV RBC AUTO: 86.8 FL (ref 80–100)
MONOCYTES # BLD: 0.7 K/UL (ref 0–1.3)
MONOCYTES NFR BLD: 5.6 %
NEUTROPHILS # BLD: 9.6 K/UL (ref 1.7–7.7)
NEUTROPHILS NFR BLD: 80.4 %
NT-PROBNP SERPL-MCNC: 2095 PG/ML (ref 0–124)
PLATELET # BLD AUTO: 207 K/UL (ref 135–450)
PMV BLD AUTO: 7.6 FL (ref 5–10.5)
POTASSIUM SERPL-SCNC: 3.7 MMOL/L (ref 3.5–5.1)
PROT SERPL-MCNC: 6.8 G/DL (ref 6.4–8.2)
RBC # BLD AUTO: 4.61 M/UL (ref 4–5.2)
SODIUM SERPL-SCNC: 136 MMOL/L (ref 136–145)
TROPONIN, HIGH SENSITIVITY: 26 NG/L (ref 0–14)
TSH SERPL DL<=0.005 MIU/L-ACNC: 0.5 UIU/ML (ref 0.27–4.2)
WBC # BLD AUTO: 11.9 K/UL (ref 4–11)

## 2025-05-23 PROCEDURE — 36415 COLL VENOUS BLD VENIPUNCTURE: CPT

## 2025-05-23 PROCEDURE — 96366 THER/PROPH/DIAG IV INF ADDON: CPT

## 2025-05-23 PROCEDURE — 93308 TTE F-UP OR LMTD: CPT | Performed by: INTERNAL MEDICINE

## 2025-05-23 PROCEDURE — 80053 COMPREHEN METABOLIC PANEL: CPT

## 2025-05-23 PROCEDURE — 83880 ASSAY OF NATRIURETIC PEPTIDE: CPT

## 2025-05-23 PROCEDURE — 99285 EMERGENCY DEPT VISIT HI MDM: CPT

## 2025-05-23 PROCEDURE — 93005 ELECTROCARDIOGRAM TRACING: CPT | Performed by: PHYSICIAN ASSISTANT

## 2025-05-23 PROCEDURE — G0378 HOSPITAL OBSERVATION PER HR: HCPCS

## 2025-05-23 PROCEDURE — 5A2204Z RESTORATION OF CARDIAC RHYTHM, SINGLE: ICD-10-PCS | Performed by: INTERNAL MEDICINE

## 2025-05-23 PROCEDURE — 7100000010 HC PHASE II RECOVERY - FIRST 15 MIN: Performed by: INTERNAL MEDICINE

## 2025-05-23 PROCEDURE — 92960 CARDIOVERSION ELECTRIC EXT: CPT | Performed by: INTERNAL MEDICINE

## 2025-05-23 PROCEDURE — 93325 DOPPLER ECHO COLOR FLOW MAPG: CPT | Performed by: INTERNAL MEDICINE

## 2025-05-23 PROCEDURE — 93321 DOPPLER ECHO F-UP/LMTD STD: CPT | Performed by: INTERNAL MEDICINE

## 2025-05-23 PROCEDURE — 85025 COMPLETE CBC W/AUTO DIFF WBC: CPT

## 2025-05-23 PROCEDURE — 99222 1ST HOSP IP/OBS MODERATE 55: CPT | Performed by: INTERNAL MEDICINE

## 2025-05-23 PROCEDURE — 96365 THER/PROPH/DIAG IV INF INIT: CPT

## 2025-05-23 PROCEDURE — 2500000003 HC RX 250 WO HCPCS

## 2025-05-23 PROCEDURE — 2500000003 HC RX 250 WO HCPCS: Performed by: INTERNAL MEDICINE

## 2025-05-23 PROCEDURE — 93005 ELECTROCARDIOGRAM TRACING: CPT

## 2025-05-23 PROCEDURE — 84484 ASSAY OF TROPONIN QUANT: CPT

## 2025-05-23 PROCEDURE — 93000 ELECTROCARDIOGRAM COMPLETE: CPT | Performed by: INTERNAL MEDICINE

## 2025-05-23 PROCEDURE — 93321 DOPPLER ECHO F-UP/LMTD STD: CPT

## 2025-05-23 PROCEDURE — 71045 X-RAY EXAM CHEST 1 VIEW: CPT

## 2025-05-23 PROCEDURE — 93005 ELECTROCARDIOGRAM TRACING: CPT | Performed by: INTERNAL MEDICINE

## 2025-05-23 PROCEDURE — 92960 CARDIOVERSION ELECTRIC EXT: CPT

## 2025-05-23 PROCEDURE — 6370000000 HC RX 637 (ALT 250 FOR IP): Performed by: INTERNAL MEDICINE

## 2025-05-23 PROCEDURE — 96375 TX/PRO/DX INJ NEW DRUG ADDON: CPT

## 2025-05-23 PROCEDURE — 84443 ASSAY THYROID STIM HORMONE: CPT

## 2025-05-23 PROCEDURE — 6370000000 HC RX 637 (ALT 250 FOR IP)

## 2025-05-23 RX ORDER — GABAPENTIN 300 MG/1
300 CAPSULE ORAL 2 TIMES DAILY
Status: DISCONTINUED | OUTPATIENT
Start: 2025-05-23 | End: 2025-05-27 | Stop reason: HOSPADM

## 2025-05-23 RX ORDER — PAROXETINE 10 MG/1
30 TABLET, FILM COATED ORAL NIGHTLY
Status: DISCONTINUED | OUTPATIENT
Start: 2025-05-23 | End: 2025-05-27 | Stop reason: HOSPADM

## 2025-05-23 RX ORDER — ROPINIROLE 0.5 MG/1
0.5 TABLET, FILM COATED ORAL 3 TIMES DAILY
Status: DISCONTINUED | OUTPATIENT
Start: 2025-05-23 | End: 2025-05-27 | Stop reason: HOSPADM

## 2025-05-23 RX ORDER — ATORVASTATIN CALCIUM 80 MG/1
80 TABLET, FILM COATED ORAL NIGHTLY
Status: DISCONTINUED | OUTPATIENT
Start: 2025-05-23 | End: 2025-05-27 | Stop reason: HOSPADM

## 2025-05-23 RX ORDER — MEMANTINE HYDROCHLORIDE 10 MG/1
10 TABLET ORAL 2 TIMES DAILY
Status: DISCONTINUED | OUTPATIENT
Start: 2025-05-23 | End: 2025-05-27 | Stop reason: HOSPADM

## 2025-05-23 RX ORDER — ACETAMINOPHEN 325 MG/1
650 TABLET ORAL EVERY 4 HOURS PRN
Status: DISCONTINUED | OUTPATIENT
Start: 2025-05-23 | End: 2025-05-27

## 2025-05-23 RX ADMIN — GABAPENTIN 300 MG: 300 CAPSULE ORAL at 20:53

## 2025-05-23 RX ADMIN — TRAZODONE HYDROCHLORIDE 150 MG: 50 TABLET ORAL at 20:53

## 2025-05-23 RX ADMIN — ACETAMINOPHEN 650 MG: 325 TABLET ORAL at 18:51

## 2025-05-23 RX ADMIN — METHOHEXITAL SODIUM 50 MG: 500 INJECTION, POWDER, LYOPHILIZED, FOR SOLUTION INTRAMUSCULAR; INTRAVENOUS; RECTAL at 16:55

## 2025-05-23 RX ADMIN — APIXABAN 5 MG: 5 TABLET, FILM COATED ORAL at 20:53

## 2025-05-23 RX ADMIN — ROPINIROLE HYDROCHLORIDE 0.5 MG: 0.5 TABLET, FILM COATED ORAL at 20:53

## 2025-05-23 RX ADMIN — ATORVASTATIN CALCIUM 80 MG: 80 TABLET, FILM COATED ORAL at 20:53

## 2025-05-23 RX ADMIN — AMIODARONE HYDROCHLORIDE 1 MG/MIN: 1.8 INJECTION, SOLUTION INTRAVENOUS at 22:01

## 2025-05-23 RX ADMIN — AMIODARONE HYDROCHLORIDE 1 MG/MIN: 1.8 INJECTION, SOLUTION INTRAVENOUS at 16:13

## 2025-05-23 RX ADMIN — PAROXETINE HYDROCHLORIDE 30 MG: 10 TABLET, FILM COATED ORAL at 20:53

## 2025-05-23 RX ADMIN — AMIODARONE HYDROCHLORIDE 150 MG: 1.5 INJECTION, SOLUTION INTRAVENOUS at 16:01

## 2025-05-23 RX ADMIN — MEMANTINE HYDROCHLORIDE 10 MG: 10 TABLET, FILM COATED ORAL at 20:53

## 2025-05-23 ASSESSMENT — PAIN DESCRIPTION - LOCATION: LOCATION: HEAD

## 2025-05-23 ASSESSMENT — PAIN DESCRIPTION - DESCRIPTORS: DESCRIPTORS: ACHING

## 2025-05-23 ASSESSMENT — PAIN SCALES - GENERAL
PAINLEVEL_OUTOF10: 0
PAINLEVEL_OUTOF10: 2
PAINLEVEL_OUTOF10: 0

## 2025-05-23 NOTE — PROGRESS NOTES
Medication Reconciliation    List of medications patient is currently taking is complete.     Source of information: 1. Med list provided by patient                                      2. EPIC records        Espinoza Barnett, [facility]   5/23/2025  5:19 PM

## 2025-05-23 NOTE — PROGRESS NOTES
[facility]     Electrophysiology                                     Progress Note    Admission date:  5/23/2025    Reason for follow up visit: Atrial fibrillation    HPI/CC: Mindy Bryan is a 70 y.o. female who was admitted on 5/23/25 with palpitations. She has a pertinent PMH history of adrenal adenoma, HTN, hypertrophic cardiomyopathy, severe MR (s/p MVR and basal septal myomectomy by Dr Madrid on 6/29/20), and paroxysmal atrial fibrillation.     She presented to [facility] ED 9/16/23 with palpitations, weakness and SOB and found to be in new onset atrial fibrillation.  She spontaneously converted to normal sinus prior to cardioversion.    Office visit 10/2/23 she endorsed symptoms of palpitations and decided to proceed with ablation.    She underwent RFA with PVI and additional left atrial flutter ablation with Dr. Boyd on 11/1/2023. Subsequent office visits (2/6/24, 12/4/24) she presented in sinus rhythm, endorsed feeling alright without noticeable recurrence of palpitations at home. However she did report fatigue most of the time, which was likely d/t dyssynchrony from LBBB. Ordered echo for hx HOCM and continued on Toprol XL 50mg daily and Eliquis 5mg BID.       Interval History:    She presented to [facility] office with complaints of palpitations stating she felt as though she was back in atrial fibrillation. She stated she was over at her sister's and started feeling \"bad\". Endorsed palpitations with some chest pain and shaky hands. EKG revealed atrial fibrillation with rapid ventricular rates ~130-140s. D/t PMH HOCM, with symptomatic palpitations and lower BP (~90/50), decision was made to admit to ED for repeat echo, IV Amio loading and possible cardioversion.     S/p DCCV 5/33/25 with Dr Castellon     Overnight:   - Patient converted back into AF ~2200  - Remains in AF this AM with rates poorly controlled     Subjective: She endorses palpitations. States she could feel when she went back into AF  ablation with Dr Boyd 11/1/23   - s/p successful DCCV 5/23/25 with Dr Castellon     - converted back to AF ~7hr after DCCV despite IV Amio loading   - Remains AF with rates poorly controlled this AM   - BP soft, will start with Metoprolol Tartrate 12.5mg TID   - Currently on Amiodarone gtt, will convert to PO loading dose 400mg BID for 7d, then 200mg daily. Turn off gtt  at 1600, continue PO administration   - TSH 0.5 AST 35 ALT 27 (5/23/2025)   - Yearly PFT and ophthalmology    2. Hypertrophic Obstructive Cardiomyopathy   - s/p septal resection 2020  - Echo 5/23/25 with severe septal thickening - IVSd 1.8cm with mild LVOT at rest, difficult to assess d/t heart rhythm and rate   - Would recommend repeat limited echo with patient in NSR   - Follows with Dr Kennedy     3. Mitral Regurgitation    - severe in 2020   - s/p MVR    - Echo 5/23/25 showed bioprosthetic valve well-seated with normal function    4. Hypotension   - Soft BP while in hospital   - Discussed with Dr Silverio. Will turn off Amio gtt at 1600, continue PO Amio and start Midodrine 5mg TID     5. Essential Hypertension  - Controlled while in hospital  - Home BP monitoring encouraged, information provided on how to accurately measure BP at home.  - Counseled to follow a low salt diet to assure blood pressure remains controlled for cardiovascular risk factor modification.   - The patient is counseled to get regular exercise 3-5 times per week and maintain a healthy weight reduce cardiovascular risk factors.   - Following with Sarah Lau MD for management.     6. Tobacco abuse   -Patient quit smoking ~15yr ago    7.  Hyperlipidemia   - Continue statin   - Follow-up care with PCP      Plan:   Discontinue Amiodarone gtt @ 1600   Start oral Amiodarone 400mg BID for 7d, then 200mg daily   Start metoprolol tartrate 12.5mg TID  Start Midodrine 5mg TID    Continue Eliquis 5mg BID  Will need outpatient follow up with Dr Kennedy  Will also need outpatient follow up  with Dr Alvarez in ~1mo after discharge   - Currently scheduled with EP NP 7/9    Assessment and plan discussed with Dr. Castellon and Dr Silverio, who are in agreement.     TABITHA Wong-MARYAM  [facility]  [phone]

## 2025-05-23 NOTE — ED PROVIDER NOTES
[facility] EMERGENCY DEPARTMENT  EMERGENCY DEPARTMENT ENCOUNTER      Pt Name: Mindy Bryan  MRN: [id number]  Birthdate 1955  Date of evaluation: 5/23/2025  Provider: NATHANIEL Ramírez  PCP: Sarah Lau MD  Note Started: 4:53 PM EDT     This patient was also seen and evaluated by Attending Physician Rick Carmen DO.    CHIEF COMPLAINT       Palpitations, chest pain.    HISTORY OF PRESENT ILLNESS   (Location, Timing/Onset, Context/Setting, Quality, Duration, Modifying Factors, Severity, Associated Signs and Symptoms)  Note limiting factors.     Mindy Bryan is a 70 y.o. female who presents from her cardiologist office.  Has a history of atrial fibrillation, says she has been taking her medications.  Earlier today she began having symptoms of the A-fib, which she does not usually have.  She says she feels her heart racing and fluttering, with some chest discomfort on the left as well.  Not terribly painful but it does hurt a bit.  She denies any difficulty breathing now.  Has not lost consciousness, not lightheaded or dizzy, she says.  She visited her cardiologist, was noted to have a heart rate greater than 140, was sent to the emergency department.  Patient denies any changes in her symptoms since onset.    Nursing Notes were all reviewed and agreed with or any disagreements were addressed in the HPI.    REVIEW OF SYSTEMS    (2-9 systems for level 4, 10 or more for level 5)     Positives and pertinent negatives as per HPI.     PAST MEDICAL HISTORY     Past Medical History:   Diagnosis Date    Adrenal adenoma     left 8 mm - stable - CT 8/13    Anxiety     Arthritis     Asthma     Chronic nausea     Environmental allergies     GERD (gastroesophageal reflux disease)     HOCM (hypertrophic obstructive cardiomyopathy) (HCC)     HTN (hypertension)     Hypothyroid     Irritable bowel syndrome     Microscopic colitis     Migraine     Mitral regurgitation     Nephrolithiasis 01/21/2015         CRITICAL CARE TIME   There was a high probability of life-threatening clinical deterioration in the patient's condition requiring my urgent intervention.  I personally saw the patient and independently provided 30 minutes of non-concurrent critical care out of the total shared critical care time provided, excluding separately reportable procedures.         FINAL IMPRESSION      1. Paroxysmal atrial fibrillation (HCC)          DISPOSITION/PLAN   DISPOSITION Admitted 05/23/2025 04:48:33 PM               PATIENT REFERRED TO:  No follow-up provider specified.    DISCHARGE MEDICATIONS:  New Prescriptions    No medications on file       DISCONTINUED MEDICATIONS:  Discontinued Medications    No medications on file            (Please note that portions of this note were completed with a voice recognition program.  Efforts were made to edit the dictations but occasionally words are mis-transcribed.)    NATHANIEL Ramírez (electronically signed)        Joshua Warner PA  05/23/25 [id number]       Joshua Warner PA  05/23/25 [id number]

## 2025-05-23 NOTE — PROGRESS NOTES
tablet TAKE 1 TABLET BY MOUTH DAILY 2/14/25   Sarah Lau MD   metoprolol succinate (TOPROL XL) 50 MG extended release tablet Take 1 tablet by mouth daily 10/29/24   Nolan Kennedy MD   atorvastatin (LIPITOR) 80 MG tablet TAKE ONE TABLET BY MOUTH ONCE NIGHTLY 10/18/24   Nolan Kennedy MD   prochlorperazine (COMPAZINE) 10 MG tablet TAKE ONE TABLET BY MOUTH EVERY 6 HOURS AS NEEDED 9/19/24   Sarah Lau MD   apixaban (ELIQUIS) 5 MG TABS tablet Take 1 tablet by mouth 2 times daily 8/20/24   Ace Alvarez MD   dicyclomine (BENTYL) 20 MG tablet Take one tablet by mouth four times a day as needed 4/18/24   Filomena Perez MD   potassium chloride (MICRO-K) 10 MEQ extended release capsule Take 1 capsule by mouth daily    Derrick Huddleston MD   ZOLMitriptan (ZOMIG) 5 MG tablet Take 1 tablet by mouth as needed for Migraine May repeat dose x1 after 2 hours if needed. 7/13/23   Filomena Perez MD   OnabotulinumtoxinA (BOTOX IJ) Inject as directed Every 3 months    Derrick Huddleston MD   CRANBERRY PO Take by mouth Two tabs in the morning    Derrick Huddleston MD   memantine (NAMENDA) 5 MG tablet Take 1 tablet by mouth 2 times daily    Derrick Huddleston MD   denosumab (PROLIA) 60 MG/ML SOSY SC injection Inject 1 mL into the skin once for 1 dose 3/3/22 4/3/25  Filomena Perez MD   loratadine (CLARITIN) 10 MG tablet Take 1 tablet by mouth daily    Derrick Huddleston MD   Coenzyme Q10 (CO Q-10) 200 MG CAPS Take 1 capsule by mouth    Derrick Huddleston MD   Ascorbic Acid (VITAMIN C) 500 MG CAPS Take 500 mg by mouth    Derrick Huddleston MD   Multiple Vitamins-Minerals (CENTRUM SILVER PO) Take 1 tablet by mouth daily    Derrick Huddleston MD   Omega-3 Fatty Acids (FISH OIL) 1200 MG CAPS Take 1,200 mg by mouth 2 times daily    Derrick Huddleston MD   Flaxseed, Linseed, (FLAXSEED OIL PO) Take 1,300 mg by mouth daily    Derrick Huddleston MD   FIBER PO Take 1 capsule by mouth daily    [place]

## 2025-05-23 NOTE — ANESTHESIA PRE-OP
LVOT; miital valve replacement w/ 25mm Medtronic Mosaic bovine pericardial bioprosthetic valve; TCPB; ICNB x 5 levels bilatereally performed by Garth Madrid MD at [facility] OR    OVARY REMOVAL Left     Partial    TRANSESOPHAGEAL ECHOCARDIOGRAM  03/11/2020    Dr. Campos    UPPER GASTROINTESTINAL ENDOSCOPY  03/2018    Normal    WRIST FRACTURE SURGERY Left 2009         Medications:  Current Facility-Administered Medications   Medication Dose Route Frequency Provider Last Rate Last Admin    amiodarone (NEXTERONE) 360 mg in dextrose 5% 200 ml  1 mg/min IntraVENous Continuous Cass León APRN - CNP 33.3 mL/hr at 05/23/25 1613 1 mg/min at 05/23/25 1613     Current Outpatient Medications   Medication Sig Dispense Refill    rOPINIRole (REQUIP) 0.5 MG tablet Take 1 tablet by mouth 3 times daily 270 tablet 1    traZODone (DESYREL) 150 MG tablet TAKE ONE TABLET BY MOUTH ONCE NIGHTLY 90 tablet 1    omeprazole (PRILOSEC) 40 MG delayed release capsule TAKE 1 CAPSULE BY MOUTH DAILY 90 capsule 1    PAROXETINE HCL PO 30 mg daily      gabapentin (NEURONTIN) 300 MG capsule       hyoscyamine (ANASPAZ;LEVSIN) 0.125 MG tablet 1 tablet as needed Orally every 6-8 hours as needed for 30 days      Butalbital-APAP-Caffeine (FIORICET PO) Take by mouth 1 tablet no more than 4 a week      levothyroxine (SYNTHROID) 88 MCG tablet TAKE 1 TABLET BY MOUTH DAILY 90 tablet 1    metoprolol succinate (TOPROL XL) 50 MG extended release tablet Take 1 tablet by mouth daily 90 tablet 3    atorvastatin (LIPITOR) 80 MG tablet TAKE ONE TABLET BY MOUTH ONCE NIGHTLY 90 tablet 3    prochlorperazine (COMPAZINE) 10 MG tablet TAKE ONE TABLET BY MOUTH EVERY 6 HOURS AS NEEDED 30 tablet 4    apixaban (ELIQUIS) 5 MG TABS tablet Take 1 tablet by mouth 2 times daily 180 tablet 3    dicyclomine (BENTYL) 20 MG tablet Take one tablet by mouth four times a day as needed 120 tablet 5    potassium chloride (MICRO-K) 10 MEQ extended release capsule Take 1 capsule by mouth  [facility]  Interventional Cardiology         NOTE:  This report was transcribed using voice recognition software.  Every effort was made to ensure accuracy; however, inadvertent computerized transcription errors may be present.

## 2025-05-23 NOTE — ED PROVIDER NOTES
Emergency Department Attending Provider Note  Location: [facility] EMERGENCY DEPARTMENT  5/23/2025   Note Started: 4:03 PM EDT 5/23/25       Patient Identification  Mindy Bryan is a 70 y.o. female      HPI:Mindy Bryan was evaluated in the Emergency Department for palpitations.  Patient was being seen in our outpatient cardiology clinic today she was noted to be in A-fib with RVR.  Has a history of A-fib with RVR.  Currently anticoagulated with Eliquis.  Was sent here by the outpatient office for evaluation by cardiology.  Dr. Massey is aware and is currently ordered a stat echo as well as amiodarone.. Although initial history and physical exam information was obtained by GREG/NPP/MD/DO (who also dictated a record of this visit), I personally saw the patient and performed a substantive portion of the visit including all aspects of the medical decision making.      PHYSICAL EXAM:  Physical Exam  Constitutional:       General: She is not in acute distress.     Appearance: Normal appearance. She is not ill-appearing.   HENT:      Head: Normocephalic and atraumatic.      Right Ear: External ear normal.      Left Ear: External ear normal.      Nose: Nose normal.      Mouth/Throat:      Mouth: Mucous membranes are moist.      Pharynx: Oropharynx is clear.   Cardiovascular:      Rate and Rhythm: Tachycardia present. Rhythm irregular.      Pulses: Normal pulses.      Heart sounds: Normal heart sounds.   Pulmonary:      Effort: Pulmonary effort is normal.      Breath sounds: Normal breath sounds.   Abdominal:      General: Abdomen is flat. There is no distension.      Palpations: Abdomen is soft.      Tenderness: There is no abdominal tenderness.   Musculoskeletal:         General: No tenderness, deformity or signs of injury. Normal range of motion.      Cervical back: Normal range of motion.   Skin:     General: Skin is warm and dry.      Capillary Refill: Capillary refill takes less than 2 seconds.    Neurological:      General: No focal deficit present.      Mental Status: She is alert and oriented to person, place, and time.      Sensory: No sensory deficit.      Motor: No weakness.   Psychiatric:         Mood and Affect: Mood normal.         Behavior: Behavior normal.          EKG Interpretation  EKG appears to be a wide-complex irregularly irregular rhythm consistent with atrial fibrillation with a known left bundle branch block, normal axis, QTc is 528, there are some discordant mild depressions noted primarily in the lateral precordial leads as well as in lead I that is likely rate related, I do not see any ST segment elevations concerning for ischemia.  This morphology is similar to prior EKG in comparison earlier today in the cardiac clinic    Radiology Interpretation      Outside Chart Review      Patient seen and evaluated.  Relevant records reviewed.  MDM    Patient overall well-appearing.  Concern for A-fib with RVR.  Currently getting a stat echocardiogram with plans for amiodarone.  Blood pressure mildly soft but otherwise no signs of illness.  She appears clinically well.  Stat echo was completed at the bedside.  Workup including CBC CMP EKG and troponin initiated in the emergency department.  Following her initial evaluation the patient was subsequently admitted to the inpatient service for further medical management.     This includes multiple reevaluations, vital sign monitoring, pulse oximetry monitoring, telemetry monitoring, clinical response to the IV medications, reviewing the nursing notes, consultation time, dictation/documentation time, and interpretation of the labwork. (This time excludes time spent performing procedures).    XR CHEST PORTABLE    (Results Pending)             Is this patient to be included in the SEP-1 core measure? No Exclusion criteria - the patient is NOT to be included for SEP-1 Core Measure due to: Infection is not suspected     CLINICAL IMPRESSION  1.

## 2025-05-23 NOTE — H&P
Cardiovascular History & Physical     PATIENT: Mindy Bryan  : 1955  MRN: [id number]    Chief complaint:   Palpitations    History of present illness:   Ms. Mindy Bryan is a 70 y.o. female patient established with Juanito Kennedy and Ace Alvarez for history of HOCM S/P septal myectomy, bioprosthetic MVR, LBBB and PAF who presented to [facility] Cardiology office with concern for palpitations since 1315 today. Lower blood pressures. States it has been some time since AF.   EP in procedure. Requested to see for DCCV. TTE in ER with stable LVEF.     Medical History:      Diagnosis Date    Adrenal adenoma     left 8 mm - stable - CT     Anxiety     Arthritis     Asthma     Chronic nausea     Environmental allergies     GERD (gastroesophageal reflux disease)     HOCM (hypertrophic obstructive cardiomyopathy) (HCC)     HTN (hypertension)     Hypothyroid     Irritable bowel syndrome     Microscopic colitis     Migraine     Mitral regurgitation     Nephrolithiasis 2015    R side -s/p lithotripsy    Nosebleed     Osteoporosis     DEXA 13 - scotty lumbar spine    Rash     Restless leg syndrome     TMJ dysfunction     Vitreous detachment     Wrist fracture, bilateral        Surgical History:      Procedure Laterality Date    CARDIAC CATHETERIZATION  2020    CARDIOVASCULAR STRESS TEST  2016    normal    CHOLECYSTECTOMY, LAPAROSCOPIC N/A 3/27/2019    w/cholangiogram w/C-arm, performed by Shahid Maria MD at [facility] OR    COLONOSCOPY      COLONOSCOPY  2018    Afshan (random bxs)-microscopic colitis    COLONOSCOPY N/A 2023    COLONOSCOPY WITH BIOPSY performed by Espinoza Cavanaugh MD at [facility] ENDOSCOPY    KNEE SURGERY Right 1975    synovectomy    MITRAL VALVE REPAIR N/A 2020    CORRIE; TCPB; resection of hypertrophic muscle from septal LVOT; miital valve replacement w/ 25mm Medtronic Mosaic bovine pericardial bioprosthetic valve; TCPB; ICNB

## 2025-05-23 NOTE — PROGRESS NOTES
Pt to room 5253 from ED via stretcher. Arrived on room air . Pt placed on telemetry monitor. VSS. Head to toe assessment completed. Pt A&Ox4. Pt oriented to room and how to use call light. Standard safety measures in place. All needs met at this time. Care ongoing.      Call placed to on call cardiologist informing them of patient's blood pressure of 101/40 and map of 60 and also that patient is requesting tylenol.   Electronically signed by Harpal Dong RN on 5/23/2025 at 6:43 PM

## 2025-05-23 NOTE — ED NOTES
ED TO INPATIENT SBAR HANDOFF    Patient Name: Mindy Bryan   Preferred Name: Aaron  : 1955  70 y.o.   Family/Caregiver Present: no   Code Status Order: Prior  PO Status: NPO:No  Telemetry Order: Yes  C-SSRS:    Sitter no   Restraints:     Sepsis Risk Score Sepsis V2 Risk Score: 48    Situation  No chief complaint on file.    Brief Description of Patient's Condition: Pt resting in bed withotu complaint/incident.   Mental Status: oriented, alert, coherent, logical, thought processes intact, and able to concentrate and follow conversation  Arrived from:Home  Imaging:   XR CHEST PORTABLE    (Results Pending)     Abnormal labs:   Abnormal Labs Reviewed   CBC WITH AUTO DIFFERENTIAL - Abnormal; Notable for the following components:       Result Value    WBC 11.9 (*)     Neutrophils Absolute 9.6 (*)     All other components within normal limits       Background  Allergies:   Allergies   Allergen Reactions    Tolectin [Tolmetin] Anaphylaxis     Face & Lips     History:   Past Medical History:   Diagnosis Date    Adrenal adenoma     left 8 mm - stable - CT     Anxiety     Arthritis     Asthma     Chronic nausea     Environmental allergies     GERD (gastroesophageal reflux disease)     HOCM (hypertrophic obstructive cardiomyopathy) (HCC)     HTN (hypertension)     Hypothyroid     Irritable bowel syndrome     Microscopic colitis     Migraine     Mitral regurgitation     Nephrolithiasis 2015    R side -s/p lithotripsy    Nosebleed     Osteoporosis     DEXA 13 - scotty lumbar spine    Rash     Restless leg syndrome     TMJ dysfunction     Vitreous detachment     Wrist fracture, bilateral        Assessment  Vitals:    Level of Consciousness: Alert (0)   Vitals:    25 1656 25 1658 25 1700 25 1700   BP: (!) 114/55 (!) 113/44  (!) 94/45   Pulse: 68 68 65    Resp: 21 17 15    SpO2: (!) 83% 93% 98%    Weight:       Height:         Deterioration Index (DI): Deterioration Index:

## 2025-05-24 PROBLEM — I48.91 ATRIAL FIBRILLATION WITH RAPID VENTRICULAR RESPONSE (HCC): Status: ACTIVE | Noted: 2025-05-24

## 2025-05-24 LAB
EKG ATRIAL RATE: 60 BPM
EKG ATRIAL RATE: 67 BPM
EKG DIAGNOSIS: NORMAL
EKG P AXIS: 70 DEGREES
EKG P-R INTERVAL: 180 MS
EKG Q-T INTERVAL: 340 MS
EKG Q-T INTERVAL: 396 MS
EKG Q-T INTERVAL: 450 MS
EKG QRS DURATION: 140 MS
EKG QRS DURATION: 146 MS
EKG QRS DURATION: 148 MS
EKG QTC CALCULATION (BAZETT): 450 MS
EKG QTC CALCULATION (BAZETT): 508 MS
EKG QTC CALCULATION (BAZETT): 528 MS
EKG R AXIS: 16 DEGREES
EKG R AXIS: 25 DEGREES
EKG R AXIS: 30 DEGREES
EKG T AXIS: 191 DEGREES
EKG T AXIS: 203 DEGREES
EKG T AXIS: 204 DEGREES
EKG VENTRICULAR RATE: 145 BPM
EKG VENTRICULAR RATE: 60 BPM
EKG VENTRICULAR RATE: 99 BPM

## 2025-05-24 PROCEDURE — 1200000000 HC SEMI PRIVATE

## 2025-05-24 PROCEDURE — 99232 SBSQ HOSP IP/OBS MODERATE 35: CPT

## 2025-05-24 PROCEDURE — 2580000003 HC RX 258: Performed by: INTERNAL MEDICINE

## 2025-05-24 PROCEDURE — 93010 ELECTROCARDIOGRAM REPORT: CPT | Performed by: INTERNAL MEDICINE

## 2025-05-24 PROCEDURE — 2500000003 HC RX 250 WO HCPCS

## 2025-05-24 PROCEDURE — 96366 THER/PROPH/DIAG IV INF ADDON: CPT

## 2025-05-24 PROCEDURE — 6370000000 HC RX 637 (ALT 250 FOR IP)

## 2025-05-24 PROCEDURE — 6370000000 HC RX 637 (ALT 250 FOR IP): Performed by: INTERNAL MEDICINE

## 2025-05-24 RX ORDER — AMIODARONE HYDROCHLORIDE 200 MG/1
400 TABLET ORAL 2 TIMES DAILY
Status: DISCONTINUED | OUTPATIENT
Start: 2025-05-24 | End: 2025-05-27 | Stop reason: HOSPADM

## 2025-05-24 RX ORDER — METOPROLOL TARTRATE 25 MG/1
25 TABLET, FILM COATED ORAL 2 TIMES DAILY
Status: DISCONTINUED | OUTPATIENT
Start: 2025-05-24 | End: 2025-05-24

## 2025-05-24 RX ORDER — LEVOTHYROXINE SODIUM 88 UG/1
88 TABLET ORAL DAILY
Status: DISCONTINUED | OUTPATIENT
Start: 2025-05-24 | End: 2025-05-27 | Stop reason: HOSPADM

## 2025-05-24 RX ORDER — AMIODARONE HYDROCHLORIDE 200 MG/1
200 TABLET ORAL DAILY
Status: DISCONTINUED | OUTPATIENT
Start: 2025-05-31 | End: 2025-05-27 | Stop reason: HOSPADM

## 2025-05-24 RX ORDER — METOPROLOL TARTRATE 25 MG/1
12.5 TABLET, FILM COATED ORAL 3 TIMES DAILY
Status: DISCONTINUED | OUTPATIENT
Start: 2025-05-24 | End: 2025-05-25

## 2025-05-24 RX ORDER — MIDODRINE HYDROCHLORIDE 5 MG/1
5 TABLET ORAL
Status: DISCONTINUED | OUTPATIENT
Start: 2025-05-24 | End: 2025-05-25

## 2025-05-24 RX ORDER — 0.9 % SODIUM CHLORIDE 0.9 %
500 INTRAVENOUS SOLUTION INTRAVENOUS ONCE
Status: COMPLETED | OUTPATIENT
Start: 2025-05-24 | End: 2025-05-25

## 2025-05-24 RX ADMIN — MEMANTINE HYDROCHLORIDE 10 MG: 10 TABLET, FILM COATED ORAL at 08:05

## 2025-05-24 RX ADMIN — ROPINIROLE HYDROCHLORIDE 0.5 MG: 0.5 TABLET, FILM COATED ORAL at 08:05

## 2025-05-24 RX ADMIN — ATORVASTATIN CALCIUM 80 MG: 80 TABLET, FILM COATED ORAL at 21:57

## 2025-05-24 RX ADMIN — MIDODRINE HYDROCHLORIDE 5 MG: 5 TABLET ORAL at 11:45

## 2025-05-24 RX ADMIN — MIDODRINE HYDROCHLORIDE 5 MG: 5 TABLET ORAL at 08:13

## 2025-05-24 RX ADMIN — MIDODRINE HYDROCHLORIDE 5 MG: 5 TABLET ORAL at 17:30

## 2025-05-24 RX ADMIN — ACETAMINOPHEN 650 MG: 325 TABLET ORAL at 11:45

## 2025-05-24 RX ADMIN — GABAPENTIN 300 MG: 300 CAPSULE ORAL at 08:05

## 2025-05-24 RX ADMIN — GABAPENTIN 300 MG: 300 CAPSULE ORAL at 21:56

## 2025-05-24 RX ADMIN — AMIODARONE HYDROCHLORIDE 200 MG: 200 TABLET ORAL at 21:55

## 2025-05-24 RX ADMIN — AMIODARONE HYDROCHLORIDE 1 MG/MIN: 1.8 INJECTION, SOLUTION INTRAVENOUS at 04:10

## 2025-05-24 RX ADMIN — METOPROLOL TARTRATE 12.5 MG: 25 TABLET, FILM COATED ORAL at 17:31

## 2025-05-24 RX ADMIN — APIXABAN 5 MG: 5 TABLET, FILM COATED ORAL at 08:05

## 2025-05-24 RX ADMIN — SODIUM CHLORIDE 500 ML: 0.9 INJECTION, SOLUTION INTRAVENOUS at 22:04

## 2025-05-24 RX ADMIN — AMIODARONE HYDROCHLORIDE 1 MG/MIN: 1.8 INJECTION, SOLUTION INTRAVENOUS at 10:17

## 2025-05-24 RX ADMIN — ROPINIROLE HYDROCHLORIDE 0.5 MG: 0.5 TABLET, FILM COATED ORAL at 17:31

## 2025-05-24 RX ADMIN — METOPROLOL TARTRATE 12.5 MG: 25 TABLET, FILM COATED ORAL at 08:05

## 2025-05-24 RX ADMIN — AMIODARONE HYDROCHLORIDE 400 MG: 200 TABLET ORAL at 08:05

## 2025-05-24 RX ADMIN — MEMANTINE HYDROCHLORIDE 10 MG: 10 TABLET, FILM COATED ORAL at 21:56

## 2025-05-24 RX ADMIN — ROPINIROLE HYDROCHLORIDE 0.5 MG: 0.5 TABLET, FILM COATED ORAL at 21:57

## 2025-05-24 RX ADMIN — PAROXETINE HYDROCHLORIDE 30 MG: 10 TABLET, FILM COATED ORAL at 21:56

## 2025-05-24 RX ADMIN — TRAZODONE HYDROCHLORIDE 150 MG: 50 TABLET ORAL at 21:56

## 2025-05-24 RX ADMIN — APIXABAN 5 MG: 5 TABLET, FILM COATED ORAL at 21:57

## 2025-05-24 NOTE — CARE COORDINATION
05/24/25 1416   IMM Letter   IMM Letter given to Patient/Family/Significant other/Guardian/POA/by: Education and copy of notice provided to patient at bedside by Lugii Wilcox RN CM. All questions answered at this time. Patient aware of 4 hours allotted to decide to launch an appeal of their discharge.   IMM Letter date given: 05/24/25   IMM Letter time given: 1338     Electronically signed by LUIGI WILCOX RN on 5/24/2025 at 2:16 PM

## 2025-05-24 NOTE — PROGRESS NOTES
benjamin    Lower thoracic back pain    Closed fracture of first lumbar vertebra (HCC)    Atrial fibrillation (HCC)    Atrial fibrillation with rapid ventricular response (HCC)       Assessment/plan:   1. Paroxysmal Atrial Fibrillation               - Diagnosed 9/16/23              - Symptomatic with palpitations               - LVEF 60-65% (5/23/25)          - CHADS-VASc 3 (Age, Gender, HTN)                          - Eliquis 5mg BID                                       - Hgb 13.9 hct 40, Creat 0.7(5/23/2025)              - s/p RFA with PVI and additional left atrial flutter ablation with Dr Boyd 11/1/23              - s/p successful DCCV 5/23/25 with Dr Castellon                           - converted back to AF ~7hr after DCCV despite IV Amio loading              - Remains AF- HR 's              - BP soft, will start with Toprol Xl 25 PM  - Amio drip D/C 5/, start Amio  mg BID               - TSH 0.5 AST 35 ALT 27 (5/23/2025)              - Yearly PFT and ophthalmology     2. Hypertrophic Obstructive Cardiomyopathy              - s/p septal resection 2020  - Echo 5/23/25 with severe septal thickening - IVSd 1.8cm with mild LVOT at rest, difficult to assess d/t heart rhythm and rate   - Would recommend repeat limited echo with patient in NSR   - Follows with Dr Kennedy      3. Mitral Regurgitation               - severe in 2020              - s/p MVR               - Echo 5/23/25 showed bioprosthetic valve well-seated with normal function     4. Hypotension              - Soft BP while in hospital   - Discussed with Dr Bender - saline bolus 300 cc's, switch metoprolol - Toprol XL 25 this evening      5. Essential Hypertension  - Controlled while in hospital  - Home BP monitoring encouraged, information provided on how to accurately measure BP at home.  - Counseled to follow a low salt diet to assure blood pressure remains controlled for cardiovascular risk factor modification.   - The patient is  counseled to get regular exercise 3-5 times per week and maintain a healthy weight reduce cardiovascular risk factors.   - Following with Sarah Lau MD for management.      6. Tobacco abuse   -Patient quit smoking ~15yr ago     7.  Hyperlipidemia              - Continue statin              - Follow-up care with PCP         Plan:   Saline Bolus 300 cc's, 12 lead EKG, Cortisol level drawn this AM  Start oral Amiodarone 400mg BID for 7d, then 200mg daily   Switch metoprolol tartrate 12.5mg TID to Toprol XL 25 mg this evening   Increase Midodrine 10 mg TID    Continue Eliquis 5mg BID  Will need outpatient follow up with Dr Kennedy  Will also need outpatient follow up with Dr Alvarez in ~1mo after discharge   - Currently scheduled with EP NP 7/9        Dispo:      Please see orders.  Discussed with patient and nursing.  Discussed with Dr. Bender     Signed:  Cyndi Kelsey, TORREY  [facility]  [phone]

## 2025-05-24 NOTE — PLAN OF CARE
Problem: Discharge Planning  Goal: Discharge to home or other facility with appropriate resources  Outcome: Progressing  Flowsheets (Taken 5/23/2025 2041)  Discharge to home or other facility with appropriate resources: Identify barriers to discharge with patient and caregiver     Problem: Safety - Adult  Goal: Free from fall injury  Outcome: Progressing     Problem: Pain  Goal: Verbalizes/displays adequate comfort level or baseline comfort level  Outcome: Progressing

## 2025-05-24 NOTE — PROGRESS NOTES
Slept off and on throughout night shift. Remained in Afib remainder of shift, HR mostly . Ambulates to bathroom, tolerates well. In bed, call light in reach.

## 2025-05-24 NOTE — PROGRESS NOTES
Patient called to say that she felt that the heart palpitations were returning. Appeared to be having increased runs of PAT's on monitor which transitioning back into Afib. 12 lead EKG done, shows Afib with LBBB. NP notified as well as cardiology on call. No new orders at this time, continue Amio gtt as ordered.

## 2025-05-25 LAB
ANION GAP SERPL CALCULATED.3IONS-SCNC: 12 MMOL/L (ref 3–16)
BUN SERPL-MCNC: 10 MG/DL (ref 7–20)
CALCIUM SERPL-MCNC: 9.8 MG/DL (ref 8.3–10.6)
CHLORIDE SERPL-SCNC: 107 MMOL/L (ref 99–110)
CO2 SERPL-SCNC: 22 MMOL/L (ref 21–32)
CORTIS AM PEAK SERPL-MCNC: 12.1 UG/DL (ref 4.3–22.4)
CREAT SERPL-MCNC: 0.7 MG/DL (ref 0.6–1.2)
GFR SERPLBLD CREATININE-BSD FMLA CKD-EPI: >90 ML/MIN/{1.73_M2}
GLUCOSE SERPL-MCNC: 137 MG/DL (ref 70–99)
MAGNESIUM SERPL-MCNC: 1.6 MG/DL (ref 1.8–2.4)
POTASSIUM SERPL-SCNC: 3.8 MMOL/L (ref 3.5–5.1)
SODIUM SERPL-SCNC: 141 MMOL/L (ref 136–145)

## 2025-05-25 PROCEDURE — 93005 ELECTROCARDIOGRAM TRACING: CPT | Performed by: NURSE PRACTITIONER

## 2025-05-25 PROCEDURE — 1200000000 HC SEMI PRIVATE

## 2025-05-25 PROCEDURE — 83735 ASSAY OF MAGNESIUM: CPT

## 2025-05-25 PROCEDURE — 6370000000 HC RX 637 (ALT 250 FOR IP): Performed by: INTERNAL MEDICINE

## 2025-05-25 PROCEDURE — 6360000002 HC RX W HCPCS: Performed by: INTERNAL MEDICINE

## 2025-05-25 PROCEDURE — 2580000003 HC RX 258: Performed by: NURSE PRACTITIONER

## 2025-05-25 PROCEDURE — 80048 BASIC METABOLIC PNL TOTAL CA: CPT

## 2025-05-25 PROCEDURE — 6370000000 HC RX 637 (ALT 250 FOR IP)

## 2025-05-25 PROCEDURE — 6370000000 HC RX 637 (ALT 250 FOR IP): Performed by: REGISTERED NURSE

## 2025-05-25 PROCEDURE — 82533 TOTAL CORTISOL: CPT

## 2025-05-25 PROCEDURE — 6370000000 HC RX 637 (ALT 250 FOR IP): Performed by: NURSE PRACTITIONER

## 2025-05-25 PROCEDURE — 99232 SBSQ HOSP IP/OBS MODERATE 35: CPT | Performed by: NURSE PRACTITIONER

## 2025-05-25 PROCEDURE — 36415 COLL VENOUS BLD VENIPUNCTURE: CPT

## 2025-05-25 RX ORDER — METOPROLOL SUCCINATE 25 MG/1
25 TABLET, EXTENDED RELEASE ORAL DAILY
Status: DISCONTINUED | OUTPATIENT
Start: 2025-05-25 | End: 2025-05-26

## 2025-05-25 RX ORDER — 0.9 % SODIUM CHLORIDE 0.9 %
300 INTRAVENOUS SOLUTION INTRAVENOUS ONCE
Status: COMPLETED | OUTPATIENT
Start: 2025-05-25 | End: 2025-05-25

## 2025-05-25 RX ORDER — MIDODRINE HYDROCHLORIDE 10 MG/1
10 TABLET ORAL ONCE
Status: COMPLETED | OUTPATIENT
Start: 2025-05-25 | End: 2025-05-25

## 2025-05-25 RX ORDER — MAGNESIUM SULFATE IN WATER 40 MG/ML
2000 INJECTION, SOLUTION INTRAVENOUS PRN
Status: DISCONTINUED | OUTPATIENT
Start: 2025-05-25 | End: 2025-05-27 | Stop reason: HOSPADM

## 2025-05-25 RX ORDER — MIDODRINE HYDROCHLORIDE 10 MG/1
10 TABLET ORAL
Status: DISCONTINUED | OUTPATIENT
Start: 2025-05-25 | End: 2025-05-27 | Stop reason: HOSPADM

## 2025-05-25 RX ADMIN — APIXABAN 5 MG: 5 TABLET, FILM COATED ORAL at 20:51

## 2025-05-25 RX ADMIN — TRAZODONE HYDROCHLORIDE 150 MG: 50 TABLET ORAL at 20:58

## 2025-05-25 RX ADMIN — PAROXETINE HYDROCHLORIDE 30 MG: 10 TABLET, FILM COATED ORAL at 20:50

## 2025-05-25 RX ADMIN — SODIUM CHLORIDE 300 ML: 0.9 INJECTION, SOLUTION INTRAVENOUS at 09:51

## 2025-05-25 RX ADMIN — MAGNESIUM SULFATE HEPTAHYDRATE 2000 MG: 40 INJECTION, SOLUTION INTRAVENOUS at 16:18

## 2025-05-25 RX ADMIN — ATORVASTATIN CALCIUM 80 MG: 80 TABLET, FILM COATED ORAL at 20:50

## 2025-05-25 RX ADMIN — GABAPENTIN 300 MG: 300 CAPSULE ORAL at 09:47

## 2025-05-25 RX ADMIN — MIDODRINE HYDROCHLORIDE 10 MG: 10 TABLET ORAL at 11:56

## 2025-05-25 RX ADMIN — AMIODARONE HYDROCHLORIDE 400 MG: 200 TABLET ORAL at 09:47

## 2025-05-25 RX ADMIN — ROPINIROLE HYDROCHLORIDE 0.5 MG: 0.5 TABLET, FILM COATED ORAL at 09:47

## 2025-05-25 RX ADMIN — MEMANTINE HYDROCHLORIDE 10 MG: 10 TABLET, FILM COATED ORAL at 20:50

## 2025-05-25 RX ADMIN — AMIODARONE HYDROCHLORIDE 400 MG: 200 TABLET ORAL at 20:51

## 2025-05-25 RX ADMIN — ROPINIROLE HYDROCHLORIDE 0.5 MG: 0.5 TABLET, FILM COATED ORAL at 20:51

## 2025-05-25 RX ADMIN — ROPINIROLE HYDROCHLORIDE 0.5 MG: 0.5 TABLET, FILM COATED ORAL at 16:14

## 2025-05-25 RX ADMIN — ACETAMINOPHEN 650 MG: 325 TABLET ORAL at 06:01

## 2025-05-25 RX ADMIN — MIDODRINE HYDROCHLORIDE 10 MG: 10 TABLET ORAL at 09:47

## 2025-05-25 RX ADMIN — GABAPENTIN 300 MG: 300 CAPSULE ORAL at 20:51

## 2025-05-25 RX ADMIN — LEVOTHYROXINE SODIUM 88 MCG: 0.09 TABLET ORAL at 05:09

## 2025-05-25 RX ADMIN — MEMANTINE HYDROCHLORIDE 10 MG: 10 TABLET, FILM COATED ORAL at 09:47

## 2025-05-25 RX ADMIN — MIDODRINE HYDROCHLORIDE 10 MG: 10 TABLET ORAL at 16:14

## 2025-05-25 RX ADMIN — APIXABAN 5 MG: 5 TABLET, FILM COATED ORAL at 09:47

## 2025-05-25 RX ADMIN — METOPROLOL SUCCINATE 25 MG: 25 TABLET, EXTENDED RELEASE ORAL at 20:54

## 2025-05-25 ASSESSMENT — PAIN DESCRIPTION - ORIENTATION: ORIENTATION: INNER

## 2025-05-25 ASSESSMENT — PAIN SCALES - GENERAL
PAINLEVEL_OUTOF10: 0
PAINLEVEL_OUTOF10: 0
PAINLEVEL_OUTOF10: 3
PAINLEVEL_OUTOF10: 0

## 2025-05-25 ASSESSMENT — PAIN - FUNCTIONAL ASSESSMENT: PAIN_FUNCTIONAL_ASSESSMENT: ACTIVITIES ARE NOT PREVENTED

## 2025-05-25 ASSESSMENT — PAIN DESCRIPTION - PAIN TYPE: TYPE: ACUTE PAIN

## 2025-05-25 ASSESSMENT — PAIN DESCRIPTION - ONSET: ONSET: PROGRESSIVE

## 2025-05-25 ASSESSMENT — PAIN DESCRIPTION - FREQUENCY: FREQUENCY: CONTINUOUS

## 2025-05-25 ASSESSMENT — PAIN DESCRIPTION - DESCRIPTORS: DESCRIPTORS: ACHING

## 2025-05-25 ASSESSMENT — PAIN DESCRIPTION - LOCATION: LOCATION: HEAD

## 2025-05-25 NOTE — PROGRESS NOTES
Slept off and on throughout night shift. C/o headache 1x but otherwise no c/o excessive discomfort, PRN tylenol provided relief. B/P @ HS 86/60 manual, cardiology on call notified, gave 500cc NS bolus, held metoprolol, gave half of ordered amiodorone total of 200mg. B/P up to 98/51 following bolus. Ambulates to bathroom, tolerates well. In bed, call light in reach.

## 2025-05-25 NOTE — PLAN OF CARE
Problem: Discharge Planning  Goal: Discharge to home or other facility with appropriate resources  5/24/2025 2329 by Rg Lloyd, RN  Outcome: Progressing  Flowsheets (Taken 5/24/2025 2013)  Discharge to home or other facility with appropriate resources: Identify barriers to discharge with patient and caregiver     Problem: Safety - Adult  Goal: Free from fall injury  5/24/2025 2329 by Rg Lloyd, RN  Outcome: Progressing     Problem: Pain  Goal: Verbalizes/displays adequate comfort level or baseline comfort level  5/24/2025 2329 by Rg Lloyd, RN  Outcome: Progressing

## 2025-05-25 NOTE — PROGRESS NOTES
Late entry    Patient's BP still soft, called [facility] at 0830 for guidelines for morning meds and possible increase in midodrine. MD Bender said defer till NP assigned to patient rounds. Meds held until NP arrived. New orders placed at 0930.    Bolus complete, BP unchanged. Patient also has not had labs drawn since admission. Orders placed for labs. Pending results.     Electronically signed by Lisette Kelsey RN on 5/25/2025 at 12:33 PM      [facility] Awaiting orders.  Electronically signed by Lisette Kelsey RN on 5/25/2025 at 2:03 PM

## 2025-05-26 LAB
EKG DIAGNOSIS: NORMAL
EKG Q-T INTERVAL: 316 MS
EKG QRS DURATION: 128 MS
EKG QTC CALCULATION (BAZETT): 429 MS
EKG R AXIS: 32 DEGREES
EKG T AXIS: 208 DEGREES
EKG VENTRICULAR RATE: 111 BPM
MAGNESIUM SERPL-MCNC: 1.8 MG/DL (ref 1.8–2.4)

## 2025-05-26 PROCEDURE — 6370000000 HC RX 637 (ALT 250 FOR IP): Performed by: NURSE PRACTITIONER

## 2025-05-26 PROCEDURE — 36415 COLL VENOUS BLD VENIPUNCTURE: CPT

## 2025-05-26 PROCEDURE — 6370000000 HC RX 637 (ALT 250 FOR IP): Performed by: INTERNAL MEDICINE

## 2025-05-26 PROCEDURE — 93010 ELECTROCARDIOGRAM REPORT: CPT | Performed by: INTERNAL MEDICINE

## 2025-05-26 PROCEDURE — 94760 N-INVAS EAR/PLS OXIMETRY 1: CPT

## 2025-05-26 PROCEDURE — 83735 ASSAY OF MAGNESIUM: CPT

## 2025-05-26 PROCEDURE — 2580000003 HC RX 258: Performed by: INTERNAL MEDICINE

## 2025-05-26 PROCEDURE — 99233 SBSQ HOSP IP/OBS HIGH 50: CPT | Performed by: INTERNAL MEDICINE

## 2025-05-26 PROCEDURE — 1200000000 HC SEMI PRIVATE

## 2025-05-26 PROCEDURE — 6370000000 HC RX 637 (ALT 250 FOR IP)

## 2025-05-26 RX ORDER — SODIUM CHLORIDE 9 MG/ML
INJECTION, SOLUTION INTRAVENOUS CONTINUOUS
Status: ACTIVE | OUTPATIENT
Start: 2025-05-26 | End: 2025-05-26

## 2025-05-26 RX ORDER — METOPROLOL SUCCINATE 25 MG/1
25 TABLET, EXTENDED RELEASE ORAL 2 TIMES DAILY
Status: DISCONTINUED | OUTPATIENT
Start: 2025-05-26 | End: 2025-05-27 | Stop reason: HOSPADM

## 2025-05-26 RX ADMIN — APIXABAN 5 MG: 5 TABLET, FILM COATED ORAL at 07:46

## 2025-05-26 RX ADMIN — GABAPENTIN 300 MG: 300 CAPSULE ORAL at 07:46

## 2025-05-26 RX ADMIN — MIDODRINE HYDROCHLORIDE 10 MG: 10 TABLET ORAL at 16:41

## 2025-05-26 RX ADMIN — ACETAMINOPHEN 650 MG: 325 TABLET ORAL at 16:41

## 2025-05-26 RX ADMIN — ATORVASTATIN CALCIUM 80 MG: 80 TABLET, FILM COATED ORAL at 20:53

## 2025-05-26 RX ADMIN — SODIUM CHLORIDE: 0.9 INJECTION, SOLUTION INTRAVENOUS at 11:05

## 2025-05-26 RX ADMIN — GABAPENTIN 300 MG: 300 CAPSULE ORAL at 20:53

## 2025-05-26 RX ADMIN — MEMANTINE HYDROCHLORIDE 10 MG: 10 TABLET, FILM COATED ORAL at 07:46

## 2025-05-26 RX ADMIN — ROPINIROLE HYDROCHLORIDE 0.5 MG: 0.5 TABLET, FILM COATED ORAL at 20:53

## 2025-05-26 RX ADMIN — ROPINIROLE HYDROCHLORIDE 0.5 MG: 0.5 TABLET, FILM COATED ORAL at 12:31

## 2025-05-26 RX ADMIN — APIXABAN 5 MG: 5 TABLET, FILM COATED ORAL at 20:53

## 2025-05-26 RX ADMIN — TRAZODONE HYDROCHLORIDE 150 MG: 50 TABLET ORAL at 20:53

## 2025-05-26 RX ADMIN — LEVOTHYROXINE SODIUM 88 MCG: 0.09 TABLET ORAL at 05:34

## 2025-05-26 RX ADMIN — METOPROLOL SUCCINATE 25 MG: 25 TABLET, EXTENDED RELEASE ORAL at 07:46

## 2025-05-26 RX ADMIN — MEMANTINE HYDROCHLORIDE 10 MG: 10 TABLET, FILM COATED ORAL at 20:53

## 2025-05-26 RX ADMIN — PAROXETINE HYDROCHLORIDE 30 MG: 10 TABLET, FILM COATED ORAL at 20:53

## 2025-05-26 RX ADMIN — MIDODRINE HYDROCHLORIDE 10 MG: 10 TABLET ORAL at 07:46

## 2025-05-26 RX ADMIN — ROPINIROLE HYDROCHLORIDE 0.5 MG: 0.5 TABLET, FILM COATED ORAL at 07:46

## 2025-05-26 RX ADMIN — ACETAMINOPHEN 650 MG: 325 TABLET ORAL at 05:34

## 2025-05-26 RX ADMIN — AMIODARONE HYDROCHLORIDE 400 MG: 200 TABLET ORAL at 07:46

## 2025-05-26 RX ADMIN — MIDODRINE HYDROCHLORIDE 10 MG: 10 TABLET ORAL at 12:31

## 2025-05-26 ASSESSMENT — PAIN SCALES - GENERAL
PAINLEVEL_OUTOF10: 0
PAINLEVEL_OUTOF10: 2
PAINLEVEL_OUTOF10: 4

## 2025-05-26 ASSESSMENT — PAIN DESCRIPTION - LOCATION: LOCATION: BACK

## 2025-05-26 NOTE — PROGRESS NOTES
Pt up independently in room. Continues in Afib- rate controlled. HR actually down in the 50s this afternoon. States she feels jittery inside.  BP remains soft- continues on midodrine and received a 600ml bolus. BP slightly improved after that.   Pt NPO at midnight. Per cardiology plan for possible cardioversion or ablation. Pt aware of NPO status.  Electronically signed by Mali Lancaster RN on 5/26/25 at 5:43 PM EDT

## 2025-05-26 NOTE — PLAN OF CARE
Problem: Discharge Planning  Goal: Discharge to home or other facility with appropriate resources  5/26/2025 0847 by Mali Lancaster RN  Outcome: Progressing  5/26/2025 0404 by Brittni Hall RN  Outcome: Progressing     Problem: Safety - Adult  Goal: Free from fall injury  5/26/2025 0847 by Mali Lancaster RN  Outcome: Progressing  5/26/2025 0404 by Brittni Hall RN  Outcome: Progressing     Problem: Pain  Goal: Verbalizes/displays adequate comfort level or baseline comfort level  5/26/2025 0847 by Mali Lancaster RN  Outcome: Progressing  5/26/2025 0404 by Brittni Hall, RN  Outcome: Progressing

## 2025-05-26 NOTE — PROGRESS NOTES
[facility]   Daily Progress Note      Admit Date:  5/23/2025      Subjective:   Ms. Bryan is a 69yo female with hypertrophic cardiomyopathy s/p septal myomectomy and mitral valve replacement (bioprosthetic) with Dr. Lam in 2020 who [facility] our office with rapid atrial fibrillation.  She had a cardioversion on 5/23/25 that restored sinus rhythm but she went back into a-fib. She has been on amiodarone with rapid a-fib at times.     Interval History:  She reports feeling better today.  She is still in persistent atrial fibrillation.  Her blood pressure has been lower.  No events overnight.  She is not requiring any oxygen.      Objective:     BP (!) 89/64   Pulse (!) 106   Temp 98 °F (36.7 °C) (Oral)   Resp 16   Ht 1.6 m (5' 3\")   Wt 67.8 kg (149 lb 7.6 oz)   SpO2 95%   BMI 26.48 kg/m²      Intake/Output Summary (Last 24 hours) at 5/26/2025 1045  Last data filed at 5/26/2025 0929  Gross per 24 hour   Intake 600 ml   Output --   Net 600 ml       Physical Exam:  General:  Awake, alert, NAD  Skin:  Warm and dry  Neck:  JVD<8, no carotid bruits  Chest:  Clear to auscultation, no wheezes/rhonchi/rales  Cardiovascular: Irregularly irregular, normal S1/S2, no M/R/G  Abdomen:  Soft, nontender, +bowel sounds  Extremities: No edema  Pulses: 2+ bilat carotid    2+ bilat radial    2+ bilat femoral        Medications:    metoprolol succinate  25 mg Oral Daily    midodrine  10 mg Oral TID     amiodarone  400 mg Oral BID    Followed by    [START ON 5/31/2025] amiodarone  200 mg Oral Daily    levothyroxine  88 mcg Oral Daily    apixaban  5 mg Oral BID    atorvastatin  80 mg Oral Nightly    rOPINIRole  0.5 mg Oral TID    traZODone  150 mg Oral Nightly    gabapentin  300 mg Oral BID    memantine  10 mg Oral BID    PARoxetine  30 mg Oral Nightly         Lab Data:  CBC:   Recent Labs     05/23/25  1554   WBC 11.9*   HGB 13.9        BMP:    Recent Labs     05/23/25  1554

## 2025-05-27 ENCOUNTER — APPOINTMENT (OUTPATIENT)
Age: 70
DRG: 309 | End: 2025-05-27
Payer: MEDICARE

## 2025-05-27 VITALS
OXYGEN SATURATION: 96 % | DIASTOLIC BLOOD PRESSURE: 48 MMHG | TEMPERATURE: 98.4 F | HEART RATE: 59 BPM | WEIGHT: 148.59 LBS | BODY MASS INDEX: 26.33 KG/M2 | SYSTOLIC BLOOD PRESSURE: 118 MMHG | HEIGHT: 63 IN | RESPIRATION RATE: 14 BRPM

## 2025-05-27 PROBLEM — I95.9 HYPOTENSION: Status: ACTIVE | Noted: 2025-05-27

## 2025-05-27 PROBLEM — I48.91 ATRIAL FIBRILLATION (HCC): Status: RESOLVED | Noted: 2025-05-23 | Resolved: 2025-05-27

## 2025-05-27 PROBLEM — I48.91 ATRIAL FIBRILLATION WITH RAPID VENTRICULAR RESPONSE (HCC): Status: RESOLVED | Noted: 2025-05-24 | Resolved: 2025-05-27

## 2025-05-27 PROBLEM — I48.19 PERSISTENT ATRIAL FIBRILLATION (HCC): Status: ACTIVE | Noted: 2024-01-16

## 2025-05-27 PROBLEM — Z98.890 S/P MVR (MITRAL VALVE REPAIR): Status: ACTIVE | Noted: 2025-05-27

## 2025-05-27 LAB
ANION GAP SERPL CALCULATED.3IONS-SCNC: 6 MMOL/L (ref 3–16)
BASOPHILS # BLD: 0 K/UL (ref 0–0.2)
BASOPHILS NFR BLD: 0.7 %
BUN SERPL-MCNC: 8 MG/DL (ref 7–20)
CALCIUM SERPL-MCNC: 10.1 MG/DL (ref 8.3–10.6)
CHLORIDE SERPL-SCNC: 109 MMOL/L (ref 99–110)
CO2 SERPL-SCNC: 26 MMOL/L (ref 21–32)
CREAT SERPL-MCNC: 0.7 MG/DL (ref 0.6–1.2)
CRP SERPL-MCNC: <3 MG/L (ref 0–5.1)
DEPRECATED RDW RBC AUTO: 13.2 % (ref 12.4–15.4)
ECHO AO ROOT DIAM: 3.3 CM
ECHO AO ROOT INDEX: 1.94 CM/M2
ECHO AV AREA PEAK VELOCITY: 0.9 CM2
ECHO AV AREA VTI: 0.9 CM2
ECHO AV AREA/BSA PEAK VELOCITY: 0.5 CM2/M2
ECHO AV AREA/BSA VTI: 0.5 CM2/M2
ECHO AV MEAN GRADIENT: 20 MMHG
ECHO AV MEAN VELOCITY: 2.1 M/S
ECHO AV PEAK GRADIENT: 39 MMHG
ECHO AV PEAK VELOCITY: 3.1 M/S
ECHO AV VELOCITY RATIO: 0.48
ECHO AV VTI: 74.7 CM
ECHO BSA: 1.7 M2
ECHO EST RA PRESSURE: 3 MMHG
ECHO LA DIAMETER INDEX: 2.18 CM/M2
ECHO LA DIAMETER: 3.7 CM
ECHO LA TO AORTIC ROOT RATIO: 1.12
ECHO LV EF PHYSICIAN: 68 %
ECHO LV FRACTIONAL SHORTENING: 53 % (ref 28–44)
ECHO LV INTERNAL DIMENSION DIASTOLE INDEX: 2.76 CM/M2
ECHO LV INTERNAL DIMENSION DIASTOLIC: 4.7 CM (ref 3.9–5.3)
ECHO LV INTERNAL DIMENSION SYSTOLIC INDEX: 1.29 CM/M2
ECHO LV INTERNAL DIMENSION SYSTOLIC: 2.2 CM
ECHO LV IVSD: 1.9 CM (ref 0.6–0.9)
ECHO LV MASS 2D: 356.4 G (ref 67–162)
ECHO LV MASS INDEX 2D: 209.6 G/M2 (ref 43–95)
ECHO LV POSTERIOR WALL DIASTOLIC: 1.5 CM (ref 0.6–0.9)
ECHO LV RELATIVE WALL THICKNESS RATIO: 0.64
ECHO LVOT AREA: 1.8 CM2
ECHO LVOT AV VTI INDEX: 0.46
ECHO LVOT DIAM: 1.5 CM
ECHO LVOT MEAN GRADIENT: 5 MMHG
ECHO LVOT PEAK GRADIENT: 9 MMHG
ECHO LVOT PEAK VELOCITY: 1.5 M/S
ECHO LVOT STROKE VOLUME INDEX: 35.4 ML/M2
ECHO LVOT SV: 60.2 ML
ECHO LVOT VTI: 34.1 CM
ECHO MV A VELOCITY: 1.36 M/S
ECHO MV AREA VTI: 0.7 CM2
ECHO MV E DECELERATION TIME (DT): 458 MS
ECHO MV E VELOCITY: 2.32 M/S
ECHO MV E/A RATIO: 1.71
ECHO MV LVOT VTI INDEX: 2.45
ECHO MV MAX VELOCITY: 2.3 M/S
ECHO MV MEAN GRADIENT: 10 MMHG
ECHO MV MEAN VELOCITY: 1.5 M/S
ECHO MV PEAK GRADIENT: 21 MMHG
ECHO MV VTI: 83.4 CM
ECHO RV TAPSE: 2 CM (ref 1.7–?)
EOSINOPHIL # BLD: 0.1 K/UL (ref 0–0.6)
EOSINOPHIL NFR BLD: 1 %
ERYTHROCYTE [SEDIMENTATION RATE] IN BLOOD BY WESTERGREN METHOD: 6 MM/HR (ref 0–30)
GFR SERPLBLD CREATININE-BSD FMLA CKD-EPI: >90 ML/MIN/{1.73_M2}
GLUCOSE SERPL-MCNC: 92 MG/DL (ref 70–99)
HCT VFR BLD AUTO: 36.5 % (ref 36–48)
HGB BLD-MCNC: 12.5 G/DL (ref 12–16)
LYMPHOCYTES # BLD: 1.1 K/UL (ref 1–5.1)
LYMPHOCYTES NFR BLD: 16.7 %
MAGNESIUM SERPL-MCNC: 1.9 MG/DL (ref 1.8–2.4)
MCH RBC QN AUTO: 30.4 PG (ref 26–34)
MCHC RBC AUTO-ENTMCNC: 34.4 G/DL (ref 31–36)
MCV RBC AUTO: 88.6 FL (ref 80–100)
MONOCYTES # BLD: 0.3 K/UL (ref 0–1.3)
MONOCYTES NFR BLD: 4.7 %
NEUTROPHILS # BLD: 5 K/UL (ref 1.7–7.7)
NEUTROPHILS NFR BLD: 76.9 %
PLATELET # BLD AUTO: 176 K/UL (ref 135–450)
PMV BLD AUTO: 8 FL (ref 5–10.5)
POTASSIUM SERPL-SCNC: 3.8 MMOL/L (ref 3.5–5.1)
RBC # BLD AUTO: 4.12 M/UL (ref 4–5.2)
SODIUM SERPL-SCNC: 141 MMOL/L (ref 136–145)
WBC # BLD AUTO: 6.6 K/UL (ref 4–11)

## 2025-05-27 PROCEDURE — 83735 ASSAY OF MAGNESIUM: CPT

## 2025-05-27 PROCEDURE — 6370000000 HC RX 637 (ALT 250 FOR IP): Performed by: INTERNAL MEDICINE

## 2025-05-27 PROCEDURE — 99232 SBSQ HOSP IP/OBS MODERATE 35: CPT

## 2025-05-27 PROCEDURE — 85652 RBC SED RATE AUTOMATED: CPT

## 2025-05-27 PROCEDURE — 93005 ELECTROCARDIOGRAM TRACING: CPT

## 2025-05-27 PROCEDURE — 93321 DOPPLER ECHO F-UP/LMTD STD: CPT

## 2025-05-27 PROCEDURE — 85025 COMPLETE CBC W/AUTO DIFF WBC: CPT

## 2025-05-27 PROCEDURE — 93325 DOPPLER ECHO COLOR FLOW MAPG: CPT | Performed by: INTERNAL MEDICINE

## 2025-05-27 PROCEDURE — 80048 BASIC METABOLIC PNL TOTAL CA: CPT

## 2025-05-27 PROCEDURE — 6370000000 HC RX 637 (ALT 250 FOR IP)

## 2025-05-27 PROCEDURE — 93321 DOPPLER ECHO F-UP/LMTD STD: CPT | Performed by: INTERNAL MEDICINE

## 2025-05-27 PROCEDURE — 36415 COLL VENOUS BLD VENIPUNCTURE: CPT

## 2025-05-27 PROCEDURE — 86140 C-REACTIVE PROTEIN: CPT

## 2025-05-27 PROCEDURE — 99239 HOSP IP/OBS DSCHRG MGMT >30: CPT

## 2025-05-27 PROCEDURE — 93308 TTE F-UP OR LMTD: CPT | Performed by: INTERNAL MEDICINE

## 2025-05-27 PROCEDURE — 94760 N-INVAS EAR/PLS OXIMETRY 1: CPT

## 2025-05-27 PROCEDURE — 6370000000 HC RX 637 (ALT 250 FOR IP): Performed by: NURSE PRACTITIONER

## 2025-05-27 RX ORDER — METOPROLOL SUCCINATE 25 MG/1
25 TABLET, EXTENDED RELEASE ORAL 2 TIMES DAILY
Qty: 30 TABLET | Refills: 3 | Status: SHIPPED | OUTPATIENT
Start: 2025-05-27

## 2025-05-27 RX ORDER — ACETAMINOPHEN 500 MG
1000 TABLET ORAL EVERY 6 HOURS PRN
Status: DISCONTINUED | OUTPATIENT
Start: 2025-05-27 | End: 2025-05-27 | Stop reason: HOSPADM

## 2025-05-27 RX ORDER — MIDODRINE HYDROCHLORIDE 10 MG/1
10 TABLET ORAL
Qty: 90 TABLET | Refills: 2 | Status: SHIPPED | OUTPATIENT
Start: 2025-05-27

## 2025-05-27 RX ORDER — AMIODARONE HYDROCHLORIDE 200 MG/1
TABLET ORAL
Qty: 76 TABLET | Refills: 0 | Status: SHIPPED | OUTPATIENT
Start: 2025-05-27 | End: 2025-07-30

## 2025-05-27 RX ADMIN — MIDODRINE HYDROCHLORIDE 10 MG: 10 TABLET ORAL at 12:19

## 2025-05-27 RX ADMIN — LEVOTHYROXINE SODIUM 88 MCG: 0.09 TABLET ORAL at 05:51

## 2025-05-27 RX ADMIN — MIDODRINE HYDROCHLORIDE 10 MG: 10 TABLET ORAL at 09:19

## 2025-05-27 RX ADMIN — ACETAMINOPHEN 1000 MG: 500 TABLET ORAL at 07:25

## 2025-05-27 RX ADMIN — GABAPENTIN 300 MG: 300 CAPSULE ORAL at 09:19

## 2025-05-27 RX ADMIN — METOPROLOL SUCCINATE 25 MG: 25 TABLET, EXTENDED RELEASE ORAL at 09:19

## 2025-05-27 RX ADMIN — ROPINIROLE HYDROCHLORIDE 0.5 MG: 0.5 TABLET, FILM COATED ORAL at 09:19

## 2025-05-27 RX ADMIN — AMIODARONE HYDROCHLORIDE 400 MG: 200 TABLET ORAL at 09:19

## 2025-05-27 RX ADMIN — APIXABAN 5 MG: 5 TABLET, FILM COATED ORAL at 09:19

## 2025-05-27 RX ADMIN — MEMANTINE HYDROCHLORIDE 10 MG: 10 TABLET, FILM COATED ORAL at 09:20

## 2025-05-27 ASSESSMENT — PAIN DESCRIPTION - DESCRIPTORS: DESCRIPTORS: ACHING

## 2025-05-27 ASSESSMENT — PAIN DESCRIPTION - FREQUENCY: FREQUENCY: CONTINUOUS

## 2025-05-27 ASSESSMENT — PAIN SCALES - GENERAL
PAINLEVEL_OUTOF10: 4
PAINLEVEL_OUTOF10: 0

## 2025-05-27 ASSESSMENT — PAIN DESCRIPTION - LOCATION: LOCATION: HEAD

## 2025-05-27 ASSESSMENT — PAIN DESCRIPTION - ONSET: ONSET: PROGRESSIVE

## 2025-05-27 ASSESSMENT — PAIN DESCRIPTION - PAIN TYPE: TYPE: ACUTE PAIN

## 2025-05-27 NOTE — DISCHARGE INSTRUCTIONS
Hold Toprol XL 25mg for SBP (systolic blood pressure) <90 mmHg    Please schedule outpatient follow up with Dr. Jaime Velazquez  - would recommend cardiac MRI.

## 2025-05-27 NOTE — PLAN OF CARE
Problem: Discharge Planning  Goal: Discharge to home or other facility with appropriate resources  5/27/2025 0807 by Ashley Palacio, RN  Outcome: Progressing  5/27/2025 0018 by Brittni Hall, RN  Outcome: Progressing     Problem: Pain  Goal: Verbalizes/displays adequate comfort level or baseline comfort level  5/27/2025 0807 by Ashley Palacio, RN  Outcome: Progressing  5/27/2025 0018 by Brittni Hall, RN  Outcome: Progressing

## 2025-05-27 NOTE — PLAN OF CARE
Problem: Discharge Planning  Goal: Discharge to home or other facility with appropriate resources  5/27/2025 1323 by Ashley Palacio RN  Outcome: Adequate for Discharge  5/27/2025 0807 by Ashley Palacio RN  Outcome: Progressing  5/27/2025 0018 by Brittni Hall RN  Outcome: Progressing     Problem: Safety - Adult  Goal: Free from fall injury  5/27/2025 1323 by Ashley Palacio RN  Outcome: Adequate for Discharge  5/27/2025 0807 by Ashley Palacio RN  Outcome: Progressing  5/27/2025 0018 by Brittni Hall RN  Outcome: Progressing     Problem: Pain  Goal: Verbalizes/displays adequate comfort level or baseline comfort level  5/27/2025 1323 by Ashley Palacio RN  Outcome: Adequate for Discharge  5/27/2025 0807 by Ashley Palacio RN  Outcome: Progressing  5/27/2025 0018 by Brittni Hall, RN  Outcome: Progressing

## 2025-05-27 NOTE — ACP (ADVANCE CARE PLANNING)
Advance Care Planning   Healthcare Decision Maker:    Primary Decision Maker: Shiv Bryan J - Spouse - [phone]    Respectfully submitted,    Maria Guadalupe SHARPE, WALDEMAR  [facility]   [phone]    Electronically signed by DELL Pierre, LSW on 5/27/2025 at 11:46 AM

## 2025-05-27 NOTE — PROGRESS NOTES
[facility]     Electrophysiology                                     Progress Note    Admission date:  5/23/2025    Reason for follow up visit: Atrial fibrillation    HPI/CC: Mindy Bryan is a 70 y.o. female who was admitted on 5/23/25 with palpitations. She has a pertinent PMH history of adrenal adenoma, HTN, hypertrophic cardiomyopathy, severe MR (s/p MVR and basal septal myomectomy by Dr Madrid on 6/29/20), and paroxysmal atrial fibrillation.     She was diagnosed with AF in September 2023 when she presented to [facility] ED with palpitations. Spontaneously converted to sinus, then decided to proceed with ablation. S/p RFA with PVI and additional L AFL ablation with Dr Boyd 11/1/23. Subsequent visits (2/6/24, 12/4/24) sinus rhythm, but continued fatigue. Echo ordered d/t fatigue with possible cause dyssynchrony from LBBB and hx of HOCM. Echo without changes, was maintained on Toprol XL 50mg daily and Eliquis.     Interval History:    She presented to [facility] office 5/23/25 with complaints of palpitations stating she felt as though she was back in atrial fibrillation. She stated she was over at her sister's and started feeling \"bad\". Endorsed palpitations with some chest pain and shaky hands. EKG revealed atrial fibrillation with rapid ventricular rates ~130-140s. D/t PMH HOCM, with symptomatic palpitations and lower BP (~90/50), decision was made to admit to ED for repeat echo, IV Amio loading and possible cardioversion.     S/p DCCV 5/33/25 with Dr Castellon     5/24/25:   - Patient converted back into AF overnight with rates poorly controlled  - Started Metoprolol Tartrate 12.5mg TID   - BP soft  - Started Midodrine 5mg TID   - Continued Amiodarone gtt @ 1 for 24hr, with starting PO Amiodarone     5/25/25:   - Saline bolus 300mL, 12 lead EKG and Cortisol level  - Increased Midodrine to 10mg TID   - Switched Metoprolol Tartrate to Toprol XL 25mg nightly     5/26/25:   - Increased Toprol XL 25mg to BID  -  Visually no aortic stenosis but gradient increased across valve which may be related to projection of the mitral valve struts into the LVOT.    Mitral Valve: Medtronic bovine bioprosthetic valve that is well-seated with normal leaflet motion with a size of 25 mm. MV mean gradient is 10 mmHg.    Tricuspid Valve: Mild to moderate regurgitation. Moderately elevated RVSP, consistent with moderate pulmonary hypertension. Est RA pressure is 3 mmHg. The estimated RVSP is 53 mmHg.    All labs and testing reviewed.      Assessment:  1. Paroxysmal Atrial Fibrillation    - Diagnosed 9/16/23   - Symptomatic with palpitations    - LVEF 60-65% (5/23/25)   - CHADS-VASc 3 (Age, Gender, HTN)    - Eliquis 5mg BID      - Hgb 13.9 hct 40, Creat 0.7 (5/23/2025)   - s/p RFA with PVI and additional left atrial flutter ablation with Dr Boyd 11/1/23   - s/p successful DCCV 5/23/25 with Dr Castellon     - converted back to AF ~7hr after DCCV despite IV/PO Amio loading   - Converted to sinus rhythm 1459 on 5/26/25    - BP soft, multiple fluid bolus over the weekend    - Toprol XL 25mg BID   - Amiodarone 400mg BID    - TSH 0.5 AST 35 ALT 27 (5/23/2025)   - Yearly PFT and ophthalmology    2. Hypertrophic Obstructive Cardiomyopathy   - s/p septal resection 2020  - Echo 5/23/25 with severe septal thickening - IVSd 1.8cm with mild LVOT at rest, difficult to assess d/t heart rhythm and rate in AF RVR   - Limited Echo this AM now back in sinus rhythm  - Consistent severe septal thickening, Mild LVOT at rest. AV peak gradient 39 mmHg, mean gradient 20 mmHg   - Discussed with Dr Bender. Would suggest follow-up cardiac MRI and consultation with Dr. Jaime Velazquez    - Follows with Dr Kennedy, next apt scheduled ~October, would recommend sooner follow up    3. Mitral Regurgitation    - severe in 2020   - s/p MVR    - Echo 5/23/25 showed bioprosthetic valve well-seated with normal function    4. Hypotension   - Soft BP while in hospital   - s/p multiple  fluid bolus over the weekend  - Midodrine 10mg TID     5. Essential Hypertension  - Controlled while in hospital  - Home BP monitoring encouraged, information provided on how to accurately measure BP at home.  - Counseled to follow a low salt diet to assure blood pressure remains controlled for cardiovascular risk factor modification.   - The patient is counseled to get regular exercise 3-5 times per week and maintain a healthy weight reduce cardiovascular risk factors.   - Following with Sarah Lau MD for management.     6. Tobacco abuse   -Patient quit smoking ~15yr ago    7.  Hyperlipidemia   - Continue statin   - Follow-up care with PCP      Plan:   Amiodarone 400mg BID for 7d, then 200mg daily   Toprol XL 25mg BID  Midodrine 10mg TID    Continue Eliquis 5mg BID  Will need outpatient follow up with Dr Kennedy  Would recommend follow up cardiac MRI, consultation with Dr Jaime Velazquez   Will also need outpatient follow up with Dr Alvarez in ~1mo after discharge   - Currently scheduled with EP NP 7/9      EP will sign off at this time  Assessment and plan discussed with Dr. Bender, who is in agreement.     TABITHA Wong-MARYAM  [facility]  [phone]

## 2025-05-27 NOTE — DISCHARGE SUMMARY
[facility]    DISCHARGE SUMMARY      Patient ID:  Mindy Bryan  [id number] 70 y.o. 1955    Admit date: 5/23/2025    Discharge date:  5/27/25    Admitting Physician: Erica Castellon DO     Discharge Physician: Cass León, APRN - CNP     Admission Diagnoses: Atrial fibrillation (HCC) [I48.91]  Paroxysmal atrial fibrillation (HCC) [I48.0]  Atrial fibrillation with rapid ventricular response (HCC) [I48.91]    Discharge Diagnoses:   Patient Active Problem List   Diagnosis    Hypertension    IBS (irritable bowel syndrome)    Microscopic colitis    GERD (gastroesophageal reflux disease)    Migraine    Osteoporosis    Anxiety    Neck arthritis C6/C7    Nephrolithiasis    Hypothyroidism    Endometrial hyperplasia    Chronic nausea    Mild intermittent asthma without complication    Fatty liver    Multiple thyroid nodules    Severe mitral regurgitation    Hypertrophic obstructive cardiomyopathy (HCC)    Mixed hyperlipidemia    S/P ventricular septal myectomy    H/O mitral valve replacement    Hyperparathyroidism    Pulmonary nodule 1 cm or greater in diameter    History of smoking 30 or more pack years    Restless leg syndrome    Psychophysiological insomnia    New onset atrial fibrillation (HCC)    Bilateral impacted cerumen    Lower thoracic back pain    Closed fracture of first lumbar vertebra (HCC)        Discharged Condition: good    Hospital Course: Mindy Bryan was admitted on 5/23/25 with palpitations. She has a pertinent PMH history of adrenal adenoma, HTN, hypertrophic cardiomyopathy, severe MR (s/p MVR and basal septal myomectomy by Dr Madrid on 6/29/20), and paroxysmal atrial fibrillation.      She was diagnosed with AF in September 2023 when she presented to [facility] ED with palpitations. Spontaneously converted to sinus, then decided to proceed with ablation. S/p RFA with PVI and additional L AFL ablation with Dr Boyd 11/1/23. Subsequent visits (2/6/24, 12/4/24) sinus rhythm, but  continued fatigue. Echo ordered d/t fatigue with possible cause dyssynchrony from LBBB and hx of HOCM. Echo without changes, was maintained on Toprol XL 50mg daily and Eliquis.      Interval History:    She presented to [facility] office 5/23/25 with complaints of palpitations stating she felt as though she was back in atrial fibrillation. She stated she was over at her sister's and started feeling \"bad\". Endorsed palpitations with some chest pain and shaky hands. EKG revealed atrial fibrillation with rapid ventricular rates ~130-140s. D/t PMH HOCM, with symptomatic palpitations and lower BP (~90/50), decision was made to admit to ED for repeat echo, IV Amio loading and possible cardioversion.      S/p DCCV 5/33/25 with Dr Castellon      5/24/25:   - Patient converted back into AF overnight with rates poorly controlled  - Started Metoprolol Tartrate 12.5mg TID   - BP soft  - Started Midodrine 5mg TID   - Continued Amiodarone gtt @ 1 for 24hr, with starting PO Amiodarone      5/25/25:   - Saline bolus 300mL, 12 lead EKG and Cortisol level  - Increased Midodrine to 10mg TID   - Switched Metoprolol Tartrate to Toprol XL 25mg nightly      5/26/25:   - Increased Toprol XL 25mg to BID  - 600mL saline bolus over 3hr       1. Paroxysmal Atrial Fibrillation               - Diagnosed 9/16/23              - Symptomatic with palpitations               - LVEF 60-65% (5/23/25)          - CHADS-VASc 3 (Age, Gender, HTN)                          - Eliquis 5mg BID                                       - Hgb 13.9 hct 40, Creat 0.7   (5/23/2025)              - s/p RFA with PVI and additional left atrial flutter ablation with Dr Boyd 11/1/23              - s/p successful DCCV 5/23/25 with Dr Casteloln                           - converted back to AF ~7hr after DCCV despite IV/PO Amio loading              - Converted to sinus rhythm 1459 on 5/26/25               - BP soft, multiple fluid bolus over the weekend               - Toprol XL 25mg BID   -  mmHg. AV Peak Velocity is 3.7 m/s. AV Mean Velocity is 2.6 m/s. AV VTI is 55.5 cm. LVOT VTI is 16.2 cm.    Mitral Valve: Bovine bioprosthetic valve that is well-seated with a size of 25 mm. Appears to have normal function  by 2D assessment.    Tricuspid Valve: Moderate regurgitation.    Left Ventricle: Normal wall motion.     Echo 1/20/25:    Image quality is suboptimal.    Left Ventricle: Normal left ventricular systolic function with a visually estimated EF of 55 - 60%. Left ventricle size is normal. Normal wall thickness. Normal wall motion.    Right Ventricle: Right ventricle size is normal. Normal systolic function.    Left Atrium: Left atrium is moderately dilated.    Aortic Valve: Trileaflet valve.  Visually no aortic stenosis but gradient increased across valve which may be related to projection of the mitral valve struts into the LVOT.    Mitral Valve: Medtronic bovine bioprosthetic valve that is well-seated with normal leaflet motion with a size of 25 mm. MV mean gradient is 10 mmHg.    Tricuspid Valve: Mild to moderate regurgitation. Moderately elevated RVSP, consistent with moderate pulmonary hypertension. Est RA pressure is 3 mmHg. The estimated RVSP is 53 mmHg.    Labs:   Lab Results   Component Value Date    CREATININE 0.7 05/27/2025    BUN 8 05/27/2025     05/27/2025    K 3.8 05/27/2025     05/27/2025    CO2 26 05/27/2025      Lab Results   Component Value Date    WBC 6.6 05/27/2025    HGB 12.5 05/27/2025    HCT 36.5 05/27/2025    MCV 88.6 05/27/2025     05/27/2025      Lab Results   Component Value Date    INR 1.00 09/16/2023    PROTIME 13.2 09/16/2023    No results found for: \"BNP\"  Cardiac Enzymes:   Troponin, High Sensitivity (ng/L)   Date Value   05/23/2025 26 (H)   09/16/2023 19 (H)   09/16/2023 16 (H)      A1C:   Hemoglobin A1C   Date Value Ref Range Status   09/23/2024 4.9 See comment % Final     Comment:     Comment:  Diagnosis of Diabetes: > or = 6.5%  Increased risk of

## 2025-05-27 NOTE — PROGRESS NOTES
Pt discharged to home via wheelchair with RN. Pt states pain is at a manageable level and denies nausea. Pt and family member verbalized understanding of all discharge instructions, with no remaining questions or concerns. PIV removed per protocol.

## 2025-05-27 NOTE — CARE COORDINATION
Case Management Assessment  Initial Evaluation    Date/Time of Evaluation: 5/27/2025 11:46 AM  Assessment Completed by: Melissa Ramírez    If patient is discharged prior to next notation, then this note serves as note for discharge by case management.    Patient Name: Mindy Bryan                   YOB: 1955  Diagnosis: Atrial fibrillation (HCC) [I48.91]  Paroxysmal atrial fibrillation (HCC) [I48.0]  Atrial fibrillation with rapid ventricular response (HCC) [I48.91]                   Date / Time: 5/23/2025  3:30 PM    Patient Admission Status: Inpatient   Readmission Risk (Low < 19, Mod (19-27), High > 27): Readmission Risk Score: 7.1    Current PCP: Sarah Lau MD  PCP verified by CM? Yes    Chart Reviewed: Yes      History Provided by: Patient  Patient Orientation: Alert and Oriented    Patient Cognition: Alert    Hospitalization in the last 30 days (Readmission):  No    If yes, Readmission Assessment in CM Navigator will be completed.    Advance Directives:      Code Status: Full Code   Patient's Primary Decision Maker is: Legal Next of Kin    Primary Decision Maker: Shiv Bryan - Spouse - [phone]    Discharge Planning:    Patient lives with: Spouse/Significant Other Type of Home: House  Primary Care Giver: Self  Patient Support Systems include: Family Members, Spouse/Significant Other   Current Financial resources: Medicare  Current community resources: None  Current services prior to admission: None            Current DME:              Type of Home Care services:  None    ADLS  Prior functional level: Independent in ADLs/IADLs  Current functional level: Independent in ADLs/IADLs    PT AM-PAC:   /24  OT AM-PAC:   /24    Family can provide assistance at DC: Yes  Would you like Case Management to discuss the discharge plan with any other family members/significant others, and if so, who? Yes (.)  Plans to Return to Present Housing: Yes  Other Identified Issues/Barriers to

## 2025-05-28 ENCOUNTER — CARE COORDINATION (OUTPATIENT)
Dept: CARE COORDINATION | Age: 70
End: 2025-05-28

## 2025-05-28 ENCOUNTER — TELEPHONE (OUTPATIENT)
Dept: CARDIOLOGY CLINIC | Age: 70
End: 2025-05-28

## 2025-05-28 DIAGNOSIS — I10 PRIMARY HYPERTENSION: Primary | ICD-10-CM

## 2025-05-28 DIAGNOSIS — Z98.890 S/P VENTRICULAR SEPTAL MYECTOMY: ICD-10-CM

## 2025-05-28 DIAGNOSIS — I42.1 HYPERTROPHIC OBSTRUCTIVE CARDIOMYOPATHY (HCC): Primary | ICD-10-CM

## 2025-05-28 LAB
EKG ATRIAL RATE: 58 BPM
EKG DIAGNOSIS: NORMAL
EKG P AXIS: 63 DEGREES
EKG P-R INTERVAL: 188 MS
EKG Q-T INTERVAL: 488 MS
EKG QRS DURATION: 142 MS
EKG QTC CALCULATION (BAZETT): 479 MS
EKG R AXIS: 17 DEGREES
EKG T AXIS: 155 DEGREES
EKG VENTRICULAR RATE: 58 BPM

## 2025-05-28 PROCEDURE — 1111F DSCHRG MED/CURRENT MED MERGE: CPT | Performed by: STUDENT IN AN ORGANIZED HEALTH CARE EDUCATION/TRAINING PROGRAM

## 2025-05-28 PROCEDURE — 93010 ELECTROCARDIOGRAM REPORT: CPT | Performed by: STUDENT IN AN ORGANIZED HEALTH CARE EDUCATION/TRAINING PROGRAM

## 2025-05-28 NOTE — TELEPHONE ENCOUNTER
Patient called in stating she was in the hospital from 5/23/2025 - 5/27/2025.   She said she was told by Cass León APRN - CNP that she needed a cardiac MRI done.   There is currently no order.     Please advise.     Callback: [phone]

## 2025-05-28 NOTE — CARE COORDINATION
Care Transitions Note    Initial Call - Call within 2 business days of discharge: Yes    Patient Current Location:  Home: [address] [place] [address]  [address]  [facility] [address]    Care Transition Nurse contacted the patient by telephone to perform post hospital discharge assessment, verified name and  as identifiers.  Provided introduction to self, and explanation of the Care Transition Nurse role.    Patient: Mindy Bryan    Patient : 1955   MRN: [id number]    Reason for Admission: Afib RVR, hypotension, NN   Discharge Date: 25  RURS: Readmission Risk Score: 7.1      Last Discharge Facility       Date Complaint Diagnosis Description Type Department Provider    25 AFib Paroxysmal atrial fibrillation (HCC) ... ED to Hosp-Admission (Discharged) (ADMITTED) [facility] Erica Castellon, ; APOLONIA Carmen..            Was this an external facility discharge? No    Additional needs identified to be addressed with provider   No needs identified             Method of communication with provider: none.    Patients top risk factors for readmission: medical condition-.    Interventions to address risk factors:   Education: hypotension, BP checks, follow ups    Care Summary Note: Spoke with pt who states she is doing well since home. States she has no C, SOB, dizziness or lightheadedness. States she is checking BP twice daily and monitoring HR - states recent HR was 60. We discussed low HR/BP and when to call cards. Pt denies needing any AD to get around. Pt states she will drive herself to her appts. Did not wish to make a HFU with PCP but will call cardiology office to make an appt today. Med reviewed along with AVS. Will continue to follow.     Care Transition Nurse reviewed discharge instructions with patient. The patient was given an opportunity to ask questions; all questions answered at this time.. The patient verbalized understanding.   Were discharge instructions available to patient? Yes.   Reviewed

## 2025-06-02 ENCOUNTER — CARE COORDINATION (OUTPATIENT)
Dept: CARE COORDINATION | Age: 70
End: 2025-06-02

## 2025-06-02 NOTE — CARE COORDINATION
Per PCP - no need for HFU unless need arises.     Kita Dailey, COMFORTN, RN   Care Transition Nurse  Mobile: [phone]

## 2025-06-03 ENCOUNTER — HOSPITAL ENCOUNTER (OUTPATIENT)
Dept: INFUSION THERAPY | Age: 70
Setting detail: INFUSION SERIES
Discharge: HOME | End: 2025-06-03
Payer: MEDICARE

## 2025-06-03 VITALS
HEART RATE: 48 BPM | DIASTOLIC BLOOD PRESSURE: 48 MMHG | TEMPERATURE: 98.2 F | SYSTOLIC BLOOD PRESSURE: 126 MMHG | RESPIRATION RATE: 15 BRPM

## 2025-06-03 DIAGNOSIS — M81.0 OSTEOPOROSIS, POSTMENOPAUSAL: ICD-10-CM

## 2025-06-03 DIAGNOSIS — M81.0 OSTEOPOROSIS WITHOUT CURRENT PATHOLOGICAL FRACTURE, UNSPECIFIED OSTEOPOROSIS TYPE: Primary | ICD-10-CM

## 2025-06-03 PROCEDURE — 96372 THER/PROPH/DIAG INJ SC/IM: CPT

## 2025-06-03 PROCEDURE — 6360000002 HC RX W HCPCS: Performed by: FAMILY MEDICINE

## 2025-06-03 RX ORDER — EPINEPHRINE 1 MG/ML
0.3 INJECTION, SOLUTION, CONCENTRATE INTRAVENOUS PRN
Status: CANCELLED | OUTPATIENT
Start: 2025-12-02

## 2025-06-03 RX ORDER — DIPHENHYDRAMINE HYDROCHLORIDE 50 MG/ML
50 INJECTION, SOLUTION INTRAMUSCULAR; INTRAVENOUS
Status: CANCELLED | OUTPATIENT
Start: 2025-12-02

## 2025-06-03 RX ORDER — SODIUM CHLORIDE 9 MG/ML
INJECTION, SOLUTION INTRAVENOUS CONTINUOUS
Status: CANCELLED | OUTPATIENT
Start: 2025-12-02

## 2025-06-03 RX ORDER — ACETAMINOPHEN 325 MG/1
650 TABLET ORAL
Status: CANCELLED | OUTPATIENT
Start: 2025-12-02

## 2025-06-03 RX ORDER — ONDANSETRON 2 MG/ML
8 INJECTION INTRAMUSCULAR; INTRAVENOUS
Status: CANCELLED | OUTPATIENT
Start: 2025-12-02

## 2025-06-03 RX ORDER — ALBUTEROL SULFATE 90 UG/1
4 INHALANT RESPIRATORY (INHALATION) PRN
Status: CANCELLED | OUTPATIENT
Start: 2025-12-02

## 2025-06-03 RX ORDER — HYDROCORTISONE SODIUM SUCCINATE 100 MG/2ML
100 INJECTION INTRAMUSCULAR; INTRAVENOUS
Status: CANCELLED | OUTPATIENT
Start: 2025-12-02

## 2025-06-03 RX ADMIN — DENOSUMAB 60 MG: 60 INJECTION SUBCUTANEOUS at 11:31

## 2025-06-03 ASSESSMENT — PAIN DESCRIPTION - ONSET: ONSET: ON-GOING

## 2025-06-03 ASSESSMENT — PAIN DESCRIPTION - LOCATION: LOCATION: BACK

## 2025-06-03 ASSESSMENT — PAIN DESCRIPTION - PAIN TYPE: TYPE: CHRONIC PAIN

## 2025-06-03 ASSESSMENT — PAIN DESCRIPTION - ORIENTATION: ORIENTATION: LOWER;RIGHT;LEFT

## 2025-06-03 ASSESSMENT — PAIN - FUNCTIONAL ASSESSMENT: PAIN_FUNCTIONAL_ASSESSMENT: PREVENTS OR INTERFERES SOME ACTIVE ACTIVITIES AND ADLS

## 2025-06-03 ASSESSMENT — PAIN DESCRIPTION - DESCRIPTORS: DESCRIPTORS: SHOOTING

## 2025-06-03 ASSESSMENT — PAIN DESCRIPTION - FREQUENCY: FREQUENCY: INTERMITTENT

## 2025-06-03 ASSESSMENT — PAIN SCALES - GENERAL: PAINLEVEL_OUTOF10: 5

## 2025-06-03 NOTE — DISCHARGE INSTRUCTIONS
Outpatient Infusion Discharge Instructions  [facility]  [address] [place], [place] [address]  Telephone: [phone]      FAX [phone]    NAME:  Mindy Brayn  YOB: 1955  MEDICAL RECORD NUMBER:  [id number]  DATE:  6/3/25    Reason for Outpatient Infusion Visit: Prolia    If you develop any these symptoms please contact you Doctor    [x] Nausea and/or vomiting not relieved with medication   [x] Swelling, redness, and/or bleeding at injection or IV site    [x] Fever or chills  [x] Rash or itching   [x] Shortness of breath  [x] Please review After Visit Summary (AVS) information on    [] Other      Outpatient Infusion Center Information: Should you experience any significant changes in your health or have questions about your care please contact the [facility] at [phone] MONDAY - FRIDAY 8:00 am - 4:00 pm.  If you need help outside these hours and cannot wait until we are again available, contact your Primary Care Physician or go to the hospital emergency room.       Electronically signed by Franny Stratton RN on 6/3/2025 at 11:27 AM

## 2025-06-03 NOTE — PROGRESS NOTES
Outpatient Infusion Center  [facility]     Prolia Visit    NAME:  Mindy Bryan  YOB: 1955  MEDICAL RECORD NUMBER:  [id number]  Episode Date:  6/3/2025    Patient arrived to Outpatient Infusion Center   [] per wheelchair   [x] ambulatory     Is this the patient's first Prolia Injection? No     Date of last Prolia Injection ? November 5, 2024.     Did the patient experience any adverse reactions to Prolia Injection? No    Any recent oral or dental surgery? No    Any recent active fever, infections and/or illnesses? No    Patient has history of pathological fracture? No    Patient has had a recent fracture due to trauma or injury? Yes, fell in Jan 2025  on marble floor and fractured back L1. Going to have kyphoplasty in the future    Patient has had a recent orthopedic surgery or procedure done? No    Approximate date of last Dexa scan? 4/3/25       BP (!) 126/48   Pulse (!) 48   Temp 98.2 °F (36.8 °C) (Oral)   Resp 15     Current Lab Data:    Calcium:    Lab Results   Component Value Date/Time    CALCIUM 10.1 05/27/2025 05:51 AM       Patient Currently taking Calcium Supplements? No     Prolia administered: Yes    Prolia dosage: 60 mg was administered slowly subcutaneously into the left upper arm.      Response to treatment:  Well tolerated by patient.     Due for next dose of Prolia after December 4, 2025.     Electronically signed by Franny Stratton RN on 6/3/2025 at 11:34 AM

## 2025-06-04 ENCOUNTER — TELEPHONE (OUTPATIENT)
Dept: CARDIOLOGY CLINIC | Age: 70
End: 2025-06-04

## 2025-06-04 ENCOUNTER — CARE COORDINATION (OUTPATIENT)
Dept: CASE MANAGEMENT | Age: 70
End: 2025-06-04

## 2025-06-04 NOTE — TELEPHONE ENCOUNTER
I would recommend an appointment to evaluate her symptoms.    In the meantime can add miralax along with senna jo-ann for constipation

## 2025-06-04 NOTE — CARE COORDINATION
Writer notified patient of DWAINE León's response. We will send message to PCP as DWAINE León has suggested. Patient v/u and stated that the office has also called her with update.

## 2025-06-04 NOTE — TELEPHONE ENCOUNTER
Please make sure she is not having any chest pain, palpitations, shortness of breath, or dizziness. These are not typically symptoms I would associate with Amiodarone/Midodrine. I would have her reach out to PCP for night sweats, itchy scalp, and abdominal bloating and make sure she has apt with Cardiology for the 4lbs weight gain in 1wk. I saw she has Cardiac MRI scheduled, which is great, but would like Cardiology to be aware of her symptoms in case they suggest sooner evaluation.    Thank you!  Renetta

## 2025-06-04 NOTE — TELEPHONE ENCOUNTER
Melanyi     Contacted pt she is not experiencing any chest pain, palpitations, sob or dizziness.     Instructed to f/u with PCP for symptoms listed below. She v/u.    Has Cardiology appt scheduled 06/17/25 at [place]

## 2025-06-04 NOTE — CARE COORDINATION
Care Transitions Note    Follow Up Call     Reason for Admission: Afib RVR, hypotension, NN     Patient Current Location:  Home: [address] [place] [address]  [address]  [facility] [address]    [facility] Care Coordinator contacted the patient by telephone. Verified name and  as identifiers.    Additional needs identified to be addressed with provider   High priority: Patient has concerns about possible side effects from new medication amiodarone and midodrine. Patient reported having an itchy scalp, night sweats, feeling really bloated and sob after meals and constipation. Patient stated that she have been taking fiber daily and colace when she started feeling constipation with no effectiveness. Patient stated that she have also put on about 4 lbs in a week. She denied any swelling in legs, arms, face, or feet  but stated that her abdomen is distended but not firm. Patient reported that BP have been doing good. This morning BP was 119/64 and HR was 54. Patient reported that symptoms have been going on for about a week now since getting out of the hospital  25.                  Method of communication with provider: chart routing    Care Summary Note: Spoke with Mindy Bryan who reported that she was doing ok but have some concerns that her new medications amiodarone and midodrine may be causing some side effects. Patient not sure one or the other or both. Writer reviewed side effects of both medications and patient v/u. Please see Yellow box. Patient denied cp, cough, dizziness, headache, n/v, diarrhea, abdominal pains, fever, or chills. Patient report that appetite and fluid intake is good and denied any problems with bladder. Patient reported that she is taking all medications as ordered. Patient denied any other needs at this time. Patient instructed to continue to monitor s/s, reporting any that may present to MD immediately for early intervention. Patient is agreeable to f/u calls.    Plan of care updates since

## 2025-06-04 NOTE — PROGRESS NOTES
Physician Progress Note      PATIENT:               ROXANNE ARVIZU  CSN #:                  [id number]  :                       1955  ADMIT DATE:       2025 3:30 PM  DISCH DATE:        2025 2:50 PM  RESPONDING  PROVIDER #:        Cass León          QUERY TEXT:    Based on your medical judgment, please clarify these findings and document if   any of the following are being evaluated and/or treated:    The clinical indicators include:  -- 69yo female admitted with AFIB. History of PAF, HTN, and asthma.  -- PN  \"- CHADS-VASc 3 (Age, Gender, HTN)  - Eliquis 5mg BID\"  -- Eliquis, supportive care  Options provided:  -- Secondary hypercoagulable state in a patient with atrial fibrillation  -- Other - I will add my own diagnosis  -- Disagree - Not applicable / Not valid  -- Disagree - Clinically unable to determine / Unknown  -- Refer to Clinical Documentation Reviewer    PROVIDER RESPONSE TEXT:    This patient has secondary hypercoagulable state in a patient with atrial   fibrillation.    Query created by: Carrie Houser on 2025 3:53 PM      Electronically signed by:  Cass León 2025 11:02 AM

## 2025-06-05 ENCOUNTER — CARE COORDINATION (OUTPATIENT)
Dept: CASE MANAGEMENT | Age: 70
End: 2025-06-05

## 2025-06-05 LAB — COLLAGEN CTX SERPL-MCNC: 632 PG/ML

## 2025-06-05 NOTE — CARE COORDINATION
Care Transitions Note    Follow Up Call     Attempted to contact patient with Dr Lau response to her concerns and no answer. Writer will try to reach patient again at another time.

## 2025-06-05 NOTE — CARE COORDINATION
Care Transitions Note    Follow Up Call     Patient returned my call and writer informed patient of Dr Lau recommendation. Patient v/u and stated that she did have a BM today but will get the Miralax for future episodes and patient stated that she will call and schedule an appointment.

## 2025-06-06 ENCOUNTER — TELEPHONE (OUTPATIENT)
Dept: ENDOCRINOLOGY | Age: 70
End: 2025-06-06

## 2025-06-06 ENCOUNTER — CARE COORDINATION (OUTPATIENT)
Dept: CARE COORDINATION | Age: 70
End: 2025-06-06

## 2025-06-06 NOTE — CARE COORDINATION
Care Transitions Note    Follow Up Call     Patient Current Location:  Home: [address] [place] [address]  [address]  [facility] [address]    Care Transition Nurse contacted the patient by telephone. Verified name and  as identifiers.    Additional needs identified to be addressed with provider   No needs identified                 Method of communication with provider: none.    Care Summary Note: Spoke with pt who states she is doing ok. Still having itchy scalp, bloating after eating and night sweats. Pt to see PCP . Pt to also have MRI that day as well. We discussed BM - pt states she had a BM yesterday and day before. States she has lost 2 lbs but still up 2lbs. Denies sob or swelling to legs or ankles. Denies any other concerns at present.     Plan of care updates since last contact:  Education: .       Advance Care Planning:   Does patient have an Advance Directive: reviewed during previous call, see note. .    Medication Review:  No changes since last call.     Remote Patient Monitoring:  Offered patient enrollment in the Remote Patient Monitoring (RPM) program for in-home monitoring: Deferred at this time because .; will discuss at next outreach.    Assessments:  Care Transitions Subsequent and Final Call    Subsequent and Final Calls  Do you have any ongoing symptoms?: Yes  Have your medications changed?: No  Do you have any questions related to your medications?: No  Do you currently have any active services?: No  Do you have any needs or concerns that I can assist you with?: No  Identified Barriers: None  Care Transitions Interventions  Other Interventions:              Follow Up Appointment:   Reviewed upcoming appointment(s).  Future Appointments         Provider Specialty Dept Phone    2025 12:00 PM Sarah Lau MD Internal Medicine [phone]    2025 3:00 PM (Arrive by 2:30 PM) [facility] MRI  2 Radiology [phone]    2025 10:30 AM Kenia Blount APRN - CNP Cardiology [phone]

## 2025-06-06 NOTE — TELEPHONE ENCOUNTER
Letter mailed with new lab orders for C Telopeptide in 3 months and again in 6 months per Dr. Kruger.

## 2025-06-11 ENCOUNTER — OFFICE VISIT (OUTPATIENT)
Dept: INTERNAL MEDICINE CLINIC | Age: 70
End: 2025-06-11

## 2025-06-11 ENCOUNTER — HOSPITAL ENCOUNTER (OUTPATIENT)
Dept: MRI IMAGING | Age: 70
Discharge: HOME | End: 2025-06-11
Payer: MEDICARE

## 2025-06-11 VITALS
BODY MASS INDEX: 26.04 KG/M2 | OXYGEN SATURATION: 98 % | DIASTOLIC BLOOD PRESSURE: 78 MMHG | HEART RATE: 56 BPM | WEIGHT: 147 LBS | SYSTOLIC BLOOD PRESSURE: 148 MMHG | TEMPERATURE: 98.4 F

## 2025-06-11 DIAGNOSIS — E03.9 HYPOTHYROIDISM, UNSPECIFIED TYPE: Chronic | ICD-10-CM

## 2025-06-11 DIAGNOSIS — I42.1 HOCM (HYPERTROPHIC OBSTRUCTIVE CARDIOMYOPATHY) (HCC): Primary | ICD-10-CM

## 2025-06-11 DIAGNOSIS — Z98.890 S/P VENTRICULAR SEPTAL MYECTOMY: ICD-10-CM

## 2025-06-11 DIAGNOSIS — I48.19 PERSISTENT ATRIAL FIBRILLATION (HCC): ICD-10-CM

## 2025-06-11 DIAGNOSIS — S32.019D CLOSED FRACTURE OF FIRST LUMBAR VERTEBRA WITH ROUTINE HEALING, UNSPECIFIED FRACTURE MORPHOLOGY, SUBSEQUENT ENCOUNTER: ICD-10-CM

## 2025-06-11 DIAGNOSIS — I10 PRIMARY HYPERTENSION: ICD-10-CM

## 2025-06-11 DIAGNOSIS — I42.1 HYPERTROPHIC OBSTRUCTIVE CARDIOMYOPATHY (HCC): ICD-10-CM

## 2025-06-11 LAB
ALBUMIN SERPL-MCNC: 4.2 G/DL (ref 3.4–5)
ALBUMIN/GLOB SERPL: 1.6 {RATIO} (ref 1.1–2.2)
ALP SERPL-CCNC: 89 U/L (ref 40–129)
ALT SERPL-CCNC: 22 U/L (ref 10–40)
ANION GAP SERPL CALCULATED.3IONS-SCNC: 9 MMOL/L (ref 3–16)
AST SERPL-CCNC: 27 U/L (ref 15–37)
BASOPHILS # BLD: 0.1 K/UL (ref 0–0.2)
BASOPHILS NFR BLD: 0.9 %
BILIRUB SERPL-MCNC: 0.3 MG/DL (ref 0–1)
BUN SERPL-MCNC: 8 MG/DL (ref 7–20)
CALCIUM SERPL-MCNC: 9.9 MG/DL (ref 8.3–10.6)
CHLORIDE SERPL-SCNC: 104 MMOL/L (ref 99–110)
CO2 SERPL-SCNC: 27 MMOL/L (ref 21–32)
CREAT SERPL-MCNC: 0.8 MG/DL (ref 0.6–1.2)
DEPRECATED RDW RBC AUTO: 13 % (ref 12.4–15.4)
EOSINOPHIL # BLD: 0 K/UL (ref 0–0.6)
EOSINOPHIL NFR BLD: 0.5 %
GFR SERPLBLD CREATININE-BSD FMLA CKD-EPI: 79 ML/MIN/{1.73_M2}
GLUCOSE SERPL-MCNC: 94 MG/DL (ref 70–99)
HCT VFR BLD AUTO: 38.8 % (ref 36–48)
HGB BLD-MCNC: 13.5 G/DL (ref 12–16)
LYMPHOCYTES # BLD: 1.2 K/UL (ref 1–5.1)
LYMPHOCYTES NFR BLD: 19.8 %
MCH RBC QN AUTO: 30.6 PG (ref 26–34)
MCHC RBC AUTO-ENTMCNC: 34.9 G/DL (ref 31–36)
MCV RBC AUTO: 87.7 FL (ref 80–100)
MONOCYTES # BLD: 0.4 K/UL (ref 0–1.3)
MONOCYTES NFR BLD: 7.1 %
NEUTROPHILS # BLD: 4.4 K/UL (ref 1.7–7.7)
NEUTROPHILS NFR BLD: 71.7 %
PLATELET # BLD AUTO: 210 K/UL (ref 135–450)
PMV BLD AUTO: 8.4 FL (ref 5–10.5)
POTASSIUM SERPL-SCNC: 4.1 MMOL/L (ref 3.5–5.1)
PROT SERPL-MCNC: 6.8 G/DL (ref 6.4–8.2)
RBC # BLD AUTO: 4.42 M/UL (ref 4–5.2)
SODIUM SERPL-SCNC: 140 MMOL/L (ref 136–145)
TSH SERPL DL<=0.005 MIU/L-ACNC: 1.09 UIU/ML (ref 0.27–4.2)
WBC # BLD AUTO: 6.1 K/UL (ref 4–11)

## 2025-06-11 PROCEDURE — A9585 GADOBUTROL INJECTION: HCPCS | Performed by: STUDENT IN AN ORGANIZED HEALTH CARE EDUCATION/TRAINING PROGRAM

## 2025-06-11 PROCEDURE — 6360000004 HC RX CONTRAST MEDICATION: Performed by: STUDENT IN AN ORGANIZED HEALTH CARE EDUCATION/TRAINING PROGRAM

## 2025-06-11 PROCEDURE — 75561 CARDIAC MRI FOR MORPH W/DYE: CPT

## 2025-06-11 RX ORDER — TIZANIDINE 2 MG/1
2 TABLET ORAL 2 TIMES DAILY PRN
Qty: 10 TABLET | Refills: 0 | Status: SHIPPED | OUTPATIENT
Start: 2025-06-11

## 2025-06-11 RX ORDER — GADOBUTROL 604.72 MG/ML
14 INJECTION INTRAVENOUS
Status: COMPLETED | OUTPATIENT
Start: 2025-06-11 | End: 2025-06-11

## 2025-06-11 RX ADMIN — GADOBUTROL 14 ML: 604.72 INJECTION INTRAVENOUS at 16:12

## 2025-06-11 NOTE — ASSESSMENT & PLAN NOTE
Does have a history of osteoporosis, hyperparathyroidism but fractured her L1 after slipping on the floor.    Continue with back brace.  Activity as tolerated  Tizanidine as needed.  I discussed with patient to consider possible injection if is continue to be worse

## 2025-06-11 NOTE — PROGRESS NOTES
Primary cardiologist: Dr Velazquez and Dr Alvarez     CC/HPI:  70 y.o. with hypotension (hx HTN), HLD, LBBB, hx HOCM and severe MR s/p myectomy and MVR (Dr Madrid 2020), pAF (eliquis) s/p RFA with PVI and left AFL ablation with (Dr Boyd 11/2023) who is here for a hospital follow up for palpitations and AF. She was started on toprol, midodrine, amiodarone and had DCCV with Dr Castellon.  Se feels tired a lot. She has chronic nausea and bloating. She denies cp, sob, LH/dizziness, palpitations, or syncope. She has a fall in January with lumbar injury  and chronic pain and now she is planning for a kyphoplasty. She has occasional hemorrhoid bleeding but denies melena or  bleeding.     Past Medical History:   Diagnosis Date    Adrenal adenoma     left 8 mm - stable - CT 8/13    Anxiety     Arthritis     Asthma     Chronic nausea     Environmental allergies     GERD (gastroesophageal reflux disease)     HOCM (hypertrophic obstructive cardiomyopathy) (HCC)     HTN (hypertension)     Hypothyroid     Irritable bowel syndrome     Microscopic colitis     Migraine     Mitral regurgitation     Nephrolithiasis 01/21/2015    R side -s/p lithotripsy    Nosebleed     Osteoporosis     DEXA 8/8/13 - scotty lumbar spine    Rash     Restless leg syndrome     TMJ dysfunction     Vitreous detachment     Wrist fracture, bilateral      Past Surgical History:   Procedure Laterality Date    CARDIAC CATHETERIZATION  02/28/2020    CARDIOVASCULAR STRESS TEST  2016    normal    CHOLECYSTECTOMY, LAPAROSCOPIC N/A 3/27/2019    w/cholangiogram w/C-arm, performed by Shahid Maria MD at [facility] OR    COLONOSCOPY  7/13    COLONOSCOPY  03/29/2018    Afshan (random bxs)-microscopic colitis    COLONOSCOPY N/A 4/24/2023    COLONOSCOPY WITH BIOPSY performed by Espinoza Cavanaugh MD at [facility] ENDOSCOPY    KNEE SURGERY Right 1975    synovectomy    MITRAL VALVE REPAIR N/A 6/29/2020    CORRIE; TCPB; resection of hypertrophic muscle from septal LVOT; miital

## 2025-06-11 NOTE — PROGRESS NOTES
Mindy Bryan (:  1955) is a 70 y.o. female here for evaluation of the following chief complaint(s):  Medication Reaction (She was prescribed new medications (midodrine and amiodarone) by an NP at the hospital in May. She has been having side effects such as itchy scalp, bloating with constipation with night sweats. She would like to have her thyroid checked after doing some research on amiodarone. ) and Pain (She fell in . She has been seeing Dr Kruger and they want to do a kyphoplasty. She is going out of town for the weekend for a wedding and is asking if she can get pain medication for the weekend. )      Assessment & Plan   ASSESSMENT/PLAN:  1. HOCM (hypertrophic obstructive cardiomyopathy) (HCC)  Assessment & Plan:   Monitored by specialist- no acute findings meriting change in the plan  2. Persistent atrial fibrillation (HCC)  -     CBC with Auto Differential; Future  -     Comprehensive Metabolic Panel; Future  -     TSH reflex to FT4; Future  3. Closed fracture of first lumbar vertebra with routine healing, unspecified fracture morphology, subsequent encounter  Assessment & Plan:   Does have a history of osteoporosis, hyperparathyroidism but fractured her L1 after slipping on the floor.    Continue with back brace.  Activity as tolerated  Tizanidine as needed.  I discussed with patient to consider possible injection if is continue to be worse  Orders:  -     CBC with Auto Differential; Future  -     Comprehensive Metabolic Panel; Future  -     TSH reflex to FT4; Future  4. Hypothyroidism, unspecified type  -     CBC with Auto Differential; Future  -     Comprehensive Metabolic Panel; Future  -     TSH reflex to FT4; Future  5. Primary hypertension  Assessment & Plan:  Blood pressure well controlled with current meds - HCTZ 25 mg and Toprol 50 mg     In August for AWV         Subjective   SUBJECTIVE/OBJECTIVE:  LATRICIA Walker is here for a follow up visit.   She is doing well today.   She was

## 2025-06-12 ENCOUNTER — CARE COORDINATION (OUTPATIENT)
Dept: CARE COORDINATION | Age: 70
End: 2025-06-12

## 2025-06-12 ENCOUNTER — RESULTS FOLLOW-UP (OUTPATIENT)
Dept: INTERNAL MEDICINE CLINIC | Age: 70
End: 2025-06-12

## 2025-06-12 NOTE — CARE COORDINATION
Care Transitions Note    Follow Up Call     Attempted to reach patient for transitions of care follow up.  Unable to reach patient.      Outreach Attempts:   HIPAA compliant voicemail left for patient.     Care Summary Note: Follow up outreach call attempt, no answer.  CTN left VM with contact information and request for return call.  CTN will continue with outreach call attempts.      Follow Up Appointment:   Future Appointments         Provider Specialty Dept Phone    6/17/2025 10:30 AM Kenia Blount APRN - CNP Cardiology [phone]    7/9/2025 11:00 AM Cass León APRN - CNP Cardiology [phone]    8/19/2025 10:30 AM Sarah Lau MD Internal Medicine [phone]    12/3/2025 10:15 AM Ace Alvarez MD Cardiology [phone]    4/14/2026 10:15 AM Nolan Kennedy MD Cardiology [phone]            Plan for follow-up call in 2-5 days based on severity of symptoms and risk factors. Plan for next call: symptom management-.    COMFORT JosephN, RN   Care Transition Nurse  Mobile: [phone]'

## 2025-06-16 ENCOUNTER — RESULTS FOLLOW-UP (OUTPATIENT)
Dept: CARDIOLOGY CLINIC | Age: 70
End: 2025-06-16

## 2025-06-17 ENCOUNTER — TELEPHONE (OUTPATIENT)
Dept: CARDIOLOGY CLINIC | Age: 70
End: 2025-06-17

## 2025-06-17 ENCOUNTER — CARE COORDINATION (OUTPATIENT)
Dept: CARE COORDINATION | Age: 70
End: 2025-06-17

## 2025-06-17 ENCOUNTER — OFFICE VISIT (OUTPATIENT)
Dept: CARDIOLOGY CLINIC | Age: 70
End: 2025-06-17
Payer: MEDICARE

## 2025-06-17 VITALS
HEART RATE: 50 BPM | HEIGHT: 63 IN | SYSTOLIC BLOOD PRESSURE: 126 MMHG | WEIGHT: 148 LBS | DIASTOLIC BLOOD PRESSURE: 72 MMHG | BODY MASS INDEX: 26.22 KG/M2

## 2025-06-17 DIAGNOSIS — I34.0 MITRAL VALVE INSUFFICIENCY, UNSPECIFIED ETIOLOGY: ICD-10-CM

## 2025-06-17 DIAGNOSIS — I95.0 IDIOPATHIC HYPOTENSION: ICD-10-CM

## 2025-06-17 DIAGNOSIS — Z98.890: ICD-10-CM

## 2025-06-17 DIAGNOSIS — Z98.890 S/P MVR (MITRAL VALVE REPAIR): ICD-10-CM

## 2025-06-17 DIAGNOSIS — I42.1 HOCM (HYPERTROPHIC OBSTRUCTIVE CARDIOMYOPATHY) (HCC): ICD-10-CM

## 2025-06-17 DIAGNOSIS — E78.2 MIXED HYPERLIPIDEMIA: ICD-10-CM

## 2025-06-17 DIAGNOSIS — I48.91 HISTORY OF CARDIAC ABLATION FOR ATRIAL FIBRILLATION (HCC): ICD-10-CM

## 2025-06-17 DIAGNOSIS — Z98.890 HISTORY OF CARDIAC ABLATION FOR ATRIAL FIBRILLATION (HCC): ICD-10-CM

## 2025-06-17 DIAGNOSIS — I48.0 PAF (PAROXYSMAL ATRIAL FIBRILLATION) (HCC): Primary | ICD-10-CM

## 2025-06-17 DIAGNOSIS — Z86.79 HX OF PRIMARY HYPERTENSION: ICD-10-CM

## 2025-06-17 PROCEDURE — 1123F ACP DISCUSS/DSCN MKR DOCD: CPT | Performed by: NURSE PRACTITIONER

## 2025-06-17 PROCEDURE — 3074F SYST BP LT 130 MM HG: CPT | Performed by: NURSE PRACTITIONER

## 2025-06-17 PROCEDURE — 1111F DSCHRG MED/CURRENT MED MERGE: CPT | Performed by: NURSE PRACTITIONER

## 2025-06-17 PROCEDURE — G8417 CALC BMI ABV UP PARAM F/U: HCPCS | Performed by: NURSE PRACTITIONER

## 2025-06-17 PROCEDURE — 3078F DIAST BP <80 MM HG: CPT | Performed by: NURSE PRACTITIONER

## 2025-06-17 PROCEDURE — 99214 OFFICE O/P EST MOD 30 MIN: CPT | Performed by: NURSE PRACTITIONER

## 2025-06-17 PROCEDURE — 93000 ELECTROCARDIOGRAM COMPLETE: CPT | Performed by: NURSE PRACTITIONER

## 2025-06-17 PROCEDURE — 1036F TOBACCO NON-USER: CPT | Performed by: NURSE PRACTITIONER

## 2025-06-17 PROCEDURE — G8399 PT W/DXA RESULTS DOCUMENT: HCPCS | Performed by: NURSE PRACTITIONER

## 2025-06-17 PROCEDURE — G8427 DOCREV CUR MEDS BY ELIG CLIN: HCPCS | Performed by: NURSE PRACTITIONER

## 2025-06-17 PROCEDURE — 1090F PRES/ABSN URINE INCON ASSESS: CPT | Performed by: NURSE PRACTITIONER

## 2025-06-17 PROCEDURE — 1159F MED LIST DOCD IN RCRD: CPT | Performed by: NURSE PRACTITIONER

## 2025-06-17 PROCEDURE — 3017F COLORECTAL CA SCREEN DOC REV: CPT | Performed by: NURSE PRACTITIONER

## 2025-06-17 RX ORDER — ACETAMINOPHEN AND CODEINE PHOSPHATE 300; 30 MG/1; MG/1
TABLET ORAL
COMMUNITY
Start: 2025-06-09

## 2025-06-17 RX ORDER — METOPROLOL SUCCINATE 25 MG/1
25 TABLET, EXTENDED RELEASE ORAL DAILY
Qty: 90 TABLET | Refills: 3 | Status: SHIPPED | OUTPATIENT
Start: 2025-06-17 | End: 2025-06-19 | Stop reason: SDUPTHER

## 2025-06-17 NOTE — TELEPHONE ENCOUNTER
Mindy called in she would like a return call, she states she can't afford the Eliquis.      She can be reached at [phone].

## 2025-06-17 NOTE — CARE COORDINATION
Care Transitions Note    Follow Up Call     Patient Current Location:  Home: [address] [place] [address]  [address]  [facility] [address]    Care Transition Nurse contacted the patient by telephone. Verified name and  as identifiers.    Additional needs identified to be addressed with provider   No needs identified                 Method of communication with provider: none.    Care Summary Note: Spoke with pt who states she saw cardiology today. Denies any dizziness or lightheadedness. States she is doing well. States BP is doing well. She is to monitor BP and is systolic is below 90 she is to not take metoprolol. Cardiology decreased to one daily instead of two do to lower HR. Pt no longer using [facility] and will  patient assistance paperwork from office tomorrow. Xarelto was offered but still expensive. Writers phone  before conversation could continue.     Plan of care updates since last contact:  Education: .       Advance Care Planning:   Does patient have an Advance Directive: reviewed during previous call, see note. .    Medication Review:  Medications changed since last call, reviewed today.     Remote Patient Monitoring:  Offered patient enrollment in the Remote Patient Monitoring (RPM) program for in-home monitoring: Deferred at this time because .; will discuss at next outreach.    Assessments:  Care Transitions Subsequent and Final Call    Subsequent and Final Calls  Do you have any ongoing symptoms?: No  Have your medications changed?: Yes  Patient Reports: decrease in metoprolol  Do you have any questions related to your medications?: No  Do you currently have any active services?: No  Do you have any needs or concerns that I can assist you with?: No  Identified Barriers: None  Care Transitions Interventions  Other Interventions:              Follow Up Appointment:   CARSON appointment attended as scheduled   Future Appointments         Provider Specialty Dept Phone    2025 11:00 AM Mau

## 2025-06-17 NOTE — TELEPHONE ENCOUNTER
Please call patient to discuss patient assistance for Eliquis and other available options.  She can see if Xarelto may be better covered under her insurance.  Warfarin is another options, medication is low cost but does require frequent lab work to make sure medication is within the therapeutic range, if chooses this option will place referral to [facility] Anticoagulation clinic for management.

## 2025-06-17 NOTE — TELEPHONE ENCOUNTER
Called patient to discuss options listed below per TON Dukes. Pt would like to try patient assistance for Eliquis and will be by to  application either today or on 06/18/25.    Offered samples to patient while we wait on patient assistance. Pt stated that she still has a little over a month's worth of Apixaban left.    Placed patient assistance application in cabinet for patient pickup up front.

## 2025-06-19 RX ORDER — METOPROLOL SUCCINATE 25 MG/1
25 TABLET, EXTENDED RELEASE ORAL DAILY
Qty: 90 TABLET | Refills: 3 | Status: CANCELLED | OUTPATIENT
Start: 2025-06-19

## 2025-06-19 RX ORDER — METOPROLOL SUCCINATE 25 MG/1
25 TABLET, EXTENDED RELEASE ORAL DAILY
Qty: 90 TABLET | Refills: 3 | Status: SHIPPED | OUTPATIENT
Start: 2025-06-19

## 2025-06-19 NOTE — TELEPHONE ENCOUNTER
The patient called stating Amber never received her prescription for the medication listed below     Medication  metoprolol succinate (TOPROL XL) 25 MG extended release tablet [15036]  metoprolol succinate (TOPROL XL) 25 MG extended release tablet [[id number]]    Order Details    Dose: 25 mg Route: Oral Frequency: DAILY   Dispense Quantity: 90 tablet Refills: 3          Sig: Take 1 tablet by mouth daily               [facility] [id number] [address] - P [phone] - F [phone] [[id number]]

## 2025-06-24 ENCOUNTER — CARE COORDINATION (OUTPATIENT)
Dept: CASE MANAGEMENT | Age: 70
End: 2025-06-24

## 2025-06-24 NOTE — CARE COORDINATION
Care Transitions Note    Final Call     Patient Current Location:  Home: [address] [place] [address]  [address]  [facility] [address]    [facility] Care Coordinator contacted the patient by telephone. Verified name and  as identifiers.    Patient graduated from the Care Transitions program on .  Patient/family verbalizes confidence in the ability to self-manage at this time..      Advance Care Planning:   Does patient have an Advance Directive: Patient will be taking a copy of ACP to PCP office at next appointment.    Handoff:   Patient was not referred to the ACM team due to no additional needs identified.       Care Summary Note: Spoke with Mindy Bryan who reported that she is doing pretty good. Patient reported that BP is doing good. Today /69 HR 66 patient stated that hr is much better since just taking the metoprolol once a day. Patient denied cp, sob, dizziness, headache, n/v, or diarrhea. Patient report that appetite and fluid intake is good and denied any problems with bowel or bladder. Patient reported that she is taking all medications as ordered. Patient denied any other needs at this time. Patient instructed to continue to monitor s/s, reporting any that may present to MD immediately for early intervention. Patient notified that this will be our last outreach and she was agreeable. Program closed.     Assessments:  Care Transitions Subsequent and Final Call    Subsequent and Final Calls  Do you have any ongoing symptoms?: No  Have your medications changed?: No  Do you have any questions related to your medications?: No  Do you currently have any active services?: No  Do you have any needs or concerns that I can assist you with?: No  Care Transitions Interventions  No Identified Needs  Other Interventions:              Upcoming Appointments:    Future Appointments         Provider Specialty Dept Phone    2025 11:00 AM Cass León, APRN - CNP Cardiology [phone]    2025 10:30 AM

## 2025-07-07 NOTE — PROGRESS NOTES
risk factors.   - Following with Sarah Lau MD for management.        7.  Hyperlipidemia              - Continue statin              - Follow-up care with PCP        Plan:   Amiodarone 200mg daily   - Repeat TSH/LFT due ~November 2025  Toprol XL 25mg daily   Midodrine 10mg TID    Continue Eliquis 5mg BID  - Repeat CBC/CMP due ~May 2026   Would recommend follow up cardiac MRI, consultation with Dr Jaime Velazquez scheduled for April 2026      Follow up with Dr Alvarez scheduled for 12/3/25       TABITHA [facility]  [phone]

## 2025-07-09 ENCOUNTER — OFFICE VISIT (OUTPATIENT)
Dept: CARDIOLOGY CLINIC | Age: 70
End: 2025-07-09
Payer: MEDICARE

## 2025-07-09 VITALS
HEART RATE: 55 BPM | OXYGEN SATURATION: 96 % | BODY MASS INDEX: 25.69 KG/M2 | RESPIRATION RATE: 16 BRPM | DIASTOLIC BLOOD PRESSURE: 70 MMHG | HEIGHT: 63 IN | SYSTOLIC BLOOD PRESSURE: 124 MMHG | WEIGHT: 145 LBS

## 2025-07-09 DIAGNOSIS — I42.1 HOCM (HYPERTROPHIC OBSTRUCTIVE CARDIOMYOPATHY) (HCC): ICD-10-CM

## 2025-07-09 DIAGNOSIS — E78.5 HYPERLIPIDEMIA, UNSPECIFIED HYPERLIPIDEMIA TYPE: ICD-10-CM

## 2025-07-09 DIAGNOSIS — I34.0 SEVERE MITRAL REGURGITATION: ICD-10-CM

## 2025-07-09 DIAGNOSIS — I48.0 PAF (PAROXYSMAL ATRIAL FIBRILLATION) (HCC): Primary | ICD-10-CM

## 2025-07-09 DIAGNOSIS — I95.0 IDIOPATHIC HYPOTENSION: ICD-10-CM

## 2025-07-09 DIAGNOSIS — Z95.2 H/O MITRAL VALVE REPLACEMENT: ICD-10-CM

## 2025-07-09 DIAGNOSIS — Z95.2 S/P MVR (MITRAL VALVE REPLACEMENT): ICD-10-CM

## 2025-07-09 DIAGNOSIS — I10 ESSENTIAL HYPERTENSION: ICD-10-CM

## 2025-07-09 PROCEDURE — 1159F MED LIST DOCD IN RCRD: CPT

## 2025-07-09 PROCEDURE — G8427 DOCREV CUR MEDS BY ELIG CLIN: HCPCS

## 2025-07-09 PROCEDURE — G8399 PT W/DXA RESULTS DOCUMENT: HCPCS

## 2025-07-09 PROCEDURE — 3078F DIAST BP <80 MM HG: CPT

## 2025-07-09 PROCEDURE — 93000 ELECTROCARDIOGRAM COMPLETE: CPT

## 2025-07-09 PROCEDURE — 3017F COLORECTAL CA SCREEN DOC REV: CPT

## 2025-07-09 PROCEDURE — 1036F TOBACCO NON-USER: CPT

## 2025-07-09 PROCEDURE — 1090F PRES/ABSN URINE INCON ASSESS: CPT

## 2025-07-09 PROCEDURE — 99214 OFFICE O/P EST MOD 30 MIN: CPT

## 2025-07-09 PROCEDURE — 1123F ACP DISCUSS/DSCN MKR DOCD: CPT

## 2025-07-09 PROCEDURE — G8417 CALC BMI ABV UP PARAM F/U: HCPCS

## 2025-07-09 PROCEDURE — G2211 COMPLEX E/M VISIT ADD ON: HCPCS

## 2025-07-09 PROCEDURE — 3074F SYST BP LT 130 MM HG: CPT

## 2025-07-09 RX ORDER — AMIODARONE HYDROCHLORIDE 200 MG/1
200 TABLET ORAL DAILY
Qty: 90 TABLET | Refills: 1 | Status: SHIPPED | OUTPATIENT
Start: 2025-07-09

## 2025-07-18 NOTE — PRE-PROCEDURE INSTRUCTIONS
Called patient about procedure. Told to be here at 0930 for procedure at 1100. Must be NPO after midnight but can take morning medication with sips of water. Patient stopped Eliquis as directed by the office. Told to have a responsible adult with them to take them home and stay with them afterwards, if they do not get admitted to hospital. Also, to bring a current list of medications. No other questions or concerns.

## 2025-07-21 ENCOUNTER — HOSPITAL ENCOUNTER (OUTPATIENT)
Dept: INTERVENTIONAL RADIOLOGY/VASCULAR | Age: 70
Discharge: HOME | End: 2025-07-23
Attending: NEUROLOGICAL SURGERY
Payer: MEDICARE

## 2025-07-21 VITALS
SYSTOLIC BLOOD PRESSURE: 138 MMHG | WEIGHT: 142 LBS | DIASTOLIC BLOOD PRESSURE: 49 MMHG | HEIGHT: 63 IN | TEMPERATURE: 98.3 F | BODY MASS INDEX: 25.16 KG/M2 | OXYGEN SATURATION: 98 % | RESPIRATION RATE: 14 BRPM | HEART RATE: 55 BPM

## 2025-07-21 DIAGNOSIS — M80.08XA CHRONIC VERTEBRAL FRACTURE DUE TO OSTEOPOROSIS (HCC): ICD-10-CM

## 2025-07-21 LAB
ANION GAP SERPL CALCULATED.3IONS-SCNC: 10 MMOL/L (ref 3–16)
BUN SERPL-MCNC: 14 MG/DL (ref 7–20)
CALCIUM SERPL-MCNC: 9.9 MG/DL (ref 8.3–10.6)
CHLORIDE SERPL-SCNC: 103 MMOL/L (ref 99–110)
CO2 SERPL-SCNC: 28 MMOL/L (ref 21–32)
CREAT SERPL-MCNC: 0.8 MG/DL (ref 0.6–1.2)
DEPRECATED RDW RBC AUTO: 13.2 % (ref 12.4–15.4)
GFR SERPLBLD CREATININE-BSD FMLA CKD-EPI: 78 ML/MIN/{1.73_M2}
GLUCOSE SERPL-MCNC: 100 MG/DL (ref 70–99)
HCT VFR BLD AUTO: 45.2 % (ref 36–48)
HGB BLD-MCNC: 15.5 G/DL (ref 12–16)
INR PPP: 0.94 (ref 0.86–1.14)
MCH RBC QN AUTO: 30.3 PG (ref 26–34)
MCHC RBC AUTO-ENTMCNC: 34.3 G/DL (ref 31–36)
MCV RBC AUTO: 88.1 FL (ref 80–100)
PLATELET # BLD AUTO: 210 K/UL (ref 135–450)
PMV BLD AUTO: 7.4 FL (ref 5–10.5)
POTASSIUM SERPL-SCNC: 4 MMOL/L (ref 3.5–5.1)
PROTHROMBIN TIME: 12.9 SEC (ref 12.1–14.9)
RBC # BLD AUTO: 5.13 M/UL (ref 4–5.2)
SODIUM SERPL-SCNC: 141 MMOL/L (ref 136–145)
WBC # BLD AUTO: 8.5 K/UL (ref 4–11)

## 2025-07-21 PROCEDURE — 6360000002 HC RX W HCPCS: Performed by: NEUROLOGICAL SURGERY

## 2025-07-21 PROCEDURE — 85027 COMPLETE CBC AUTOMATED: CPT

## 2025-07-21 PROCEDURE — C1894 INTRO/SHEATH, NON-LASER: HCPCS

## 2025-07-21 PROCEDURE — C1713 ANCHOR/SCREW BN/BN,TIS/BN: HCPCS

## 2025-07-21 PROCEDURE — 80048 BASIC METABOLIC PNL TOTAL CA: CPT

## 2025-07-21 PROCEDURE — 7100000010 HC PHASE II RECOVERY - FIRST 15 MIN

## 2025-07-21 PROCEDURE — 99152 MOD SED SAME PHYS/QHP 5/>YRS: CPT

## 2025-07-21 PROCEDURE — 7100000011 HC PHASE II RECOVERY - ADDTL 15 MIN

## 2025-07-21 PROCEDURE — 85610 PROTHROMBIN TIME: CPT

## 2025-07-21 RX ORDER — LIDOCAINE HYDROCHLORIDE 10 MG/ML
INJECTION, SOLUTION EPIDURAL; INFILTRATION; INTRACAUDAL; PERINEURAL PRN
Status: COMPLETED | OUTPATIENT
Start: 2025-07-21 | End: 2025-07-21

## 2025-07-21 RX ORDER — ACETAMINOPHEN 325 MG/1
650 TABLET ORAL EVERY 4 HOURS PRN
Status: DISCONTINUED | OUTPATIENT
Start: 2025-07-21 | End: 2025-07-24 | Stop reason: HOSPADM

## 2025-07-21 RX ORDER — MIDAZOLAM HYDROCHLORIDE 1 MG/ML
INJECTION, SOLUTION INTRAMUSCULAR; INTRAVENOUS PRN
Status: COMPLETED | OUTPATIENT
Start: 2025-07-21 | End: 2025-07-21

## 2025-07-21 RX ORDER — FENTANYL CITRATE 50 UG/ML
INJECTION, SOLUTION INTRAMUSCULAR; INTRAVENOUS PRN
Status: COMPLETED | OUTPATIENT
Start: 2025-07-21 | End: 2025-07-21

## 2025-07-21 RX ADMIN — MIDAZOLAM HYDROCHLORIDE 1 MG: 1 INJECTION, SOLUTION INTRAMUSCULAR; INTRAVENOUS at 10:56

## 2025-07-21 RX ADMIN — FENTANYL CITRATE 50 MCG: 50 INJECTION INTRAMUSCULAR; INTRAVENOUS at 10:55

## 2025-07-21 RX ADMIN — FENTANYL CITRATE 50 MCG: 50 INJECTION INTRAMUSCULAR; INTRAVENOUS at 10:57

## 2025-07-21 RX ADMIN — MIDAZOLAM HYDROCHLORIDE 1 MG: 1 INJECTION, SOLUTION INTRAMUSCULAR; INTRAVENOUS at 10:57

## 2025-07-21 RX ADMIN — LIDOCAINE HYDROCHLORIDE 10 ML: 10 INJECTION, SOLUTION EPIDURAL; INFILTRATION; INTRACAUDAL; PERINEURAL at 10:57

## 2025-07-21 NOTE — H&P
History of Present Illness   HPI: Ms. Bryan comes in today to discuss treatment of an L1 compression fracture and concerns for a new, early L2 fracture. She had a fall at home in January this year and the L1 fracture was noted then. She was treated with medication and bracing and initially did better. She did not feel that the brace helped, though. Then, in late April, her pain got worse. It remains in her mid back with same radiation up and down the midline and around each side, sometimes into her right buttock. It does not go down her legs. She was then evaluated with MRI confirming the unhealed fratures.    Vital Signs for Today's Encounter     Height: 5' 3'' Weight: 148 pounds    BMI: 26.21    Medical History       Prior neck/back surgery? no  Has pt. had any other surgery? yes    Details: Heart Related Surgery,Significant Orthopedic (knee, hip, shoulder)  Comorbidities:heart disease,heart valve problem,osteoporosis  Supplemental oxygen? no  Bleeding/clotting disorder? no  Blood thinner?Other  Has the patient had angioplasty? no  Does patient have stents? no  Other implant: none    Allergies   Allergic to shellfish, contrast dye, or iodine? no  Allergic to latex? no  Allergic to metals/jewelry? no  Allergic to steroids? no  Allergic to antibiotics? no  Allergic to tape? no  Other allergies? yes    Social History   Does the patient live with someone else? yes  Is this person able to help at home if surgery is needed? yes  Smoking status: never      Other tobacco user? never    Alcohol consumed: none  Drug use: never     Work History   Is the patient employed? retired      Family History   Stroke or aneurysm? no  Malignant hyperthermia? no    Outcome Data  Daily level of functioning: independent/limited to light work or light activity  Oswestry score: 44  VAS back: 3  VAS left le  VAS right leg: 3        Review of Histories and Systems   I reviewed the patient's past medical history, social history, family

## 2025-07-21 NOTE — PROGRESS NOTES
Dressing clean, dry, intact. PIV removed. Ambulated well. Patient/family given discharge instructions. Patient/family verbalize understanding of discharge instructions, all questions addressed, copy given to patient/family. Pt transferred to vehicle via wheelchair to be discharged home with family.

## 2025-07-21 NOTE — PROCEDURES
Date of Procedure: 7/21/2025    Pre-Op Diagnosis: L1 pathological fracture    Post-Op Diagnosis: Same       Procedure: L1 kyphoplasty    Anesthesia: Local with conscious sedation    Estimated Blood Loss (mL): Minimal    Complications: None    Specimens:   None    Implants:  Methylmethacrylate, 6 mL      Drains: * No LDAs found *    Findings:     Detailed Description of Procedure:   The patient was placed in a prone position.  The site was prepped and draped in sterile fashion after marking of the entrance point at L1 for a left extrapedicular approach.  Local anesthesia was provided.  A bone introducer was used and advanced under fluoroscopy.  Upon reaching the vertebral body, the biopsy canula was used and a balloon was introduced.  Balloon inflation was performed under fluoroscopy.  After balloon inflation and subsequent removal, 6 mL of cement were injected into the vertebral body.  The cement was allowed to be hardened and then the introducer sheath was pulled.  AP and lateral x-rays were taken at this moment.     A small amount of leakage into the L1-2 disc space was noted.     FINDINGS:      The x-rays of lumbar spine showed a collapsed vertebral body at L1.      The bone introducer sheath is in good position on subsequent images. Balloon inflation is noticed adequately with improvement of the vertebral body height.      A final x-ray shows adequate cement injection with minimal improvement of the vertebral body high.  There is no ectopic cement visualized.  Other than a small amount into the L1-2 disc space

## 2025-07-21 NOTE — DISCHARGE INSTRUCTIONS
[facility]  Cardiovascular Special Procedures  Vertebroplasty/Kyphoplasty/Sacralplasty  Patient Discharge Instructions    Refrain from driving the day of procedure.  You may gradually return to normal activities, as tolerated.  You may remove the bandage on your back the day following the procedure.  (Please report any redness or signs of infection to us immediately.)  Report any chest pain, back pain (new or increased level of back pain), fever, or change in neurological function (i.e.: difficulty with mobility, numbness of extremities).  You may shower.  You may take Acetaminophen (Tylenol) or Ibuprofen (Advil, Motrin) to alleviate any back discomfort or soreness following the procedure.  You may experience discomfort at the puncture site for 24-48 hours following your procedure.    You may contact [facility] for any questions or problems that may occur at [phone] during the hours of 9am-4pm Monday-Friday, or the hospital  after hours at [phone], to have the interventional radiologist on call paged.        [facility]  Cardiovascular Special Procedures  General Discharge Instructions    PROCEDURE: Kyphoplasty    __x__ You may be drowsy or lightheaded after receiving sedation. DO NOT operate a vehicle (automobile, bicycle, motorcycle, machinery, or power tools), make any important decisions or sign any important/legal documents, or drink alcoholic beverages for the next 24 hours  __x__ We strongly suggest that a responsible adult be with you for the next 24 hours for your protection and safety  __x__ If the intravenous catheter site is painful, apply warm wet compresses on the site until the soreness is relieved and elevate the arm above the heart.  Call your physician if no improvement in 2 to 3 days    DIETARY INSTRUCTIONS:    ____ Drink extra fluids over the next 24 hours (If not contraindicated by illness or by physician order)  ____ Start with clear

## 2025-08-02 ENCOUNTER — TELEPHONE (OUTPATIENT)
Dept: CASE MANAGEMENT | Age: 70
End: 2025-08-02

## 2025-08-11 DIAGNOSIS — K76.0 FATTY LIVER: ICD-10-CM

## 2025-08-11 DIAGNOSIS — I10 PRIMARY HYPERTENSION: ICD-10-CM

## 2025-08-11 DIAGNOSIS — M81.0 AGE-RELATED OSTEOPOROSIS WITHOUT CURRENT PATHOLOGICAL FRACTURE: ICD-10-CM

## 2025-08-11 DIAGNOSIS — S32.019A CLOSED FRACTURE OF FIRST LUMBAR VERTEBRA, UNSPECIFIED FRACTURE MORPHOLOGY, INITIAL ENCOUNTER (HCC): ICD-10-CM

## 2025-08-11 DIAGNOSIS — E21.3 HYPERPARATHYROIDISM: ICD-10-CM

## 2025-08-11 DIAGNOSIS — M81.0 OSTEOPOROSIS WITHOUT CURRENT PATHOLOGICAL FRACTURE, UNSPECIFIED OSTEOPOROSIS TYPE: ICD-10-CM

## 2025-08-11 LAB
ALBUMIN SERPL-MCNC: 4.1 G/DL (ref 3.4–5)
ALBUMIN/GLOB SERPL: 1.6 {RATIO} (ref 1.1–2.2)
ALP SERPL-CCNC: 59 U/L (ref 40–129)
ALT SERPL-CCNC: 25 U/L (ref 10–40)
ANION GAP SERPL CALCULATED.3IONS-SCNC: 9 MMOL/L (ref 3–16)
AST SERPL-CCNC: 27 U/L (ref 15–37)
BASOPHILS # BLD: 0 K/UL (ref 0–0.2)
BASOPHILS NFR BLD: 0.7 %
BILIRUB SERPL-MCNC: 0.3 MG/DL (ref 0–1)
BUN SERPL-MCNC: 16 MG/DL (ref 7–20)
CALCIUM SERPL-MCNC: 10.1 MG/DL (ref 8.3–10.6)
CALCIUM SERPL-MCNC: 10.1 MG/DL (ref 8.3–10.6)
CHLORIDE SERPL-SCNC: 105 MMOL/L (ref 99–110)
CHOLEST SERPL-MCNC: 145 MG/DL (ref 0–199)
CO2 SERPL-SCNC: 28 MMOL/L (ref 21–32)
CREAT SERPL-MCNC: 0.8 MG/DL (ref 0.6–1.2)
DEPRECATED RDW RBC AUTO: 13.3 % (ref 12.4–15.4)
EOSINOPHIL # BLD: 0.1 K/UL (ref 0–0.6)
EOSINOPHIL NFR BLD: 1.2 %
EST. AVERAGE GLUCOSE BLD GHB EST-MCNC: 105.4 MG/DL
GFR SERPLBLD CREATININE-BSD FMLA CKD-EPI: 78 ML/MIN/{1.73_M2}
GLUCOSE SERPL-MCNC: 105 MG/DL (ref 70–99)
HBA1C MFR BLD: 5.3 %
HCT VFR BLD AUTO: 41.1 % (ref 36–48)
HDLC SERPL-MCNC: 65 MG/DL (ref 40–60)
HGB BLD-MCNC: 14.1 G/DL (ref 12–16)
LDL CHOLESTEROL: 70 MG/DL
LYMPHOCYTES # BLD: 1.2 K/UL (ref 1–5.1)
LYMPHOCYTES NFR BLD: 21.4 %
MCH RBC QN AUTO: 30 PG (ref 26–34)
MCHC RBC AUTO-ENTMCNC: 34.4 G/DL (ref 31–36)
MCV RBC AUTO: 87.2 FL (ref 80–100)
MONOCYTES # BLD: 0.4 K/UL (ref 0–1.3)
MONOCYTES NFR BLD: 6.8 %
NEUTROPHILS # BLD: 3.8 K/UL (ref 1.7–7.7)
NEUTROPHILS NFR BLD: 69.9 %
PLATELET # BLD AUTO: 214 K/UL (ref 135–450)
PMV BLD AUTO: 8.2 FL (ref 5–10.5)
POTASSIUM SERPL-SCNC: 4.4 MMOL/L (ref 3.5–5.1)
PROT SERPL-MCNC: 6.6 G/DL (ref 6.4–8.2)
RBC # BLD AUTO: 4.71 M/UL (ref 4–5.2)
SODIUM SERPL-SCNC: 142 MMOL/L (ref 136–145)
TRIGL SERPL-MCNC: 49 MG/DL (ref 0–150)
TSH SERPL DL<=0.005 MIU/L-ACNC: 1.7 UIU/ML (ref 0.27–4.2)
VLDLC SERPL CALC-MCNC: 10 MG/DL
WBC # BLD AUTO: 5.5 K/UL (ref 4–11)

## 2025-08-11 RX ORDER — LEVOTHYROXINE SODIUM 88 UG/1
88 TABLET ORAL DAILY
Qty: 90 TABLET | Refills: 1 | Status: SHIPPED | OUTPATIENT
Start: 2025-08-11

## 2025-08-11 RX ORDER — MIDODRINE HYDROCHLORIDE 10 MG/1
TABLET ORAL
Qty: 90 TABLET | Refills: 2 | Status: SHIPPED | OUTPATIENT
Start: 2025-08-11

## 2025-08-12 SDOH — HEALTH STABILITY: PHYSICAL HEALTH: ON AVERAGE, HOW MANY DAYS PER WEEK DO YOU ENGAGE IN MODERATE TO STRENUOUS EXERCISE (LIKE A BRISK WALK)?: 0 DAYS

## 2025-08-12 SDOH — HEALTH STABILITY: PHYSICAL HEALTH: ON AVERAGE, HOW MANY MINUTES DO YOU ENGAGE IN EXERCISE AT THIS LEVEL?: 0 MIN

## 2025-08-12 ASSESSMENT — PATIENT HEALTH QUESTIONNAIRE - PHQ9
2. FEELING DOWN, DEPRESSED OR HOPELESS: NOT AT ALL
1. LITTLE INTEREST OR PLEASURE IN DOING THINGS: MORE THAN HALF THE DAYS
SUM OF ALL RESPONSES TO PHQ QUESTIONS 1-9: 2

## 2025-08-12 ASSESSMENT — LIFESTYLE VARIABLES
HOW MANY STANDARD DRINKS CONTAINING ALCOHOL DO YOU HAVE ON A TYPICAL DAY: 1
HOW MANY STANDARD DRINKS CONTAINING ALCOHOL DO YOU HAVE ON A TYPICAL DAY: 1 OR 2
HOW OFTEN DO YOU HAVE A DRINK CONTAINING ALCOHOL: MONTHLY OR LESS
HOW OFTEN DO YOU HAVE SIX OR MORE DRINKS ON ONE OCCASION: 1
HOW OFTEN DO YOU HAVE A DRINK CONTAINING ALCOHOL: 2

## 2025-08-19 ENCOUNTER — OFFICE VISIT (OUTPATIENT)
Dept: INTERNAL MEDICINE CLINIC | Age: 70
End: 2025-08-19

## 2025-08-19 VITALS
TEMPERATURE: 99.5 F | WEIGHT: 144.4 LBS | DIASTOLIC BLOOD PRESSURE: 64 MMHG | HEIGHT: 63 IN | OXYGEN SATURATION: 97 % | HEART RATE: 60 BPM | BODY MASS INDEX: 25.59 KG/M2 | SYSTOLIC BLOOD PRESSURE: 128 MMHG

## 2025-08-19 DIAGNOSIS — Z00.00 MEDICARE ANNUAL WELLNESS VISIT, SUBSEQUENT: Primary | ICD-10-CM

## 2025-08-19 DIAGNOSIS — I48.19 PERSISTENT ATRIAL FIBRILLATION (HCC): ICD-10-CM

## 2025-08-19 DIAGNOSIS — R91.1 LUNG NODULE: ICD-10-CM

## 2025-08-19 DIAGNOSIS — I42.1 HOCM (HYPERTROPHIC OBSTRUCTIVE CARDIOMYOPATHY) (HCC): ICD-10-CM

## 2025-08-19 RX ORDER — PROCHLORPERAZINE MALEATE 10 MG
TABLET ORAL
Qty: 30 TABLET | Refills: 4 | Status: SHIPPED | OUTPATIENT
Start: 2025-08-19

## 2025-09-03 ENCOUNTER — HOSPITAL ENCOUNTER (OUTPATIENT)
Dept: CT IMAGING | Age: 70
Discharge: HOME | End: 2025-09-03
Payer: MEDICARE

## 2025-09-03 DIAGNOSIS — R91.1 LUNG NODULE: ICD-10-CM

## 2025-09-03 PROCEDURE — 71271 CT THORAX LUNG CANCER SCR C-: CPT

## 2025-09-05 ENCOUNTER — TELEPHONE (OUTPATIENT)
Dept: INTERNAL MEDICINE CLINIC | Age: 70
End: 2025-09-05

## (undated) DEVICE — SUMP PERICARD AD 20FR WT TIP VERSATILE DSGN MULT PRT VENT

## (undated) DEVICE — [HIGH FLOW INSUFFLATOR,  DO NOT USE IF PACKAGE IS DAMAGED,  KEEP DRY,  KEEP AWAY FROM SUNLIGHT,  PROTECT FROM HEAT AND RADIOACTIVE SOURCES.]: Brand: PNEUMOSURE

## (undated) DEVICE — APPLIER CLP M/L SHFT DIA5MM 15 LIG LIGAMAX 5

## (undated) DEVICE — SUTURE ETHBND EXCEL SZ 2-0 L36IN NONABSORBABLE GRN WHT SXP86

## (undated) DEVICE — Z INACTIVE NO SUPPLIER SOLUTIONIRRIG 3000ML 0.9% SOD CHL FLX CONT [79720808] [HOSPIRA WORLDWIDE INC]

## (undated) DEVICE — ENDOSCOPY KIT: Brand: MEDLINE INDUSTRIES, INC.

## (undated) DEVICE — BLADE SURG NO15 S STL STR DISP GLASSVAN

## (undated) DEVICE — ORTHO PRE OP PACK: Brand: MEDLINE INDUSTRIES, INC.

## (undated) DEVICE — CONNECTOR IV TB L28MM CLR VLV ACCS NDLLSS DISP MAXPLUS

## (undated) DEVICE — CANISTER, RIGID, 1200CC: Brand: MEDLINE INDUSTRIES, INC.

## (undated) DEVICE — BLOOD TRANSFUSION FILTER: Brand: HAEMONETICS

## (undated) DEVICE — CONNECTOR STR 3/8IN ST 050506000 375STRCONN

## (undated) DEVICE — GAUZE,SPONGE,2"X2",8PLY,STERILE,LF,2'S: Brand: MEDLINE

## (undated) DEVICE — SUTURE S STL SZ 6 L18IN NONABSORBABLE SIL L48MM V-40 1/2 M649G

## (undated) DEVICE — 3M™ STERI-DRAPE™ INSTRUMENT POUCH 1018: Brand: STERI-DRAPE™

## (undated) DEVICE — DRAPE C ARM UNIV W41XL74IN CLR PLAS XR VELC CLSR POLY STRP

## (undated) DEVICE — CANNULA PERF 24FR CONN SITE 3/8IN SGL STG VEN W/ R ANG MTL

## (undated) DEVICE — CANNULA PERF 28FR L355CM CONN SITE 3 8IN VEN R ANG SGL STG

## (undated) DEVICE — TROCARS: Brand: KII® BALLOON BLUNT TIP SYSTEM

## (undated) DEVICE — SUTURE NONABSORBABLE MONOFILAMENT 5-0 C-1 1X24 IN PROLENE 8725H

## (undated) DEVICE — SUTURE MCRYL SZ 3-0 L27IN ABSRB UD PS-2 3/8 CIR REV CUT NDL MCP427H

## (undated) DEVICE — ELECTROSURGICAL PENCIL ROCKER SWITCH NON COATED BLADE ELECTRODE 10 FT (3 M) CORD HOLSTER: Brand: MEGADYNE

## (undated) DEVICE — DUAL LUMEN STOMACH TUBE: Brand: SALEM SUMP

## (undated) DEVICE — KIT OR ROOM TURNOVER W/STRAP

## (undated) DEVICE — SUTURE SZ 0 27IN 5/8 CIR UR-6  TAPER PT VIOLET ABSRB VICRYL J603H

## (undated) DEVICE — TROCAR ENDOSCP L100MM DIA5MM BLDELSS STBL SL OBT RADLUC

## (undated) DEVICE — SYSTEM SKIN CLSR 22CM DERMBND PRINEO

## (undated) DEVICE — Z INACTIVE OBSOLETE PER MEDTRONIC ADAPTER Y TYP RECIRCULATING FEM LUER CLMP W  CLR CODE ARROWS

## (undated) DEVICE — GLOVE SURG SZ 75 L12IN FNGR THK94MIL TRNSLUC YEL LTX

## (undated) DEVICE — SUTURE VCRL SZ 4-0 L18IN ABSRB UD L19MM PS-2 3/8 CIR PRIM J496H

## (undated) DEVICE — SUTURE PROL 3-0 L36IN NONABSORBABLE BLU L26MM SH 1/2 CIR P8522

## (undated) DEVICE — KIT INFUS PMP 270ML 4ML/HR 2ML/SITE SOAK CATH L5IN N NARC

## (undated) DEVICE — INDICATED FOR USE DURING OPEN AND LAPAROSCOPIC CHOLECYSTECTOMY PROCEDURES TO INJECT RADIOPAQUE MEDIA THROUGH THE CYSTIC DUCT INTO THE BILIARY TREE.: Brand: AEROSTAT®

## (undated) DEVICE — SHEET,DRAPE,40X58,STERILE: Brand: MEDLINE

## (undated) DEVICE — COVER LT HNDL BLU PLAS

## (undated) DEVICE — FORMALIN CLEAR VIAL 20 ML 10%

## (undated) DEVICE — 1.5L THIN WALL CAN: Brand: CRD

## (undated) DEVICE — SUTURE PERMA-HAND SZ 2 L60IN NONABSORBABLE BLK SILK BRAID SA8H

## (undated) DEVICE — SYRINGE MED 30ML STD CLR PLAS LUERLOCK TIP N CTRL DISP

## (undated) DEVICE — CATHETERIZATION TRAY 16 FR 5 CC FOL STR TIP URIMTR BARDX IC

## (undated) DEVICE — SUTURE VCRL SZ 0 L18IN ABSRB VLT L36MM CT-1 1/2 CIR J740D

## (undated) DEVICE — COR-KNOT® QUICK LOAD® 6-POUCH: Brand: COR-KNOT® QUICK LOAD®

## (undated) DEVICE — GLOVE ORANGE PI 7   MSG9070

## (undated) DEVICE — SOLUTION NORMOSOL-R PH 7.4   X

## (undated) DEVICE — E-Z CLEAN, NON-STICK, PTFE COATED, ELECTROSURGICAL BLADE ELECTRODE, 2.5 INCH (6.35 CM): Brand: EZ CLEAN

## (undated) DEVICE — STERNUM SAW BLADE, 9.4MM X 34MM X 1MM: Brand: MICROAIRE®

## (undated) DEVICE — SUTURE ETHBND 4-0 L30IN NONABSORBABLE GRN L17MM RB-1 1/2 X551H

## (undated) DEVICE — DRAPE,LAP,CHOLE,W/TROUGHS,STERILE: Brand: MEDLINE

## (undated) DEVICE — CONNECTOR PERF 3/8X3/8X3/8IN EQL WYE

## (undated) DEVICE — ELECTRODE PT RET AD L9FT HI MOIST COND ADH HYDRGEL CORDED

## (undated) DEVICE — 3M™ STERI-STRIP™ COMPOUND BENZOIN TINCTURE 40 BAGS/CARTON 4 CARTONS/CASE C1544: Brand: 3M™ STERI-STRIP™

## (undated) DEVICE — FOGARTY - HYDRAGRIP SURGICAL - CLAMP INSERTS: Brand: FOGARTY SOFTJAW

## (undated) DEVICE — Device

## (undated) DEVICE — CANNULA PERF 15FR L12.5IN RG STPCOCK WIREWOUND BODY

## (undated) DEVICE — CANNULA PERF 7FR L5.5IN AORT ROOT RADPQ STD TIP W/ VENT LN

## (undated) DEVICE — SUTURE GOR TX SZ 4-0 L24IN NONABSORBABLE L13MM TTC-13 3/8 5K02B

## (undated) DEVICE — SOLUTION IV CARDPLG PLEGISOL

## (undated) DEVICE — LAPAROSCOPY PK

## (undated) DEVICE — CONNECTOR PERF L5 IN OD1 2 IN SAT HCT ST FEATURING B CARE 5

## (undated) DEVICE — CORD ES L10FT MPLR LAP

## (undated) DEVICE — Device: Brand: TEMPORARY MYOCARDIAL HEARTWIRE

## (undated) DEVICE — 3M™ TEGADERM™ TRANSPARENT FILM DRESSING FRAME STYLE, 1624W, 2-3/8 IN X 2-3/4 IN (6 CM X 7 CM), 100/CT 4CT/CASE: Brand: 3M™ TEGADERM™

## (undated) DEVICE — CHLORAPREP 26ML ORANGE

## (undated) DEVICE — FILTER OXGNTR GAS BACT VIR REMOVAL W/ 1/4 INLET

## (undated) DEVICE — PRESSURE TUBING: Brand: TRUWAVE

## (undated) DEVICE — LARGE BORE STOPCOCK WITH ROTATING MALE LUER LOCK

## (undated) DEVICE — Device: Brand: MEDEX

## (undated) DEVICE — SUTURE ABSORBABLE BRAIDED 2-0 CT-1 27 IN UD VICRYL J259H

## (undated) DEVICE — STRIP,CLOSURE,WOUND,MEDI-STRIP,1/2X4: Brand: MEDLINE

## (undated) DEVICE — SUTURE GOR TX SZ 2-0 L36IN NONABSORBABLE L18MM TH-18 1/2 3N04B

## (undated) DEVICE — SYRINGE MED 20ML STD CLR PLAS LUERLOCK TIP N CTRL DISP

## (undated) DEVICE — WAX SURG 2.5GM HEMSTAT BNE BEESWAX PARAFFIN ISO PALMITATE

## (undated) DEVICE — ADTEC SINGLE USE HOOK SCISSORS, SHAFT ONLY, MONOPOLAR, STRAIGHT, WORKING LENGTH: 12 1/4", (310 MM), DIAM. 5 MM, BLUNT/BLUNT, INSULATED, SINGLE ACTION, STERILE, DISPOSABLE, PACKAGE OF 10 PIECES: Brand: AESCULAP

## (undated) DEVICE — SURGICAL PROCEDURE PACK PERF TBNG

## (undated) DEVICE — DRAIN SURG SGL COLL PT TB FOR ATS BG OASIS

## (undated) DEVICE — SUTURE PROL SZ 4-0 L36IN NONABSORBABLE BLU L26MM SH 1/2 CIR 8521H

## (undated) DEVICE — DRAPE SLUSH W44XL66IN FOR RECT BSIN ORS HUSH SYS PLATE-DRP

## (undated) DEVICE — NEEDLE HYPO 25GA L1.5IN BVL ORIENTED ECLIPSE

## (undated) DEVICE — CANNULA PERF 24FR 51CML 45DEG TIP 3 8IN CONN 20CML SUT RNG

## (undated) DEVICE — GARMENT COMPR STD FOR 17IN CALF UNIF THER FLOTRN

## (undated) DEVICE — GOWN,SIRUS,POLYRNF,BRTHSLV,LG,30/CS: Brand: MEDLINE

## (undated) DEVICE — SOLUTION IV 1000ML 0.9% SOD CHL FOR IRRIG PLAS CONT

## (undated) DEVICE — TAPE UMB W1 8XL24IN POLY

## (undated) DEVICE — GOWN,SIRUS,POLYRNF,BRTHSLV,XL,30/CS: Brand: MEDLINE

## (undated) DEVICE — OPEN HRT CDS

## (undated) DEVICE — TUBING SUCT L12FT DIA025IN UNIV W SCALLOPED FEM CONN

## (undated) DEVICE — SUTURE PDS II SZ 0 L27IN ABSRB VLT L36MM CT-1 1/2 CIR Z340H

## (undated) DEVICE — SOLUTION IV IRRIG POUR BRL 0.9% SODIUM CHL 2F7124

## (undated) DEVICE — JEWISH HOSPITAL TURNOVER KIT: Brand: MEDLINE INDUSTRIES, INC.

## (undated) DEVICE — DRAPE SURG HRT STRL

## (undated) DEVICE — TOWEL,OR,DSP,ST,BLUE,DLX,8/PK,10PK/CS: Brand: MEDLINE

## (undated) DEVICE — SURGICAL SET UP - SURE SET: Brand: MEDLINE INDUSTRIES, INC.

## (undated) DEVICE — COR-KNOT MINI® DEVICE KIT: Brand: COR-KNOT MINI®

## (undated) DEVICE — TUBING, SUCTION, 1/4" X 12', STRAIGHT: Brand: MEDLINE

## (undated) DEVICE — KIT SUCT DBL LUMN QLOK STD RESVR BRAT CELL SAVR

## (undated) DEVICE — KIT CVC AD 9FR L10CM POLYUR DBL LUMN NONTUNNELED AMLESS

## (undated) DEVICE — Z INACTIVE USE 2582933 BAG BLD TRNSF 1 CPLR IV ST 600ML TERUFLEX

## (undated) DEVICE — SET PROC STD VOL BOWL SUCT ASMBLY GS FLTR BLD COLLCTN RESVR

## (undated) DEVICE — SURE SET-DOUBLE BASIN-LF: Brand: MEDLINE INDUSTRIES, INC.